# Patient Record
Sex: FEMALE | Race: WHITE | NOT HISPANIC OR LATINO | Employment: OTHER | URBAN - METROPOLITAN AREA
[De-identification: names, ages, dates, MRNs, and addresses within clinical notes are randomized per-mention and may not be internally consistent; named-entity substitution may affect disease eponyms.]

---

## 2017-02-16 ENCOUNTER — COMMUNICATION - HEALTHEAST (OUTPATIENT)
Dept: VASCULAR SURGERY | Facility: CLINIC | Age: 62
End: 2017-02-16

## 2017-02-16 ENCOUNTER — AMBULATORY - HEALTHEAST (OUTPATIENT)
Dept: VASCULAR SURGERY | Facility: CLINIC | Age: 62
End: 2017-02-16

## 2017-02-16 DIAGNOSIS — I83.813 VARICOSE VEINS OF BOTH LOWER EXTREMITIES WITH PAIN: ICD-10-CM

## 2017-02-27 ENCOUNTER — OFFICE VISIT - HEALTHEAST (OUTPATIENT)
Dept: VASCULAR SURGERY | Facility: CLINIC | Age: 62
End: 2017-02-27

## 2017-02-27 DIAGNOSIS — I83.813 VARICOSE VEINS OF BOTH LOWER EXTREMITIES WITH PAIN: ICD-10-CM

## 2017-02-27 DIAGNOSIS — M79.604 LEG PAIN, BILATERAL: ICD-10-CM

## 2017-02-27 DIAGNOSIS — M79.605 LEG PAIN, BILATERAL: ICD-10-CM

## 2017-02-28 ENCOUNTER — OFFICE VISIT - HEALTHEAST (OUTPATIENT)
Dept: VASCULAR SURGERY | Facility: CLINIC | Age: 62
End: 2017-02-28

## 2017-02-28 DIAGNOSIS — I87.2 VENOUS INSUFFICIENCY OF BOTH LOWER EXTREMITIES: ICD-10-CM

## 2017-02-28 DIAGNOSIS — I83.893 SYMPTOMATIC VARICOSE VEINS OF BOTH LOWER EXTREMITIES: ICD-10-CM

## 2017-03-14 ENCOUNTER — COMMUNICATION - HEALTHEAST (OUTPATIENT)
Dept: VASCULAR SURGERY | Facility: CLINIC | Age: 62
End: 2017-03-14

## 2017-03-14 ENCOUNTER — AMBULATORY - HEALTHEAST (OUTPATIENT)
Dept: VASCULAR SURGERY | Facility: CLINIC | Age: 62
End: 2017-03-14

## 2017-03-16 ENCOUNTER — COMMUNICATION - HEALTHEAST (OUTPATIENT)
Dept: VASCULAR SURGERY | Facility: CLINIC | Age: 62
End: 2017-03-16

## 2017-03-20 ENCOUNTER — HOSPITAL ENCOUNTER (OUTPATIENT)
Dept: MAMMOGRAPHY | Facility: CLINIC | Age: 62
Discharge: HOME | End: 2017-03-20
Attending: NURSE PRACTITIONER

## 2017-03-20 DIAGNOSIS — Z12.31 VISIT FOR SCREENING MAMMOGRAM: ICD-10-CM

## 2017-03-22 ENCOUNTER — RECORDS - HEALTHEAST (OUTPATIENT)
Dept: VASCULAR ULTRASOUND | Facility: CLINIC | Age: 62
End: 2017-03-22

## 2017-03-22 DIAGNOSIS — I83.893 VARICOSE VEINS OF BILATERAL LOWER EXTREMITIES WITH OTHER COMPLICATIONS: ICD-10-CM

## 2017-03-22 DIAGNOSIS — I87.2 VENOUS INSUFFICIENCY (CHRONIC) (PERIPHERAL): ICD-10-CM

## 2017-03-24 ENCOUNTER — RECORDS - HEALTHEAST (OUTPATIENT)
Dept: VASCULAR ULTRASOUND | Facility: CLINIC | Age: 62
End: 2017-03-24

## 2017-03-24 ENCOUNTER — RECORDS - HEALTHEAST (OUTPATIENT)
Dept: ADMINISTRATIVE | Facility: OTHER | Age: 62
End: 2017-03-24

## 2017-03-24 DIAGNOSIS — I87.2 VENOUS INSUFFICIENCY (CHRONIC) (PERIPHERAL): ICD-10-CM

## 2017-03-24 DIAGNOSIS — I83.893 VARICOSE VEINS OF BILATERAL LOWER EXTREMITIES WITH OTHER COMPLICATIONS: ICD-10-CM

## 2017-03-27 ENCOUNTER — AMBULATORY - HEALTHEAST (OUTPATIENT)
Dept: VASCULAR SURGERY | Facility: CLINIC | Age: 62
End: 2017-03-27

## 2017-04-11 ENCOUNTER — OFFICE VISIT - HEALTHEAST (OUTPATIENT)
Dept: VASCULAR SURGERY | Facility: CLINIC | Age: 62
End: 2017-04-11

## 2017-04-11 DIAGNOSIS — I83.893 SYMPTOMATIC VARICOSE VEINS OF BOTH LOWER EXTREMITIES: ICD-10-CM

## 2017-06-02 ENCOUNTER — TELEPHONE (OUTPATIENT)
Dept: OPHTHALMOLOGY | Facility: CLINIC | Age: 62
End: 2017-06-02

## 2017-06-02 ENCOUNTER — COMMUNICATION - HEALTHEAST (OUTPATIENT)
Dept: FAMILY MEDICINE | Facility: CLINIC | Age: 62
End: 2017-06-02

## 2017-06-02 NOTE — TELEPHONE ENCOUNTER
Pt last seen at eye clinic years ago  Pt states h/o retina holes  Has had right eye floaters for couple years  Tuesday evening while cooking started having visual disturbance  Peripheral vision 'scrambled; and center clear  Lasted about 5 minutes or so until normalized-- has not reoccurred  No new floaters and no consistant flashing-- no flashing at night, no flashing with eye movement  Pt's vision back to baseline   Reviewed s/s of ocular migraine and s/s of vitreous changes  Scheduled evaluation with Dr. Pierre/Alisa next Tuesday for evaluation  Asked pt to call if has any reoccurrances of visual disturbances, new floaters, new flashing  Reviewed weekend on call protocols  Pt verbally demonstrated understanding and seemed satisfied with plan  William Higginbotham RN 12:44 PM 06/02/17

## 2017-06-02 NOTE — TELEPHONE ENCOUNTER
----- Message from William Higginbotham RN sent at 6/2/2017 11:26 AM CDT -----  Regarding: FW: PT SEEING FLOATERS AND VISION IS SPOTTY  Contact: [phone]   Left message with direct number at [phone]  William Higginbotham RN 11:26 AM 06/02/17    ----- Message -----     From: Kendy Manzano     Sent: 6/2/2017  10:38 AM       To: Eye Triage-[facility]  Subject: PT SEEING FLOATERS AND VISION IS SPOTTY          Pt is seeing floaters and vision is like pieces of a puzzle and getting worse. Per Pt, this started on Tuesday of this week and wanted to speak with a nurse.     Please give Pt a call back at [phone].    Thank you,    Kendy  Call Center    Please DO NOT send message and or reply back to sender. Call center Representatives DO NOT respond to Messages.

## 2017-06-05 ENCOUNTER — AMBULATORY - HEALTHEAST (OUTPATIENT)
Dept: FAMILY MEDICINE | Facility: CLINIC | Age: 62
End: 2017-06-05

## 2017-06-05 DIAGNOSIS — H54.7 DECREASED VISUAL ACUITY: ICD-10-CM

## 2017-06-06 ENCOUNTER — OFFICE VISIT (OUTPATIENT)
Dept: OPHTHALMOLOGY | Facility: CLINIC | Age: 62
End: 2017-06-06
Attending: OPHTHALMOLOGY
Payer: COMMERCIAL

## 2017-06-06 ENCOUNTER — AMBULATORY - HEALTHEAST (OUTPATIENT)
Dept: NURSING | Facility: CLINIC | Age: 62
End: 2017-06-06

## 2017-06-06 DIAGNOSIS — H35.413 LATTICE DEGENERATION OF PERIPHERAL RETINA, BILATERAL: ICD-10-CM

## 2017-06-06 DIAGNOSIS — M81.0 SENILE OSTEOPOROSIS: ICD-10-CM

## 2017-06-06 DIAGNOSIS — G43.109 MIGRAINE WITH TYPICAL AURA: Primary | ICD-10-CM

## 2017-06-06 PROCEDURE — 99212 OFFICE O/P EST SF 10 MIN: CPT | Mod: ZF

## 2017-06-06 ASSESSMENT — VISUAL ACUITY
OS_CC: 20/20
OD_CC+: -1
OD_CC: 20/20
METHOD: SNELLEN - LINEAR

## 2017-06-06 ASSESSMENT — REFRACTION_WEARINGRX
OS_CYLINDER: +1.25
OD_ADD: +2.50
OD_CYLINDER: +0.50
OD_AXIS: 012
OS_AXIS: 024
OS_SPHERE: +2.00
OS_ADD: +2.50
OD_SPHERE: +4.75

## 2017-06-06 ASSESSMENT — CUP TO DISC RATIO
OD_RATIO: 0.5
OS_RATIO: 0.5

## 2017-06-06 ASSESSMENT — SLIT LAMP EXAM - LIDS
COMMENTS: NORMAL
COMMENTS: NORMAL

## 2017-06-06 ASSESSMENT — CONF VISUAL FIELD
OD_NORMAL: 1
OS_NORMAL: 1

## 2017-06-06 NOTE — NURSING NOTE
Chief Complaints and History of Present Illnesses   Patient presents with     Consult For     floaters,retina holes X2yrs  BE     HPI    Affected eye(s):  Both   Symptoms:     Floaters (Comment: BE for years no new floaters )   No flashes   No glare   No halos   Photophobia (Comment: at times)      Frequency:  Constant       Do you have eye pain now?:  No      Comments:  Floaters and retinal hole X years  1 week ago had distorted vision a scramble arch, started to get headache not sure how long lasted took meds and heat pad fell asleep.  Tanesha Bearden COA 9:06 AM June 6, 2017

## 2017-06-06 NOTE — PROGRESS NOTES
CC: Subjective Visual Disturbance    HPI: Soco Ferguson is a 61 year old female who presents for comprehensive eye exam. She says 1 week ago she had an episode in which her vision seemed as if it were a jigsaw puzzle. She also noticed a dark area which seemed to expand. There were some occasional flashes. Then she began to have a headache in the back of her head. The symptoms began over the course of minutes. She took some ibuprofen and then went to bed. She since has not had any symptoms.    POHx:  Lattice Degeneration  [facility]    Assessment & Plan   Soco Ferguson is a 61 year old female with the following diagnoses:     1. Migraine with Aura   - the patient describes a typical migraine aura   - she has a history of migraine headaches but not necessarily aura, discussed as people age their migraines can have more visual disturbance than headache   - discussed warning symptoms to seek further evaluation     2. Lattice Degeneration, Both Eyes   - stable    3. Refractive Error   - defers today    Return for yearly comprehensive eye exam    Discussed with Dr. Cuellar,    Julio Pierre MD PGY-3  Ophthalmology    Attending Physician Attestation:  Complete documentation of historical and exam elements from today's encounter can be found in the full encounter summary report (not reduplicated in this progress note).  I personally obtained the chief complaint(s) and history of present illness.  I confirmed and edited as necessary the review of systems, past medical/surgical history, family history, social history, and examination findings as documented by others; and I examined the patient myself.  I personally reviewed the relevant tests, images, and reports as documented above.  I formulated and edited as necessary the assessment and plan and discussed the findings and management plan with the patient and family. . - Mitchel Cuellar MD

## 2017-06-06 NOTE — MR AVS SNAPSHOT
After Visit Summary   2017    Soco Ferguson    MRN: [id number]           Patient Information     Date Of Birth          1955        Visit Information        Provider Department      2017 8:30 AM Julio Pierre MD [facility]        Today's Diagnoses     Migraine with typical aura    -  1       Follow-ups after your visit        Follow-up notes from your care team     Return in about 1 year (around 2018) for sooner if new symptoms.      Who to contact     Please call your clinic at [phone] to:    Ask questions about your health    Make or cancel appointments    Discuss your medicines    Learn about your test results    Speak to your doctor   If you have compliments or concerns about an experience at your clinic, or if you wish to file a complaint, please contact [facility] Patient Relations at [phone] or email us at [email or web address]         Additional Information About Your Visit        MyChart Information     Digitwhizt is an electronic gateway that provides easy, online access to your medical records. With [employer], you can request a clinic appointment, read your test results, renew a prescription or communicate with your care team.     To sign up for Digitwhizt visit the website at [email or web address]   You will be asked to enter the access code listed below, as well as some personal information. Please follow the directions to create your username and password.     Your access code is: [email or web address]  Expires: 9/3/2017  6:31 AM     Your access code will  in 90 days. If you need help or a new code, please contact your [facility] or call [phone] for assistance.        Care EveryWhere ID     This is your Care EveryWhere ID. This could be used by other organizations to access your [place] medical records  [id number]         Blood Pressure from Last 3 Encounters:   No data found for  BP    Weight from Last 3 Encounters:   No data found for Wt              Today, you had the following     No orders found for display       Primary Care Provider Office Phone # Fax #    Lachelle Odom [phone]       [place] [address] [facility] [address]  [facility] [address]        Thank you!     Thank you for choosing EYE CLINIC  for your care. Our goal is always to provide you with excellent care. Hearing back from our patients is one way we can continue to improve our services. Please take a few minutes to complete the written survey that you may receive in the mail after your visit with us. Thank you!             Your Updated Medication List - Protect others around you: Learn how to safely use, store and throw away your medicines at www.disposemymeds.org.          This list is accurate as of: 6/6/17 10:20 AM.  Always use your most recent med list.                   Brand Name Dispense Instructions for use    [employer] CALCIUM CITRATE +D3 MINI 200-250 MG-UNIT Tabs   Generic drug:  Calcium Citrate-Vitamin D      Take 1 tablet by mouth       D 2000 2000 UNITS tablet   Generic drug:  cholecalciferol      Take 1 tablet by mouth       denosumab 60 MG/ML Soln injection    PROLIA     Inject 60 mg Subcutaneous       docusate 50 MG/5ML liquid    COLACE

## 2017-06-08 ENCOUNTER — COMMUNICATION - HEALTHEAST (OUTPATIENT)
Dept: INTERNAL MEDICINE | Facility: CLINIC | Age: 62
End: 2017-06-08

## 2017-06-08 DIAGNOSIS — M81.0 OSTEOPOROSIS: ICD-10-CM

## 2017-06-22 ENCOUNTER — COMMUNICATION - HEALTHEAST (OUTPATIENT)
Dept: VASCULAR SURGERY | Facility: CLINIC | Age: 62
End: 2017-06-22

## 2017-06-27 ENCOUNTER — COMMUNICATION - HEALTHEAST (OUTPATIENT)
Dept: FAMILY MEDICINE | Facility: CLINIC | Age: 62
End: 2017-06-27

## 2017-06-29 ENCOUNTER — OFFICE VISIT - HEALTHEAST (OUTPATIENT)
Dept: FAMILY MEDICINE | Facility: CLINIC | Age: 62
End: 2017-06-29

## 2017-06-29 ENCOUNTER — HOSPITAL ENCOUNTER (OUTPATIENT)
Dept: ULTRASOUND IMAGING | Facility: CLINIC | Age: 62
Discharge: HOME | End: 2017-06-29
Attending: NURSE PRACTITIONER

## 2017-06-29 DIAGNOSIS — N85.00 ENDOMETRIAL HYPERPLASIA, UNSPECIFIED: ICD-10-CM

## 2017-06-29 DIAGNOSIS — M81.0 SENILE OSTEOPOROSIS: ICD-10-CM

## 2017-06-29 DIAGNOSIS — E21.3 HYPERPARATHYROIDISM, UNSPECIFIED (H): ICD-10-CM

## 2017-06-29 DIAGNOSIS — E66.9 OBESITY: ICD-10-CM

## 2017-06-29 DIAGNOSIS — K51.919 ULCERATIVE COLITIS WITH COMPLICATION, UNSPECIFIED LOCATION (H): ICD-10-CM

## 2017-06-29 DIAGNOSIS — J30.9 ALLERGIC RHINITIS, UNSPECIFIED ALLERGIC RHINITIS TRIGGER, UNSPECIFIED RHINITIS SEASONALITY: ICD-10-CM

## 2017-06-29 DIAGNOSIS — K21.9 GASTROESOPHAGEAL REFLUX DISEASE, ESOPHAGITIS PRESENCE NOT SPECIFIED: ICD-10-CM

## 2017-06-29 DIAGNOSIS — Z00.00 ROUTINE GENERAL MEDICAL EXAMINATION AT A HEALTH CARE FACILITY: ICD-10-CM

## 2017-06-29 LAB
CHOLEST SERPL-MCNC: 247 MG/DL
FASTING STATUS PATIENT QL REPORTED: YES
HDLC SERPL-MCNC: 51 MG/DL
LDLC SERPL CALC-MCNC: 156 MG/DL
TRIGL SERPL-MCNC: 201 MG/DL

## 2017-06-29 ASSESSMENT — MIFFLIN-ST. JEOR: SCORE: 1258.04

## 2017-07-05 ENCOUNTER — AMBULATORY - HEALTHEAST (OUTPATIENT)
Dept: VASCULAR SURGERY | Facility: CLINIC | Age: 62
End: 2017-07-05

## 2017-07-05 ENCOUNTER — COMMUNICATION - HEALTHEAST (OUTPATIENT)
Dept: VASCULAR SURGERY | Facility: CLINIC | Age: 62
End: 2017-07-05

## 2017-07-05 DIAGNOSIS — I83.813 VARICOSE VEINS OF BOTH LOWER EXTREMITIES WITH PAIN: ICD-10-CM

## 2017-07-05 LAB
HPV INTERPRETATION - HISTORICAL: NORMAL
HPV INTERPRETER - HISTORICAL: NORMAL

## 2017-07-13 ENCOUNTER — COMMUNICATION - HEALTHEAST (OUTPATIENT)
Dept: FAMILY MEDICINE | Facility: CLINIC | Age: 62
End: 2017-07-13

## 2017-07-25 ENCOUNTER — COMMUNICATION - HEALTHEAST (OUTPATIENT)
Dept: FAMILY MEDICINE | Facility: CLINIC | Age: 62
End: 2017-07-25

## 2017-07-26 ENCOUNTER — COMMUNICATION - HEALTHEAST (OUTPATIENT)
Dept: SCHEDULING | Facility: CLINIC | Age: 62
End: 2017-07-26

## 2017-08-23 ENCOUNTER — OFFICE VISIT - HEALTHEAST (OUTPATIENT)
Dept: VASCULAR SURGERY | Facility: CLINIC | Age: 62
End: 2017-08-23

## 2017-08-23 DIAGNOSIS — I83.893 SYMPTOMATIC VARICOSE VEINS OF BOTH LOWER EXTREMITIES: ICD-10-CM

## 2017-08-23 DIAGNOSIS — I87.2 VENOUS INSUFFICIENCY OF BOTH LOWER EXTREMITIES: ICD-10-CM

## 2017-08-25 ENCOUNTER — COMMUNICATION - HEALTHEAST (OUTPATIENT)
Dept: FAMILY MEDICINE | Facility: CLINIC | Age: 62
End: 2017-08-25

## 2017-08-31 ENCOUNTER — OFFICE VISIT - HEALTHEAST (OUTPATIENT)
Dept: VASCULAR SURGERY | Facility: CLINIC | Age: 62
End: 2017-08-31

## 2017-08-31 DIAGNOSIS — M79.604 LEG PAIN, BILATERAL: ICD-10-CM

## 2017-08-31 DIAGNOSIS — I83.813 VARICOSE VEINS OF BOTH LOWER EXTREMITIES WITH PAIN: ICD-10-CM

## 2017-08-31 DIAGNOSIS — S86.892A SHIN SPLINT, LEFT, INITIAL ENCOUNTER: ICD-10-CM

## 2017-08-31 DIAGNOSIS — Q66.70 HIGH ARCHES, CONGENITAL: ICD-10-CM

## 2017-08-31 DIAGNOSIS — M79.605 LEG PAIN, BILATERAL: ICD-10-CM

## 2017-08-31 ASSESSMENT — MIFFLIN-ST. JEOR: SCORE: 1244.89

## 2017-09-01 ENCOUNTER — COMMUNICATION - HEALTHEAST (OUTPATIENT)
Dept: VASCULAR SURGERY | Facility: CLINIC | Age: 62
End: 2017-09-01

## 2017-09-20 ENCOUNTER — COMMUNICATION - HEALTHEAST (OUTPATIENT)
Dept: VASCULAR SURGERY | Facility: CLINIC | Age: 62
End: 2017-09-20

## 2017-10-04 ENCOUNTER — RECORDS - HEALTHEAST (OUTPATIENT)
Dept: ADMINISTRATIVE | Facility: OTHER | Age: 62
End: 2017-10-04

## 2017-10-04 ENCOUNTER — RECORDS - HEALTHEAST (OUTPATIENT)
Dept: LAB | Facility: HOSPITAL | Age: 62
End: 2017-10-04

## 2017-10-05 LAB — ANA SER QL: 0.2 U

## 2017-10-10 LAB
SS-A/RO AUTOANTIBODIES - HISTORICAL: 2 EU
SS-B/LA AUTOANTIBODIES - HISTORICAL: 0 EU

## 2017-11-01 ENCOUNTER — COMMUNICATION - HEALTHEAST (OUTPATIENT)
Dept: FAMILY MEDICINE | Facility: CLINIC | Age: 62
End: 2017-11-01

## 2017-11-06 ENCOUNTER — AMBULATORY - HEALTHEAST (OUTPATIENT)
Dept: VASCULAR SURGERY | Facility: CLINIC | Age: 62
End: 2017-11-06

## 2017-11-06 ENCOUNTER — AMBULATORY - HEALTHEAST (OUTPATIENT)
Dept: FAMILY MEDICINE | Facility: CLINIC | Age: 62
End: 2017-11-06

## 2017-11-06 ENCOUNTER — COMMUNICATION - HEALTHEAST (OUTPATIENT)
Dept: VASCULAR SURGERY | Facility: CLINIC | Age: 62
End: 2017-11-06

## 2017-11-06 DIAGNOSIS — J31.0 CHRONIC RHINITIS, UNSPECIFIED TYPE: ICD-10-CM

## 2017-11-06 DIAGNOSIS — I83.813 VARICOSE VEINS OF BOTH LOWER EXTREMITIES WITH PAIN: ICD-10-CM

## 2017-11-06 DIAGNOSIS — M79.605 LEFT LEG PAIN: ICD-10-CM

## 2017-11-13 ENCOUNTER — RECORDS - HEALTHEAST (OUTPATIENT)
Dept: ADMINISTRATIVE | Facility: OTHER | Age: 62
End: 2017-11-13

## 2017-11-13 ENCOUNTER — OFFICE VISIT - HEALTHEAST (OUTPATIENT)
Dept: VASCULAR SURGERY | Facility: CLINIC | Age: 62
End: 2017-11-13

## 2017-11-13 DIAGNOSIS — Q66.70 HIGH ARCHES, CONGENITAL: ICD-10-CM

## 2017-11-13 DIAGNOSIS — M79.605 LEFT LEG PAIN: ICD-10-CM

## 2017-11-13 DIAGNOSIS — I83.813 VARICOSE VEINS OF BOTH LOWER EXTREMITIES WITH PAIN: ICD-10-CM

## 2017-11-13 DIAGNOSIS — S86.892A SHIN SPLINT, LEFT, INITIAL ENCOUNTER: ICD-10-CM

## 2017-11-13 ASSESSMENT — MIFFLIN-ST. JEOR: SCORE: 1244.89

## 2017-12-06 ENCOUNTER — RECORDS - HEALTHEAST (OUTPATIENT)
Dept: BONE DENSITY | Facility: CLINIC | Age: 62
End: 2017-12-06

## 2017-12-06 ENCOUNTER — RECORDS - HEALTHEAST (OUTPATIENT)
Dept: ADMINISTRATIVE | Facility: OTHER | Age: 62
End: 2017-12-06

## 2017-12-06 DIAGNOSIS — M81.0 AGE-RELATED OSTEOPOROSIS WITHOUT CURRENT PATHOLOGICAL FRACTURE: ICD-10-CM

## 2018-01-10 ENCOUNTER — OFFICE VISIT - HEALTHEAST (OUTPATIENT)
Dept: INTERNAL MEDICINE | Facility: CLINIC | Age: 63
End: 2018-01-10

## 2018-01-10 DIAGNOSIS — M81.0 SENILE OSTEOPOROSIS: ICD-10-CM

## 2018-02-01 ENCOUNTER — OFFICE VISIT - HEALTHEAST (OUTPATIENT)
Dept: OTOLARYNGOLOGY | Facility: CLINIC | Age: 63
End: 2018-02-01

## 2018-02-01 DIAGNOSIS — G50.1 ATYPICAL FACE PAIN: ICD-10-CM

## 2018-02-01 DIAGNOSIS — J37.0 CHRONIC LARYNGITIS: ICD-10-CM

## 2018-02-01 DIAGNOSIS — K21.9 LARYNGOPHARYNGEAL REFLUX (LPR): ICD-10-CM

## 2018-02-01 DIAGNOSIS — J30.1 CHRONIC SEASONAL ALLERGIC RHINITIS DUE TO POLLEN: ICD-10-CM

## 2018-02-14 ENCOUNTER — RECORDS - HEALTHEAST (OUTPATIENT)
Dept: ADMINISTRATIVE | Facility: OTHER | Age: 63
End: 2018-02-14

## 2018-02-15 ENCOUNTER — OFFICE VISIT - HEALTHEAST (OUTPATIENT)
Dept: VASCULAR SURGERY | Facility: CLINIC | Age: 63
End: 2018-02-15

## 2018-02-15 DIAGNOSIS — I83.813 VARICOSE VEINS OF BOTH LOWER EXTREMITIES WITH PAIN: ICD-10-CM

## 2018-02-15 DIAGNOSIS — Q66.70 HIGH ARCHES, CONGENITAL: ICD-10-CM

## 2018-02-15 DIAGNOSIS — M79.605 LEFT LEG PAIN: ICD-10-CM

## 2018-02-15 ASSESSMENT — MIFFLIN-ST. JEOR: SCORE: 1204.06

## 2018-03-27 ENCOUNTER — HOSPITAL ENCOUNTER (OUTPATIENT)
Dept: MAMMOGRAPHY | Facility: CLINIC | Age: 63
Discharge: HOME | End: 2018-03-27
Attending: NURSE PRACTITIONER

## 2018-03-27 DIAGNOSIS — Z12.31 VISIT FOR SCREENING MAMMOGRAM: ICD-10-CM

## 2018-06-19 ENCOUNTER — AMBULATORY - HEALTHEAST (OUTPATIENT)
Dept: INTERNAL MEDICINE | Facility: CLINIC | Age: 63
End: 2018-06-19

## 2018-06-19 DIAGNOSIS — M81.0 SENILE OSTEOPOROSIS: ICD-10-CM

## 2018-07-13 ENCOUNTER — AMBULATORY - HEALTHEAST (OUTPATIENT)
Dept: NURSING | Facility: CLINIC | Age: 63
End: 2018-07-13

## 2018-10-09 ENCOUNTER — COMMUNICATION - HEALTHEAST (OUTPATIENT)
Dept: VASCULAR SURGERY | Facility: CLINIC | Age: 63
End: 2018-10-09

## 2018-10-09 DIAGNOSIS — Q66.70 HIGH ARCHES, CONGENITAL: ICD-10-CM

## 2018-10-09 DIAGNOSIS — I83.813 VARICOSE VEINS OF BOTH LOWER EXTREMITIES WITH PAIN: ICD-10-CM

## 2018-10-09 DIAGNOSIS — G25.81 RESTLESS LEGS SYNDROME (RLS): ICD-10-CM

## 2018-10-09 DIAGNOSIS — M79.605 LEFT LEG PAIN: ICD-10-CM

## 2018-10-11 ENCOUNTER — RECORDS - HEALTHEAST (OUTPATIENT)
Dept: ADMINISTRATIVE | Facility: OTHER | Age: 63
End: 2018-10-11

## 2018-11-25 ENCOUNTER — COMMUNICATION - HEALTHEAST (OUTPATIENT)
Dept: FAMILY MEDICINE | Facility: CLINIC | Age: 63
End: 2018-11-25

## 2019-01-31 ENCOUNTER — AMBULATORY - HEALTHEAST (OUTPATIENT)
Dept: NURSING | Facility: CLINIC | Age: 64
End: 2019-01-31

## 2019-01-31 ENCOUNTER — OFFICE VISIT - HEALTHEAST (OUTPATIENT)
Dept: FAMILY MEDICINE | Facility: CLINIC | Age: 64
End: 2019-01-31

## 2019-01-31 DIAGNOSIS — J02.9 PHARYNGITIS, UNSPECIFIED ETIOLOGY: ICD-10-CM

## 2019-01-31 LAB — DEPRECATED S PYO AG THROAT QL EIA: NORMAL

## 2019-02-01 LAB — GROUP A STREP BY PCR: NORMAL

## 2019-02-14 ENCOUNTER — RECORDS - HEALTHEAST (OUTPATIENT)
Dept: ADMINISTRATIVE | Facility: OTHER | Age: 64
End: 2019-02-14

## 2019-02-14 ENCOUNTER — OFFICE VISIT - HEALTHEAST (OUTPATIENT)
Dept: VASCULAR SURGERY | Facility: CLINIC | Age: 64
End: 2019-02-14

## 2019-02-14 DIAGNOSIS — Q66.70 HIGH ARCHES, CONGENITAL: ICD-10-CM

## 2019-02-14 DIAGNOSIS — I83.813 VARICOSE VEINS OF BOTH LOWER EXTREMITIES WITH PAIN: ICD-10-CM

## 2019-02-14 DIAGNOSIS — I87.2 VENOUS INSUFFICIENCY OF BOTH LOWER EXTREMITIES: ICD-10-CM

## 2019-02-14 ASSESSMENT — MIFFLIN-ST. JEOR: SCORE: 1249.42

## 2019-02-15 ENCOUNTER — COMMUNICATION - HEALTHEAST (OUTPATIENT)
Dept: OTHER | Facility: CLINIC | Age: 64
End: 2019-02-15

## 2019-02-20 ENCOUNTER — COMMUNICATION - HEALTHEAST (OUTPATIENT)
Dept: OTHER | Facility: CLINIC | Age: 64
End: 2019-02-20

## 2019-02-21 ENCOUNTER — AMBULATORY - HEALTHEAST (OUTPATIENT)
Dept: VASCULAR SURGERY | Facility: CLINIC | Age: 64
End: 2019-02-21

## 2019-02-21 DIAGNOSIS — M79.604 BILATERAL LEG PAIN: ICD-10-CM

## 2019-02-21 DIAGNOSIS — I83.813 VARICOSE VEINS OF BOTH LOWER EXTREMITIES WITH PAIN: ICD-10-CM

## 2019-02-21 DIAGNOSIS — M79.605 BILATERAL LEG PAIN: ICD-10-CM

## 2019-02-21 DIAGNOSIS — Q66.70 HIGH ARCH: ICD-10-CM

## 2019-04-26 ENCOUNTER — HOSPITAL ENCOUNTER (OUTPATIENT)
Dept: MAMMOGRAPHY | Facility: CLINIC | Age: 64
Discharge: HOME | End: 2019-04-26
Attending: NURSE PRACTITIONER

## 2019-04-26 DIAGNOSIS — Z12.31 VISIT FOR SCREENING MAMMOGRAM: ICD-10-CM

## 2019-05-03 ENCOUNTER — COMMUNICATION - HEALTHEAST (OUTPATIENT)
Dept: INTERNAL MEDICINE | Facility: CLINIC | Age: 64
End: 2019-05-03

## 2019-05-03 DIAGNOSIS — M81.0 SENILE OSTEOPOROSIS: ICD-10-CM

## 2019-05-30 PROBLEM — K51.90 ULCERATIVE COLITIS (H): Status: ACTIVE | Noted: 2019-05-30

## 2019-05-30 PROBLEM — G25.81 RESTLESS LEGS SYNDROME: Status: ACTIVE | Noted: 2019-05-30

## 2019-05-30 PROBLEM — N85.00 ENDOMETRIAL HYPERPLASIA: Status: ACTIVE | Noted: 2019-05-30

## 2019-05-30 PROBLEM — D25.9 UTERINE LEIOMYOMA: Status: ACTIVE | Noted: 2019-05-30

## 2019-05-30 PROBLEM — M81.0 SENILE OSTEOPOROSIS: Status: ACTIVE | Noted: 2019-05-30

## 2019-05-30 PROBLEM — E21.3 HYPERPARATHYROIDISM (H): Status: ACTIVE | Noted: 2019-05-30

## 2019-05-30 PROBLEM — B40.9 BLASTOMYCOSIS: Status: ACTIVE | Noted: 2019-05-30

## 2019-05-30 NOTE — PROGRESS NOTES
HPI:  Patient presents for an annual eye exam - she is interested in new glasses      Pertinent Medical History:    Blastomycosis    Hyperparathyroidism    Ulcerative colitis    Leiomyoma of uterus    Ocular History:    Migraine with Aura    Lattice Degeneration both eyes    Eye Medications:    None    Assessment and Plan:  1.   Narrow Angle, both eyes.     Gonioscopy shows angles open to PTM both eyes - can see scleral spur with depression.     IOP is not elevated today.     Risk factors include: hyperopia and cataract    LPI consult with Daniel Olson/Alisa/Susanne.     Signs and symptoms of angle closure discussed - patient is to be seen immediately if experiencing symptoms.     2.   Migraine with Aura    Stable as before.     3.   Lattice Degeneration, both eyes.     Re-assess at a later juncture. Not able to dilate secondary to narrow angle.     4.   Hyperopia, both eyes.     Hold off on prescription for now.       Medical History:  No past medical history on file.    Medications:  Current Outpatient Medications   Medication Sig Dispense Refill     Calcium Citrate-Vitamin D (CVS CALCIUM CITRATE +D3 MINI) 200-250 MG-UNIT TABS Take 1 tablet by mouth       cholecalciferol (D 2000) 2000 UNITS tablet Take 1 tablet by mouth       denosumab (PROLIA) 60 MG/ML SOLN injection Inject 60 mg Subcutaneous       docusate (COLACE) 50 MG/5ML liquid      Complete documentation of historical and exam elements from today's encounter can be found in the full encounter summary report (not reduplicated in this progress note). I personally obtained the chief complaint(s) and history of present illness.  I confirmed and edited as necessary the review of systems, past medical/surgical history, family history, social history, and examination findings as documented by others; and I examined the patient myself. I personally reviewed the relevant tests, images, and reports as documented above. I formulated and edited as necessary the  assessment and plan and discussed the findings and management plan with the patient and family. - Janine Ivy OD

## 2019-05-31 ENCOUNTER — OFFICE VISIT (OUTPATIENT)
Dept: OPHTHALMOLOGY | Facility: CLINIC | Age: 64
End: 2019-05-31
Attending: OPHTHALMOLOGY
Payer: COMMERCIAL

## 2019-05-31 DIAGNOSIS — H25.813 COMBINED FORMS OF AGE-RELATED CATARACT OF BOTH EYES: ICD-10-CM

## 2019-05-31 DIAGNOSIS — H40.033 ANATOMICAL NARROW ANGLE BORDERLINE GLAUCOMA OF BOTH EYES: Primary | ICD-10-CM

## 2019-05-31 DIAGNOSIS — H52.03 HYPERMETROPIA OF BOTH EYES: ICD-10-CM

## 2019-05-31 PROBLEM — M26.639 ARTICULAR DISC DISORDER OF TEMPOROMANDIBULAR JOINT: Status: ACTIVE | Noted: 2018-02-14

## 2019-05-31 PROBLEM — M79.18 MYOFASCIAL PAIN: Status: ACTIVE | Noted: 2018-02-14

## 2019-05-31 PROCEDURE — 92020 GONIOSCOPY: CPT | Mod: ZF | Performed by: OPTOMETRIST

## 2019-05-31 PROCEDURE — 92015 DETERMINE REFRACTIVE STATE: CPT | Mod: ZF

## 2019-05-31 PROCEDURE — G0463 HOSPITAL OUTPT CLINIC VISIT: HCPCS | Mod: ZF

## 2019-05-31 ASSESSMENT — GONIOSCOPY
OD_TEMPORAL: PTM
OD_NASAL: PTM
OS_NASAL: PTM
OS_SUPERIOR: PTM
OD_SUPERIOR: PTM
OD_INFERIOR: PTM
OS_INFERIOR: PTM
OS_TEMPORAL: PTM

## 2019-05-31 ASSESSMENT — CUP TO DISC RATIO
OD_RATIO: 0.5
OS_RATIO: 0.5

## 2019-05-31 ASSESSMENT — TONOMETRY
OD_IOP_MMHG: 15
IOP_METHOD: APPLANATION
OS_IOP_MMHG: 17

## 2019-05-31 ASSESSMENT — REFRACTION_WEARINGRX
OD_AXIS: 012
OS_AXIS: 024
OS_ADD: +2.50
OS_SPHERE: +2.00
OD_CYLINDER: +0.50
OS_CYLINDER: +1.25
OD_ADD: +2.50
OD_SPHERE: +4.75

## 2019-05-31 ASSESSMENT — REFRACTION_MANIFEST
OD_CYLINDER: +0.75
OD_SPHERE: +4.25
OD_AXIS: 015
OS_CYLINDER: +1.00
OS_AXIS: 015
OD_ADD: +2.50
OS_SPHERE: +1.75
OS_ADD: +2.50

## 2019-05-31 ASSESSMENT — CONF VISUAL FIELD
OD_NORMAL: 1
METHOD: COUNTING FINGERS
OS_NORMAL: 1

## 2019-05-31 ASSESSMENT — VISUAL ACUITY
METHOD: SNELLEN - LINEAR
CORRECTION_TYPE: GLASSES
OD_CC: 20/25
OD_CC+: -1
OS_CC: 20/20

## 2019-05-31 ASSESSMENT — SLIT LAMP EXAM - LIDS
COMMENTS: NORMAL
COMMENTS: NORMAL

## 2019-05-31 NOTE — NURSING NOTE
Chief Complaints and History of Present Illnesses   Patient presents with     Yearly Exam     Chief Complaint(s) and History of Present Illness(es)     Yearly Exam     Laterality: both eyes    Onset: gradual    Onset: years ago    Quality: States va is the same since last visit      Frequency: constantly    Associated symptoms: Negative for dryness, redness, tearing, floaters and flashes    Pain scale: 0/10              Comments     Sera FRENCH 8:04 AM May 31, 2019

## 2019-06-10 ENCOUNTER — OFFICE VISIT (OUTPATIENT)
Dept: OPHTHALMOLOGY | Facility: CLINIC | Age: 64
End: 2019-06-10
Attending: OPTOMETRIST
Payer: COMMERCIAL

## 2019-06-10 DIAGNOSIS — H40.033 ANATOMICAL NARROW ANGLE BORDERLINE GLAUCOMA OF BOTH EYES: Primary | ICD-10-CM

## 2019-06-10 PROCEDURE — G0463 HOSPITAL OUTPT CLINIC VISIT: HCPCS | Mod: ZF

## 2019-06-10 PROCEDURE — 92020 GONIOSCOPY: CPT | Mod: ZF | Performed by: OPHTHALMOLOGY

## 2019-06-10 PROCEDURE — 25000125 ZZHC RX 250: Mod: ZF | Performed by: OPHTHALMOLOGY

## 2019-06-10 PROCEDURE — 66761 REVISION OF IRIS: CPT | Mod: RT,ZF | Performed by: OPHTHALMOLOGY

## 2019-06-10 RX ORDER — PREDNISOLONE ACETATE 10 MG/ML
1 SUSPENSION/ DROPS OPHTHALMIC 4 TIMES DAILY
Status: DISCONTINUED | OUTPATIENT
Start: 2019-06-10 | End: 2024-07-12

## 2019-06-10 RX ADMIN — PREDNISOLONE ACETATE 1 DROP: 10 SUSPENSION/ DROPS OPHTHALMIC at 10:01

## 2019-06-10 RX ADMIN — APRACLONIDINE HYDROCHLORIDE 1 DROP: 10 SOLUTION/ DROPS OPHTHALMIC at 09:11

## 2019-06-10 ASSESSMENT — VISUAL ACUITY
METHOD: SNELLEN - LINEAR
CORRECTION_TYPE: GLASSES
OS_CC: 20/20
OD_CC: 20/25

## 2019-06-10 ASSESSMENT — TONOMETRY
OS_IOP_MMHG: 17
IOP_METHOD: APPLANATION
OD_IOP_MMHG: 16

## 2019-06-10 ASSESSMENT — CUP TO DISC RATIO
OS_RATIO: 0.5
OD_RATIO: 0.5

## 2019-06-10 ASSESSMENT — REFRACTION_WEARINGRX
OD_AXIS: 012
OD_ADD: +2.50
OD_CYLINDER: +0.50
OS_ADD: +2.50
OS_AXIS: 024
OD_SPHERE: +4.75
OS_SPHERE: +2.00
OS_CYLINDER: +1.25

## 2019-06-10 ASSESSMENT — GONIOSCOPY
OS_SUPERIOR: B25B
OD_INFERIOR: B25B
OS_NASAL: B25B
OS_TEMPORAL: A25B
OD_NASAL: B25B
OD_TEMPORAL: A25B
OD_SUPERIOR: B25B
OS_INFERIOR: B25B

## 2019-06-10 ASSESSMENT — CONF VISUAL FIELD
OD_NORMAL: 1
OS_NORMAL: 1

## 2019-06-10 ASSESSMENT — PACHYMETRY
OS_CT(UM): 543
OD_CT(UM): 539

## 2019-06-10 ASSESSMENT — SLIT LAMP EXAM - LIDS
COMMENTS: NORMAL
COMMENTS: NORMAL

## 2019-06-10 NOTE — PROGRESS NOTES
HPI:  Soco Ferguson is a 63 year old female referred by Dr. Ivy for anatomical narrow angle of both eyes.     Pertinent Medical History:    Blastomycosis    Hyperparathyroidism    Ulcerative colitis    Leiomyoma of uterus    Ocular History:    Migraine with Aura    Lattice Degeneration both eyes    Assessment & Plan      (H40.033) Anatomical narrow angle borderline glaucoma of both eyes  (primary encounter diagnosis)  Comment: Appears borderline occludable in both eyes today in setting of high hyperopia and mild cataracts both eyes. Normal IOPs today and with Dr. Ivy.   Plan: Discussed r/b/a of LPI right eye, and she would like to proceed. Please see Imaging/Proc tab for procedure details. Return in 2-3 weeks for LPI left eye.     Return in about 3 weeks (around 7/1/2019) for LPI left eye. or sooner as needed.      Teaching statement:  Complete documentation of historical and exam elements from today's encounter can be found in the full encounter summary report (not reduplicated in this progress note). I personally obtained the chief complaint(s) and history of present illness.  I confirmed and edited as necessary the review of systems, past medical/surgical history, family history, social history, and examination findings as documented by others; and I examined the patient myself. I personally reviewed the relevant tests, images, and reports as documented above.     I formulated and edited as necessary the assessment and plan and discussed the findings and management plan with the patient and family.    Tabby Skinner MD  Comprehensive Ophthalmology & Ocular Pathology  Department of Ophthalmology and Visual Neurosciences  [email or web address]  Pager [phone]

## 2019-06-10 NOTE — NURSING NOTE
"Chief Complaints and History of Present Illnesses   Patient presents with     Consult For     Chief Complaint(s) and History of Present Illness(es)     Consult For     Laterality: both eyes              Comments     Patient here for an evaluation due to Anatomical narrow angle borderline glaucoma of both eyes. Her vision has been stable over the last 2 weeks. Denies eye pain each eye. Denies flashes each eye, has floaters \"sometimes\".   Deisy Cantor COA 7:45 AM Ayaka 10, 2019                   "

## 2019-06-24 ENCOUNTER — OFFICE VISIT (OUTPATIENT)
Dept: OPHTHALMOLOGY | Facility: CLINIC | Age: 64
End: 2019-06-24
Attending: OPHTHALMOLOGY
Payer: COMMERCIAL

## 2019-06-24 DIAGNOSIS — H40.033 ANATOMICAL NARROW ANGLE BORDERLINE GLAUCOMA OF BOTH EYES: Primary | ICD-10-CM

## 2019-06-24 PROCEDURE — G0463 HOSPITAL OUTPT CLINIC VISIT: HCPCS | Mod: ZF

## 2019-06-24 PROCEDURE — 66761 REVISION OF IRIS: CPT | Mod: LT,ZF | Performed by: OPHTHALMOLOGY

## 2019-06-24 RX ORDER — PREDNISOLONE ACETATE 10 MG/ML
1 SUSPENSION/ DROPS OPHTHALMIC 4 TIMES DAILY
Status: DISPENSED | OUTPATIENT
Start: 2019-06-24 | End: 2019-06-28

## 2019-06-24 ASSESSMENT — REFRACTION_WEARINGRX
OS_ADD: +2.50
OD_ADD: +2.50
OS_AXIS: 024
OD_CYLINDER: +0.50
OS_CYLINDER: +1.25
OS_SPHERE: +2.00
OD_SPHERE: +4.75
OD_AXIS: 012

## 2019-06-24 ASSESSMENT — CUP TO DISC RATIO
OS_RATIO: 0.5
OD_RATIO: 0.5

## 2019-06-24 ASSESSMENT — TONOMETRY
IOP_METHOD: APPLANATION
OS_IOP_MMHG: 20
OD_IOP_MMHG: 20

## 2019-06-24 ASSESSMENT — CONF VISUAL FIELD
OD_NORMAL: 1
OS_NORMAL: 1

## 2019-06-24 ASSESSMENT — SLIT LAMP EXAM - LIDS
COMMENTS: NORMAL
COMMENTS: NORMAL

## 2019-06-24 ASSESSMENT — VISUAL ACUITY
OS_CC: 20/20
OD_CC: 20/25
CORRECTION_TYPE: GLASSES
METHOD: SNELLEN - LINEAR

## 2019-06-24 NOTE — PROGRESS NOTES
HPI:  Soco Ferguson is a 63 year old female referred by Dr. Ivy for anatomical narrow angle of both eyes. She has done well since laser right eye without issues. No pain, redness, discharge. No flashes/floaters.     Pertinent Medical History:    Blastomycosis    Hyperparathyroidism    Ulcerative colitis    Leiomyoma of uterus    Ocular History:    Migraine with Aura    Lattice Degeneration both eyes    Assessment & Plan      (H40.033) Anatomical narrow angle borderline glaucoma of both eyes  (primary encounter diagnosis)  Comment: Appears borderline occludable in both eyes at last visit. Good appearance of LPI right eye today. In setting of high hyperopia and mild cataracts both eyes. Normal IOPs today and with Dr. Ivy.   Plan: Discussed r/b/a of LPI left eye, and she would like to proceed. Please see Imaging/Proc tab for procedure details.     Return in about 2 months (around 8/24/2019) for applanation IOP, dilation. or sooner as needed.      Teaching statement:  Complete documentation of historical and exam elements from today's encounter can be found in the full encounter summary report (not reduplicated in this progress note). I personally obtained the chief complaint(s) and history of present illness.  I confirmed and edited as necessary the review of systems, past medical/surgical history, family history, social history, and examination findings as documented by others; and I examined the patient myself. I personally reviewed the relevant tests, images, and reports as documented above.     I formulated and edited as necessary the assessment and plan and discussed the findings and management plan with the patient and family.    Tabby Skinner MD  Comprehensive Ophthalmology & Ocular Pathology  Department of Ophthalmology and Visual Neurosciences  [email or web address]  Pager [phone]

## 2019-06-24 NOTE — NURSING NOTE
Chief Complaints and History of Present Illnesses   Patient presents with     Follow Up     Chief Complaint(s) and History of Present Illness(es)     Follow Up               Comments     Anatomical narrow angle borderline glaucoma of both eyes-LPI LE today    Pt. States that she is doing well.  No change in VA BE.  No pain BE.  Lisette Holley COT 7:29 AM June 24, 2019

## 2019-08-02 ENCOUNTER — RECORDS - HEALTHEAST (OUTPATIENT)
Dept: BONE DENSITY | Facility: CLINIC | Age: 64
End: 2019-08-02

## 2019-08-02 ENCOUNTER — OFFICE VISIT - HEALTHEAST (OUTPATIENT)
Dept: INTERNAL MEDICINE | Facility: CLINIC | Age: 64
End: 2019-08-02

## 2019-08-02 ENCOUNTER — COMMUNICATION - HEALTHEAST (OUTPATIENT)
Dept: INTERNAL MEDICINE | Facility: CLINIC | Age: 64
End: 2019-08-02

## 2019-08-02 ENCOUNTER — RECORDS - HEALTHEAST (OUTPATIENT)
Dept: ADMINISTRATIVE | Facility: OTHER | Age: 64
End: 2019-08-02

## 2019-08-02 DIAGNOSIS — M81.0 AGE-RELATED OSTEOPOROSIS WITHOUT CURRENT PATHOLOGICAL FRACTURE: ICD-10-CM

## 2019-08-02 DIAGNOSIS — M81.0 SENILE OSTEOPOROSIS: ICD-10-CM

## 2019-08-02 LAB
ALBUMIN SERPL-MCNC: 4 G/DL (ref 3.5–5)
ALP SERPL-CCNC: 92 U/L (ref 45–120)
ALT SERPL W P-5'-P-CCNC: 17 U/L (ref 0–45)
ANION GAP SERPL CALCULATED.3IONS-SCNC: 10 MMOL/L (ref 5–18)
AST SERPL W P-5'-P-CCNC: 19 U/L (ref 0–40)
BILIRUB SERPL-MCNC: 0.4 MG/DL (ref 0–1)
BUN SERPL-MCNC: 26 MG/DL (ref 8–22)
CALCIUM SERPL-MCNC: 9.8 MG/DL (ref 8.5–10.5)
CALCIUM SERPL-MCNC: 9.9 MG/DL (ref 8.5–10.5)
CHLORIDE BLD-SCNC: 103 MMOL/L (ref 98–107)
CO2 SERPL-SCNC: 26 MMOL/L (ref 22–31)
CREAT SERPL-MCNC: 0.81 MG/DL (ref 0.6–1.1)
CREAT SERPL-MCNC: 0.82 MG/DL (ref 0.6–1.1)
ERYTHROCYTE [DISTWIDTH] IN BLOOD BY AUTOMATED COUNT: 12.8 % (ref 11–14.5)
GFR SERPL CREATININE-BSD FRML MDRD: >60 ML/MIN/1.73M2
GFR SERPL CREATININE-BSD FRML MDRD: >60 ML/MIN/1.73M2
GLUCOSE BLD-MCNC: 89 MG/DL (ref 70–125)
HCT VFR BLD AUTO: 42.9 % (ref 35–47)
HGB BLD-MCNC: 14.3 G/DL (ref 12–16)
MAGNESIUM SERPL-MCNC: 2.1 MG/DL (ref 1.8–2.6)
MCH RBC QN AUTO: 29.6 PG (ref 27–34)
MCHC RBC AUTO-ENTMCNC: 33.3 G/DL (ref 32–36)
MCV RBC AUTO: 89 FL (ref 80–100)
PHOSPHATE SERPL-MCNC: 3.8 MG/DL (ref 2.5–4.5)
PLATELET # BLD AUTO: 267 THOU/UL (ref 140–440)
PMV BLD AUTO: 7.6 FL (ref 7–10)
POTASSIUM BLD-SCNC: 4.3 MMOL/L (ref 3.5–5)
PROT SERPL-MCNC: 6.7 G/DL (ref 6–8)
PTH-INTACT SERPL-MCNC: 61 PG/ML (ref 10–86)
RBC # BLD AUTO: 4.81 MILL/UL (ref 3.8–5.4)
SODIUM SERPL-SCNC: 139 MMOL/L (ref 136–145)
TSH SERPL DL<=0.005 MIU/L-ACNC: 1.21 UIU/ML (ref 0.3–5)
WBC: 9 THOU/UL (ref 4–11)

## 2019-08-04 LAB — ALP BONE SERPL-MCNC: 14.9 UG/L

## 2019-08-05 ENCOUNTER — OFFICE VISIT - HEALTHEAST (OUTPATIENT)
Dept: FAMILY MEDICINE | Facility: CLINIC | Age: 64
End: 2019-08-05

## 2019-08-05 ENCOUNTER — HOSPITAL ENCOUNTER (OUTPATIENT)
Dept: ULTRASOUND IMAGING | Facility: CLINIC | Age: 64
Discharge: HOME | End: 2019-08-05

## 2019-08-05 ENCOUNTER — AMBULATORY - HEALTHEAST (OUTPATIENT)
Dept: LAB | Facility: CLINIC | Age: 64
End: 2019-08-05

## 2019-08-05 ENCOUNTER — OFFICE VISIT (OUTPATIENT)
Dept: OPHTHALMOLOGY | Facility: CLINIC | Age: 64
End: 2019-08-05
Attending: OPHTHALMOLOGY
Payer: COMMERCIAL

## 2019-08-05 DIAGNOSIS — H25.13 NUCLEAR SCLEROTIC CATARACT OF BOTH EYES: ICD-10-CM

## 2019-08-05 DIAGNOSIS — H52.203 HYPEROPIC ASTIGMATISM OF BOTH EYES: ICD-10-CM

## 2019-08-05 DIAGNOSIS — M79.622 PAIN OF LEFT UPPER ARM: ICD-10-CM

## 2019-08-05 DIAGNOSIS — M81.0 SENILE OSTEOPOROSIS: ICD-10-CM

## 2019-08-05 DIAGNOSIS — H40.033 ANATOMICAL NARROW ANGLE BORDERLINE GLAUCOMA OF BOTH EYES: Primary | ICD-10-CM

## 2019-08-05 DIAGNOSIS — H52.4 PRESBYOPIA: ICD-10-CM

## 2019-08-05 DIAGNOSIS — T14.8XXA BRUISING: ICD-10-CM

## 2019-08-05 LAB
25(OH)D3 SERPL-MCNC: 29.6 NG/ML (ref 30–80)
25(OH)D3 SERPL-MCNC: 29.6 NG/ML (ref 30–80)
BASOPHILS # BLD AUTO: 0 THOU/UL (ref 0–0.2)
BASOPHILS NFR BLD AUTO: 1 % (ref 0–2)
CALCIUM 24H UR-MRATE: 127 MG/24HR (ref 20–275)
CALCIUM UR-MCNC: 10.4 MG/DL
EOSINOPHIL # BLD AUTO: 0.5 THOU/UL (ref 0–0.4)
EOSINOPHIL NFR BLD AUTO: 7 % (ref 0–6)
ERYTHROCYTE [DISTWIDTH] IN BLOOD BY AUTOMATED COUNT: 12.9 % (ref 11–14.5)
GLIADIN IGA SER-ACNC: 1 U/ML
GLIADIN IGG SER-ACNC: 0.6 U/ML
HCT VFR BLD AUTO: 43.3 % (ref 35–47)
HGB BLD-MCNC: 14.5 G/DL (ref 12–16)
IGA SERPL-MCNC: 182 MG/DL (ref 65–400)
LYMPHOCYTES # BLD AUTO: 1.7 THOU/UL (ref 0.8–4.4)
LYMPHOCYTES NFR BLD AUTO: 26 % (ref 20–40)
MCH RBC QN AUTO: 29.8 PG (ref 27–34)
MCHC RBC AUTO-ENTMCNC: 33.4 G/DL (ref 32–36)
MCV RBC AUTO: 89 FL (ref 80–100)
MONOCYTES # BLD AUTO: 0.7 THOU/UL (ref 0–0.9)
MONOCYTES NFR BLD AUTO: 10 % (ref 2–10)
NEUTROPHILS # BLD AUTO: 3.7 THOU/UL (ref 2–7.7)
NEUTROPHILS NFR BLD AUTO: 56 % (ref 50–70)
PLATELET # BLD AUTO: 263 THOU/UL (ref 140–440)
PMV BLD AUTO: 7.3 FL (ref 7–10)
RBC # BLD AUTO: 4.85 MILL/UL (ref 3.8–5.4)
SPECIMEN VOL UR: 1225 ML
TTG IGA SER-ACNC: 0.4 U/ML
TTG IGG SER-ACNC: <0.6 U/ML
WBC: 6.6 THOU/UL (ref 4–11)

## 2019-08-05 PROCEDURE — G0463 HOSPITAL OUTPT CLINIC VISIT: HCPCS | Mod: ZF

## 2019-08-05 ASSESSMENT — REFRACTION_WEARINGRX
OS_AXIS: 024
OD_SPHERE: +4.75
OD_CYLINDER: +0.50
OD_AXIS: 012
OD_ADD: +2.50
OS_ADD: +2.50
OS_SPHERE: +2.00
OS_CYLINDER: +1.25

## 2019-08-05 ASSESSMENT — VISUAL ACUITY
METHOD: SNELLEN - LINEAR
CORRECTION_TYPE: GLASSES
OD_CC+: -2
OD_CC: 20/20
OS_CC: 20/20

## 2019-08-05 ASSESSMENT — CONF VISUAL FIELD
OD_NORMAL: 1
METHOD: COUNTING FINGERS
OS_NORMAL: 1

## 2019-08-05 ASSESSMENT — CUP TO DISC RATIO
OS_RATIO: 0.5
OD_RATIO: 0.5

## 2019-08-05 ASSESSMENT — TONOMETRY
OS_IOP_MMHG: 20
OD_IOP_MMHG: 18
IOP_METHOD: APPLANATION

## 2019-08-05 ASSESSMENT — SLIT LAMP EXAM - LIDS
COMMENTS: NORMAL
COMMENTS: NORMAL

## 2019-08-05 NOTE — NURSING NOTE
Chief Complaints and History of Present Illnesses   Patient presents with     Follow Up     Chief Complaint(s) and History of Present Illness(es)     Follow Up     Laterality: both eyes    Onset: gradual    Onset: months ago    Quality: States va is the same since last visit      Severity: mild    Frequency: constantly    Associated symptoms: Negative for dryness, redness and tearing    Treatments tried: no treatments    Pain scale: 0/10              Comments     TERESA Lentz COT 7:28 AM August 5, 2019

## 2019-08-05 NOTE — PROGRESS NOTES
HPI:  Soco Ferguson is a 64 year old female initially referred by Dr. Ivy for anatomical narrow angle of both eyes. She has done well since laser left eye without issues (now s/p LPI both eyes). Her vision is good with current glasses in both eyes at near and distance. No pain, redness, discharge. No flashes/floaters.     Pertinent Medical History:    Blastomycosis    Hyperparathyroidism    Ulcerative colitis    Leiomyoma of uterus    Ocular History:    Migraine with Aura    Lattice Degeneration both eyes    Assessment & Plan      (H40.033) Anatomical narrow angle borderline glaucoma of both eyes  (primary encounter diagnosis)  Comment: Borderline occludable on my initial exam. In setting of high hyperopia and mild cataracts both eyes. Now s/p LPI both eyes with good appearance. Normal IOPs with good neuroretinal rim.   Plan: Monitor    (H25.13) Nuclear sclerotic cataract of both eyes  Comment: Not visually significant  Plan: Observe    (H52.203) Hyperopic astigmatism of both eyes  (H52.4) Presbyopia  Comment: Good vision with current glasses  Plan: Ok to continue with current glasses    Return in about 1 year (around 8/5/2020). or sooner as needed.      Teaching statement:  Complete documentation of historical and exam elements from today's encounter can be found in the full encounter summary report (not reduplicated in this progress note). I personally obtained the chief complaint(s) and history of present illness.  I confirmed and edited as necessary the review of systems, past medical/surgical history, family history, social history, and examination findings as documented by others; and I examined the patient myself. I personally reviewed the relevant tests, images, and reports as documented above.     I formulated and edited as necessary the assessment and plan and discussed the findings and management plan with the patient and family.    Tabby Skinner MD  Comprehensive Ophthalmology & Ocular  Pathology  Department of Ophthalmology and Visual Neurosciences  [email or web address]  Pager [phone]

## 2019-08-06 LAB
ALBUMIN PERCENT: 61.8 % (ref 51–67)
ALBUMIN SERPL ELPH-MCNC: 4.1 G/DL (ref 3.2–4.7)
ALPHA 1 PERCENT: 3.1 % (ref 2–4)
ALPHA 2 PERCENT: 11.6 % (ref 5–13)
ALPHA1 GLOB SERPL ELPH-MCNC: 0.2 G/DL (ref 0.1–0.3)
ALPHA2 GLOB SERPL ELPH-MCNC: 0.8 G/DL (ref 0.4–0.9)
B-GLOBULIN SERPL ELPH-MCNC: 1 G/DL (ref 0.7–1.2)
BETA PERCENT: 14.2 % (ref 10–17)
GAMMA GLOB SERPL ELPH-MCNC: 0.6 G/DL (ref 0.6–1.4)
GAMMA GLOBULIN PERCENT: 9.3 % (ref 9–20)
PATH ICD:: NORMAL
PATH ICD:: NORMAL
PROT PATTERN SERPL ELPH-IMP: NORMAL
PROT PATTERN SERPL ELPH-IMP: NORMAL
PROT SERPL-MCNC: 6.7 G/DL (ref 6–8)
REVIEWING PATHOLOGIST: NORMAL
REVIEWING PATHOLOGIST: NORMAL
TOTAL PROTEIN RANDOM URINE MG/DL: <7 MG/DL
TRYPTASE SERPL-MCNC: 4.7 UG/L

## 2019-08-07 ENCOUNTER — COMMUNICATION - HEALTHEAST (OUTPATIENT)
Dept: INTERNAL MEDICINE | Facility: CLINIC | Age: 64
End: 2019-08-07

## 2019-08-08 ENCOUNTER — AMBULATORY - HEALTHEAST (OUTPATIENT)
Dept: VASCULAR SURGERY | Facility: CLINIC | Age: 64
End: 2019-08-08

## 2019-08-08 ENCOUNTER — COMMUNICATION - HEALTHEAST (OUTPATIENT)
Dept: VASCULAR SURGERY | Facility: CLINIC | Age: 64
End: 2019-08-08

## 2019-08-08 DIAGNOSIS — I87.2 VENOUS INSUFFICIENCY OF BOTH LOWER EXTREMITIES: ICD-10-CM

## 2019-08-08 DIAGNOSIS — I83.813 VARICOSE VEINS OF BOTH LOWER EXTREMITIES WITH PAIN: ICD-10-CM

## 2019-08-08 DIAGNOSIS — I83.893 SYMPTOMATIC VARICOSE VEINS OF BOTH LOWER EXTREMITIES: ICD-10-CM

## 2019-08-15 ENCOUNTER — RECORDS - HEALTHEAST (OUTPATIENT)
Dept: ADMINISTRATIVE | Facility: OTHER | Age: 64
End: 2019-08-15

## 2019-08-30 ENCOUNTER — OFFICE VISIT - HEALTHEAST (OUTPATIENT)
Dept: INTERNAL MEDICINE | Facility: CLINIC | Age: 64
End: 2019-08-30

## 2019-08-30 DIAGNOSIS — M81.0 SENILE OSTEOPOROSIS: ICD-10-CM

## 2019-09-06 ENCOUNTER — COMMUNICATION - HEALTHEAST (OUTPATIENT)
Dept: ADMINISTRATIVE | Facility: CLINIC | Age: 64
End: 2019-09-06

## 2019-09-27 ENCOUNTER — COMMUNICATION - HEALTHEAST (OUTPATIENT)
Dept: ADMINISTRATIVE | Facility: CLINIC | Age: 64
End: 2019-09-27

## 2019-10-25 ENCOUNTER — AMBULATORY - HEALTHEAST (OUTPATIENT)
Dept: LAB | Facility: CLINIC | Age: 64
End: 2019-10-25

## 2019-10-25 DIAGNOSIS — M81.0 SENILE OSTEOPOROSIS: ICD-10-CM

## 2019-10-25 LAB — CALCIUM SERPL-MCNC: 9.4 MG/DL (ref 8.5–10.5)

## 2019-11-20 ENCOUNTER — COMMUNICATION - HEALTHEAST (OUTPATIENT)
Dept: ADMINISTRATIVE | Facility: CLINIC | Age: 64
End: 2019-11-20

## 2020-01-27 ENCOUNTER — COMMUNICATION - HEALTHEAST (OUTPATIENT)
Dept: ADMINISTRATIVE | Facility: CLINIC | Age: 65
End: 2020-01-27

## 2020-01-27 DIAGNOSIS — M81.0 SENILE OSTEOPOROSIS: ICD-10-CM

## 2020-02-13 ENCOUNTER — OFFICE VISIT - HEALTHEAST (OUTPATIENT)
Dept: VASCULAR SURGERY | Facility: CLINIC | Age: 65
End: 2020-02-13

## 2020-02-13 DIAGNOSIS — M81.0 OSTEOPOROSIS, UNSPECIFIED OSTEOPOROSIS TYPE, UNSPECIFIED PATHOLOGICAL FRACTURE PRESENCE: ICD-10-CM

## 2020-02-13 DIAGNOSIS — I83.813 VARICOSE VEINS OF BOTH LOWER EXTREMITIES WITH PAIN: ICD-10-CM

## 2020-02-13 DIAGNOSIS — Q66.70 HIGH ARCH: ICD-10-CM

## 2020-02-13 DIAGNOSIS — M79.89 LEG SWELLING: ICD-10-CM

## 2020-02-13 DIAGNOSIS — I87.2 VENOUS INSUFFICIENCY OF BOTH LOWER EXTREMITIES: ICD-10-CM

## 2020-02-13 ASSESSMENT — MIFFLIN-ST. JEOR: SCORE: 1248.06

## 2020-02-18 ENCOUNTER — COMMUNICATION - HEALTHEAST (OUTPATIENT)
Dept: OTHER | Facility: CLINIC | Age: 65
End: 2020-02-18

## 2020-02-25 ENCOUNTER — COMMUNICATION - HEALTHEAST (OUTPATIENT)
Dept: OTHER | Facility: CLINIC | Age: 65
End: 2020-02-25

## 2020-03-03 ENCOUNTER — COMMUNICATION - HEALTHEAST (OUTPATIENT)
Dept: OTHER | Facility: CLINIC | Age: 65
End: 2020-03-03

## 2020-04-09 ENCOUNTER — COMMUNICATION - HEALTHEAST (OUTPATIENT)
Dept: ADMINISTRATIVE | Facility: CLINIC | Age: 65
End: 2020-04-09

## 2020-04-09 DIAGNOSIS — M19.90 ARTHRITIS: ICD-10-CM

## 2020-05-22 ENCOUNTER — HOSPITAL ENCOUNTER (OUTPATIENT)
Dept: MAMMOGRAPHY | Facility: CLINIC | Age: 65
Discharge: HOME | End: 2020-05-22
Attending: INTERNAL MEDICINE

## 2020-05-22 DIAGNOSIS — Z12.31 VISIT FOR SCREENING MAMMOGRAM: ICD-10-CM

## 2020-07-27 ENCOUNTER — OFFICE VISIT (OUTPATIENT)
Dept: OPHTHALMOLOGY | Facility: CLINIC | Age: 65
End: 2020-07-27
Attending: OPHTHALMOLOGY
Payer: COMMERCIAL

## 2020-07-27 DIAGNOSIS — H40.033 ANATOMICAL NARROW ANGLE BORDERLINE GLAUCOMA OF BOTH EYES: Primary | ICD-10-CM

## 2020-07-27 DIAGNOSIS — H52.4 PRESBYOPIA: ICD-10-CM

## 2020-07-27 DIAGNOSIS — H52.203 HYPEROPIC ASTIGMATISM OF BOTH EYES: ICD-10-CM

## 2020-07-27 DIAGNOSIS — H25.13 NUCLEAR SCLEROTIC CATARACT OF BOTH EYES: ICD-10-CM

## 2020-07-27 PROCEDURE — G0463 HOSPITAL OUTPT CLINIC VISIT: HCPCS | Mod: ZF

## 2020-07-27 PROCEDURE — 92015 DETERMINE REFRACTIVE STATE: CPT | Mod: ZF

## 2020-07-27 RX ORDER — TERIPARATIDE 250 UG/ML
1 INJECTION, SOLUTION SUBCUTANEOUS DAILY
COMMUNITY
Start: 2020-03-17 | End: 2021-08-10 | Stop reason: ALTCHOICE

## 2020-07-27 ASSESSMENT — REFRACTION_WEARINGRX
OD_SPHERE: +4.75
OS_ADD: +2.50
OS_SPHERE: +2.00
OS_CYLINDER: +1.25
OD_ADD: +2.50
OS_AXIS: 024
OD_AXIS: 012
OD_CYLINDER: +0.50

## 2020-07-27 ASSESSMENT — VISUAL ACUITY
CORRECTION_TYPE: GLASSES
OS_CC: J1
METHOD: SNELLEN - LINEAR
OD_CC: 20/25-2
OS_CC: 20/20
OD_CC: J1
OD_CC+: +1
METHOD_MR: DEFERRED BY PT

## 2020-07-27 ASSESSMENT — REFRACTION_MANIFEST
OS_AXIS: 015
OD_CYLINDER: +0.75
OD_SPHERE: +4.50
OS_ADD: +2.50
OS_SPHERE: +1.75
OS_CYLINDER: +1.25
OD_AXIS: 014
OD_ADD: +2.50

## 2020-07-27 ASSESSMENT — CONF VISUAL FIELD
METHOD: COUNTING FINGERS
OD_NORMAL: 1
OS_NORMAL: 1

## 2020-07-27 ASSESSMENT — TONOMETRY
OS_IOP_MMHG: 18
IOP_METHOD: ICARE
OD_IOP_MMHG: 17

## 2020-07-27 ASSESSMENT — SLIT LAMP EXAM - LIDS
COMMENTS: NORMAL
COMMENTS: NORMAL

## 2020-07-27 ASSESSMENT — CUP TO DISC RATIO
OS_RATIO: 0.5
OD_RATIO: 0.5

## 2020-07-27 NOTE — PROGRESS NOTES
HPI:  Soco Ferguson is a 64 year old female initially referred by Dr. Ivy for anatomical narrow angle of both eyes, and is s/p LPI both eyes in June 2019. Her vision is good with current glasses in both eyes at near and distance. No pain, redness, discharge. She has longstanding rare floaters in both eyes that are unchanged. No associated flashes.      Pertinent Medical History:    Blastomycosis    Hyperparathyroidism    Ulcerative colitis    Leiomyoma of uterus    Ocular History:    Migraine with Aura    Lattice Degeneration both eyes    Assessment & Plan      (H40.033) Anatomical narrow angle borderline glaucoma of both eyes  (primary encounter diagnosis)  Comment: Borderline occludable on my initial exam. In setting of high hyperopia and mild cataracts both eyes. Now s/p LPI both eyes with good appearance. Normal IOPs at 17/18 today with good neuroretinal rim.   Plan: Monitor    (H25.13) Nuclear sclerotic cataract of both eyes  Comment: Not visually significant  Plan: Observe    (H52.203) Hyperopic astigmatism of both eyes  (H52.4) Presbyopia  Comment: Good vision with refraction, minimal change  Plan: Given updated glasses Rx.     Return in about 1 year (around 7/27/2021). or sooner as needed.      Teaching statement:  Complete documentation of historical and exam elements from today's encounter can be found in the full encounter summary report (not reduplicated in this progress note). I personally obtained the chief complaint(s) and history of present illness.  I confirmed and edited as necessary the review of systems, past medical/surgical history, family history, social history, and examination findings as documented by others; and I examined the patient myself. I personally reviewed the relevant tests, images, and reports as documented above.     I formulated and edited as necessary the assessment and plan and discussed the findings and management plan with the patient and family.    Tabby Skinner,  MD  Comprehensive Ophthalmology & Ocular Pathology  Department of Ophthalmology and Visual Neurosciences  [email or web address]  Pager [phone]

## 2020-07-27 NOTE — NURSING NOTE
Chief Complaints and History of Present Illnesses   Patient presents with     Annual Eye Exam     Chief Complaint(s) and History of Present Illness(es)     Annual Eye Exam     Laterality: both eyes    Course: stable    Associated symptoms: Negative for eye pain, floaters, flashes, glare and haloes    Treatments tried: no treatments    Pain scale: 0/10              Comments     Annual Comprehensive Eye Exam    Vision stable, no additional comments/concerns.   Floaters OU are rare; longstanding and unchanging.     Lupe Singleton COT 8:56 AM July 27, 2020

## 2020-07-29 ENCOUNTER — AMBULATORY - HEALTHEAST (OUTPATIENT)
Dept: OTHER | Facility: CLINIC | Age: 65
End: 2020-07-29

## 2020-07-29 ENCOUNTER — COMMUNICATION - HEALTHEAST (OUTPATIENT)
Dept: INTERNAL MEDICINE | Facility: CLINIC | Age: 65
End: 2020-07-29

## 2020-07-29 DIAGNOSIS — M81.0 SENILE OSTEOPOROSIS: ICD-10-CM

## 2020-08-02 ENCOUNTER — COMMUNICATION - HEALTHEAST (OUTPATIENT)
Dept: INTERNAL MEDICINE | Facility: CLINIC | Age: 65
End: 2020-08-02

## 2020-08-02 DIAGNOSIS — M81.0 SENILE OSTEOPOROSIS: ICD-10-CM

## 2020-08-08 ENCOUNTER — COMMUNICATION - HEALTHEAST (OUTPATIENT)
Dept: SCHEDULING | Facility: CLINIC | Age: 65
End: 2020-08-08

## 2020-08-08 ENCOUNTER — AMBULATORY - HEALTHEAST (OUTPATIENT)
Dept: FAMILY MEDICINE | Facility: CLINIC | Age: 65
End: 2020-08-08

## 2020-08-08 ENCOUNTER — NURSE TRIAGE (OUTPATIENT)
Dept: NURSING | Facility: CLINIC | Age: 65
End: 2020-08-08

## 2020-08-08 DIAGNOSIS — Z20.822 SUSPECTED COVID-19 VIRUS INFECTION: ICD-10-CM

## 2020-08-08 NOTE — TELEPHONE ENCOUNTER
Soco reports that she started having the following symptoms on Friday:  - generally not feeling good  - fatigue  - Body aches  - throat pain  - abdominal pain and diarrhea, diarrhea has resolved today    No fever, no SOB, no chest pain.    Person at work tested positive for COVID but she was not in close contact with this person. Had domestic travel by plane, and was at airport on Monday.    She is looking to get tested for COVID, prefers phone visit than [facility].    Patient is seen regularly at [facility], PCP Dr. Blanton    COVID 19 Nurse Triage Plan/Patient Instructions    Please be aware that novel coronavirus (COVID-19) may be circulating in the community. If you develop symptoms such as fever, cough, or SOB or if you have concerns about the presence of another infection including coronavirus (COVID-19), please contact your health care provider or visit [email or web address].     Disposition/Instructions    Virtual Visit with provider recommended. Reference Visit Selection Guide. (within 24 hours)    Thank you for taking steps to prevent the spread of this virus.  o Limit your contact with others.  o Wear a simple mask to cover your cough.  o Wash your hands well and often.    Resources    [facility]: About COVID-19: [email or web address]    CDC: What to Do If You're Sick: www.cdc.gov/coronavirus/2019-ncov/about/steps-when-sick.html    CDC: Ending Home Isolation: www.cdc.gov/coronavirus/2019-ncov/hcp/disposition-in-home-patients.html     CDC: Caring for Someone: www.cdc.gov/coronavirus/2019-ncov/if-you-are-sick/care-for-someone.html     [facility]: Interim Guidance for Hospital Discharge to Home: www.health.[facility].[place]/diseases/coronavirus/hcp/hospdischarge.pdf    [facility] clinical trials (COVID-19 research studies): [email or web address].[email or web address]-trials     Below are the COVID-19 hotlines at the [place] Department of Health ([facility]). Interpreters are available.   o For abeo  questions: Call 552-798-5660 or 1-169.314.1309 (7 a.m. to 7 p.m.)  o For questions about schools and childcare: Call 426-168-9582 or 1-283.512.8681 (7 a.m. to 7 p.m.)         Lakshmi Kwon RN/[place] Nurse Advisor      Reason for Disposition    HIGH RISK patient (e.g., age > 64 years, diabetes, heart or lung disease, weak immune system)    Additional Information    Negative: SEVERE difficulty breathing (e.g., struggling for each breath, speaks in single words)    Negative: Difficult to awaken or acting confused (e.g., disoriented, slurred speech)    Negative: Bluish (or gray) lips or face now    Negative: Shock suspected (e.g., cold/pale/clammy skin, too weak to stand, low BP, rapid pulse)    Negative: Sounds like a life-threatening emergency to the triager    Negative: SEVERE or constant chest pain or pressure (Exception: mild central chest pain, present only when coughing)    Negative: MODERATE difficulty breathing (e.g., speaks in phrases, SOB even at rest, pulse 100-120)    Negative: Patient sounds very sick or weak to the triager    Negative: MILD difficulty breathing (e.g., minimal/no SOB at rest, SOB with walking, pulse <100)    Negative: Chest pain or pressure    Negative: Fever > 103 F (39.4 C)    Negative: [1] Fever > 101 F (38.3 C) AND [2] age > 60    Negative: [1] Fever > 100.0 F (37.8 C) AND [2] bedridden (e.g., nursing home patient, CVA, chronic illness, recovering from surgery)    Protocols used: CORONAVIRUS (COVID-19) DIAGNOSED OR [id number]-R-HF 5.16.20

## 2020-08-09 ENCOUNTER — COMMUNICATION - HEALTHEAST (OUTPATIENT)
Dept: SCHEDULING | Facility: CLINIC | Age: 65
End: 2020-08-09

## 2020-08-11 ENCOUNTER — COMMUNICATION - HEALTHEAST (OUTPATIENT)
Dept: SCHEDULING | Facility: CLINIC | Age: 65
End: 2020-08-11

## 2020-08-11 ENCOUNTER — COMMUNICATION - HEALTHEAST (OUTPATIENT)
Dept: FAMILY MEDICINE | Facility: CLINIC | Age: 65
End: 2020-08-11

## 2020-08-19 ENCOUNTER — AMBULATORY - HEALTHEAST (OUTPATIENT)
Dept: OTHER | Facility: CLINIC | Age: 65
End: 2020-08-19

## 2020-09-29 ENCOUNTER — RECORDS - HEALTHEAST (OUTPATIENT)
Dept: ADMINISTRATIVE | Facility: OTHER | Age: 65
End: 2020-09-29

## 2020-09-29 ENCOUNTER — OFFICE VISIT - HEALTHEAST (OUTPATIENT)
Dept: INTERNAL MEDICINE | Facility: CLINIC | Age: 65
End: 2020-09-29

## 2020-09-29 ENCOUNTER — RECORDS - HEALTHEAST (OUTPATIENT)
Dept: BONE DENSITY | Facility: CLINIC | Age: 65
End: 2020-09-29

## 2020-09-29 DIAGNOSIS — Z23 NEED FOR TD VACCINE: ICD-10-CM

## 2020-09-29 DIAGNOSIS — Z23 NEED FOR IMMUNIZATION AGAINST INFLUENZA: ICD-10-CM

## 2020-09-29 DIAGNOSIS — M81.0 SENILE OSTEOPOROSIS: ICD-10-CM

## 2020-09-29 DIAGNOSIS — N89.8 VAGINAL ITCHING: ICD-10-CM

## 2020-09-29 DIAGNOSIS — M81.0 AGE-RELATED OSTEOPOROSIS WITHOUT CURRENT PATHOLOGICAL FRACTURE: ICD-10-CM

## 2020-09-29 LAB
CLUE CELLS: NORMAL
TRICHOMONAS, WET PREP: NORMAL
YEAST, WET PREP: NORMAL

## 2020-09-30 ENCOUNTER — COMMUNICATION - HEALTHEAST (OUTPATIENT)
Dept: INTERNAL MEDICINE | Facility: CLINIC | Age: 65
End: 2020-09-30

## 2020-09-30 DIAGNOSIS — M81.0 SENILE OSTEOPOROSIS: ICD-10-CM

## 2020-09-30 LAB
BKR LAB AP ABNORMAL BLEEDING: NO
BKR LAB AP BIRTH CONTROL/HORMONES: NORMAL
BKR LAB AP CERVICAL APPEARANCE: NORMAL
BKR LAB AP GYN ADEQUACY: NORMAL
BKR LAB AP GYN INTERPRETATION: NORMAL
BKR LAB AP HPV REFLEX: NORMAL
BKR LAB AP LMP: NORMAL
BKR LAB AP PATIENT STATUS: NORMAL
BKR LAB AP PREVIOUS ABNORMAL: NO
BKR LAB AP PREVIOUS NORMAL: 2017
HIGH RISK?: NO
PATH REPORT.COMMENTS IMP SPEC: NORMAL
RESULT FLAG (HE HISTORICAL CONVERSION): NORMAL

## 2020-12-01 ENCOUNTER — OFFICE VISIT (OUTPATIENT)
Dept: OPHTHALMOLOGY | Facility: CLINIC | Age: 65
End: 2020-12-01
Attending: OPHTHALMOLOGY
Payer: COMMERCIAL

## 2020-12-01 DIAGNOSIS — H53.123 TRANSIENT VISUAL LOSS OF BOTH EYES: Primary | ICD-10-CM

## 2020-12-01 PROCEDURE — 99214 OFFICE O/P EST MOD 30 MIN: CPT | Performed by: OPHTHALMOLOGY

## 2020-12-01 PROCEDURE — G0463 HOSPITAL OUTPT CLINIC VISIT: HCPCS

## 2020-12-01 ASSESSMENT — SLIT LAMP EXAM - LIDS
COMMENTS: NORMAL
COMMENTS: NORMAL

## 2020-12-01 ASSESSMENT — VISUAL ACUITY
OD_CC: 20/20
METHOD: SNELLEN - LINEAR
CORRECTION_TYPE: GLASSES
OD_CC+: -2
OS_CC+: -2
OS_CC: 20/20

## 2020-12-01 ASSESSMENT — CUP TO DISC RATIO
OD_RATIO: 0.5
OS_RATIO: 0.5

## 2020-12-01 ASSESSMENT — TONOMETRY
IOP_METHOD: TONOPEN
OS_IOP_MMHG: 18
OD_IOP_MMHG: 14

## 2020-12-01 ASSESSMENT — REFRACTION_WEARINGRX
OS_AXIS: 024
OS_CYLINDER: +1.25
OD_AXIS: 012
OS_SPHERE: +2.00
OD_ADD: +2.50
OS_ADD: +2.50
OD_SPHERE: +4.75
OD_CYLINDER: +0.50

## 2020-12-01 ASSESSMENT — EXTERNAL EXAM - RIGHT EYE: OD_EXAM: NORMAL

## 2020-12-01 ASSESSMENT — CONF VISUAL FIELD
OD_NORMAL: 1
OS_NORMAL: 1
METHOD: COUNTING FINGERS

## 2020-12-01 ASSESSMENT — EXTERNAL EXAM - LEFT EYE: OS_EXAM: NORMAL

## 2020-12-01 NOTE — PROGRESS NOTES
HPI:  Soco Ferguson is a 65 year old female with a history of narrow angles s/p LPI both eyes here for evaluation of blurred vision in both eyes that occurred last week. It involved her entire peripheral vision, but there was a clear spot centrally where she could see her daughter's face. It lasted about 30 minutes. No headache, but she does have a history of migraines. There is associated irritating eye pain, 3/10 in severity, along with eye dryness. Both eyes feel mildly achy today. She is using artificial tears. The blurry vision has not returned. No redness or discharge. No flashes/floaters.      Pertinent Medical History:    Blastomycosis    Hyperparathyroidism    Ulcerative colitis    Leiomyoma of uterus    Ocular History:    Migraine with Aura    Lattice Degeneration both eyes    Assessment & Plan    (H53.123) Transient visual loss of both eyes  (primary encounter diagnosis)  Comment: Episode of bilateral vision blurring last 30 minutes with central clearing. Stable exam today without new abnormality. Given history of migraines, sounds most consistent with ocular migraine.  Plan: Discussed that should the vision changes recur or if she has any new episodes to call for repeat exam.    (H40.033) Anatomical narrow angle borderline glaucoma of both eyes   Comment: Borderline occludable on my initial exam. In setting of high hyperopia and mild cataracts both eyes. Now s/p LPI both eyes with good appearance. Normal IOPs at 14/18 today with good neuroretinal rim.   Plan: Monitor    (H25.13) Nuclear sclerotic cataract of both eyes  Comment: Not visually significant  Plan: Observe    (H52.203) Hyperopic astigmatism of both eyes  (H52.4) Presbyopia  Comment: Good vision with current glasses  Plan: Observe    Return in about 1 year (around 12/1/2021). or sooner as needed.      Teaching statement:  Complete documentation of historical and exam elements from today's encounter can be found in the full encounter summary  report (not reduplicated in this progress note). I personally obtained the chief complaint(s) and history of present illness.  I confirmed and edited as necessary the review of systems, past medical/surgical history, family history, social history, and examination findings as documented by others; and I examined the patient myself. I personally reviewed the relevant tests, images, and reports as documented above.     I formulated and edited as necessary the assessment and plan and discussed the findings and management plan with the patient and family.    Tabby Skinner MD  Comprehensive Ophthalmology & Ocular Pathology  Department of Ophthalmology and Visual Neurosciences  [email or web address]  Pager [phone]

## 2020-12-01 NOTE — NURSING NOTE
Chief Complaints and History of Present Illnesses   Patient presents with     Blurred Vision Evaluation     Chief Complaint(s) and History of Present Illness(es)     Blurred Vision Evaluation     Laterality: both eyes    Quality: blurred vision    Associated symptoms: eye pain and dryness.  Negative for flashes, floaters and discharge    Treatments tried: artificial tears    Pain scale: 3/10              Comments     Pt notes on Thursday she had an episode of blurry vision BE that lasted 30 minutes.  Has not had any blurry vision since.  Complains of BE feeling slightly achy today.  Denies any flashes, floaters, discharge, and tearing.  Ocular meds: AT's BID BE    Taylor Goldberg OT 8:25 AM December 1, 2020

## 2020-12-09 ENCOUNTER — COMMUNICATION - HEALTHEAST (OUTPATIENT)
Dept: INTERNAL MEDICINE | Facility: CLINIC | Age: 65
End: 2020-12-09

## 2020-12-09 ENCOUNTER — COMMUNICATION - HEALTHEAST (OUTPATIENT)
Dept: SCHEDULING | Facility: CLINIC | Age: 65
End: 2020-12-09

## 2020-12-11 ENCOUNTER — OFFICE VISIT - HEALTHEAST (OUTPATIENT)
Dept: CARDIOLOGY | Facility: CLINIC | Age: 65
End: 2020-12-11

## 2020-12-11 DIAGNOSIS — R06.09 DYSPNEA ON EXERTION: ICD-10-CM

## 2020-12-11 DIAGNOSIS — I44.7 LEFT BUNDLE BRANCH BLOCK (LBBB): ICD-10-CM

## 2020-12-11 DIAGNOSIS — R07.9 CHEST PAIN, UNSPECIFIED TYPE: ICD-10-CM

## 2020-12-11 DIAGNOSIS — E78.2 MIXED HYPERLIPIDEMIA: ICD-10-CM

## 2020-12-11 ASSESSMENT — MIFFLIN-ST. JEOR: SCORE: 1240.35

## 2020-12-30 ENCOUNTER — OFFICE VISIT - HEALTHEAST (OUTPATIENT)
Dept: INTERNAL MEDICINE | Facility: CLINIC | Age: 65
End: 2020-12-30

## 2020-12-30 DIAGNOSIS — Z00.00 HEALTHCARE MAINTENANCE: ICD-10-CM

## 2020-12-30 DIAGNOSIS — Z92.29 PERSONAL HISTORY OF OTHER DRUG THERAPY: ICD-10-CM

## 2020-12-30 DIAGNOSIS — R07.9 CHEST PAIN, UNSPECIFIED TYPE: ICD-10-CM

## 2020-12-30 DIAGNOSIS — K21.00 GASTROESOPHAGEAL REFLUX DISEASE WITH ESOPHAGITIS WITHOUT HEMORRHAGE: ICD-10-CM

## 2020-12-30 DIAGNOSIS — M81.0 SENILE OSTEOPOROSIS: ICD-10-CM

## 2020-12-30 DIAGNOSIS — K51.919 ULCERATIVE COLITIS WITH COMPLICATION, UNSPECIFIED LOCATION (H): ICD-10-CM

## 2020-12-30 LAB
ALBUMIN SERPL-MCNC: 4.1 G/DL (ref 3.5–5)
ALBUMIN UR-MCNC: NEGATIVE MG/DL
ALP SERPL-CCNC: 152 U/L (ref 45–120)
ALT SERPL W P-5'-P-CCNC: 26 U/L (ref 0–45)
ANION GAP SERPL CALCULATED.3IONS-SCNC: 10 MMOL/L (ref 5–18)
APPEARANCE UR: CLEAR
AST SERPL W P-5'-P-CCNC: 22 U/L (ref 0–40)
BACTERIA #/AREA URNS HPF: ABNORMAL HPF
BILIRUB SERPL-MCNC: 0.4 MG/DL (ref 0–1)
BILIRUB UR QL STRIP: NEGATIVE
BUN SERPL-MCNC: 17 MG/DL (ref 8–22)
CALCIUM SERPL-MCNC: 10.1 MG/DL (ref 8.5–10.5)
CHLORIDE BLD-SCNC: 103 MMOL/L (ref 98–107)
CHOLEST SERPL-MCNC: 265 MG/DL
CO2 SERPL-SCNC: 30 MMOL/L (ref 22–31)
COLOR UR AUTO: YELLOW
CREAT SERPL-MCNC: 0.87 MG/DL (ref 0.6–1.1)
ERYTHROCYTE [DISTWIDTH] IN BLOOD BY AUTOMATED COUNT: 12 % (ref 11–14.5)
FASTING STATUS PATIENT QL REPORTED: YES
GFR SERPL CREATININE-BSD FRML MDRD: >60 ML/MIN/1.73M2
GLUCOSE BLD-MCNC: 96 MG/DL (ref 70–125)
GLUCOSE UR STRIP-MCNC: NEGATIVE MG/DL
HCT VFR BLD AUTO: 47.2 % (ref 35–47)
HDLC SERPL-MCNC: 52 MG/DL
HGB BLD-MCNC: 15.5 G/DL (ref 12–16)
HGB UR QL STRIP: ABNORMAL
HYALINE CASTS #/AREA URNS LPF: ABNORMAL LPF
KETONES UR STRIP-MCNC: NEGATIVE MG/DL
LDLC SERPL CALC-MCNC: 150 MG/DL
LEUKOCYTE ESTERASE UR QL STRIP: ABNORMAL
MCH RBC QN AUTO: 29.7 PG (ref 27–34)
MCHC RBC AUTO-ENTMCNC: 32.8 G/DL (ref 32–36)
MCV RBC AUTO: 91 FL (ref 80–100)
MUCOUS THREADS #/AREA URNS LPF: ABNORMAL LPF
NITRATE UR QL: NEGATIVE
PH UR STRIP: 5.5 [PH] (ref 5–8)
PLATELET # BLD AUTO: 253 THOU/UL (ref 140–440)
PMV BLD AUTO: 7.6 FL (ref 7–10)
POTASSIUM BLD-SCNC: 5.5 MMOL/L (ref 3.5–5)
PROT SERPL-MCNC: 7.3 G/DL (ref 6–8)
RBC # BLD AUTO: 5.21 MILL/UL (ref 3.8–5.4)
RBC #/AREA URNS AUTO: ABNORMAL HPF
SODIUM SERPL-SCNC: 143 MMOL/L (ref 136–145)
SP GR UR STRIP: 1.02 (ref 1–1.03)
SQUAMOUS #/AREA URNS AUTO: ABNORMAL LPF
TRIGL SERPL-MCNC: 314 MG/DL
UROBILINOGEN UR STRIP-ACNC: ABNORMAL
WBC #/AREA URNS AUTO: ABNORMAL HPF
WBC: 8.3 THOU/UL (ref 4–11)

## 2020-12-30 ASSESSMENT — MIFFLIN-ST. JEOR: SCORE: 1237.05

## 2020-12-31 LAB
25(OH)D3 SERPL-MCNC: 33.2 NG/ML (ref 30–80)
25(OH)D3 SERPL-MCNC: 33.2 NG/ML (ref 30–80)
BACTERIA SPEC CULT: ABNORMAL

## 2021-01-02 ENCOUNTER — AMBULATORY - HEALTHEAST (OUTPATIENT)
Dept: INTERNAL MEDICINE | Facility: CLINIC | Age: 66
End: 2021-01-02

## 2021-01-02 DIAGNOSIS — E78.2 MIXED HYPERLIPIDEMIA: ICD-10-CM

## 2021-01-04 ENCOUNTER — COMMUNICATION - HEALTHEAST (OUTPATIENT)
Dept: VASCULAR SURGERY | Facility: CLINIC | Age: 66
End: 2021-01-04

## 2021-01-07 ENCOUNTER — HOSPITAL ENCOUNTER (OUTPATIENT)
Dept: CT IMAGING | Facility: CLINIC | Age: 66
Discharge: HOME | End: 2021-01-07
Attending: INTERNAL MEDICINE

## 2021-01-07 ENCOUNTER — HOSPITAL ENCOUNTER (OUTPATIENT)
Dept: CARDIOLOGY | Facility: HOSPITAL | Age: 66
Discharge: HOME | End: 2021-01-07
Attending: INTERNAL MEDICINE

## 2021-01-07 DIAGNOSIS — R07.9 CHEST PAIN, UNSPECIFIED TYPE: ICD-10-CM

## 2021-01-07 DIAGNOSIS — I44.7 LEFT BUNDLE BRANCH BLOCK (LBBB): ICD-10-CM

## 2021-01-07 DIAGNOSIS — E78.2 MIXED HYPERLIPIDEMIA: ICD-10-CM

## 2021-01-07 DIAGNOSIS — R06.09 DYSPNEA ON EXERTION: ICD-10-CM

## 2021-01-07 LAB
AORTIC ROOT: 2.8 CM
AORTIC VALVE MEAN VELOCITY: 96.8 CM/S
AV DIMENSIONLESS INDEX VTI: 0.9
AV MEAN GRADIENT: 4 MMHG
AV PEAK GRADIENT: 10.5 MMHG
AV VALVE AREA: 3.4 CM2
AV VELOCITY RATIO: 0.9
BSA FOR ECHO PROCEDURE: 1.79 M2
BSA FOR ECHO PROCEDURE: 1.79 M2
CV BLOOD PRESSURE: ABNORMAL MMHG
CV ECHO HEIGHT: 61.3 IN
CV ECHO WEIGHT: 164 LBS
DOP CALC AO PEAK VEL: 162 CM/S
DOP CALC AO VTI: 31.3 CM
DOP CALC LVOT AREA: 3.8 CM2
DOP CALC LVOT DIAMETER: 2.2 CM
DOP CALC LVOT PEAK VEL: 140 CM/S
DOP CALC LVOT STROKE VOLUME: 105.2 CM3
DOP CALCLVOT PEAK VEL VTI: 27.7 CM
EJECTION FRACTION: 63 % (ref 55–75)
FRACTIONAL SHORTENING: 29.8 % (ref 28–44)
INTERVENTRICULAR SEPTUM IN END DIASTOLE: 1.2 CM (ref 0.6–0.9)
IVS/PW RATIO: 1.7
LA AREA 1: 18.6 CM2
LA AREA 2: 14.6 CM2
LEFT ATRIUM LENGTH: 5.02 CM
LEFT ATRIUM SIZE: 3.2 CM
LEFT ATRIUM VOLUME INDEX: 25.7 ML/M2
LEFT ATRIUM VOLUME: 46 ML
LEFT VENTRICLE CARDIAC INDEX: 4.4 L/MIN/M2
LEFT VENTRICLE CARDIAC OUTPUT: 7.8 L/MIN
LEFT VENTRICLE DIASTOLIC VOLUME INDEX: 53.6 CM3/M2 (ref 29–61)
LEFT VENTRICLE DIASTOLIC VOLUME: 96 CM3 (ref 46–106)
LEFT VENTRICLE HEART RATE: 72 BPM
LEFT VENTRICLE HEART RATE: 74 BPM
LEFT VENTRICLE MASS INDEX: 85.7 G/M2
LEFT VENTRICLE SYSTOLIC VOLUME INDEX: 20.1 CM3/M2 (ref 8–24)
LEFT VENTRICLE SYSTOLIC VOLUME: 36 CM3 (ref 14–42)
LEFT VENTRICULAR INTERNAL DIMENSION IN DIASTOLE: 4.7 CM (ref 3.8–5.2)
LEFT VENTRICULAR INTERNAL DIMENSION IN SYSTOLE: 3.3 CM (ref 2.2–3.5)
LEFT VENTRICULAR MASS: 153.4 G
LEFT VENTRICULAR OUTFLOW TRACT MEAN GRADIENT: 4 MMHG
LEFT VENTRICULAR OUTFLOW TRACT MEAN VELOCITY: 89.6 CM/S
LEFT VENTRICULAR OUTFLOW TRACT PEAK GRADIENT: 8 MMHG
LEFT VENTRICULAR POSTERIOR WALL IN END DIASTOLE: 0.7 CM (ref 0.6–0.9)
LV STROKE VOLUME INDEX: 58.8 ML/M2
MITRAL VALVE E/A RATIO: 1.2
MV AVERAGE E/E' RATIO: 8.2 CM/S
MV DECELERATION TIME: 222 MS
MV E'TISSUE VEL-LAT: 11.3 CM/S
MV E'TISSUE VEL-MED: 9.75 CM/S
MV LATERAL E/E' RATIO: 7.6
MV MEDIAL E/E' RATIO: 8.9
MV PEAK A VELOCITY: 72.1 CM/S
MV PEAK E VELOCITY: 86.4 CM/S
NUC REST DIASTOLIC VOLUME INDEX: 2624 LBS
NUC REST SYSTOLIC VOLUME INDEX: 61.25 IN
TRICUSPID REGURGITATION PEAK PRESSURE GRADIENT: 17.5 MMHG
TRICUSPID VALVE ANULAR PLANE SYSTOLIC EXCURSION: 2.8 CM
TRICUSPID VALVE PEAK REGURGITANT VELOCITY: 209 CM/S

## 2021-01-07 ASSESSMENT — MIFFLIN-ST. JEOR
SCORE: 1235.24
SCORE: 1235.24

## 2021-01-19 ENCOUNTER — COMMUNICATION - HEALTHEAST (OUTPATIENT)
Dept: OTHER | Facility: CLINIC | Age: 66
End: 2021-01-19

## 2021-01-21 ENCOUNTER — COMMUNICATION - HEALTHEAST (OUTPATIENT)
Dept: INTERNAL MEDICINE | Facility: CLINIC | Age: 66
End: 2021-01-21

## 2021-01-21 ENCOUNTER — OFFICE VISIT - HEALTHEAST (OUTPATIENT)
Dept: INTERNAL MEDICINE | Facility: CLINIC | Age: 66
End: 2021-01-21

## 2021-01-21 DIAGNOSIS — R07.89 ATYPICAL CHEST PAIN: ICD-10-CM

## 2021-01-21 DIAGNOSIS — E87.5 HYPERKALEMIA: ICD-10-CM

## 2021-01-21 DIAGNOSIS — K51.919 ULCERATIVE COLITIS WITH COMPLICATION, UNSPECIFIED LOCATION (H): ICD-10-CM

## 2021-01-21 DIAGNOSIS — M81.0 SENILE OSTEOPOROSIS: ICD-10-CM

## 2021-01-21 DIAGNOSIS — E78.2 MIXED HYPERLIPIDEMIA: ICD-10-CM

## 2021-01-21 DIAGNOSIS — R03.0 ELEVATED BLOOD PRESSURE READING WITHOUT DIAGNOSIS OF HYPERTENSION: ICD-10-CM

## 2021-01-22 ENCOUNTER — COMMUNICATION - HEALTHEAST (OUTPATIENT)
Dept: INTERNAL MEDICINE | Facility: CLINIC | Age: 66
End: 2021-01-22

## 2021-01-29 ENCOUNTER — AMBULATORY - HEALTHEAST (OUTPATIENT)
Dept: LAB | Facility: CLINIC | Age: 66
End: 2021-01-29

## 2021-01-29 DIAGNOSIS — E87.5 HYPERKALEMIA: ICD-10-CM

## 2021-01-29 DIAGNOSIS — E78.2 MIXED HYPERLIPIDEMIA: ICD-10-CM

## 2021-01-29 LAB
ALT SERPL W P-5'-P-CCNC: 17 U/L (ref 0–45)
AST SERPL W P-5'-P-CCNC: 16 U/L (ref 0–40)
CHOLEST SERPL-MCNC: 141 MG/DL
FASTING STATUS PATIENT QL REPORTED: YES
HDLC SERPL-MCNC: 54 MG/DL
LDLC SERPL CALC-MCNC: 76 MG/DL
POTASSIUM BLD-SCNC: 4.4 MMOL/L (ref 3.5–5)
TRIGL SERPL-MCNC: 57 MG/DL

## 2021-03-08 ENCOUNTER — OFFICE VISIT - HEALTHEAST (OUTPATIENT)
Dept: VASCULAR SURGERY | Facility: CLINIC | Age: 66
End: 2021-03-08

## 2021-03-08 DIAGNOSIS — M79.89 LEG SWELLING: ICD-10-CM

## 2021-03-08 DIAGNOSIS — I87.2 VENOUS INSUFFICIENCY OF BOTH LOWER EXTREMITIES: ICD-10-CM

## 2021-03-08 DIAGNOSIS — Q66.70 HIGH ARCH: ICD-10-CM

## 2021-03-08 DIAGNOSIS — I83.813 VARICOSE VEINS OF BOTH LOWER EXTREMITIES WITH PAIN: ICD-10-CM

## 2021-03-08 DIAGNOSIS — M81.0 OSTEOPOROSIS, UNSPECIFIED OSTEOPOROSIS TYPE, UNSPECIFIED PATHOLOGICAL FRACTURE PRESENCE: ICD-10-CM

## 2021-05-10 ENCOUNTER — COMMUNICATION - HEALTHEAST (OUTPATIENT)
Dept: ADMINISTRATIVE | Facility: CLINIC | Age: 66
End: 2021-05-10

## 2021-05-20 ENCOUNTER — RECORDS - HEALTHEAST (OUTPATIENT)
Dept: INTERNAL MEDICINE | Facility: CLINIC | Age: 66
End: 2021-05-20

## 2021-05-20 DIAGNOSIS — Z12.31 VISIT FOR SCREENING MAMMOGRAM: ICD-10-CM

## 2021-05-25 ENCOUNTER — RECORDS - HEALTHEAST (OUTPATIENT)
Dept: ADMINISTRATIVE | Facility: CLINIC | Age: 66
End: 2021-05-25

## 2021-05-26 ENCOUNTER — RECORDS - HEALTHEAST (OUTPATIENT)
Dept: ADMINISTRATIVE | Facility: CLINIC | Age: 66
End: 2021-05-26

## 2021-05-27 ENCOUNTER — COMMUNICATION - HEALTHEAST (OUTPATIENT)
Dept: ADMINISTRATIVE | Facility: CLINIC | Age: 66
End: 2021-05-27

## 2021-05-28 ENCOUNTER — RECORDS - HEALTHEAST (OUTPATIENT)
Dept: ADMINISTRATIVE | Facility: CLINIC | Age: 66
End: 2021-05-28

## 2021-05-28 NOTE — TELEPHONE ENCOUNTER
She is getting Prolia for 8-9 yars. It would be strange that Prolia is giving her pain now. She can see me in July when due for the next dose.

## 2021-05-28 NOTE — TELEPHONE ENCOUNTER
Dr. Blanton patient     She is complaining of aching in arms, hands and feet. She is wondering if it's due to the Prolia injection? Should she continue on the injection?     Please advise.     Ambreen @ [phone]

## 2021-05-28 NOTE — TELEPHONE ENCOUNTER
I switched pt's nurse visit to seeing Dr. BUCIO - she is requesting the a dxa order be placed.. Pool Mcduffie CMA  2:01 PM  5/3/2019

## 2021-05-29 ENCOUNTER — RECORDS - HEALTHEAST (OUTPATIENT)
Dept: ADMINISTRATIVE | Facility: CLINIC | Age: 66
End: 2021-05-29

## 2021-05-30 ENCOUNTER — RECORDS - HEALTHEAST (OUTPATIENT)
Dept: ADMINISTRATIVE | Facility: CLINIC | Age: 66
End: 2021-05-30

## 2021-05-31 VITALS — WEIGHT: 169.9 LBS | HEIGHT: 61 IN | BODY MASS INDEX: 32.08 KG/M2

## 2021-05-31 VITALS — BODY MASS INDEX: 31.53 KG/M2 | HEIGHT: 61 IN | WEIGHT: 167 LBS

## 2021-05-31 VITALS — BODY MASS INDEX: 30.99 KG/M2 | WEIGHT: 164 LBS

## 2021-05-31 VITALS — WEIGHT: 167 LBS | BODY MASS INDEX: 31.53 KG/M2 | HEIGHT: 61 IN

## 2021-05-31 NOTE — TELEPHONE ENCOUNTER
Faxed new compression order to [facility]. Called patient but no answer and no voicemail box available.

## 2021-05-31 NOTE — TELEPHONE ENCOUNTER
Called [facility]  [phone] and per rep Forteo is covered by insurance plan, no PA required. Patient can get this medication with a prior authorization.

## 2021-05-31 NOTE — PROGRESS NOTES
"Assessment/Plan:        1. Osteoporosis Senile  teriparatide (FORTEO) 20 mcg/dose - 600 mcg/2.4 mL injection    pen needle, diabetic (SURE-FINE PEN NEEDLES) 31 gauge x 3/16\" Ndle    Calcium       Return in about 1 year (around 8/30/2020) for Recheck, osteoporosis.    Patient Instructions   Let me know when Forteo started.  Stop taking calcium pills when you start Forteo. Get calcium from diet.    We have to check calcium in your blood 4 weeks after Forteo started. Lab order is in, just call and schedule lab only appt 4 weeks 4 weeks after Forteo started.    We should repeat DXA scan in 1 year after Forteo started.  We will not do Prolia for 15 months while on Forteo.  We will restart Prolia when done with Forteo.      Chief Complaint   Patient presents with     Follow-up     Recheck-Blood work       /62   Pulse 72   Wt 168 lb 8 oz (76.4 kg)   LMP 03/20/2006   SpO2 98%   BMI 31.84 kg/m      Patient is here today for follow up of osteoporosis labs and starting treatment with Forteo.  Since she has significant decline in the bone density, on Prolia treatment every 6 months for the last 4 years, I did proceed with the workup for secondary causes of the bone density loss.  She was treated with Forteo for 9 months in 2012, stopped because was not covered. She was on oral BSP for 4 years prior Forteo.     I did check if we can get Forteo covered and use this anabolic agent for 15 months.  Lab results from the last visit reviewed and unremarkable. She will just increase vit D intake. I will  recheck  calcium level 4 weeks after Forteo started.  follow up with me in 1 year with repeated DXA scan.    15 minutes spent with the patient and more then 50 % of the time in counseling.  This note has been dictated using voice recognition software. Any grammatical or context distortions are unintentional and inherent to the software      Patient Active Problem List   Diagnosis     Blastomycosis     Nephrolithiasis     " Osteoporosis Senile     Restless Legs Syndrome     Chronic Constipation     Ulcerative Colitis     Leiomyoma Of The Uterus     Endometrial Hyperplasia     Left leg pain     Venous insufficiency of both lower extremities     Varicose veins of both lower extremities with pain     High arches, congenital       Current Outpatient Medications on File Prior to Visit   Medication Sig Dispense Refill     b complex vitamins tablet daily.       calcium citrate-vitamin D3 200 mg calcium -250 unit Tab Take 1 tablet by mouth at bedtime.        cholecalciferol, vitamin D3, (VITAMIN D3) 2,000 unit Tab Take 1 tablet by mouth at bedtime.        denosumab 60 mg/mL Syrg Inject 60 mg under the skin every 6 (six) months.       docusate sodium (COLACE) 50 mg/5 mL oral liquid Take by mouth.       ibuprofen (ADVIL,MOTRIN) 200 MG tablet Take 600 mg by mouth 2 (two) times a day.       mesalamine (ROWASA) 4 gram/60 mL enema        omeprazole (PRILOSEC) 20 MG capsule Take 20 mg by mouth daily as needed.       methylcellulose (CITRUCEL ORAL) 20 mg daily.       traMADol (ULTRAM) 50 mg tablet Take 1 tablet (50 mg total) by mouth every 6 (six) hours as needed for pain. 10 tablet 0     No current facility-administered medications on file prior to visit.

## 2021-05-31 NOTE — PROGRESS NOTES
1. Osteoporosis Senile  Electrophoresis, Protein, Random Urine Ccade    Parathyroid Hormone Intact with Minerals    Celiac(Gluten)Antibody Panel    Bone Specific Alkaline Phosphatase    Calcium, 24 Hour Urine    Electrophoresis, Protein, Serum, Cascade    Tryptase    Vitamin D, Total (25-Hydroxy)    HM2(CBC w/o Differential)    Thyroid Stimulating Hormone (TSH)    Comprehensive Metabolic Panel    Magnesium     Patient was educated on safety of Prolia utilizing Patient Counseling Chart for Healthcare Providers, as outlined by the Prolia REMS progam.     Return in about 4 weeks (around 8/30/2019) for Recheck, osteoporosis.    Patient Instructions   Prolia 14th today.  Prolia 15th in 6 months with my nurse. I will see you in 1 year.    DXA in 1 year .   Phone number to schedule [phone].    Daily calcium need is 2023-9020 mg a day from the diet and supplements.  Calcium citrate is easier to digest.  Vitamin D 2000 IU daily recommended.      Chief Complaint   Patient presents with     Osteoporosis Follow Up     Osteo F/U       /75   Pulse 67   Wt 167 lb 11.2 oz (76.1 kg)   LMP 03/20/2006   SpO2 100%   BMI 31.69 kg/m        Did you experience any problems with previous Prolia injection? no  Any medication change in the last 6 months? no  Did you take prednisone or other immunosupressant drugs in the last 6 months   (chemo, transplant, rheum, dermatology conditions)? no  Did you have any serious infection in the last 6 months?no  Any recent hospitalizations?no  Do you plan any dental work in the next 2-3 months?no  How much calcium do you take daily from the diet and supplements?1200 mg  How much vit D do you take daily? 2000 IU  Last DXA? Done today, L1-L2 T-score -3.0 and significant decline of 12.8% compared to 2017. Reviewed and discussed      Patient is here today for the 14th Prolia injection. Patient tolerated previous injections well.   We discussed calcium and vit D daily needs today.   Next  Prolia injection will be in 6 months.     Since she has significant decline in the bone density, on Prolia treatment every 6 months for the last 4 years, I will proceed with the workup for secondary causes of the bone density loss.  She was treated with Forteo for 9 months in 2012, stopped because was not covered. She was on oral BSP for 4 years prior Forteo.    I will check if we can get Forteo covered and use this anabolic agent for 15 months ( reaching 2 years).  She will be the candidate for trial with Masha, when this medication is available.    25 minutes spent with the patient and more then 50 % of the time in counseling.  This note has been dictated using voice recognition software. Any grammatical or context distortions are unintentional and inherent to the software      Patient Active Problem List   Diagnosis     Blastomycosis     Nephrolithiasis     Osteoporosis Senile     Restless Legs Syndrome     Chronic Constipation     Ulcerative Colitis     Leiomyoma Of The Uterus     Endometrial Hyperplasia     Left leg pain     Venous insufficiency of both lower extremities     Varicose veins of both lower extremities with pain     High arches, congenital       Current Outpatient Medications on File Prior to Visit   Medication Sig Dispense Refill     b complex vitamins tablet daily.       calcium citrate-vitamin D3 200 mg calcium -250 unit Tab Take 1 tablet by mouth at bedtime.        cholecalciferol, vitamin D3, (VITAMIN D3) 2,000 unit Tab Take 1 tablet by mouth at bedtime.        denosumab 60 mg/mL Syrg Inject 60 mg under the skin every 6 (six) months.       docusate sodium (COLACE) 50 mg/5 mL oral liquid Take by mouth.       ibuprofen (ADVIL,MOTRIN) 200 MG tablet Take 600 mg by mouth 2 (two) times a day.       omeprazole (PRILOSEC) 20 MG capsule Take 20 mg by mouth daily as needed.       mesalamine (ROWASA) 4 gram/60 mL enema        methylcellulose (CITRUCEL ORAL) 20 mg daily.       Current  Facility-Administered Medications on File Prior to Visit   Medication Dose Route Frequency Provider Last Rate Last Dose     [COMPLETED] denosumab 60 mg (PROLIA 60 mg/ml)  60 mg Subcutaneous Q6 Months Jorge Blanton MD   60 mg at 08/02/19 8471

## 2021-05-31 NOTE — TELEPHONE ENCOUNTER
Patient Returning Call  Reason for call:  Patient calling back   Information relayed to patient:  Below message  Patient has additional questions:  Yes  If YES, what are your questions/concerns:  Patient asked if there was a later appointment on 8/30. Patient will call back to try to re-schedule.  Okay to leave a detailed message?: Yes [phone]

## 2021-05-31 NOTE — TELEPHONE ENCOUNTER
Please start PA process for Forteo or Tymlos.   Dg. Osteoporosis  Decline in the bone density on Prolia treatment  Thanks.  Dr SANCHEZ

## 2021-05-31 NOTE — TELEPHONE ENCOUNTER
Patient Returning Call  Reason for call:  Returning a brianna from the clinic.  Information relayed to patient:      No open message found on chart.  Did see labs results on patient, notes on result stated this was available to see on my chart, patient has  informed that she is not up and running on my chart at this time, so if there is further information for the patient to please call phone number listed      Patient has additional questions:  Yes  If YES, what are your questions/concerns:  Further instructions?  Okay to leave a detailed message?: Yes

## 2021-05-31 NOTE — PATIENT INSTRUCTIONS - HE
Let me know when Forteo started.  Stop taking calcium pills when you start Forteo. Get calcium from diet.    We have to check calcium in your blood 4 weeks after Forteo started. Lab order is in, just call and schedule lab only appt 4 weeks 4 weeks after Forteo started.    We should repeat DXA scan in 1 year after Forteo started.  We will not do Prolia for 15 months while on Forteo.  We will restart Prolia when done with Forteo.

## 2021-05-31 NOTE — PATIENT INSTRUCTIONS - HE
Prolia 14th today.  Prolia 15th in 6 months with my nurse. I will see you in 1 year.    DXA in 1 year .   Phone number to schedule [phone].    Daily calcium need is 7443-9572 mg a day from the diet and supplements.  Calcium citrate is easier to digest.  Vitamin D 2000 IU daily recommended.

## 2021-05-31 NOTE — TELEPHONE ENCOUNTER
Attempted to call but was not able to leave voicemail. Will try back at a later time.  [employer] message sent.

## 2021-05-31 NOTE — PROGRESS NOTES
ASSESSMENT:   1. Bruising  HM1(CBC and Differential)    HM1 (CBC with Diff)    traMADol (ULTRAM) 50 mg tablet   2. Pain of left upper arm  US Venous Arm Right    traMADol (ULTRAM) 50 mg tablet       PLAN:  Normal CBC and ultrasound is negative for DVT.  Results were communicated to patient over the phone as she was at the hospital for imaging.  Likely resolving hematoma.  Patient asked to use warm packs, patient reiterates to me that she has been in so much pain that she cannot sleep.  Prescribed a small number of tramadol for pain management.  Encouraged to follow-up with her primary care provider if no improvement over the course of the next several days.    I discussed red flag symptoms, return precautions to clinic/ER and follow up care with patient/parent.  Expected clinical course, symptomatic cares advised. Questions answered. Patient/parent amenable with plan.    Patient Instructions:  There are no Patient Instructions on file for this visit.    SUBJECTIVE:   Soco Ferguson is a 64 y.o. female who presents today with pain, swelling and bruising to the right arm.  Patient notes that she donated plasma on July 26 and staff members had difficulty accessing her vein.  Notes that she had 3 venipunctures in total, and during 1 think she felt a significant pain just above the antecubital fossa.  The following day, she developed an area of bruising to the medial aspect of the antecubital fossa, and in the several days following she developed bruising extending from the mid bicep area to the mid forearm.  Patient notes that the bruising has improved, and does present me with photos documenting the bruising and it does appear to be improved.  She presents today because she is having increasing pain to the area and was unable to sleep last night secondary to the pain.  She denies any paresthesias, she denies fevers.  She denies any weakness of the upper extremity.  She has no personal or family history of bleeding  or clotting disorders.      ROS:  Comprehensive 12 pt ROS completed, positives noted in HPI, otherwise negative.      Past Medical History:  Patient Active Problem List   Diagnosis     Blastomycosis     Nephrolithiasis     Osteoporosis Senile     Restless Legs Syndrome     Chronic Constipation     Ulcerative Colitis     Leiomyoma Of The Uterus     Endometrial Hyperplasia     Left leg pain     Venous insufficiency of both lower extremities     Varicose veins of both lower extremities with pain     High arches, congenital       Surgical History:  Past Surgical History:   Procedure Laterality Date     KIDNEY STONE SURGERY  1999    removal     TUBAL LIGATION  1987     venous closure Bilateral      WISDOM TOOTH EXTRACTION  1980           Family History:  Family History   Problem Relation Age of Onset     Breast cancer Other      Emphysema Mother      Varicose Veins Mother      Edema Mother      Lung cancer Father      Breast cancer Niece         Early 20's     Asthma Grandchild        Reviewed; Non-contributory    Social History     Tobacco Use   Smoking Status Former Smoker     Packs/day: 0.25     Types: Cigarettes   Smokeless Tobacco Never Used   Tobacco Comment    in HS and 20's socail only, never pack a day smoker       Current Medications:  Current Outpatient Medications on File Prior to Visit   Medication Sig Dispense Refill     b complex vitamins tablet daily.       calcium citrate-vitamin D3 200 mg calcium -250 unit Tab Take 1 tablet by mouth at bedtime.        cholecalciferol, vitamin D3, (VITAMIN D3) 2,000 unit Tab Take 1 tablet by mouth at bedtime.        denosumab 60 mg/mL Syrg Inject 60 mg under the skin every 6 (six) months.       docusate sodium (COLACE) 50 mg/5 mL oral liquid Take by mouth.       ibuprofen (ADVIL,MOTRIN) 200 MG tablet Take 600 mg by mouth 2 (two) times a day.       mesalamine (ROWASA) 4 gram/60 mL enema        methylcellulose (CITRUCEL ORAL) 20 mg daily.       omeprazole (PRILOSEC) 20 MG  capsule Take 20 mg by mouth daily as needed.       No current facility-administered medications on file prior to visit.        Allergies:   Allergies   Allergen Reactions     Comtrex Dizziness     Acet/dextromethorphan/phenylephrine     Ragweed Pollen        OBJECTIVE:   Vitals:    08/05/19 0926   BP: 133/82   Patient Site: Left Arm   Patient Position: Sitting   Cuff Size: Adult Regular   Pulse: 68   Resp: 16   Temp: 97.9  F (36.6  C)   TempSrc: Oral   SpO2: 97%   Weight: 168 lb 4 oz (76.3 kg)     Physical exam reveals a pleasant 64 y.o. female.   General: Appears healthy, alert and cooperative. Non-toxic appearance.  Pulmonary/Chest: Chest is clear, no wheezing, rhonchi or rales. Symmetric air entry throughout both lung fields. Symmetrical chest wall movement.  Heart: regular rate and rhythm, no murmur, rub or gallop  Right arm: There is ecchymosis extending from the medial aspect of the bicep area to the medial aspect of the volar surface of the forearm.  There is swelling when compared to the opposite arm.  2+ bilateral radial pulses.  CMS is intact.  Neuro: Alert, oriented. Non-focal.  Skin: pink, warm, dry, and without lesions on limited skin exam. No rashes.  Psychiatric: Normal mood and affect.  Normal judgement and thought content. Normal behavior.       RADIOLOGY    Us Venous Arm Right    Result Date: 8/5/2019  EXAM: US VENOUS ARM RIGHT LOCATION: [facility] DATE/TIME: 8/5/2019 1:36 PM INDICATION: Pain, swelling to RUE COMPARISON: None. TECHNIQUE: Duplex exam performed utilizing 2D gray-scale imaging, Doppler interrogation with color-flow and spectral waveform analysis. Exam performed without and with compression when possible. FINDINGS: Ultrasound includes evaluation of the internal jugular veins, innominate veins, subclavian veins, axillary veins, and brachial veins. The superficial cephalic and basilic veins were also evaluated where seen. RIGHT ARM VEINS: Negative for DVT.     CONCLUSION: 1.  The  right arm veins are negative for DVT.      LABORATORY STUDIES    Recent Results (from the past 24 hour(s))   HM1 (CBC with Diff)   Result Value Ref Range    WBC 6.6 4.0 - 11.0 thou/uL    RBC 4.85 3.80 - 5.40 mill/uL    Hemoglobin 14.5 12.0 - 16.0 g/dL    Hematocrit 43.3 35.0 - 47.0 %    MCV 89 80 - 100 fL    MCH 29.8 27.0 - 34.0 pg    MCHC 33.4 32.0 - 36.0 g/dL    RDW 12.9 11.0 - 14.5 %    Platelets 263 140 - 440 thou/uL    MPV 7.3 7.0 - 10.0 fL    Neutrophils % 56 50 - 70 %    Lymphocytes % 26 20 - 40 %    Monocytes % 10 2 - 10 %    Eosinophils % 7 (H) 0 - 6 %    Basophils % 1 0 - 2 %    Neutrophils Absolute 3.7 2.0 - 7.7 thou/uL    Lymphocytes Absolute 1.7 0.8 - 4.4 thou/uL    Monocytes Absolute 0.7 0.0 - 0.9 thou/uL    Eosinophils Absolute 0.5 (H) 0.0 - 0.4 thou/uL    Basophils Absolute 0.0 0.0 - 0.2 thou/uL           Danielle Godinez, CNP

## 2021-05-31 NOTE — TELEPHONE ENCOUNTER
There was a telephone encounter from 8/2/19. Attempted to call but was not able to leave voicemail. Will try back at a later time.    If patient returns call, please give her message below:  Please inform the pt that Forteo is covered. Dr. Blanton will talk about this more when she sees her in a few weeks. Also, all of her labs looked good. Dr. Blanton would like her to add 1000 U of Vitamin D3 OTC daily.  Viola Lopes CMA ............... 9:34 AM, 08/07/19

## 2021-05-31 NOTE — TELEPHONE ENCOUNTER
Attempted to call but was not able to leave voicemail. Will try back at a later time.  Viola Lopes CMA ............... 10:03 AM, 08/14/19

## 2021-05-31 NOTE — TELEPHONE ENCOUNTER
Please inform the pt that Forteo is covered. We will talk about this more when I see her in a few weeks.

## 2021-05-31 NOTE — TELEPHONE ENCOUNTER
Attempted to call pt but no answer, no voicemail. Please try again  KEKE Mcduffie  3:33 PM  8/6/2019

## 2021-06-01 ENCOUNTER — OFFICE VISIT - HEALTHEAST (OUTPATIENT)
Dept: INTERNAL MEDICINE | Facility: CLINIC | Age: 66
End: 2021-06-01

## 2021-06-01 VITALS — BODY MASS INDEX: 29.83 KG/M2 | HEIGHT: 61 IN | WEIGHT: 158 LBS

## 2021-06-01 DIAGNOSIS — N89.8 VAGINAL ITCHING: ICD-10-CM

## 2021-06-01 DIAGNOSIS — N95.2 ATROPHIC VAGINITIS: ICD-10-CM

## 2021-06-01 NOTE — TELEPHONE ENCOUNTER
Left detailed message for patient with this information. Instructed to call back if further questions.  Viola Lopes CMA ............... 11:48 AM, 09/09/19

## 2021-06-01 NOTE — TELEPHONE ENCOUNTER
Please call the pt :    Stop taking calcium pills when you start Forteo. Get calcium from diet.     We have to check calcium in your blood 4 weeks after Forteo started. Lab order is in, just call and schedule lab only appt 4 weeks 4 weeks after Forteo started.    She should see me in a year

## 2021-06-01 NOTE — TELEPHONE ENCOUNTER
Left voicemail for patient to return call to clinic. When patient returns call, please give them below message.    Viola Lopes CMA ............... 5:07 PM, 09/27/19

## 2021-06-01 NOTE — TELEPHONE ENCOUNTER
Harvinder Team    Forteo copay card people need to know what type of DX pt has, and what strength is the medication.    Please call her back @ [phone], ok to lv msg.

## 2021-06-01 NOTE — TELEPHONE ENCOUNTER
Pt returned call. She called the number on the back of the patient support forteo and they were able to help her. And get her started.    At this time nothing else is needed.

## 2021-06-01 NOTE — TELEPHONE ENCOUNTER
She has called several places, one place has no more money, the other 7 places say they wont help her due to her DX.    So she is wondering what her other options are in regards of medications or assistance.    She can be reached @ [phone], ok to lv msg.

## 2021-06-02 ENCOUNTER — RECORDS - HEALTHEAST (OUTPATIENT)
Dept: ADMINISTRATIVE | Facility: CLINIC | Age: 66
End: 2021-06-02

## 2021-06-02 VITALS — WEIGHT: 160 LBS | BODY MASS INDEX: 30.23 KG/M2

## 2021-06-02 VITALS — BODY MASS INDEX: 31.72 KG/M2 | WEIGHT: 168 LBS | HEIGHT: 61 IN

## 2021-06-03 VITALS — WEIGHT: 168.5 LBS | BODY MASS INDEX: 31.84 KG/M2

## 2021-06-03 VITALS — BODY MASS INDEX: 31.69 KG/M2 | WEIGHT: 167.7 LBS

## 2021-06-03 VITALS — BODY MASS INDEX: 31.79 KG/M2 | WEIGHT: 168.25 LBS

## 2021-06-03 NOTE — TELEPHONE ENCOUNTER
Harvinder Team    since taking foreteo, heart rate has increased and pain in chest, mostly on the left, upper side, and some on the back.     she can be reached @ [phone], ok to [facility] at 1030am.

## 2021-06-04 VITALS
HEART RATE: 64 BPM | WEIGHT: 167.7 LBS | TEMPERATURE: 98.7 F | HEIGHT: 61 IN | DIASTOLIC BLOOD PRESSURE: 66 MMHG | BODY MASS INDEX: 31.66 KG/M2 | RESPIRATION RATE: 16 BRPM | SYSTOLIC BLOOD PRESSURE: 124 MMHG

## 2021-06-05 ENCOUNTER — RECORDS - HEALTHEAST (OUTPATIENT)
Dept: FAMILY MEDICINE | Facility: CLINIC | Age: 66
End: 2021-06-05

## 2021-06-05 VITALS
DIASTOLIC BLOOD PRESSURE: 74 MMHG | HEIGHT: 61 IN | WEIGHT: 166 LBS | HEART RATE: 72 BPM | BODY MASS INDEX: 31.34 KG/M2 | SYSTOLIC BLOOD PRESSURE: 126 MMHG | RESPIRATION RATE: 16 BRPM

## 2021-06-05 VITALS
HEIGHT: 61 IN | SYSTOLIC BLOOD PRESSURE: 123 MMHG | HEART RATE: 75 BPM | DIASTOLIC BLOOD PRESSURE: 79 MMHG | WEIGHT: 164.4 LBS | BODY MASS INDEX: 31.04 KG/M2 | OXYGEN SATURATION: 99 %

## 2021-06-05 VITALS
HEART RATE: 80 BPM | TEMPERATURE: 98.9 F | BODY MASS INDEX: 30.55 KG/M2 | DIASTOLIC BLOOD PRESSURE: 70 MMHG | WEIGHT: 163 LBS | RESPIRATION RATE: 20 BRPM | SYSTOLIC BLOOD PRESSURE: 110 MMHG

## 2021-06-05 VITALS — HEIGHT: 61 IN | WEIGHT: 164 LBS | BODY MASS INDEX: 30.96 KG/M2

## 2021-06-05 VITALS
BODY MASS INDEX: 31.29 KG/M2 | SYSTOLIC BLOOD PRESSURE: 124 MMHG | DIASTOLIC BLOOD PRESSURE: 70 MMHG | HEART RATE: 80 BPM | WEIGHT: 165.6 LBS

## 2021-06-05 VITALS — BODY MASS INDEX: 30.96 KG/M2 | WEIGHT: 164 LBS | HEIGHT: 61 IN

## 2021-06-05 VITALS
HEART RATE: 68 BPM | WEIGHT: 171 LBS | OXYGEN SATURATION: 98 % | DIASTOLIC BLOOD PRESSURE: 83 MMHG | BODY MASS INDEX: 32.05 KG/M2 | SYSTOLIC BLOOD PRESSURE: 149 MMHG

## 2021-06-05 DIAGNOSIS — R10.9 ABDOMINAL PAIN: ICD-10-CM

## 2021-06-05 NOTE — TELEPHONE ENCOUNTER
Left message informing pt that she is not due for a DEXA scan until 8/2020, and in regards to Forteo, I do not have the exact start date. She was told to recheck labs 4 weeks after starting Forteo which would put her start date around 9/27/2019

## 2021-06-05 NOTE — TELEPHONE ENCOUNTER
Patient believes she needs a bone Dxa, and is wondering if Dr Blanton could place an order for it.    She is also wondering if you can tell her the date when she started the Forteo, as she cant remember exactly and would like to keep it uniform as far as getting the injection.     [id number]

## 2021-06-05 NOTE — TELEPHONE ENCOUNTER
It looks like Forteo was started on 9/25/19.   Please remind her to Stop taking calcium pills when you start Forteo. Get calcium from diet.     We should repeat DXA scan in 1 year after Forteo started.  We will not do Prolia for 15 months while on Forteo.  We will restart Prolia when done with Forteo.

## 2021-06-06 NOTE — PROGRESS NOTES
Date of Service:  20    Date last seen by Dr. Tavares:  19    PCP: Jorge Blanton MD    Impression:  1. Bilateral leg swelling -under good control  2. Bilateral leg venous insufficiency and hypertension  3. Very high arches  4. Osteoporosis     Plan:  1. Questions were answered.  2. Insoles working well.  To continue.  3. Knows importance of exercise and compression with regular updating.  New compression written for.  4. Discussed importance of and how to exercise on a regular basis.  Modifications reviewed with history of osteoporosis.  5. Patient will follow up in 1 year or when needed.     Time spent with patient 15 minutes with greater than 50% time in consultation, education and coordination of care.     ---------------------------------------------------------------------------------------------------------------------    Chief Complaint: Bilateral leg swelling     History of Present Illness:     Soco Ferguson returns to the [facility] for bilateral leg swelling secondary to venous hypertension with insufficiency confirmed with ultrasound s/p right leg venous closure (2017) with improvement.   She was to continue to wear her compression, exercise and wear her insoles.  At follow-up today she reports she has been stable. She is on medication Forteo for osteoporosis.  She has been feeling good.  She wears her compression daily and also insoles.  She needs new compression.  She is trying to increase her exercise.  There is no new numbness, tingling or weakness.  She denies any new rashes, masses, shortness of breath or unexplained weight loss.  She has had no irregular bleeding.  She has had no fevers and no ulcers.        Past Medical History:   Diagnosis Date     Abdominal Pain     Created by Conversion      Arthralgia     Created by Conversion      Blastomycosis     Created by Conversion      Chronic Constipation     Created by Conversion       "Endometrial Hyperplasia     Created by Conversion      Hyperparathyroidism     Created by Conversion      Leiomyoma Of The Uterus     Created by Conversion      Myalgia And Myositis     Created by Conversion      Nausea     Created by Conversion      Nephrolithiasis     Created by Conversion      Osteoporosis     Created by Conversion      Osteoporosis Senile     Created by Conversion      Pelvic Pain     Created by Conversion      Restless Legs Syndrome     Created by Conversion      Sinusitis     Created by Conversion      Ulcerative Colitis     Created by Conversion      Urine Tests Nonspecific Abnormal Findings     Created by Conversion      Varicose Veins     Created by Conversion      Vitamin D Deficiency     Created by Conversion        Past Surgical History:   Procedure Laterality Date     KIDNEY STONE SURGERY  1999    removal     TUBAL LIGATION  1987     venous closure Bilateral      WISDOM TOOTH EXTRACTION  1980         Current Outpatient Medications:      b complex vitamins tablet, daily., Disp: , Rfl:      ibuprofen (ADVIL,MOTRIN) 200 MG tablet, Take 600 mg by mouth 2 (two) times a day., Disp: , Rfl:      omeprazole (PRILOSEC) 20 MG capsule, Take 20 mg by mouth daily as needed., Disp: , Rfl:      teriparatide (FORTEO) 20 mcg/dose - 600 mcg/2.4 mL injection, Inject 0.08 mL (20 mcg total) under the skin daily., Disp: 2.4 mL, Rfl: 14     pen needle, diabetic (SURE-FINE PEN NEEDLES) 31 gauge x 3/16\" Ndle, Use daily with Forteo, Disp: 100 each, Rfl: 3    Allergies   Allergen Reactions     Comtrex Dizziness     Acet/dextromethorphan/phenylephrine     Ragweed Pollen        Social History     Socioeconomic History     Marital status:      Spouse name: Not on file     Number of children: Not on file     Years of education: Not on file     Highest education level: Not on file   Occupational History     Not on file   Social Needs     Financial resource strain: Not on file     Food insecurity:     Worry: Not " on file     Inability: Not on file     Transportation needs:     Medical: Not on file     Non-medical: Not on file   Tobacco Use     Smoking status: Former Smoker     Packs/day: 0.25     Types: Cigarettes     Smokeless tobacco: Never Used     Tobacco comment: in HS and 20's socail only, never pack a day smoker   Substance and Sexual Activity     Alcohol use: Yes     Alcohol/week: 1.7 standard drinks     Types: 2 Standard drinks or equivalent per week     Drug use: No     Sexual activity: Never     Comment:     Lifestyle     Physical activity:     Days per week: Not on file     Minutes per session: Not on file     Stress: Not on file   Relationships     Social connections:     Talks on phone: Not on file     Gets together: Not on file     Attends Mandaeism service: Not on file     Active member of club or organization: Not on file     Attends meetings of clubs or organizations: Not on file     Relationship status: Not on file     Intimate partner violence:     Fear of current or ex partner: Not on file     Emotionally abused: Not on file     Physically abused: Not on file     Forced sexual activity: Not on file   Other Topics Concern     Not on file   Social History Narrative    .  2 kids.  [employer] .       Family History   Problem Relation Age of Onset     Breast cancer Other      Emphysema Mother      Varicose Veins Mother      Edema Mother      Lung cancer Father      Breast cancer Niece         Early 20's     Asthma Grandchild        Review of Systems:  Soco Ferguson no new numbess, tingling or weakness, redness or rashes, fevers, new masses, abdominal bloating or discomfort, unexplained weight loss, increased pain, new ulcers, shortness of breath and chest pain  Full 12 point review of systems was completed.    Imaging:    I personally reviewed the following imaging results today and those on care everywhere, if indicated    EXAM: US VENOUS ARM RIGHT  LOCATION:  [facility]  DATE/TIME: 8/5/2019 1:36 PM     INDICATION: Pain, swelling to RUE  COMPARISON: None.  TECHNIQUE: Duplex exam performed utilizing 2D gray-scale imaging, Doppler interrogation with color-flow and spectral waveform analysis. Exam performed without and with compression when possible.     FINDINGS: Ultrasound includes evaluation of the internal jugular veins, innominate veins, subclavian veins, axillary veins, and brachial veins. The superficial cephalic and basilic veins were also evaluated where seen.      RIGHT ARM VEINS: Negative for DVT.     IMPRESSION:   CONCLUSION:  1.  The right arm veins are negative for DVT.    12/19/2016     EXAM: BILATERAL LOWER EXTREMITY DEEP AND SUPERFICIAL VENOUS DUPLEX ULTRASOUND WITH PHYSIOLOGIC TESTING     INDICATION: Bilateral leg swelling and pain, varicose veins. Assess for incompetent veins.     TECHNIQUE: Supine and upright ultrasound of the deep and superficial veins with Valsalva and compression augmentation maneuvers. Duplex imaging is performed utilizing gray-scale, two-dimensional images, color-flow imaging, Doppler waveform analysis, and   spectral Doppler imaging.      INCOMPETENCY CRITERIA: Deep vein reflux reported when greater than 1,000 ms flow reversal. Superficial vein reflux reported when greater than 500 ms flow reversal.  vein reflux reported as greater than 350 ms flow reversal.     DEEP VEIN FINDINGS:   RIGHT LEG: The common femoral, profunda femoral, femoral, popliteal, and visualized calf veins are patent and compressible. The right common femoral vein, profunda femoral vein, and proximal superficial femoral vein are all incompetent.     LEFT LEG: The common femoral, profunda femoral, femoral, popliteal, and visualized calf veins are patent and compressible. The left common femoral vein is incompetent.     RIGHT SUPERFICIAL VEIN FINDINGS:  GREAT SAPHENOUS VEIN: Incompetent from the saphenofemoral junction to the distal calf.      SMALL SAPHENOUS VEIN: Competent from the saphenopopliteal junction to the mid calf.     No incompetent perforating veins or abnormal accessory veins identified.     LEFT SUPERFICIAL VEIN FINDINGS:  GREAT SAPHENOUS VEIN: Incompetent at the saphenofemoral junction. Competent from the mid thigh to the mid calf.     SMALL SAPHENOUS VEIN: Competent from the saphenopopliteal junction to the mid calf.     No incompetent perforating veins or abnormal accessory veins identified.     IMPRESSION:   CONCLUSION:   1. No deep venous thrombosis of either lower extremity.  2. RIGHT LEG: The right superficial venous system is patent, the greater saphenous vein is incompetent throughout its course.  3. LEFT LEG: The left superficial venous system is patent, the greater saphenous vein is incompetent at the saphenofemoral junction.    Labs:    I personally reviewed the following lab results today and those on care everywhere, if indicated    Lab Results   Component Value Date    SEDRATE 7 09/04/2013         Lab Results   Component Value Date    CRP 0.3 09/04/2013           Lab Results   Component Value Date    CREATININE 0.81 08/02/2019    CREATININE 0.82 08/02/2019      Lab Results   Component Value Date    HGBA1C 5.6 06/27/2014           Lab Results   Component Value Date    BUN 26 (H) 08/02/2019              Lab Results   Component Value Date    ALBUMIN 4.0 08/02/2019       Vitamin D, Total (25-Hydroxy)   Date Value Ref Range Status   08/02/2019 29.6 (L) 30.0 - 80.0 ng/mL Final      Ref Range & Units11/30/16 10:28 AM  TSH0.30 - 5.00 uIU/mL  0.96  [facility]  Ref Range & Units11/30/16 10:28 AM  Magnesium1.8 - 2.6 mg/dL  2.3  [facility]      Physical Exam:  Vitals:    02/13/20 1528   BP: 124/66   Pulse: 64   Resp: 16   Temp: 98.7  F (37.1  C)     BMI 31.69  Weight 167 pounds    Circumferential measures:    Vasc Edema 8/31/2017 11/13/2017 2/15/2018 2/14/2019 2/13/2020   Right just above MTP 20.6 21.0 19.3 20.5 20.2    Right Ankle 22.2 21.5 21.3 21.8 22.2   Right Widest Calf 38.0 37.0 36.6 36.7 37.6   Right Thigh Up 10cm - - 44.7 - 47   Left - just above MTP 20.6 20.6 20 20 20.7   Left Ankle 21.9 21.9 21.1 22 22.2   Left Widest Calf 37.1 34.5 36.5 36. 37.2   Left Thigh Up 10cm - - 45 - 46     Measures stable.    General:  64 y.o. female in no apparent distress.      Psych: Alert and oriented x 3.  Cooperative. Affect normal.    HEENT: Atraumatic, normocephalic.     Musculoskeletal:  Normal range of motion in knees and ankles bilaterally without active joint synovitis, erythema, swelling or joint laxity.      Neurological:  Sensation is intact to pin prick and light touch in both legs.  Strength testing is normal in hip flexion, knee flexion, knee extension, ankle dorsiflexion and great toe extension bilaterally.      Vascular: Dorsalis pedis and posterior tibialis pulses are strong and equal bilaterally. There are significant telangietasias, medial ankle venous flares, venous varicosities  and spider veins .     Integumentary: Skin of the legs is uniformly warm and dry and with negative Stemmer's Sign.      Haylee Tavares MD, FABWMS, FACCWS, FAAPMR  Medical Director Wound Care and Lymphedema  [facility]  [phone]

## 2021-06-06 NOTE — PATIENT INSTRUCTIONS - HE
"For Compression Stockings:  [facility] (Please call to make an appointment)    [facility]  [address], [address]  [place], [place].  Phone: [phone]    [facility]  [address]  [place] [address] [phone]          Swelling in the legs can be caused by many reasons. No matter what the reason, treatment usually includes some type of compression. You should wear your compression socks as much as you can. Your compression should be put on first thing in the morning. Take the compression off at night. It is especially important to wear them with long periods of sitting/standing, long car rides or if you will be flying. Going without compression for even brief periods of time can be damaging to your legs and your health.  Compression socks should get replaced usually every 4-6 months. They do not need to be worn at night while in bed. If we have seen you in the last year, we can refill your sock prescription, otherwise your primary care provider is able to refill them for you. Call us with any problems or questions.   Compression Velcro may need to be replaced every 9-12 months.  Often the liners and socks need to be replaced every three or four months.  The neoprene velcro may last up to 2 years.  If the velcro becomes worn a tailor may be able to repair it for you inexpensively.    If you do a lot of standing, it is good to do calf raises to help keep the blood pumping. If you sit a lot at work, it is good to get up periodically to walk around. Elevation of the foot of your bed 4-6\" helps the blood return back to where it is needed.   Please call us if you have any questions [phone]    Thank you for choosing [facility].    "

## 2021-06-07 NOTE — TELEPHONE ENCOUNTER
Dr Blanton      States she is experiencing throbbing in hands and arms. She spoke with her vascular provider and they recommended for her to follow up with PCP.    Wondering if should test for arthiritis or get a referral.    she can be reached @ [phone]

## 2021-06-09 NOTE — PROGRESS NOTES
Patient here for her 3 month compression f/u. Patient is a candidate for her right gsv. pics taken, needs prior auth.

## 2021-06-09 NOTE — PROGRESS NOTES
Follow Up: Varicose Veins/ Venous Insufficiency    Soco Ferguson is a 61 y.o.  female here for followup. she has worn stockings now for 3 months. I saw her previously in December 2016.  Continued progression of disease and symptoms and issues; reviewed ultrasound results. Patient has ongoing symptoms with pain and swelling needing intervention with pain meds secondary to interfering with daily activities and work.    Allergies:Comtrex and Aspirin (tartrazine only)    Past Medical History:   Diagnosis Date     Abdominal Pain     Created by Conversion      Arthralgia     Created by Conversion      Blastomycosis     Created by Conversion      Chronic Constipation     Created by Conversion      Endometrial Hyperplasia     Created by Conversion      Hyperparathyroidism     Created by Conversion      Leiomyoma Of The Uterus     Created by Conversion      Myalgia And Myositis     Created by Conversion      Nausea     Created by Conversion      Nephrolithiasis     Created by Conversion      Osteoporosis     Created by Conversion      Osteoporosis Senile     Created by Conversion      Pelvic Pain     Created by Conversion      Restless Legs Syndrome     Created by Conversion      Sinusitis     Created by Conversion      Ulcerative Colitis     Created by Conversion      Urine Tests Nonspecific Abnormal Findings     Created by Conversion      Varicose Veins     Created by Conversion      Vitamin D Deficiency     Created by Conversion        Past Surgical History:   Procedure Laterality Date     KIDNEY STONE SURGERY  1999    removal     TUBAL LIGATION  1987     WISDOM TOOTH EXTRACTION  1980       CURRENT MEDS:  Current Outpatient Prescriptions on File Prior to Visit   Medication Sig Dispense Refill     calcium citrate-vitamin D3 200 mg calcium -250 unit Tab Take 1 tablet by mouth daily.       cholecalciferol, vitamin D3, (VITAMIN D3) 2,000 unit Tab Take 1 tablet by mouth daily.       DOCUSATE CALCIUM (STOOL SOFTENER ORAL)  Take by mouth daily.       No current facility-administered medications on file prior to visit.        Family History   Problem Relation Age of Onset     Emphysema Mother      Varicose Veins Mother      Edema Mother      Lung cancer Father      Breast cancer Niece      Early 20's     Asthma Grandchild         reports that she has quit smoking. Her smoking use included Cigarettes. She smoked 0.25 packs per day. She does not have any smokeless tobacco history on file. She reports that she drinks about 1.0 oz of alcohol per week  She reports that she does not use illicit drugs.    Review of Systems:  Negative except leg pain, swelling, varicose veins, inhibits daily activities and chores, Otherwise twelve system of review is negative.      OBJECTIVE:  There were no vitals filed for this visit.  There is no height or weight on file to calculate BMI.    EXAM:  GENERAL: This is a well-developed 61 y.o. female who appears her stated age  HEAD: normocephalic  HEENT: Pupils equal and reactive bilaterally  CARDIAC: RRR without murmur  CHEST/LUNG:  Clear to auscultation  ABDOMEN: Soft, nontender, nondistended, no masses    NEUROLOGIC: Focally intact, nonfocal  VASCULAR: Pulses intact, symmetrical upper and lower extremities.    US Venous Insufficiency Legs Bilateral (Order [id number])   Imaging   Date: 12/19/2016 Department: [facility] Ultrasound [place] Released By: Levy Stewart CMA Authorizing: Haylee Tavares MD   Study Result   [facility] OUTPATIENT  12/19/2016     EXAM: BILATERAL LOWER EXTREMITY DEEP AND SUPERFICIAL VENOUS DUPLEX ULTRASOUND WITH PHYSIOLOGIC TESTING     INDICATION: Bilateral leg swelling and pain, varicose veins. Assess for incompetent veins.     TECHNIQUE: Supine and upright ultrasound of the deep and superficial veins with Valsalva and compression augmentation maneuvers. Duplex imaging is performed utilizing gray-scale, two-dimensional images, color-flow imaging,  Doppler waveform analysis, and   spectral Doppler imaging.      INCOMPETENCY CRITERIA: Deep vein reflux reported when greater than 1,000 ms flow reversal. Superficial vein reflux reported when greater than 500 ms flow reversal.  vein reflux reported as greater than 350 ms flow reversal.     DEEP VEIN FINDINGS:   RIGHT LEG: The common femoral, profunda femoral, femoral, popliteal, and visualized calf veins are patent and compressible. The right common femoral vein, profunda femoral vein, and proximal superficial femoral vein are all incompetent.     LEFT LEG: The common femoral, profunda femoral, femoral, popliteal, and visualized calf veins are patent and compressible. The left common femoral vein is incompetent.     RIGHT SUPERFICIAL VEIN FINDINGS:  GREAT SAPHENOUS VEIN: Incompetent from the saphenofemoral junction to the distal calf.     SMALL SAPHENOUS VEIN: Competent from the saphenopopliteal junction to the mid calf.     No incompetent perforating veins or abnormal accessory veins identified.     LEFT SUPERFICIAL VEIN FINDINGS:  GREAT SAPHENOUS VEIN: Incompetent at the saphenofemoral junction. Competent from the mid thigh to the mid calf.     SMALL SAPHENOUS VEIN: Competent from the saphenopopliteal junction to the mid calf.     No incompetent perforating veins or abnormal accessory veins identified.     IMPRESSION:   CONCLUSION:   1. No deep venous thrombosis of either lower extremity.  2. RIGHT LEG: The right superficial venous system is patent, the greater saphenous vein is incompetent throughout its course.  3. LEFT LEG: The left superficial venous system is patent, the greater saphenous vein is incompetent at the saphenofemoral junction.         She has  incompetency and insuffiencey of the rignt  greater  saphenous vein.  Right measures 11mm  at the junction. Deep systems are intact. No DVTs. Great candidate for endovenous  closure. We spent counseling time more than 50% of visit: 20 minutes  today and discussed the procedure. The risks of anesthesia, infection, bleeding, clotting, DVTs, numbess at the insertion site dermatome and  the process and procedure were discussed. Answered all questions today. Will submit this to [employer] insurance if needed for pre approval.Will set this up when approved. Discussed need to have a  and procedure woul take around 30 minutes with total time 2-3 hours. Also understands a small screening ultrasound 2-3 days out to rule out clot formation at the closed vessel.    DIAGNOSIS: Venous insufficiency of the rignt  greater} saphenous vein .     PROCEDURE: Endovenous closure of the right greater saphenous vein    Shayan King MD  [facility] Surgery Dept.

## 2021-06-09 NOTE — PROGRESS NOTES
Date of Service: 2017     Date last seen by Dr. Tavares:  2017    PCP: Joslyn Ness CNP    Impression:  1. Bilateral leg swelling and pain  2. Bilateral leg venous insufficiency and hypertension  3. Cramps in legs, L>R     Plan:  1. Questions were answered.  2. Venous insufficiency study of legs showed venous insufficiency.  To see Dr. King this week.    3. Discussed importance of exercise and compression.  4. Compression stockings ordered  5. TSH and Mg normal.  Labs reviewed.    6. Patient will follow up in 6 months or when needed.      Time spent with patient 15 minutes with greater than 50% time in consultation, education and coordination of care.     ---------------------------------------------------------------------------------------------------------------------    Chief Complaint: Bilateral leg swelling and  leg swelling     History of Present Illness:     Soco Ferguson returns to the [facility], as a new consult, with bilateral leg swelling and generalized arms and leg swelling secondary to venous hypertension with insufficiency.  Venous insufficiency ultrasound showed venous insufficiency bilaterally.  She is scheduled to see Dr. King tomorrow.  Her compression daily now.  She says this definitely has helped.  The pain is less.  Her thyroid and magnesium tests were normal.  The cramping is less.  She still has the pain in the legs.  There is no new numbness, tingling or weakness.  She has had no fevers.  There is no increase in pain.      Past Medical History:   Diagnosis Date     Abdominal Pain     Created by Conversion      Arthralgia     Created by Conversion      Blastomycosis     Created by Conversion      Chronic Constipation     Created by Conversion      Endometrial Hyperplasia     Created by Conversion      Hyperparathyroidism     Created by Conversion      Leiomyoma Of The Uterus     Created by Conversion      Myalgia And Myositis     Created by  Conversion      Nausea     Created by Conversion      Nephrolithiasis     Created by Conversion      Osteoporosis     Created by Conversion      Osteoporosis Senile     Created by Conversion      Pelvic Pain     Created by Conversion      Restless Legs Syndrome     Created by Conversion      Sinusitis     Created by Conversion      Ulcerative Colitis     Created by Conversion      Urine Tests Nonspecific Abnormal Findings     Created by Conversion      Varicose Veins     Created by Conversion      Vitamin D Deficiency     Created by Conversion        Past Surgical History:   Procedure Laterality Date     KIDNEY STONE SURGERY  1999    removal     TUBAL LIGATION  1987     WISDOM TOOTH EXTRACTION  1980         Current Outpatient Prescriptions:      calcium citrate-vitamin D3 200 mg calcium -250 unit Tab, Take 1 tablet by mouth daily., Disp: , Rfl:      cholecalciferol, vitamin D3, (VITAMIN D3) 2,000 unit Tab, Take 1 tablet by mouth daily., Disp: , Rfl:      DOCUSATE CALCIUM (STOOL SOFTENER ORAL), Take by mouth daily., Disp: , Rfl:     Allergies   Allergen Reactions     Comtrex Dizziness     Aspirin (Tartrazine Only) Nausea Only       Social History     Social History     Marital status:      Spouse name: N/A     Number of children: N/A     Years of education: N/A     Occupational History     Not on file.     Social History Main Topics     Smoking status: Former Smoker     Packs/day: 0.25     Types: Cigarettes     Smokeless tobacco: Not on file      Comment: in HS and 20's socail only, never pack a day smoker     Alcohol use 1.0 oz/week     2 Standard drinks or equivalent per week     Drug use: No     Sexual activity: No      Comment:       Other Topics Concern     Not on file     Social History Narrative    .  2 kids.  [employer] .       Family History   Problem Relation Age of Onset     Emphysema Mother      Varicose Veins Mother      Edema Mother      Lung cancer Father       Breast cancer Niece      Early 20's     Asthma Grandchild        Review of Systems:  Soco Ferguson no new numbess, tingling or weakness, redness or rashes, fevers, new masses, abdominal bloating or discomfort, unexplained weight loss, increased pain, new ulcers, shortness of breath and chest pain  Full 12 point review of systems was completed.    Imagin2016     EXAM: BILATERAL LOWER EXTREMITY DEEP AND SUPERFICIAL VENOUS DUPLEX ULTRASOUND WITH PHYSIOLOGIC TESTING     INDICATION: Bilateral leg swelling and pain, varicose veins. Assess for incompetent veins.     TECHNIQUE: Supine and upright ultrasound of the deep and superficial veins with Valsalva and compression augmentation maneuvers. Duplex imaging is performed utilizing gray-scale, two-dimensional images, color-flow imaging, Doppler waveform analysis, and   spectral Doppler imaging.      INCOMPETENCY CRITERIA: Deep vein reflux reported when greater than 1,000 ms flow reversal. Superficial vein reflux reported when greater than 500 ms flow reversal.  vein reflux reported as greater than 350 ms flow reversal.     DEEP VEIN FINDINGS:   RIGHT LEG: The common femoral, profunda femoral, femoral, popliteal, and visualized calf veins are patent and compressible. The right common femoral vein, profunda femoral vein, and proximal superficial femoral vein are all incompetent.     LEFT LEG: The common femoral, profunda femoral, femoral, popliteal, and visualized calf veins are patent and compressible. The left common femoral vein is incompetent.     RIGHT SUPERFICIAL VEIN FINDINGS:  GREAT SAPHENOUS VEIN: Incompetent from the saphenofemoral junction to the distal calf.     SMALL SAPHENOUS VEIN: Competent from the saphenopopliteal junction to the mid calf.     No incompetent perforating veins or abnormal accessory veins identified.     LEFT SUPERFICIAL VEIN FINDINGS:  GREAT SAPHENOUS VEIN: Incompetent at the saphenofemoral junction. Competent  from the mid thigh to the mid calf.     SMALL SAPHENOUS VEIN: Competent from the saphenopopliteal junction to the mid calf.     No incompetent perforating veins or abnormal accessory veins identified.     IMPRESSION:   CONCLUSION:   1. No deep venous thrombosis of either lower extremity.  2. RIGHT LEG: The right superficial venous system is patent, the greater saphenous vein is incompetent throughout its course.  3. LEFT LEG: The left superficial venous system is patent, the greater saphenous vein is incompetent at the saphenofemoral junction.      Labs:    Lab Results   Component Value Date    SEDRATE 7 09/04/2013         Lab Results   Component Value Date    CRP 0.3 09/04/2013           Lab Results   Component Value Date    CREATININE 0.82 06/22/2015      Lab Results   Component Value Date    HGBA1C 5.6 06/27/2014           Lab Results   Component Value Date    BUN 21 06/22/2015              Lab Results   Component Value Date    ALBUMIN 3.8 06/10/2014       VITAMIN D, TOTAL (25-HYDROXY)   Date Value Ref Range Status   06/22/2015 47.4 30.0 - 80.0 ng/mL Final      Ref Range & Units11/30/16 10:28 AM  TSH0.30 - 5.00 uIU/mL  0.96  Resulting AgencyS  Ref Range & Units11/30/16 10:28 AM  Magnesium1.8 - 2.6 mg/dL  2.3  Resulting AgencyS        Physical Exam:  Vitals:    02/27/17 1519   BP: 110/68   Pulse: 80   Resp: 16   Temp: 97.9  F (36.6  C)    There is no height or weight on file to calculate BMI.    Circumferential measures:    Vasc Edema 11/30/2016 2/27/2017   Right just above MTP 19.7 19.9   Right Ankle 22 22   Right Widest Calf 37.5 39   Right Thigh Up 10cm 44 47   Left - just above MTP 21.5 20.6   Left Ankle 21.3 21.5   Left Widest Calf 36.7 37.2   Left Thigh Up 10cm 42.3 47.4       General:  61 y.o. female in no apparent distress.  Alert and oriented x 3.  Cooperative. Affect normal.    Musculoskeletal:  Normal range of motion in  knees and ankles bilaterally throughout .  There is no active joint synovitis,  erythema, swelling or joint laxity.      Neurological:  Sensation is intact to pin prick and light touch in both legs.  Strength testing is normal in hip flexion, knee flexion, knee extension, ankle dorsiflexion and great toe extension bilaterally.      Vascular: Dorsalis pedis and posterior tibialis pulses are strong and equal bilaterally. There are significant telangietasias, medial ankle venous flares, venous varicosities  and spider veins . There is normal capillary refill.     Integumentary: Skin of the legs is uniformly warm and dry and with negative Stemmer's Sign.      Haylee Tavares MD, ABWMS, FACCWS, [facility]  Medical Director Wound Care and Lymphedema  [facility]  [phone]

## 2021-06-09 NOTE — PROGRESS NOTES
VNUS Radiofrequency Ablation    Pre-Procedure:  Admit  [x]Consent for Radio Frequency Ablation of the   [x]Right Saphenous Vein and/or branches  []Left Saphenous Vein and/or branches  Vitals  [x]Vital signs per routine    Nursing Orders  [x]Vein Mapping of  [x]R extremity  []L extremity  [x]Sterile Prep to extremity  [x]Obtain Tumescent Solution 500mL (NS 1000mL, 1% Lidocaine w Epi 56mL, 8.4% Sodium Bicarbonate 5.6mL)    Medications  []Diazepam (Valium) 5mg PO, May Repeat x 1 for anxiety, relaxtion.    Post-Procedure:  Admission  [x]Post Procedure care - call physician for status update  []Admit to inpatient - Please document reason for admission in chart    Interventions require inpatient services    High risk of deterioration due to comorbidities    High risk of deterioration due to nature of admitting diagnosis  Location:    Vitals  [x]Vital signs per routine    Nursing Orders  [x]Thigh High Compression Stocking 20-30mm or 30-40mm to  [x]R extremity  []L extremity  [x]Check insertion site for bleeding or hematoma.  If bleeding or hematoma occurs, apply pressure and notify MD    Discharge  [x]Discharge home if no complications arise.  [x]Give post radiofrequency ablation discharge instructions to patient.  [x]Follow up venous ultrasound 72-96 hours after procedure.  [x]Follow up appointment with MD at 2 weeks.  [x]Contact MD for further questions

## 2021-06-10 NOTE — PROGRESS NOTES
Post Procedure Note Endovenous Closure    S: Soco Ferguson is a 61 y.o. female S/P Right  leg endovenous closure ofRight lower ext. Two weeks out from procedure. Doing well, wore female  thigh high socks. Minimal discomfort. Some superficial phlibitis. Which is resolving.     O:   Vitals:    04/11/17 1139   BP: 100/60   Patient Site: Right Arm   Patient Position: Sitting   Cuff Size: Adult Large   Pulse: 72   Resp: 16   Temp: 97.8  F (36.6  C)   TempSrc: Oral       General: no apparent distress  Legs look good no signs of infection, incisions healing nicely.    US Venous Post Ablation Leg Right (Order [id number])   Imaging   Date: 3/24/2017 Department: [facility] Ultrasound [place] Released By: Ciera Meza Authorizing: Shayan King MD   Study Result   LIMITED RIGHT LOWER EXTREMITY VENOUS DUPLEX ULTRASOUND  3/24/2017 10:40 AM     INDICATIONS: Symptomatic varicose veins, leg pain, and swelling. Followup closure of right greater saphenous vein.  TECHNIQUE: Venous duplex ultrasound with gray-scale imaging, graded compression, and color-flow, Doppler, and spectral analysis.     FINDINGS: The right greater saphenous vein is thrombosed and incompressible, from the proximal thigh to distal thigh. The right common femoral vein is patent and fully compressible, with normal waveforms by duplex ultrasound.     CONCLUSIONS:   1. Closure of right greater saphenous vein.  2. No evidence for propagation of thrombus into the deep venous system.         A/P: S/p endovenous closure. For insuffiencey of veins     May switch to knee high support socks   Resume all activities   RTC 4 months   Call for any questions or concerns     Shayan King MD   [facility]

## 2021-06-10 NOTE — TELEPHONE ENCOUNTER
RN cannot approve Refill Request    RN can NOT refill this medication Protocol failed and NO refill given. Last office visit: 8/30/2019 Jorge Blanton MD Last Physical: Visit date not found Last MTM visit: Visit date not found Last visit same specialty: 8/30/2019 Jorge Blanton MD.  Next visit within 3 mo: Visit date not found  Next physical within 3 mo: Visit date not found      Renita Morales, [facility] Triage/Med Refill 8/2/2020    Requested Prescriptions   Pending Prescriptions Disp Refills     FORTEO 20 mcg/dose - 600 mcg/2.4 mL injection [Pharmacy Med Name: FORTEO PEN 2.4ML 20MCG/DOSE] 2.4 mL 12     Sig: INJECT 0.08 ML OR 20 MCG UNDER THE SKIN DAILY. EXPIRES 28 DAYS FROM INITIAL USE.       There is no refill protocol information for this order

## 2021-06-10 NOTE — TELEPHONE ENCOUNTER
Tested for Covid yesterday - is feeling much better - is wondering if this is typical for Covid? Advised patient that her Covid test is still in process. Patient states that she will call back tomorrow to set up Mychart - advised patient we could transfer her to the schedulers for assistance.    Erica Valladares RN, Triage Nurse Advisor    Reason for Disposition    COVID-19 Disease, questions about    Additional Information    Negative: SEVERE difficulty breathing (e.g., struggling for each breath, speaks in single words)    Negative: Difficult to awaken or acting confused (e.g., disoriented, slurred speech)    Negative: Bluish (or gray) lips or face now    Negative: Shock suspected (e.g., cold/pale/clammy skin, too weak to stand, low BP, rapid pulse)    Negative: Sounds like a life-threatening emergency to the triager    Negative: [1] COVID-19 exposure AND [2] no symptoms    Negative: COVID-19 and Breastfeeding, questions about    Negative: [1] Adult with possible COVID-19 symptoms AND [2] triager concerned about severity of symptoms or other causes    Negative: SEVERE or constant chest pain or pressure (Exception: mild central chest pain, present only when coughing)    Negative: MODERATE difficulty breathing (e.g., speaks in phrases, SOB even at rest, pulse 100-120)    Negative: Patient sounds very sick or weak to the triager    Negative: MILD difficulty breathing (e.g., minimal/no SOB at rest, SOB with walking, pulse <100)    Negative: Chest pain or pressure    Negative: Fever > 103 F (39.4 C)    Negative: [1] Fever > 101 F (38.3 C) AND [2] age > 60    Negative: [1] Fever > 100.0 F (37.8 C) AND [2] bedridden (e.g., nursing home patient, CVA, chronic illness, recovering from surgery)    Negative: HIGH RISK patient (e.g., age > 64 years, diabetes, heart or lung disease, weak immune system)    Negative: Fever present > 3 days (72 hours)    Negative: [1] Fever returns after gone for over 24 hours AND [2] symptoms worse or  not improved    Negative: [1] Continuous (nonstop) coughing interferes with work or school AND [2] no improvement using cough treatment per protocol    Negative: [1] COVID-19 infection suspected by caller or triager AND [2] mild symptoms (cough, fever, or others) AND [3] no complications or SOB    Negative: Cough present > 3 weeks    Negative: [1] COVID-19 diagnosed by positive lab test AND [2] mild symptoms (e.g., cough, fever, others) AND [3] no complications or SOB    Negative: [1] COVID-19 diagnosed by HCP (doctor, NP or PA) AND [2] mild symptoms (e.g., cough, fever, others) AND [3] no complications or SOB    Negative: COVID-19 Home Isolation, questions about    Negative: COVID-19 Testing, questions about    Negative: COVID-19 Prevention and Healthy Living, questions about    Protocols used: CORONAVIRUS (COVID-19) DIAGNOSED OR [id number] 5.16.20

## 2021-06-10 NOTE — TELEPHONE ENCOUNTER
Coronavirus (COVID-19) Notification  Writer in error sent positive result letter.  Patient informed per phone to disregard.  Writer sent another letter that confirms the negative and destroy the letter that  Is in error.    Your result for COVID-19 is Negative  Letter sent that will serve as a formal notice for your employer. Patient is asking for a letter per MyChart. Writer sent that.    Writer informed the patient of the following:   How can I protect others?If you have symptoms, stay home and away from others (self-isolate) until:You ve had no fever--and no medicine that reduces fever--for 3 full days (72 hours). And      Your other symptoms have gotten better. For example, your cough or breathing has improved. And   At least 10 days have passed since your symptoms started.    Kiana Castorena LPN

## 2021-06-10 NOTE — TELEPHONE ENCOUNTER
Soco reports that she started having the following symptoms on Friday:  - generally not feeling good  - fatigue  - Body aches  - throat pain  - abdominal pain and diarrhea, diarrhea has resolved today     No fever, no SOB, no chest pain.     Person at work tested positive for COVID but she was not in close contact with this person. Had domestic travel by plane, and was at airport on Monday.     She is looking to get tested for COVID, prefers phone visit than [facility].     Patient is seen regularly at [facility], PCP Dr. Blanton.    Transferred to  scheduling for  virtual visit.     COVID 19 Nurse Triage Plan/Patient Instructions     Please be aware that novel coronavirus (COVID-19) may be circulating in the community. If you develop symptoms such as fever, cough, or SOB or if you have concerns about the presence of another infection including coronavirus (COVID-19), please contact your health care provider or visit [email or web address].      Disposition/Instructions     Virtual Visit with provider recommended. Reference Visit Selection Guide. (within 24 hours)     Thank you for taking steps to prevent the spread of this virus.    Limit your contact with others.    Wear a simple mask to cover your cough.    Wash your hands well and often.     Resources    [facility]: About COVID-19: [email or web address]    CDC: What to Do If You're Sick: www.cdc.gov/coronavirus/2019-ncov/about/steps-when-sick.html    CDC: Ending Home Isolation: www.cdc.gov/coronavirus/2019-ncov/hcp/disposition-in-home-patients.html     CDC: Caring for Someone: www.cdc.gov/coronavirus/2019-ncov/if-you-are-sick/care-for-someone.html     [facility]: Interim Guidance for Hospital Discharge to Home: www.health.[facility].mn.us/diseases/coronavirus/hcp/hospdischarge.pdf    [facility] clinical trials (COVID-19 research studies): clinicalaffairs.[facility]/umn-clinical-trials     Below are the COVID-19 hotlines at the [facility]  [facility] ([facility]). Interpreters are available.     For health questions: Call [phone] or 1-426.898.2145 (7 a.m. to 7 p.m.)    For questions about schools and childcare: Call 253-745-5800 or 1-446.169.6478 (7 a.m. to 7 p.m.)            Lakshmi Kwon RN/[place] Nurse Advisor    Reason for Disposition    [1] COVID-19 infection suspected by caller or triager AND [2] mild symptoms (cough, fever, or others) AND [3] no complications or SOB    Additional Information    Negative: SEVERE difficulty breathing (e.g., struggling for each breath, speaks in single words)    Negative: Difficult to awaken or acting confused (e.g., disoriented, slurred speech)    Negative: Bluish (or gray) lips or face now    Negative: Shock suspected (e.g., cold/pale/clammy skin, too weak to stand, low BP, rapid pulse)    Negative: Sounds like a life-threatening emergency to the triager    Negative: SEVERE or constant chest pain or pressure (Exception: mild central chest pain, present only when coughing)    Negative: MODERATE difficulty breathing (e.g., speaks in phrases, SOB even at rest, pulse 100-120)    Negative: Patient sounds very sick or weak to the triager    Negative: MILD difficulty breathing (e.g., minimal/no SOB at rest, SOB with walking, pulse <100)    Negative: Chest pain or pressure    Negative: Fever > 103 F (39.4 C)    Negative: [1] Fever > 101 F (38.3 C) AND [2] age > 60    Negative: [1] Fever > 100.0 F (37.8 C) AND [2] bedridden (e.g., nursing home patient, CVA, chronic illness, recovering from surgery)    Negative: HIGH RISK patient (e.g., age > 64 years, diabetes, heart or lung disease, weak immune system)    Negative: Fever present > 3 days (72 hours)    Negative: [1] Fever returns after gone for over 24 hours AND [2] symptoms worse or not improved    Negative: [1] Continuous (nonstop) coughing interferes with work or school AND [2] no improvement using cough treatment per protocol    Protocols used: CORONAVIRUS  (COVID-19) DIAGNOSED OR [id number] 5.16.20

## 2021-06-10 NOTE — PROGRESS NOTES
"S: Patient was seen in [place] to be measured for knee high stockings 20-30 mmHg. RX on-file is current from Dr. Tavares.    O: Goal is to evaluate and measure patient for a compression garment that will help control her Varicose Veins, Venous Insufficiency, and Leg Swelling. Ambreen brought in her old stockings from [employer] which did not have a tag on them so I could not tell the brand.    A: Ambreen complained that these stockings she has been getting from [employer] bind hard into her ankle so she would like to try something different. I told her maybe a different material that has more cotton fabric would help so I showed her the [employer] and [place] [employer]. She loved both these and would like one of each pair in navy. She says she had been wearing open toe because closed toe always was too tight on her toes. I told her we could do a \"men's closed toe\" option where it is longer in the foot. She is happy to try this so I placed an order for one of each pair in a size Medium, Short per her measurements taken today, with the men's closed toe option.    P: She confirmed her address so I can ship them out to her once in.     "

## 2021-06-10 NOTE — PROGRESS NOTES
S: I have received Soco's [employer]is knee high stockings. RX on-file is current.    A: No assessment was made. I will be shipping the garments to her home address, along with proper compliance paperwork to be signed, dated, and returned.    P: She is to call with any further needs.  Goal is to maintain a home program.

## 2021-06-10 NOTE — TELEPHONE ENCOUNTER
RN cannot approve Refill Request    RN can NOT refill this medication med is not covered by policy/route to provider     . Last office visit: 8/30/2019 Jorge Blanton MD Last Physical: Visit date not found Last MTM visit: Visit date not found Last visit same specialty: 8/30/2019 Jorge Blanton MD.  Next visit within 3 mo: Visit date not found  Next physical within 3 mo: Visit date not found      Dalila Awan, [facility] Triage/Med Refill 7/30/2020    Requested Prescriptions   Pending Prescriptions Disp Refills     FORTEO 20 mcg/dose - 600 mcg/2.4 mL injection [Pharmacy Med Name: FORTEO PEN 2.4ML 20MCG/DOSE] 2.4 mL 12     Sig: INJECT 0.08 ML OR 20 MCG UNDER THE SKIN DAILY. EXPIRES 28 DAYS FROM INITIAL USE.       There is no refill protocol information for this order

## 2021-06-11 NOTE — PROGRESS NOTES
"Assessment/Plan:        1. Need for immunization against influenza  Influenza, Recombinant, Inj, Quadrivalent, PF, 18+YRS   2. Osteoporosis Senile  pen needle, diabetic (SURE-FINE PEN NEEDLES) 31 gauge x 3/16\" Ndle    teriparatide (FORTEO) 20 mcg/dose - 600 mcg/2.4 mL injection    pen needle, diabetic (SURE-FINE PEN NEEDLES) 31 gauge x 3/16\" Ndle   3. Need for Td vaccine  Td, Preservative Free (green label)   4. Vaginal itching  Wet Prep, Vaginal    Gynecologic Cytology (PAP Smear)    estradioL (ESTRACE) 0.01 % (0.1 mg/gram) vaginal cream    miconazole (MICOTIN) 2 % vaginal cream       Return in about 6 months (around 3/29/2021) for Annual physical.    Patient Instructions   Continue Forteo daily.  DXA in 1 year.  We will switch to Prolia in 9/2021.    It itching not better in 2-3 weeks, let me know.        Chief Complaint   Patient presents with     Medication Management       /70 (Patient Site: Right Arm, Patient Position: Sitting, Cuff Size: Adult Large)   Pulse 80   Wt 165 lb 9.6 oz (75.1 kg)   LMP 03/20/2006   BMI 31.29 kg/m      Patient is here today for follow up of osteoporosis treatment with Forteo, started a year ago. No reported side effects. Patient will continue with the same supplements.Patient will continue Forteo daily and follow up with me in 1 year with repeated DXA scan. We will switch to Prolia at that time.  DXA was done today and showed improvement of 17% in her spine. We reviewed it together.    She is complaining about vaginal itching for 2 months with no bleeding or discharge. She tried different soaps, detergent change. Itching is mostly on labia and vaginal opening.    General Appearance: Alert, cooperative, no distress, appears stated age.  Head: Normocephalic, without obvious abnormality, atraumatic  Abdomen: Soft, non-tender, bowel sounds active all four quadrants,  no masses, no organomegaly.  Pelvic:Normal external genitalia, speculum exam done, PAP done, cervix normal, " "bimanual exam showed no adnexal tenderness.    Wet prep and PAP done.  I sent Rx for Monistat and Estrace.    25 minutes spent with the patient and more then 50 % of the time in counseling.  This note has been dictated using voice recognition software. Any grammatical or context distortions are unintentional and inherent to the software      Patient Active Problem List   Diagnosis     Blastomycosis     Nephrolithiasis     Osteoporosis Senile     Osteoporosis, unspecified osteoporosis type, unspecified pathological fracture presence     Restless Legs Syndrome     Chronic Constipation     Ulcerative Colitis     Leiomyoma Of The Uterus     Endometrial Hyperplasia     Left leg pain     Venous insufficiency of both lower extremities     Varicose veins of both lower extremities with pain     High arches, congenital     High arch     Leg swelling       Current Outpatient Medications on File Prior to Visit   Medication Sig Dispense Refill     b complex vitamins tablet daily.       ibuprofen (ADVIL,MOTRIN) 200 MG tablet Take 600 mg by mouth 2 (two) times a day.       omeprazole (PRILOSEC) 20 MG capsule Take 20 mg by mouth daily as needed.       [DISCONTINUED] FORTEO 20 mcg/dose - 600 mcg/2.4 mL injection INJECT 0.08 ML OR 20 MCG UNDER THE SKIN DAILY. EXPIRES 28 DAYS FROM INITIAL USE. 2.4 mL 12     [DISCONTINUED] pen needle, diabetic (SURE-FINE PEN NEEDLES) 31 gauge x 3/16\" Ndle Use daily with Forteo 100 each 3     No current facility-administered medications on file prior to visit.      "

## 2021-06-11 NOTE — TELEPHONE ENCOUNTER
Medication Question or Clarification  Who is calling: [facility]  What medication are you calling about (include dose and sig)?:   teriparatide (FORTEO) 20 mcg/dose - 600 mcg/2.4 mL injection  2.4 mL  12  9/29/2020      Sig: INJECT 0.08 ML OR 20 MCG UNDER THE SKIN DAILY. EXPIRES 28 DAYS FROM INITIAL USE.        Who prescribed the medication?: Jorge Blanton MD  What is your question/concern?: Pharmacy stated due to patient's insurance, the above listed prescription will need to be submitted to [facility]. Pharmacy was not able to transfer the prescription.  Requested Pharmacy: [facility]  Okay to leave a detailed message?: No

## 2021-06-11 NOTE — PROGRESS NOTES
Chief Complaint   Patient presents with     Prolia #10     1. Did you experience any problems with previous Prolia injection? NO  2. Do you feel sick today?(fever, RS, GI,  issues)? NO  3. Any medication changes in the last 6 months? NO  4. Did you take prednisone or other immunosuppressant drugs in the last 6 months?(chemo, transplant, rheu, dermatology conditions)? NO  5. Did you have any serious infection in the last 6 months? (pancreatitis) NO  6. Any recent hospitalizations/ surgeries (especially gastric bypass, thyroid, parathyroid)? NO   7. Do you plan any dental work?(especially implants and extractions) in the next 2-3 months? NO  8. Did you have any fractures in the last year? NO    Remind pt to schedule for next 6 months OV F/U with provider and Dexa scan.   Per Kendy KAMARA Ok to give Prolia for this time visit. Any questions/ concerns please contact to Kendy KAMARA Thank you       Aster Chow, [facility] 6/6/2017 1:12 PM

## 2021-06-11 NOTE — PATIENT INSTRUCTIONS - HE
Continue Forteo daily.  DXA in 1 year.  We will switch to Prolia in 9/2021.    It itching not better in 2-3 weeks, let me know.

## 2021-06-11 NOTE — PROGRESS NOTES
Assessment and Plan:    1. Routine general medical examination at a health care facility  Discussed consuming a healthy diet and exercising.  Discussed importance of routine sunscreen.  Discussed adequate calcium and vitamin D intake.  She receives Prolia injections.  She is up-to-date on vaccinations, mammogram, and colonoscopy.  - Gynecologic Cytology (PAP Smear)  - Lipid Cascade    2. Hyperparathyroidism  We will recheck parathyroid labs and notify patient.  - Basic Metabolic Panel  - Vitamin D, Total (25-Hydroxy)  - Parathyroid Hormone Intact    3. Osteoporosis Senile  She continues to receive Prolia injections.    4. Obesity  The following high BMI interventions were performed this visit: exercise promotion: strength training, exercise promotion: stretching and nutrition therapy    5. Ulcerative colitis with complication, unspecified location  She continues to see gastroenterology.      6. Gastroesophageal reflux disease, esophagitis presence not specified  Due to her chronic throat irritation, I encouraged her to take her omeprazole every day to rule out esophageal reflux as being the cause.  - omeprazole (PRILOSEC) 20 MG capsule; Take 1 capsule (20 mg total) by mouth daily.  Dispense: 90 capsule; Refill: 3    7. Allergic rhinitis, unspecified allergic rhinitis trigger, unspecified rhinitis seasonality  I suspect patient's postnasal drainage may be related to allergies.  Will refer to allergist for further evaluation and treatment options.  She is content with the plan.  - Ambulatory referral to Allergy    8. Endometrial Hyperplasia  I do not see any gynecology notes with information regarding follow-up.  Due to history of endometrial hyperplasia, will obtain pelvic ultrasound for further evaluation.    Subjective:     Soco is a 61 y.o. female presenting to the clinic for a female physical.     LMP: age 50, no bleeding since   Hx of abnormal pap smear: yes, in her 40's; colposcopy normal since   Last pap  smear: 1/25/13 normal, negative HPV   Perform self-breast exams: yes   Vaginal discharge or irritation: none   Sexually active: single ()   Contraception: none   Concerns for STDs: none   Previous pregnancies:two pregnancies, vaginal deliveries     Patient sees gastroenterology for ulcerative colitis.  She occasionally uses the omeprazole for acid reflux and when her ulcerative colitis flares.  She has a history of hyperparathyroidism. She had labs checked in 2015 with normal PTH, calcium, and vitamin D. She recently had venous closure performed by Dr. King which has assisted with her leg cramps.  She has a history of endometrial hyperplasia in 2014 with a pelvic ultrasound showing the endometrial stripe was thickened.  She had a biopsy which was normal.  She is unsure what follow-up is necessary.  She has not had any vaginal bleeding since.    She also complains of chronic postnasal drainage and nonproductive cough.  She has had intermittent sore throats for the past 2-1/2 years.  She feels as though she is constantly congested.  She has tried Mucinex with no relief.  She was taking out Allegra-D but does not want to take that long-term.  She has a history of allergy testing multiple years ago which was positive for ragweed.  She does experience acid reflux symptoms but takes omeprazole intermittently.    Review of systems:  I performed a 10 point review of systems.  All pertinent positives and negatives are noted in the HPI. All others are negative.     Allergies   Allergen Reactions     Comtrex Dizziness     Aspirin (Tartrazine Only) Nausea Only       Current Outpatient Prescriptions on File Prior to Visit   Medication Sig Dispense Refill     calcium citrate-vitamin D3 200 mg calcium -250 unit Tab Take 1 tablet by mouth daily.       cholecalciferol, vitamin D3, (VITAMIN D3) 2,000 unit Tab Take 1 tablet by mouth daily.       denosumab 60 mg/mL Syrg Inject 60 mg under the skin every 6 (six) months.        DOCUSATE CALCIUM (STOOL SOFTENER ORAL) Take by mouth daily.       naproxen sodium (ALEVE) 220 MG tablet Take 220 mg by mouth every 12 (twelve) hours as needed for pain.       No current facility-administered medications on file prior to visit.        Social History     Social History     Marital status:      Spouse name: N/A     Number of children: N/A     Years of education: N/A     Occupational History     Not on file.     Social History Main Topics     Smoking status: Former Smoker     Packs/day: 0.25     Types: Cigarettes     Smokeless tobacco: Not on file      Comment: in HS and 20's socail only, never pack a day smoker     Alcohol use 1.0 oz/week     2 Standard drinks or equivalent per week     Drug use: No     Sexual activity: No      Comment:       Other Topics Concern     Not on file     Social History Narrative    .  2 kids.  [employer] .       Past Medical History:   Diagnosis Date     Abdominal Pain     Created by Conversion      Arthralgia     Created by Conversion      Blastomycosis     Created by Conversion      Chronic Constipation     Created by Conversion      Endometrial Hyperplasia     Created by Conversion      Hyperparathyroidism     Created by Conversion      Leiomyoma Of The Uterus     Created by Conversion      Myalgia And Myositis     Created by Conversion      Nausea     Created by Conversion      Nephrolithiasis     Created by Conversion      Osteoporosis     Created by Conversion      Osteoporosis Senile     Created by Conversion      Pelvic Pain     Created by Conversion      Restless Legs Syndrome     Created by Conversion      Sinusitis     Created by Conversion      Ulcerative Colitis     Created by Conversion      Urine Tests Nonspecific Abnormal Findings     Created by Conversion      Varicose Veins     Created by Conversion      Vitamin D Deficiency     Created by Conversion        Family History   Problem Relation Age of Onset      Emphysema Mother      Varicose Veins Mother      Edema Mother      Lung cancer Father      Breast cancer Niece      Early 20's     Asthma Grandchild        Past Surgical History:   Procedure Laterality Date     KIDNEY STONE SURGERY  1999    removal     TUBAL LIGATION  1987     WISDOM TOOTH EXTRACTION  1980       Objective:     Vitals:    06/29/17 0830   BP: 120/62   Pulse: 72   SpO2: 96%       Patient is alert, no obvious distress.   Skin: Warm, dry.  No rashes or lesions. Skin turgor rapid return.   HEENT:  Eyes normal.  Ears normal.  Nose patent, mucosa pink.  Oropharynx mucosa pink, no lesions or tonsil enlargement.   Neck:  Supple, without lymphadenopathy, bruits, JVD. Thyroid normal texture and size.    Lungs:  Clear to auscultation.  No wheezing, rales noted.  Respirations even and unlabored.   Heart:  Regular rate and rhythm.  No murmurs.   Breasts:  Normal.  No surrounding adenopathy.   Abdomen: Soft, nontender.  No organomegaly.  Bowel sounds normoactive.  No guarding or masses noted.   :  External genitalia normal.  Normal vaginal mucosa.  Cervix no lesions or cervical motion tenderness. Uterus normal size, no masses.  Adnexa no masses palpated bilaterally.   Musculoskeletal:  Full ROM of extremities.  Muscle strength equal +5/5.   Neurological:  Cranial nerves 2-12 intact.

## 2021-06-12 NOTE — PROGRESS NOTES
Date of Service:  17    Date last seen by Dr. Tavares:  2017    PCP: Joslyn Ness CNP    Impression:  1. Bilateral leg swelling and pain  2. Bilateral leg venous insufficiency and hypertension  3. Left leg pain,  Probable shin splints  4.  Very high arches     Plan:  1. Questions were answered.  2. Insoles.    3. Knows importance of exercise and compression. She regularly updates her compression.  4. Patient will follow up in 8 weeks to check insoles and see if left shin pain has resolved.     Time spent with patient 15 minutes with greater than 50% time in consultation, education and coordination of care.     ---------------------------------------------------------------------------------------------------------------------    Chief Complaint: Bilateral leg swelling and  leg swelling     History of Present Illness:     Soco Ferguson returns to the [facility] with bilateral leg swelling and generalized arms and leg swelling secondary to venous hypertension with insufficiency.  Venous insufficiency ultrasound showed venous insufficiency bilaterally.  She is scheduled to see Dr. King tomorrow.  She wear her compression daily and updates them regularly.  She had venous closure of the right leg due to continuing pain despite conservative treatment and compliance with the program.  This was done 3.22/17 and she says the discomfort has improved .  There is no new numbness, tingling or weakness.  She has had no fevers.  About 2-3 months ago she started to develop sharp pain in the left shin  She does not have any LBP with it.  She cannot associate it with any specific activity.  She has has repeated strep throat and ear infections.  She is scheduled to see ENT soon.        Past Medical History:   Diagnosis Date     Abdominal Pain     Created by Conversion      Arthralgia     Created by Conversion      Blastomycosis     Created by Conversion      Chronic Constipation     Created by  Conversion      Endometrial Hyperplasia     Created by Conversion      Hyperparathyroidism     Created by Conversion      Leiomyoma Of The Uterus     Created by Conversion      Myalgia And Myositis     Created by Conversion      Nausea     Created by Conversion      Nephrolithiasis     Created by Conversion      Osteoporosis     Created by Conversion      Osteoporosis Senile     Created by Conversion      Pelvic Pain     Created by Conversion      Restless Legs Syndrome     Created by Conversion      Sinusitis     Created by Conversion      Ulcerative Colitis     Created by Conversion      Urine Tests Nonspecific Abnormal Findings     Created by Conversion      Varicose Veins     Created by Conversion      Vitamin D Deficiency     Created by Conversion        Past Surgical History:   Procedure Laterality Date     KIDNEY STONE SURGERY  1999    removal     TUBAL LIGATION  1987     venous closure Bilateral      WISDOM TOOTH EXTRACTION  1980         Current Outpatient Prescriptions:      calcium citrate-vitamin D3 200 mg calcium -250 unit Tab, Take 1 tablet by mouth at bedtime. , Disp: , Rfl:      cholecalciferol, vitamin D3, (VITAMIN D3) 2,000 unit Tab, Take 1 tablet by mouth at bedtime. , Disp: , Rfl:      denosumab 60 mg/mL Syrg, Inject 60 mg under the skin every 6 (six) months., Disp: , Rfl:      omeprazole (PRILOSEC) 20 MG capsule, Take 20 mg by mouth daily as needed., Disp: , Rfl:     Allergies   Allergen Reactions     Comtrex Dizziness     Acet/dextromethorphan/phenylephrine       Social History     Social History     Marital status:      Spouse name: N/A     Number of children: N/A     Years of education: N/A     Occupational History     Not on file.     Social History Main Topics     Smoking status: Former Smoker     Packs/day: 0.25     Types: Cigarettes     Smokeless tobacco: Never Used      Comment: in HS and 20's socail only, never pack a day smoker     Alcohol use 1.0 oz/week     2 Standard drinks  or equivalent per week     Drug use: No     Sexual activity: No      Comment:       Other Topics Concern     Not on file     Social History Narrative    .  2 kids.  [employer] .       Family History   Problem Relation Age of Onset     Emphysema Mother      Varicose Veins Mother      Edema Mother      Lung cancer Father      Breast cancer Niece      Early 20's     Asthma Grandchild        Review of Systems:  Soco Ferguson no new numbess, tingling or weakness, redness or rashes, fevers, new masses, abdominal bloating or discomfort, unexplained weight loss, increased pain, new ulcers, shortness of breath and chest pain  Full 12 point review of systems was completed.    Imagin2016     EXAM: BILATERAL LOWER EXTREMITY DEEP AND SUPERFICIAL VENOUS DUPLEX ULTRASOUND WITH PHYSIOLOGIC TESTING     INDICATION: Bilateral leg swelling and pain, varicose veins. Assess for incompetent veins.     TECHNIQUE: Supine and upright ultrasound of the deep and superficial veins with Valsalva and compression augmentation maneuvers. Duplex imaging is performed utilizing gray-scale, two-dimensional images, color-flow imaging, Doppler waveform analysis, and   spectral Doppler imaging.      INCOMPETENCY CRITERIA: Deep vein reflux reported when greater than 1,000 ms flow reversal. Superficial vein reflux reported when greater than 500 ms flow reversal.  vein reflux reported as greater than 350 ms flow reversal.     DEEP VEIN FINDINGS:   RIGHT LEG: The common femoral, profunda femoral, femoral, popliteal, and visualized calf veins are patent and compressible. The right common femoral vein, profunda femoral vein, and proximal superficial femoral vein are all incompetent.     LEFT LEG: The common femoral, profunda femoral, femoral, popliteal, and visualized calf veins are patent and compressible. The left common femoral vein is incompetent.     RIGHT SUPERFICIAL VEIN FINDINGS:  GREAT  SAPHENOUS VEIN: Incompetent from the saphenofemoral junction to the distal calf.     SMALL SAPHENOUS VEIN: Competent from the saphenopopliteal junction to the mid calf.     No incompetent perforating veins or abnormal accessory veins identified.     LEFT SUPERFICIAL VEIN FINDINGS:  GREAT SAPHENOUS VEIN: Incompetent at the saphenofemoral junction. Competent from the mid thigh to the mid calf.     SMALL SAPHENOUS VEIN: Competent from the saphenopopliteal junction to the mid calf.     No incompetent perforating veins or abnormal accessory veins identified.     IMPRESSION:   CONCLUSION:   1. No deep venous thrombosis of either lower extremity.  2. RIGHT LEG: The right superficial venous system is patent, the greater saphenous vein is incompetent throughout its course.  3. LEFT LEG: The left superficial venous system is patent, the greater saphenous vein is incompetent at the saphenofemoral junction.      Labs:    Lab Results   Component Value Date    SEDRATE 7 09/04/2013         Lab Results   Component Value Date    CRP 0.3 09/04/2013           Lab Results   Component Value Date    CREATININE 0.84 07/26/2017      Lab Results   Component Value Date    HGBA1C 5.6 06/27/2014           Lab Results   Component Value Date    BUN 21 07/26/2017              Lab Results   Component Value Date    ALBUMIN 3.8 06/10/2014       Vitamin D, Total (25-Hydroxy)   Date Value Ref Range Status   06/29/2017 44.1 30.0 - 80.0 ng/mL Final      Ref Range & Units11/30/16 10:28 AM  TSH0.30 - 5.00 uIU/mL  0.96  [facility]ing [facility]  Ref Range & Units11/30/16 10:28 AM  Magnesium1.8 - 2.6 mg/dL  2.3  Resulting [facility]        Physical Exam:  Vitals:    08/31/17 1526   BP: 110/58   Pulse: 76   Resp: 16   Temp: 98  F (36.7  C)    Body mass index is 31.55 kg/(m^2).    Circumferential measures:    Vasc Edema 11/30/2016 2/27/2017 8/31/2017   Right just above MTP 19.7 19.9 20.6   Right Ankle 22 22 22.2   Right Widest Calf 37.5 39 38.0   Right Thigh Up  10cm 44 47 -   Left - just above MTP 21.5 20.6 20.6   Left Ankle 21.3 21.5 21.9   Left Widest Calf 36.7 37.2 37.1   Left Thigh Up 10cm 42.3 47.4 -     Measures stable.    General:  62 y.o. female in no apparent distress.  Alert and oriented x 3.  Cooperative. Affect normal.    Musculoskeletal:  Normal range of motion in hips,  knees and ankles bilaterally throughout .  There is no active joint synovitis, erythema, swelling or joint laxity.  Straight leg raise is negative bilaterally without increased pain with ankle dorsiflexion.  Slight pain along palpation of the left anterior mid tibia.  With gait noted to have extremely high arches.    Abdominal:  Normal bowel sounds.  No masses, tenderness, guarding or rigidity.  No inguinal lymphadenopathy or fullness palpated.      Neurological:  Sensation is intact to pin prick and light touch in both legs.  Strength testing is normal in hip flexion, knee flexion, knee extension, ankle dorsiflexion and great toe extension bilaterally.      Vascular: Dorsalis pedis and posterior tibialis pulses are strong and equal bilaterally. There are significant telangietasias, medial ankle venous flares, venous varicosities  and spider veins . There is normal capillary refill.     Integumentary: Skin of the legs is uniformly warm and dry and with negative Stemmer's Sign.      Haylee Tavares MD, ABWMS, FACCWS, [facility]  Medical Director Wound Care and Lymphedema  [facility]  [phone]

## 2021-06-12 NOTE — PROGRESS NOTES
Patient here for her 4 month post right gsv rfa. Patient reports pain in her left leg that started about 3 months that has not gone away.

## 2021-06-12 NOTE — PROGRESS NOTES
HPI: Pt is here for follow up of a endovenous closure of right leg.  This leg is doing fine with no problems or issues decrease in size of her veins she is quite happy the results.  She is a left leg pain this is posterior calf region, knee region here to see because this is a venous issue.  Does not hurt all the time hurts at the end of the day and is relieved and helped with this compression sock use She is doing well. Her appetite is good, and bowel function regular.  No fevers or chills. Ambulating without problems.       LMP 03/20/2006      EXAM: This is a  62 y.o. WOMAN in no distress  GENERAL: Appears well  CHEST clear  CVS S1S2 NSR  ABDOMEN: Soft, non-tender.   EXT: warm, moves without difficulty, no residual varicose vein just a few spider veins in both legs.  No significant swelling noted at this time point bilaterally.      Assessment/Plan:   1. Symptomatic varicose veins of both lower extremities  Appears that the endovenous closure is done well for her she is happy results from that standpoint tried to reassure her about left leg pain I would try continue wearing socks this could be some may be some lymphedema issues which she has had known problem for the past to wear socks exercise work on weight control and and she has an appointment follow-up and will see Dr. Tavares in the future    2. Venous insufficiency of both lower extremities    Compression socks    No Follow-up on file.     Shayan King MD  [facility] Department of Surgery

## 2021-06-13 NOTE — TELEPHONE ENCOUNTER
See triage note from same date (12/9/20). Pt had returned call and was triaged by Ana Heaton RN.  Patient was advised to go to ED during call. Note from ED at  by ED triage, patient has arrived at ED at 1148am.    Guerline Carbone RN 12/09/20 12:03 PM  [facility] Nurse Advisor

## 2021-06-13 NOTE — TELEPHONE ENCOUNTER
Patient calling .    Pain between breasts on left side , sharp pain stays all the time , and both sides of breast on outer side of breasts , pain is there , on the outside of breast.  No breathing problems.  No left arm pain or shoulder pain.    No light headedness or dizziness .  Patient reports chest pain has been going on for quite some time , but seems to be getting worse, especially when lying on her left side in bed.    Advised ER visit now..    Ana Heaton RN  [facility] Triage/refill nurse          Additional Information    Chest pain lasting longer than 5 minutes    Intermittent chest pain and pain has been increasing in severity or frequency    Negative: SEVERE chest pain    Negative: Pain also present in shoulder(s) or arm(s) or jaw    Negative: Difficulty breathing    Negative: Cocaine use within last 3 days    Negative: History of prior 'blood clot' in leg or lungs (i.e., deep vein thrombosis, pulmonary embolism)    Negative: Recent illness requiring prolonged bed rest (i.e., immobilization)    Negative: Hip or leg fracture in past 2 months (e.g, or had cast on leg or ankle)    Negative: Major surgery in the past month    Negative: Recent long-distance travel with prolonged time in car, bus, plane, or train (i.e., within past 2 weeks; 6 or more hours duration)    Negative: Heart beating irregularly or very rapidly    Protocols used: CHEST PAIN-A-OH

## 2021-06-13 NOTE — TELEPHONE ENCOUNTER
RN attempted to reach patient to triage symptoms.     RN left message for patient to return call. RN advised that RN would attempt to call patient again in the next 2-3 minutes and gave number for patient to call back to speak to triage.     RN attempted to reach patient a second time. Second message left asking patient to call back to triage.     Guerline Carbone RN 12/09/20 10:35 AM  [facility] Nurse Advisor

## 2021-06-13 NOTE — TELEPHONE ENCOUNTER
I do not see a triage call.  I did try to contact patient to check on her.  No answer, received voicemail but did not leave message.  Marisol JENKINS, CMA

## 2021-06-13 NOTE — TELEPHONE ENCOUNTER
Symptom  Describe your symptoms: chest pain  Any pain: yes  New/Ongoing: New  How long have you been having symptoms: Transferred to triage  Have you been seen for this:  No  Appointment offered?: Patient was transferred to triage  Triage offered?: Yes, caller was transferred.  Home remedies tried: Unknown  Requested [facility]acy: [facility]  Okay to leave a detailed message? Yes

## 2021-06-14 NOTE — TELEPHONE ENCOUNTER
Ambreen called on 1/18/2021 and left a VM with the  asking about a bill she received for a third pair of stockings she never got. I called her back and left a VM explaining that what she is seeing is likely the updated bill for her 8/20/20 DOS since I had to re-enter a new sales order due to her returning one pair of her stockings. I said it looks like she should just have one pair of stockings and because of this, a new sales order with one pair was entered in and the original order with two pairs was voided through insurance. I told her that is the best I can explain it and only what I can see from my end and if she has any further questions she should direct them to the billing department and I provided that number for her to call.

## 2021-06-14 NOTE — PATIENT INSTRUCTIONS - HE
Stop Forteo.  We will restart Prolia when approved.  When restart Prolia, you should increase daily calcium intake.  My nurse will call you when Prolia approved and set you up to see me again.      You should follow up with [facility] for the h/o ulcerative colitis/ proctitis and schedule colonoscopy.    Check with your insurance if you can get Shingrix vaccine, 2 doses, one now and the second one in 2-6 months.

## 2021-06-14 NOTE — TELEPHONE ENCOUNTER
Please call the pt. I sent her message a while ago about labs from physical through My Chart. Please help to access My Chart

## 2021-06-14 NOTE — TELEPHONE ENCOUNTER
"LMTCB - lab results below     Urine looks like \"dirty\" sample that picked up some cells and bacteria from the skin. If you do not have any symptoms of pain and burning with urination, blood in the urine - no need for antibiotics.     Stable results. You can take same vit D supplements. Potassium can be higher if the blood sample was hemolyzed ( lab error). We will recheck it on the next visit.      Cholesterol is high and I would suggest starting the treatment with cholesterol medication atorvastatin 40 mg daily. I sent new RX. Triglycerides can get better with low fat diet, weight loss and taking omega 3 fish oil capsules OTC.    "

## 2021-06-14 NOTE — TELEPHONE ENCOUNTER
Lmtcb. Please inform the patient of the lab results in the note documented below when she returns this call.

## 2021-06-14 NOTE — PATIENT INSTRUCTIONS - HE
Shingrix 1st dose today.    Prolia 15th today.  Prolia 16th in 6 months with me.    DXA in 9/2021 .   Phone number to schedule [phone].    Daily calcium need is 6394-5799 mg a day from the diet and supplements.  Calcium citrate is easier to digest.  Vitamin D 4000 international units during winter time and 2000 IU daily spring/summer recommended.

## 2021-06-14 NOTE — PROGRESS NOTES
Assessment:     Healthy female exam.   All preventive exams are up-to-date.  Fasting labs will be done today.  She will f/u with Cardiologist after CT angiogram and ECHO.  She is due to see [facility] and schedule colonoscopy.  She will stop Forteo and when Prolia approved, will see her again to discuss the plan for next year. At this point, she completed FDA approved Forteo use of 2 years in the lifetime. She will need to restart calcium supplements when back on Prolia.    The following high BMI interventions were performed this visit: encouragement to exercise and weight monitoring  This note has been dictated using voice recognition software. Any grammatical or context distortions are unintentional and inherent to the software    1. Healthcare maintenance  Lipid Profile   2. Osteoporosis Senile  denosumab 60 mg (PROLIA 60 mg/ml)    DXA Bone Density Scan   3. Personal history of other drug therapy  denosumab 60 mg (PROLIA 60 mg/ml)   4. Ulcerative colitis with complication, unspecified location (H)  Urinalysis-UC if Indicated    Vitamin D, Total (25-Hydroxy)    Lipid Profile    HM2(CBC w/o Differential)    Comprehensive Metabolic Panel    Ambulatory referral for Colonoscopy    Ambulatory referral to Gastroenterology   5. Chest pain, unspecified type  Lipid Profile   6. Gastroesophageal reflux disease with esophagitis without hemorrhage  Ambulatory referral to Gastroenterology         Patient Instructions   Stop Forteo.  We will restart Prolia when approved.  When restart Prolia, you should increase daily calcium intake.  My nurse will call you when Prolia approved and set you up to see me again.      You should follow up with [facility] for the h/o ulcerative colitis/ proctitis and schedule colonoscopy.    Check with your insurance if you can get Shingrix vaccine, 2 doses, one now and the second one in 2-6 months.      Plan:          Return in about 6 months (around 6/30/2021) for Recheck, Prolia injection.    Subjective:        Chief Complaint   Patient presents with     Annual Exam     Mammo 5-22-20 DXA 9-29-20 Pap 9-29-20 Colon 12-30-11       Soco Ferguson is a 65 y.o. female who presents for an annual exam.   Chronic medical problems reviewed:  1. H/o blastomycosis pneumonia more than 10 years ago. No shortness of breath, cough or chronic respiratory issues. Last CT chest and lung biopsy was done in 2010. Lung biopsy showed caseating granuloma in the left lower lobe. Patient was told that will stay as a scar on her lungs.  2. Ulcerative colitis, diagnosed on 2006. Last colonoscopy in 2011 showed proctitis. No recent flare ups. LAst visit with [facility] was in 2014. She also has GERD and taking Pepcid daily.  3. She presented in the ER few weeks ago with acute chest pain and saw cardiologist on 12/11/20. She was diagnosed with new LBBB and is scheduled now for CT angiogram and ECHO. The pain resolved.  4. Osteoporosis - on Forteo since 9/2019, with the previous history of Forteo use with Dr Bruce in [id number] for about 9-10 months.     Healthy Habits:   Regular Exercise: Yes  Sunscreen Use: Yes  Healthy Diet: Yes  Dental Visits Regularly: Yes  Seat Belt: Yes  Immunization status: up to date and documented.    Health Maintenance   Topic Date Due     HIV SCREENING  07/28/1971     ADVANCE CARE PLANNING  07/28/1974     ZOSTER VACCINES (1 of 2) 07/28/2006     PREVENTIVE CARE VISIT  06/29/2018     MAMMOGRAM  05/22/2022     LIPID  06/29/2022     PAP SMEAR  09/29/2023     HPV TEST  09/29/2023     COLORECTAL CANCER SCREENING  12/30/2025     TD 18+ HE  09/29/2030     HEPATITIS C SCREENING  Completed     INFLUENZA VACCINE RULE BASED  Completed     TDAP ADULT ONE TIME DOSE  Completed     Pneumococcal Vaccine: Pediatrics (0 to 5 Years) and At-Risk Patients (6 to 64 Years)  Aged Out     HEPATITIS B VACCINES  Aged Out       Social History     Socioeconomic History     Marital status:      Spouse name: Not on file     Number of  children: Not on file     Years of education: Not on file     Highest education level: Not on file   Occupational History     Not on file   Social Needs     Financial resource strain: Not on file     Food insecurity     Worry: Not on file     Inability: Not on file     Transportation needs     Medical: Not on file     Non-medical: Not on file   Tobacco Use     Smoking status: Never Smoker     Smokeless tobacco: Never Used     Tobacco comment: in HS and 20's socail only, never pack a day smoker   Substance and Sexual Activity     Alcohol use: Yes     Alcohol/week: 1.7 standard drinks     Types: 2 Standard drinks or equivalent per week     Drug use: No     Sexual activity: Never     Comment:     Lifestyle     Physical activity     Days per week: Not on file     Minutes per session: Not on file     Stress: Not on file   Relationships     Social connections     Talks on phone: Not on file     Gets together: Not on file     Attends Jewish service: Not on file     Active member of club or organization: Not on file     Attends meetings of clubs or organizations: Not on file     Relationship status: Not on file     Intimate partner violence     Fear of current or ex partner: Not on file     Emotionally abused: Not on file     Physically abused: Not on file     Forced sexual activity: Not on file   Other Topics Concern     Not on file   Social History Narrative    .  2 kids.  [employer] .       Family History   Problem Relation Age of Onset     Breast cancer Other      Emphysema Mother      Varicose Veins Mother      Edema Mother      Lung cancer Father      Breast cancer Niece         Early 20's     Asthma Grandchild        Patient Active Problem List   Diagnosis     Blastomycosis     Nephrolithiasis     Osteoporosis Senile     Restless Legs Syndrome     Ulcerative Colitis     Leiomyoma Of The Uterus     Endometrial Hyperplasia     Varicose veins of both lower extremities with pain  "      Current Outpatient Medications on File Prior to Visit   Medication Sig Dispense Refill     b complex vitamins tablet daily.       cholecalciferol, vitamin D3, 50 mcg (2,000 unit) Tab Take 2 tablets by mouth daily.       estradioL (ESTRACE) 0.01 % (0.1 mg/gram) vaginal cream Insert 1 g into the vagina daily. 42.5 g 1     famotidine (PEPCID) 20 MG tablet Take 20 mg by mouth daily.       levocetirizine (XYZAL) 5 MG tablet Take 5 mg by mouth as needed for allergies.       peg 400-propylene glycol (SYSTANE) 0.4-0.3 % Drop Administer 1 drop to both eyes 2 (two) times a day.       pen needle, diabetic (SURE-FINE PEN NEEDLES) 31 gauge x 3/16\" Ndle Use daily with Forteo 100 each 3     teriparatide (FORTEO) 20 mcg/dose - 600 mcg/2.4 mL injection INJECT 0.08 ML OR 20 MCG UNDER THE SKIN DAILY. EXPIRES 28 DAYS FROM INITIAL USE. 2.4 mL 12     triamcinolone (NASACORT) 55 mcg nasal inhaler Apply 1 spray into each nostril daily.       [DISCONTINUED] ibuprofen (ADVIL,MOTRIN) 200 MG tablet Take 600 mg by mouth 2 (two) times a day.       [DISCONTINUED] omeprazole (PRILOSEC) 20 MG capsule Take 20 mg by mouth daily as needed.       [DISCONTINUED] pen needle, diabetic (SURE-FINE PEN NEEDLES) 31 gauge x 3/16\" Ndle Use daily with Forteo 100 each 3     No current facility-administered medications on file prior to visit.        Review of Systems  A 12 point comprehensive review of systems was negative except as noted in HPI.         Objective:      /79   Pulse 75   Ht 5' 1.25\" (1.556 m)   Wt 164 lb 6.4 oz (74.6 kg)   LMP 03/20/2006   SpO2 99%   BMI 30.81 kg/m      Body mass index is 30.81 kg/m .        Physical Exam:  General Appearance: Alert, cooperative, no distress, appears stated age.  Head: Normocephalic, without obvious abnormality, atraumatic  Eyes: PERRL, conjunctiva/corneas clear, EOM's intact  Ears: Normal TM's and external ear canals, both ears  Nose: Nares normal, septum midline,mucosa normal, no " drainage  Throat: Lips, mucosa, and tongue normal; teeth and gums normal  Neck: Supple, symmetrical, trachea midline, no adenopathy;  thyroid: not enlarged, symmetric, no tenderness/mass/nodules; no carotid bruit or JVD  Back: Symmetric, no curvature, ROM normal, no CVA tenderness.  Lungs: Clear to auscultation bilaterally, respirations unlabored.  Breasts: No breast masses, tenderness, asymmetry, or nipple discharge.  Heart: Regular rate and rhythm, S1 and S2 normal, no murmur, rub, or gallop.  Abdomen: Soft, non-tender, bowel sounds active all four quadrants,  no masses, no organomegaly.  Pelvic:Not done, seeing GYN.  Extremities: Extremities normal, atraumatic, no cyanosis or edema.  Skin: Skin color, texture, turgor normal, no rashes or lesions.  Lymph nodes: Cervical, supraclavicular, and axillary nodes normal.  Neurologic: No focal neurological findings.

## 2021-06-14 NOTE — PROGRESS NOTES
Assessment/Plan:        1. Ulcerative colitis with complication, unspecified location (H)     2. Osteoporosis Senile     3. Atypical chest pain     4. Elevated blood pressure reading without diagnosis of hypertension     5. Mixed hyperlipidemia  Lipid Profile    AST (SGOT)    ALT (SGPT)       Return in about 6 months (around 7/21/2021) for Prolia injection.    Patient Instructions   Shingrix 1st dose today.    Prolia 15th today.  Prolia 16th in 6 months with me.    DXA in 9/2021 .   Phone number to schedule [phone].    Daily calcium need is 1599-3298 mg a day from the diet and supplements.  Calcium citrate is easier to digest.  Vitamin D 4000 international units during winter time and 2000 IU daily spring/summer recommended.      Chief Complaint   Patient presents with     Osteoporosis Follow Up     Osteo F/U-Shingles Injection     Few different issues discussed today:  1. Osteoporosis -  She was treated with Prolia 4281-4927, Forteo 6466-2592 and 9/2019-12/2020. She stopped Forteo last month ssince completed 2 years of treatment. We are restarting Prolia today. We spent a lot of time reviewing her supplements. While she was on Forteo, I was instructing her not to take calcium pills. Now, since we are restarting Prolia, she will have to start calcium supplements since her dietary intake of calcium rich food is poor. We reviewed her labs from 12/30 and I recommended to increase vit D to 4000 international unit(s) daily ( currently taking 2000). Next Prolia dose should be in 6 months. DXA should be done 9/2021.  2. Ulcerative colitis with last colonoscopy in 2011. She will contact [facility] to schedule f/u visit and colonoscopy this year.  3. Cardiac evaluation after presented in the ER on 12/9/20 with acute chest pain. CT angigram and ECHO normal, both results reviewed today.  4. She wants to get first Shingrix dose today.  5. BP elevated in the office today, usually not that high. She will monitor at home. Goal BP <  "130/80.  6. Mixed hyperlipidemia - labs 12/30 showed Tgl 314 and non . She started atorvastatin that I sent in December. Low fat diet and omega 3 fish oil recommended. F/u labs - FLP, AST, ALT in 1 week.  7. Potassium was 5.5 on 12/30, but not on any medications that can possibly increase potassium. She denies any cardiac symptoms. It will be rechecked with lipids.    /83   Pulse 68   Wt 171 lb (77.6 kg)   LMP 03/20/2006   SpO2 98%   BMI 32.05 kg/m    30 minutes ( 11 in the room and 20 in the chart) spent with the patient.    This note has been dictated using voice recognition software. Any grammatical or context distortions are unintentional and inherent to the software      Patient Active Problem List   Diagnosis     Blastomycosis     Nephrolithiasis     Osteoporosis Senile     Restless Legs Syndrome     Ulcerative Colitis     Leiomyoma Of The Uterus     Endometrial Hyperplasia     Varicose veins of both lower extremities with pain     Mixed hyperlipidemia       Current Outpatient Medications on File Prior to Visit   Medication Sig Dispense Refill     atorvastatin (LIPITOR) 40 MG tablet Take 1 tablet (40 mg total) by mouth daily. 90 tablet 3     b complex vitamins tablet daily.       cholecalciferol, vitamin D3, 50 mcg (2,000 unit) Tab Take 2 tablets by mouth daily.       estradioL (ESTRACE) 0.01 % (0.1 mg/gram) vaginal cream Insert 1 g into the vagina daily. 42.5 g 1     famotidine (PEPCID) 20 MG tablet Take 20 mg by mouth daily.       levocetirizine (XYZAL) 5 MG tablet Take 5 mg by mouth as needed for allergies.       peg 400-propylene glycol (SYSTANE) 0.4-0.3 % Drop Administer 1 drop to both eyes 2 (two) times a day.       triamcinolone (NASACORT) 55 mcg nasal inhaler Apply 1 spray into each nostril daily.       pen needle, diabetic (SURE-FINE PEN NEEDLES) 31 gauge x 3/16\" Ndle Use daily with Forteo 100 each 3     [DISCONTINUED] teriparatide (FORTEO) 20 mcg/dose - 600 mcg/2.4 mL injection " INJECT 0.08 ML OR 20 MCG UNDER THE SKIN DAILY. EXPIRES 28 DAYS FROM INITIAL USE. 2.4 mL 12     Current Facility-Administered Medications on File Prior to Visit   Medication Dose Route Frequency Provider Last Rate Last Admin     denosumab 60 mg (PROLIA 60 mg/ml)  60 mg Subcutaneous Q6 Months Jorge Blanton MD   60 mg at 01/21/21 [id number]

## 2021-06-14 NOTE — TELEPHONE ENCOUNTER
I spoke to [place] and she will be making that lab only appointment to come in next week, She was also inquiring about the lab's from her physical?

## 2021-06-14 NOTE — TELEPHONE ENCOUNTER
Call the pt. Need a confirmation that she started prescribed atorvastatin for high cholesterol in labs from 12/30/20. If she started this med, should schedule lab only appt and come fasting to the lab next week. Orders are in.

## 2021-06-14 NOTE — PROGRESS NOTES
Date of Service:  17    Date last seen by Dr. Tavares:  2017    PCP: Joslyn Ness CNP    Impression:  1. Bilateral leg swelling and pain  2. Bilateral leg venous insufficiency and hypertension  3. Left leg pain,  Probable shin splints.  Improved, but still bothersome.     4. Very high arches     Plan:  1. Questions were answered.  2. Insoles.    3. Knows importance of exercise and compression. She regularly updates her compression.  I will write for therapy to work with the left leg pain.  4. Patient will follow up in 3 months to see if left shin pain has resolved.     Time spent with patient 15 minutes with greater than 50% time in consultation, education and coordination of care.     ---------------------------------------------------------------------------------------------------------------------    Chief Complaint: Bilateral leg swelling and  leg swelling     History of Present Illness:     Soco Ferguson returns to the [facility] with bilateral leg swelling secondary to venous hypertension with insufficiency.  Venous insufficiency ultrasound showed venous insufficiency bilaterally. She had right leg venous closure and the leg is feeling much better.  She wears her compression daily and updates them regularly. There is no new numbness, tingling or weakness.  She has had no fevers. She developed left shin pain which was felt to be due to left shin splint.  Insoles were ordered.  Her insurance did not cover custom insoles so she has over the counter insoles which help but do not resolve the pain and it is still bothersome.  She continues to have ear problems.      Past Medical History:   Diagnosis Date     Abdominal Pain     Created by Conversion      Arthralgia     Created by Conversion      Blastomycosis     Created by Conversion      Chronic Constipation     Created by Conversion      Endometrial Hyperplasia     Created by Conversion      Hyperparathyroidism     Created by  Conversion      Leiomyoma Of The Uterus     Created by Conversion      Myalgia And Myositis     Created by Conversion      Nausea     Created by Conversion      Nephrolithiasis     Created by Conversion      Osteoporosis     Created by Conversion      Osteoporosis Senile     Created by Conversion      Pelvic Pain     Created by Conversion      Restless Legs Syndrome     Created by Conversion      Sinusitis     Created by Conversion      Ulcerative Colitis     Created by Conversion      Urine Tests Nonspecific Abnormal Findings     Created by Conversion      Varicose Veins     Created by Conversion      Vitamin D Deficiency     Created by Conversion        Past Surgical History:   Procedure Laterality Date     KIDNEY STONE SURGERY  1999    removal     TUBAL LIGATION  1987     venous closure Bilateral      WISDOM TOOTH EXTRACTION  1980         Current Outpatient Prescriptions:      calcium citrate-vitamin D3 200 mg calcium -250 unit Tab, Take 1 tablet by mouth at bedtime. , Disp: , Rfl:      cholecalciferol, vitamin D3, (VITAMIN D3) 2,000 unit Tab, Take 1 tablet by mouth at bedtime. , Disp: , Rfl:      denosumab 60 mg/mL Syrg, Inject 60 mg under the skin every 6 (six) months., Disp: , Rfl:      ibuprofen (ADVIL,MOTRIN) 200 MG tablet, Take 600 mg by mouth 2 (two) times a day., Disp: , Rfl:      omeprazole (PRILOSEC) 20 MG capsule, Take 20 mg by mouth daily as needed., Disp: , Rfl:     Allergies   Allergen Reactions     Comtrex Dizziness     Acet/dextromethorphan/phenylephrine       Social History     Social History     Marital status:      Spouse name: N/A     Number of children: N/A     Years of education: N/A     Occupational History     Not on file.     Social History Main Topics     Smoking status: Former Smoker     Packs/day: 0.25     Types: Cigarettes     Smokeless tobacco: Never Used      Comment: in HS and 20's socail only, never pack a day smoker     Alcohol use 1.0 oz/week     2 Standard drinks or  equivalent per week     Drug use: No     Sexual activity: No      Comment:       Other Topics Concern     Not on file     Social History Narrative    .  2 kids.  [employer] .       Family History   Problem Relation Age of Onset     Emphysema Mother      Varicose Veins Mother      Edema Mother      Lung cancer Father      Breast cancer Niece      Early 20's     Asthma Grandchild        Review of Systems:  Soco Ferguson no new numbess, tingling or weakness, redness or rashes, fevers, new masses, abdominal bloating or discomfort, unexplained weight loss, increased pain, new ulcers, shortness of breath and chest pain  Full 12 point review of systems was completed.    Imagin2016     EXAM: BILATERAL LOWER EXTREMITY DEEP AND SUPERFICIAL VENOUS DUPLEX ULTRASOUND WITH PHYSIOLOGIC TESTING     INDICATION: Bilateral leg swelling and pain, varicose veins. Assess for incompetent veins.     TECHNIQUE: Supine and upright ultrasound of the deep and superficial veins with Valsalva and compression augmentation maneuvers. Duplex imaging is performed utilizing gray-scale, two-dimensional images, color-flow imaging, Doppler waveform analysis, and   spectral Doppler imaging.      INCOMPETENCY CRITERIA: Deep vein reflux reported when greater than 1,000 ms flow reversal. Superficial vein reflux reported when greater than 500 ms flow reversal.  vein reflux reported as greater than 350 ms flow reversal.     DEEP VEIN FINDINGS:   RIGHT LEG: The common femoral, profunda femoral, femoral, popliteal, and visualized calf veins are patent and compressible. The right common femoral vein, profunda femoral vein, and proximal superficial femoral vein are all incompetent.     LEFT LEG: The common femoral, profunda femoral, femoral, popliteal, and visualized calf veins are patent and compressible. The left common femoral vein is incompetent.     RIGHT SUPERFICIAL VEIN FINDINGS:  GREAT  SAPHENOUS VEIN: Incompetent from the saphenofemoral junction to the distal calf.     SMALL SAPHENOUS VEIN: Competent from the saphenopopliteal junction to the mid calf.     No incompetent perforating veins or abnormal accessory veins identified.     LEFT SUPERFICIAL VEIN FINDINGS:  GREAT SAPHENOUS VEIN: Incompetent at the saphenofemoral junction. Competent from the mid thigh to the mid calf.     SMALL SAPHENOUS VEIN: Competent from the saphenopopliteal junction to the mid calf.     No incompetent perforating veins or abnormal accessory veins identified.     IMPRESSION:   CONCLUSION:   1. No deep venous thrombosis of either lower extremity.  2. RIGHT LEG: The right superficial venous system is patent, the greater saphenous vein is incompetent throughout its course.  3. LEFT LEG: The left superficial venous system is patent, the greater saphenous vein is incompetent at the saphenofemoral junction.      Labs:    Lab Results   Component Value Date    SEDRATE 7 09/04/2013         Lab Results   Component Value Date    CRP 0.3 09/04/2013           Lab Results   Component Value Date    CREATININE 0.84 07/26/2017      Lab Results   Component Value Date    HGBA1C 5.6 06/27/2014           Lab Results   Component Value Date    BUN 21 07/26/2017              Lab Results   Component Value Date    ALBUMIN 3.8 06/10/2014       Vitamin D, Total (25-Hydroxy)   Date Value Ref Range Status   06/29/2017 44.1 30.0 - 80.0 ng/mL Final      Ref Range & Units11/30/16 10:28 AM  TSH0.30 - 5.00 uIU/mL  0.96  [facility]  Ref Range & Units11/30/16 10:28 AM  Magnesium1.8 - 2.6 mg/dL  2.3  [facility]        Physical Exam:  Vitals:    11/13/17 1500   BP: 110/68   Pulse: 79   Resp: 16   Temp: 98  F (36.7  C)    Body mass index is 31.55 kg/(m^2).    Circumferential measures:    Vasc Edema 11/30/2016 2/27/2017 8/31/2017 11/13/2017   Right just above MTP 19.7 19.9 20.6 21.0   Right Ankle 22 22 22.2 21.5   Right Widest Calf 37.5 39 38.0  37.0   Right Thigh Up 10cm 44 47 - -   Left - just above MTP 21.5 20.6 20.6 20.6   Left Ankle 21.3 21.5 21.9 21.9   Left Widest Calf 36.7 37.2 37.1 34.5   Left Thigh Up 10cm 42.3 47.4 - -     Measures stable.    General:  62 y.o. female in no apparent distress.  Alert and oriented x 3.  Cooperative. Affect normal.    Musculoskeletal:  Normal range of motion in knees and ankles bilaterally throughout.  There is no active joint synovitis, erythema, swelling or joint laxity. Slight pain along palpation of the left anterior mid tibia.  With gait noted to have extremely high arches.      Neurological:  Sensation is intact to pin prick and light touch in both legs.  Strength testing is normal in hip flexion, knee flexion, knee extension, ankle dorsiflexion and great toe extension bilaterally.      Vascular: Dorsalis pedis and posterior tibialis pulses are strong and equal bilaterally. There are significant telangietasias, medial ankle venous flares, venous varicosities  and spider veins .     Integumentary: Skin of the legs is uniformly warm and dry and with negative Stemmer's Sign.      Haylee Tavares MD, ABWMS, FACCWS, [facility] [place]  Medical Director Wound Care and Lymphedema  [facility]  [phone]

## 2021-06-15 NOTE — PROGRESS NOTES
1. Osteoporosis Senile         Return in about 6 months (around 7/10/2018) for Recheck.    Patient Instructions   Prolia 11th today.  Prolia 12th, 13th, 14th every 6 months with my nurse. I will see you in 2 years.  DXA in 12/2019 .   Phone number to schedule [phone].  Please avoid any extensive dental work as implants and teeth extractions for the next 1-2 months.  Daily calcium need is 0078-0791 mg a day from the diet and supplements.  Calcium citrate is easier to digest.  Vitamin D 2000 IU daily recommended.      Chief Complaint   Patient presents with     Osteoporosis Follow Up       /70  Pulse 72  Wt 164 lb (74.4 kg)  LMP 03/20/2006  BMI 30.99 kg/m2      Did you experience any problems with previous Prolia injection? no  Any medication change in the last 6 months? no  Did you take prednisone or other immunosupressant drugs in the last 6 months   (chemo, transplant, rheum, dermatology conditions)? no  Did you have any serious infection in the last 6 months?no  Any recent hospitalizations?no  Do you plan any dental work in the next 2-3 months?no  How much calcium do you take daily from the diet and supplements?1200 mg  How much vit D do you take daily? 2000 IU  Last DXA?12/2017      Patient is here today for the 11th Prolia injection. Patient tolerated previous injections well.   We discussed calcium and vit D daily needs today.   Next Prolia injection will be in 6 months.     15 minutes spent with the patient and more then 50 % of the time in counseling.  This note has been dictated using voice recognition software. Any grammatical or context distortions are unintentional and inherent to the software      Patient Active Problem List   Diagnosis     Blastomycosis     Nephrolithiasis     Osteoporosis Senile     Restless Legs Syndrome     Chronic Constipation     Ulcerative Colitis     Hyperparathyroidism     Leiomyoma Of The Uterus     Endometrial Hyperplasia     Left leg pain     Varicose veins  of both lower extremities with pain     Shin splint, left, initial encounter     High arches, congenital       Current Outpatient Prescriptions on File Prior to Visit   Medication Sig Dispense Refill     calcium citrate-vitamin D3 200 mg calcium -250 unit Tab Take 1 tablet by mouth at bedtime.        cholecalciferol, vitamin D3, (VITAMIN D3) 2,000 unit Tab Take 1 tablet by mouth at bedtime.        denosumab 60 mg/mL Syrg Inject 60 mg under the skin every 6 (six) months.       ibuprofen (ADVIL,MOTRIN) 200 MG tablet Take 600 mg by mouth 2 (two) times a day.       omeprazole (PRILOSEC) 20 MG capsule Take 20 mg by mouth daily as needed.       No current facility-administered medications on file prior to visit.

## 2021-06-15 NOTE — PATIENT INSTRUCTIONS - HE
"    For compression stockings:  [facility] (Please call to make an appointment)    [facility]  [address], [address]  [place], [place].  Phone: [phone]        Swelling in the legs can be caused by many reasons. No matter what the reason, treatment usually includes some type of compression. You should wear your compression socks as much as you can. Your compression should be put on first thing in the morning. Take the compression off at night. It is especially important to wear them with long periods of sitting/standing, long car rides or if you will be flying. Going without compression for even brief periods of time can be damaging to your legs and your health.  Compression socks should get replaced usually every 4-6 months. They do not need to be worn at night while in bed. If we have seen you in the last year, we can refill your sock prescription, otherwise your primary care provider is able to refill them for you. Call us with any problems or questions.   Compression Velcro may need to be replaced every 9-12 months.  Often the liners and socks need to be replaced every three or four months.  The neoprene velcro may last up to 2 years.  If the velcro becomes worn a tailor may be able to repair it for you inexpensively.    If you do a lot of standing, it is good to do calf raises to help keep the blood pumping. If you sit a lot at work, it is good to get up periodically to walk around. Elevation of the foot of your bed 4-6\" helps the blood return back to where it is needed.   Please call us if you have any questions [phone]    Thank you for choosing [facility].      "

## 2021-06-15 NOTE — PROGRESS NOTES
HPI: This patient is a 63yo F who presents for evaluation of several issues.. There has been a globus sensation for quite some time, accompanied with occasional hoarseness and occasional dry cough. She has nasal congestion and rhinorrhea; has been diagnosed with a ragweed allergy and takes Allegra-D. Also reports bilateral ear pressure and popping. She does have clicking and popping of the jaw, but does not know if she is clenching or grinding her teeth. Denies fevers, otalgia, weight loss, odynophagia, dysphagia, hemoptysis, and shortness of breath. Does not complain of sour taste, heartburn, or burping. Is taking reflux medication.    Past medical history, surgical history, social history, family history, medications, and allergies have been reviewed with the patient and are documented above.    Review of Systems: a 10-system review was performed. Pertinent positives are noted in the HPI and on a separate scanned document in the chart.    PHYSICAL EXAMINATION:  GEN: no acute distress, normocephalic  EYES: extraocular movements are intact, pupils are equal and round. Sclera clear.   EARS: auricles are normally formed. The external auditory canals are clear with minimal to no cerumen. Tympanic membranes are intact bilaterally with no signs of infection, effusion, retractions, or perforations.  NOSE: anterior nares are patent. There are no masses or lesions. The septum is non-obstructing. Turbinates mildly boggy  OC/OP: clear, dentition is in good repair. The tongue and palate are fully mobile and symmetric. The floor of mouth, base of tongue, and tonsils are soft and symmetric. Cobblestoning of the posterior pharyngeal wall.  HP/L (scope): nasopharynx, base of tongue, vallecula, epiglottis, and pyriform sinuses are clear. The bilateral vocal folds are mobile and without lesion. There is interarytenoid edema and erythema.  NECK: soft and supple. No lymphadenopathy or masses. Airway is midline. Bilateral TMJ crepitus  and pain  NEURO: CN II-XII are intact bilaterally. alert and oriented x 3. No nystagmus. Gait is normal.  PULM: breathing comfortably on room air, normal chest expansion with respiration    MEDICAL DECISION-MAKING: This patient is a 61yo F with chronic laryngitis/globus sensation from acid reflux, known allergic rhinitis, and TMD. Discussed diet and lifestyle changes, including weight loss, in addition to risks and benefits of H2 blocker vs PPI therapy she is on. Discussed nasal steroid sprays for her allergic rhinitis. Also referred to [facility] for management of this.

## 2021-06-16 NOTE — PROGRESS NOTES
Date of Service:  02/15/18    Date last seen by Dr. Tavares:  17    PCP: Joslyn Ness CNP    Impression:  1. Bilateral leg swelling -under good control  2. Bilateral leg venous insufficiency and hypertension  3. Left leg pain-resolved    4. Very high arches     Plan:  1. Questions were answered.  2. Insoles.    3. Knows importance of exercise and compression. She regularly updates her compression. New compression written for to address some of the problems she has been having.  4. Patient will follow up in 1 year or when needed.     Time spent with patient 25 minutes with greater than 50% time in consultation, education and coordination of care.     ---------------------------------------------------------------------------------------------------------------------    Chief Complaint: Bilateral leg swelling and  leg swelling     History of Present Illness:     Soco Ferguson returns to the [facility] with bilateral leg swelling secondary to venous hypertension with insufficiency confirmed with ultrasound.   She had right leg venous closure and the leg improved.  She wears her compression daily and updates them regularly. There is no new numbness, tingling or weakness.  She has had no fevers. She developed left shin pain which was felt to be due to left shin splint and therapy helped along with over the counter insoles.  The only problem she is having is with reflux and tight jaw.  She will be getting therapy for the latter. She has tightness in her socks around her MTP joints with the open toed sock.  The closed toe caused too much compression on the toes.      Past Medical History:   Diagnosis Date     Abdominal Pain     Created by Conversion      Arthralgia     Created by Conversion      Blastomycosis     Created by Conversion      Chronic Constipation     Created by Conversion      Endometrial Hyperplasia     Created by Conversion      Hyperparathyroidism     Created by Conversion       Leiomyoma Of The Uterus     Created by Conversion      Myalgia And Myositis     Created by Conversion      Nausea     Created by Conversion      Nephrolithiasis     Created by Conversion      Osteoporosis     Created by Conversion      Osteoporosis Senile     Created by Conversion      Pelvic Pain     Created by Conversion      Restless Legs Syndrome     Created by Conversion      Sinusitis     Created by Conversion      Ulcerative Colitis     Created by Conversion      Urine Tests Nonspecific Abnormal Findings     Created by Conversion      Varicose Veins     Created by Conversion      Vitamin D Deficiency     Created by Conversion        Past Surgical History:   Procedure Laterality Date     KIDNEY STONE SURGERY  1999    removal     TUBAL LIGATION  1987     venous closure Bilateral      WISDOM TOOTH EXTRACTION  1980         Current Outpatient Prescriptions:      calcium citrate-vitamin D3 200 mg calcium -250 unit Tab, Take 1 tablet by mouth at bedtime. , Disp: , Rfl:      cholecalciferol, vitamin D3, (VITAMIN D3) 2,000 unit Tab, Take 1 tablet by mouth at bedtime. , Disp: , Rfl:      denosumab 60 mg/mL Syrg, Inject 60 mg under the skin every 6 (six) months., Disp: , Rfl:      ibuprofen (ADVIL,MOTRIN) 200 MG tablet, Take 600 mg by mouth 2 (two) times a day., Disp: , Rfl:      omeprazole (PRILOSEC) 20 MG capsule, Take 20 mg by mouth daily as needed., Disp: , Rfl:     Allergies   Allergen Reactions     Comtrex Dizziness     Acet/dextromethorphan/phenylephrine       Social History     Social History     Marital status:      Spouse name: N/A     Number of children: N/A     Years of education: N/A     Occupational History     Not on file.     Social History Main Topics     Smoking status: Former Smoker     Packs/day: 0.25     Types: Cigarettes     Smokeless tobacco: Never Used      Comment: in HS and 20's socail only, never pack a day smoker     Alcohol use 1.0 oz/week     2 Standard drinks or equivalent per  week     Drug use: No     Sexual activity: No      Comment:       Other Topics Concern     Not on file     Social History Narrative    .  2 kids.  [employer] .       Family History   Problem Relation Age of Onset     Emphysema Mother      Varicose Veins Mother      Edema Mother      Lung cancer Father      Breast cancer Niece      Early 20's     Asthma Grandchild        Review of Systems:  Soco Ferguson no new numbess, tingling or weakness, redness or rashes, fevers, new masses, abdominal bloating or discomfort, unexplained weight loss, increased pain, new ulcers, shortness of breath and chest pain  Full 12 point review of systems was completed.    [email or web address]     EXAM: BILATERAL LOWER EXTREMITY DEEP AND SUPERFICIAL VENOUS DUPLEX ULTRASOUND WITH PHYSIOLOGIC TESTING     INDICATION: Bilateral leg swelling and pain, varicose veins. Assess for incompetent veins.     TECHNIQUE: Supine and upright ultrasound of the deep and superficial veins with Valsalva and compression augmentation maneuvers. Duplex imaging is performed utilizing gray-scale, two-dimensional images, color-flow imaging, Doppler waveform analysis, and   spectral Doppler imaging.      INCOMPETENCY CRITERIA: Deep vein reflux reported when greater than 1,000 ms flow reversal. Superficial vein reflux reported when greater than 500 ms flow reversal.  vein reflux reported as greater than 350 ms flow reversal.     DEEP VEIN FINDINGS:   RIGHT LEG: The common femoral, profunda femoral, femoral, popliteal, and visualized calf veins are patent and compressible. The right common femoral vein, profunda femoral vein, and proximal superficial femoral vein are all incompetent.     LEFT LEG: The common femoral, profunda femoral, femoral, popliteal, and visualized calf veins are patent and compressible. The left common femoral vein is incompetent.     RIGHT SUPERFICIAL VEIN FINDINGS:  GREAT SAPHENOUS VEIN:  Incompetent from the saphenofemoral junction to the distal calf.     SMALL SAPHENOUS VEIN: Competent from the saphenopopliteal junction to the mid calf.     No incompetent perforating veins or abnormal accessory veins identified.     LEFT SUPERFICIAL VEIN FINDINGS:  GREAT SAPHENOUS VEIN: Incompetent at the saphenofemoral junction. Competent from the mid thigh to the mid calf.     SMALL SAPHENOUS VEIN: Competent from the saphenopopliteal junction to the mid calf.     No incompetent perforating veins or abnormal accessory veins identified.     IMPRESSION:   CONCLUSION:   1. No deep venous thrombosis of either lower extremity.  2. RIGHT LEG: The right superficial venous system is patent, the greater saphenous vein is incompetent throughout its course.  3. LEFT LEG: The left superficial venous system is patent, the greater saphenous vein is incompetent at the saphenofemoral junction.      Labs:    Lab Results   Component Value Date    SEDRATE 7 09/04/2013         Lab Results   Component Value Date    CRP 0.3 09/04/2013           Lab Results   Component Value Date    CREATININE 0.84 07/26/2017      Lab Results   Component Value Date    HGBA1C 5.6 06/27/2014           Lab Results   Component Value Date    BUN 21 07/26/2017              Lab Results   Component Value Date    ALBUMIN 3.8 06/10/2014       Vitamin D, Total (25-Hydroxy)   Date Value Ref Range Status   06/29/2017 44.1 30.0 - 80.0 ng/mL Final      Ref Range & Units11/30/16 10:28 AM  TSH0.30 - 5.00 uIU/mL  0.96  [facility]  Ref Range & Units11/30/16 10:28 AM  Magnesium1.8 - 2.6 mg/dL  2.3  [facility]        Physical Exam:  Vitals:    02/15/18 1305   BP: 100/74   Pulse: 76   Resp: 16   Temp: 98.4  F (36.9  C)    Body mass index is 29.85 kg/(m^2).    Circumferential measures:    Vasc Edema 11/30/2016 2/27/2017 8/31/2017 11/13/2017 2/15/2018   Right just above MTP 19.7 19.9 20.6 21.0 19.3   Right Ankle 22 22 22.2 21.5 21.3   Right Widest Calf 37.5  39 38.0 37.0 36.6   Right Thigh Up 10cm 44 47 - - 44.7   Left - just above MTP 21.5 20.6 20.6 20.6 20   Left Ankle 21.3 21.5 21.9 21.9 21.1   Left Widest Calf 36.7 37.2 37.1 34.5 36.5   Left Thigh Up 10cm 42.3 47.4 - - 45     Measures stable.    General:  62 y.o. female in no apparent distress.  Alert and oriented x 3.  Cooperative. Affect normal.    Abdominal:  Normal bowel sounds.  No masses, tenderness, guarding or rigidity.  No inguinal lymphadenopathy or fullness palpated.      Musculoskeletal:  Normal range of motion in knees and ankles bilaterally throughout.  There is no active joint synovitis, erythema, swelling or joint laxity. Extremely high arches.      Neurological:  Sensation is intact to pin prick and light touch in both legs.  Strength testing is normal in hip flexion, knee flexion, knee extension, ankle dorsiflexion and great toe extension bilaterally.      Vascular: Dorsalis pedis and posterior tibialis pulses are strong and equal bilaterally. There are significant telangietasias, medial ankle venous flares, venous varicosities  and spider veins .     Integumentary: Skin of the legs is uniformly warm and dry and with negative Stemmer's Sign.      Haylee Tavares MD, ABWMS, FACCWS, [facility]  Medical Director Wound Care and Lymphedema  [facility]  [phone]

## 2021-06-17 NOTE — TELEPHONE ENCOUNTER
Reason for Call:  Medication or medication refill:    Do you use a [facility]?  Name of the pharmacy and phone number for the current request: [facility]  [phone]    Name of the medication requested: estradioL (ESTRACE) 0.01 % (0.1 mg/gram) vaginal cream    Patient requesting this in PILL form if it is available        Can we leave a detailed message on this number? Yes    Phone number patient can be reached at: Home number on file [phone] (home)    Best Time: any    Call taken on 5/10/2021 at 4:44 PM by Laura L Goldberg

## 2021-06-17 NOTE — PATIENT INSTRUCTIONS - HE
Patient Instructions by Glendy Yang CNP at 1/31/2019 10:10 AM     Author: Glendy Yang CNP Service: -- Author Type: Nurse Practitioner    Filed: 1/31/2019 10:35 AM Encounter Date: 1/31/2019 Status: Addendum    : Glendy Yang CNP (Nurse Practitioner)    Related Notes: Original Note by Glendy Yang CNP (Nurse Practitioner) filed at 1/31/2019 10:35 AM         Patient Education     Self-Care for Sore Throats    Sore throats happen for many reasons, such as colds, allergies, and infections caused by viruses or bacteria. In any case, your throat becomes red and sore. Your goal for self-care is to reduce your discomfort while giving your throat a chance to heal.  Moisten and soothe your throat  Tips include the following:    Try a sip of water first thing after waking up.    Keep your throat moist by drinking 6 or more glasses of clear liquids every day.    Run a cool-air humidifier in your room overnight.    Avoid cigarette smoke.     Suck on throat lozenges, cough drops, hard candy, ice chips, or frozen fruit-juice bars. Use the sugar-free versions if your diet or medical condition requires them.  Gargle to ease irritation  Gargling every hour or 2 can ease irritation. Try gargling with 1 of these solutions:    1/4 teaspoon of salt in 1/2 cup of warm water    An over-the-counter anesthetic gargle  Use medicine for more relief  Over-the-counter medicine can reduce sore throat symptoms. Ask your pharmacist if you have questions about which medicine to use:    Ease pain with anesthetic sprays. Aspirin or an aspirin substitute also helps. Remember, never give aspirin to anyone 18 or younger, or if you are already taking blood thinners.     For sore throats caused by allergies, try antihistamines to block the allergic reaction.    Remember: unless a sore throat is caused by a bacterial infection, antibiotics wont help you.  Prevent future sore throats  Prevention tips include  the following:    Stop smoking or reduce contact with secondhand smoke. Smoke irritates the tender throat lining.    Limit contact with pets and with allergy-causing substances, such as pollen and mold.    When youre around someone with a sore throat or cold, wash your hands often to keep viruses or bacteria from spreading.    Dont strain your vocal cords.  Call your healthcare provider  Contact your healthcare provider if you have:    A temperature over 101 F (38.3 C)    White spots on the throat    Great difficulty swallowing    Trouble breathing    A skin rash    Recent exposure to someone else with strep bacteria    Severe hoarseness and swollen glands in the neck or jaw   Date Last Reviewed: 8/1/2016 2000-2017 [employer]. [address] [place] [address], [place] [address]. All rights reserved. This information is not intended as a substitute for professional medical care. Always follow your healthcare professional's instructions.           Patient Education     Pharyngitis (Sore Throat), Report Pending    Pharyngitis (sore throat) is often due to a virus. It can also be caused by streptococcus (strep), bacteria. This is often called strep throat. Both viral and strep infections can cause throat pain that is worse when swallowing, aching all over, headache, and fever. Both types of infections are contagious. They may be spread by coughing, kissing, or touching others after touching your mouth or nose.  A test has been done to find out if you or your child have strep throat. Call this facility or your healthcare provider if you were not given your test results. If the test is positive for strep infection, you will need to take antibiotic medicines. A prescription can be called into your pharmacy at that time. If the test is negative, you probably have a viral pharyngitis. This does not need to be treated with antibiotics. Until you receive the results of the strep test, you should stay home from work. If  your child is being tested, he or she should stay home from school.  Home care    Rest at home. Drink plenty of fluids so you won't get dehydrated.    If the test is positive for strep, you or your child should not go to work or school for the first 2 days of taking the antibiotics. After this time, you or your child will not be contagious. You or your child can then return to work or school when feeling better.     Use the antibiotic medicine for the full 10 days. Do not stop the medicine even if you or your child feel better. This is very important to make sure the infection is fully treated. It is also important to prevent medicine-resistant germs from growing. If you or your child were given an antibiotic shot, no more antibiotics are needed.    Use throat lozenges or numbing throat sprays to help reduce pain. Gargling with warm salt water will also help reduce throat pain. Dissolve 1/2 teaspoon of salt in 1 glass of warm water. Children can sip on juice or a popsicle. Children 5 years and older can also suck on a lollipop or hard candy.    Don't eat salty or spicy foods or give them to your child. These can irritate the throat.  Other medicine for a child: You can give your child acetaminophen for fever, fussiness, or discomfort. In babies over 6 months of age, you may use ibuprofen instead of acetaminophen. If your child has chronic liver or kidney disease or ever had a stomach ulcer or GI bleeding, talk with your jonelle healthcare provider before giving these medicines. Aspirin should never be used by any child under 18 years of age who has a fever. It may cause severe liver damage.  Other medicine for an adult: You may use acetaminophen or ibuprofen to control pain or fever, unless another medicine was prescribed for this. If you have chronic liver or kidney disease or ever had a stomach ulcer or GI bleeding, talk with your healthcare provider before using these medicines.  Follow-up care  Follow up with  your healthcare provider or our staff if you or your child don't get better over the next week.  When to seek medical advice  Call your healthcare provider right away if any of these occur:    Fever as directed by your healthcare provider. For children, seek care if:  ? Your child is of any age and has repeated fevers above 104 F (40 C).  ? Your child is younger than 2 years of age and has a fever of 100.4 F (38 C) for more than 1 day.  ? Your child is 2 years old or older and has a fever of 100.4 F (38 C) for more than 3 days.    New or worsening ear pain, sinus pain, or headache    Painful lumps in the back of neck    Stiff neck    Lymph nodes are getting larger    ?Cant swallow liquids, a lot of drooling, or cant open mouth wide due to throat pain    Signs of dehydration, such as very dark urine or no urine, sunken eyes, dizziness    Trouble breathing or noisy breathing    Muffled voice    New rash    Other symptoms getting worse  Prevention  Here are steps you can take to help prevent an infection:    Keep good hand washing habits.    Dont have close contact with people who have sore throats, colds, or other upper respiratory infections.    Dont smoke, and stay away from secondhand smoke.    Stay up to date with of your vaccines.  Date Last Reviewed: 11/1/2017 2000-2017 [employer]. [address] [place] [address], [place] [address]. All rights reserved. This information is not intended as a substitute for professional medical care. Always follow your healthcare professional's instructions.

## 2021-06-19 NOTE — PROGRESS NOTES
1. Did you experience new onset of achiness or rashes that lasted for over a month? no  2. Do you feel sick today - fever > 101F, or new deep cough? no  3. Did you have gastric bypass or parathyroid surgery in the last 6 months? no  4. Do you plan any dental implants or extractions in the next 2-3 months? no  5. Have you started any new medications in the last 6 months that you were told could affect your immune system? These may have been prescribed by Oncologist, Transplant Center, Rheumatology or Dermatology. no

## 2021-06-20 NOTE — LETTER
Letter by Severson, Tammie F, LPN at      Author: Severson, Tammie F, LPN Service: -- Author Type: --    Filed:  Encounter Date: 8/11/2020 Status: (Other)       8/11/2020      Soco Ferguson  [address]  [place] [address]          2019-nCOV   Date Value Ref Range Status   08/08/2020 Not Detected  Final     Comment:     Collection of multiple specimens from the same patient may be necessary to  detect the virus. The possibility of a false negative should be considered if  the patient's recent exposure or clinical presentation suggests 2019 nCOV  infection and diagnostic tests for other causes of illness are negative. Repeat  testing may be considered in this setting.  Viral RNA was extracted via a validated method and subsequently underwent  single step reverse transcriptase-real time polymerase chain reaction using  primers to the CDC specified N1,N2 gene targets of CoV2 and human RNP as an  internal control.  A negative result does not rule out the presence of real-time PCR inhibitors in  the specimen or COVID-19 RNA in concentrations below the limit of detection of  the assay. The possibility of a false negative should be considered if the  patients recent exposure or clinical presentation suggests COVID-19. Additional  testing or repeat testing requires consultation with the laboratory.  Nasopharyngeal specimen is the preferred choice for swab-based SARS CoV2  testing. When collection of a nasopharyngeal swab is not possible the following  are acceptable alternatives:  an oropharyngeal (OP) specimen collected by a healthcare professional, or a  nasal mid-turbinate (NMT) swab collected by a healthcare professional or by  onsite self-collection (using a flocked tapered swab), or an anterior nares  specimen collected by a healthcare professional or by onsite self-collection  (using a round foam swab). (Centers for Disease Control)  Testing performed by [facility], Room 1-210,  [address] [place] [id number]. This test was developed and its  performance characteristics determined by the [facility]. It has not been cleared or approved by the FDA.  The laboratory is regulated under the Clinical Laboratory Improvement  Amendments of 1988 (CLIA-88) as qualified to perform high-complexity testing.  This test is used for clinical purposes. It should not be regarded as  investigational or for research.    Performed and/or entered by:  [address] [address]        No results found for: [id number]    This letter provides a written record that you were tested for COVID-19.    Your result was positive. This means you have COVID-19 (coronavirus).    How can I protect others?If you have symptoms, stay home and away from others (self-isolate) until:Youve had no fever--and no medicine that reduces fever--for 3 full days (72 hours). And?     Your other symptoms have gotten better. For example, your cough or breathing has improved. And?  At least 10 days have passed since your symptoms started.    If you dont have symptoms: Stay home and away from others (self-isolate) until at least 10 days have passed since your first positive COVID-19 test.    During this time:    Stay in your own room, including for meals. Use your own bathroom if you can.    Stay away from others in your home. No hugging, kissing or shaking hands. No visitors.     Dont go to work, school or anywhere else.     Clean high touch surfaces often (doorknobs, counters, handles, etc.). Use a household cleaning spray or wipes. Youll find a full list on the EPA website at www.epa.gov/pesticide-registration/list-n-disinfectants-use-against-sars-cov-2.     Cover your mouth and nose with a mask, tissue or washcloth to avoid spreading germs.    Wash your hands and face often with soap and water.    Caregivers in these groups are at risk for severe  illness due to COVID-19:  o People 65 years and older  o People who live in a nursing home or long-term care facility  o People with chronic disease (lung, heart, cancer, diabetes, kidney, liver, immunologic)  o People who have a weakened immune system, including those who:    Are in cancer treatment    Take medicine that weakens the immune system, such as corticosteroids    Had a bone marrow or organ transplant    Have an immune deficiency    Have poorly controlled HIV or AIDS    Are obese (body mass index of 40 or higher)    Smoke regularly    Caregivers should wear gloves while washing dishes, handling laundry and cleaning bedrooms and bathrooms.    Wash and dry laundry with special caution. Dont shake dirty laundry, and use the warmest water setting you can.    If you have a weakened immune system, ask your doctor about other actions you should take.    For more tips, go to www.cdc.gov/coronavirus/2019-ncov/downloads/10Things.pdf.    You should not go back to work until you meet the guidelines above for ending your home isolation. You should meet these along with any other guidelines that your employer has.    Employers: This document serves as formal notice of your employees medical guidelines for going back to work. They must meet the above guidelines before going back to work in person.    How can I take care of myself?    1. Get lots of rest. Drink extra fluids (unless a doctor has told you not to).    2. Take Tylenol (acetaminophen) for fever or pain. If you have liver or kidney problems, ask your family doctor if its okay to take Tylenol.     Take either:     650 mg (two 325 mg pills) every 4 to 6 hours, or?    1,000 mg (two 500 mg pills) every 8 hours as needed.     Note: Dont take more than 3,000 mg in one day. Acetaminophen is found in many medicines (both prescribed and over-the-counter medicines). Read all labels to be sure you dont take too much.    For children, check the Tylenol bottle for the  right dose (based on their age or weight).    3. If you have other health problems (like cancer, heart failure, an organ transplant or severe kidney disease): Call your specialty clinic if you dont feel better in the next 2 days.    4. Know when to call 911: Emergency warning signs include:    Trouble breathing or shortness of breath    Pain or pressure in the chest that doesnt go away    Feeling confused like you havent felt before, or not being able to wake up    Bluish-colored lips or face    5. Sign up for [employer]. We know its scary to hear that you have COVID-19. We want to track your symptoms to make sure youre okay over the next 2 weeks. Please look for an email from [employer]--this is a free, online program that well use to keep in touch. To sign up, follow the link in the email. Learn more at [email or web address].    Where can I get more information?    [facility]: [email or web address]    Coronavirus Basics: www.health.[email or web address].mn./diseases/coronavirus/basics.html    What to Do If Youre Sick: www.cdc.gov/coronavirus/2019-ncov/about/steps-when-sick.html    Ending Home Isolation: www.cdc.gov/coronavirus/2019-ncov/hcp/disposition-in-home-patients.html     Caring for Someone with COVID-19: www.cdc.gov/coronavirus/2019-ncov/if-you-are-sick/care-for-someone.html     [facility] clinical trials (COVID-19 research studies): clinicalaffairs.[facility]/n-clinical-trials

## 2021-06-20 NOTE — LETTER
Letter by Kiana Castorena LPN at      Author: Kiana Castorena LPN Service: -- Author Type: --    Filed:  Encounter Date: 8/11/2020 Status: (Other)       8/11/2020      Soco Ferguson  [address]  [place] [address]          2019-nCOV   Date Value Ref Range Status   08/08/2020 Not Detected  Final     Comment:     Collection of multiple specimens from the same patient may be necessary to  detect the virus. The possibility of a false negative should be considered if  the patient's recent exposure or clinical presentation suggests 2019 nCOV  infection and diagnostic tests for other causes of illness are negative. Repeat  testing may be considered in this setting.  Viral RNA was extracted via a validated method and subsequently underwent  single step reverse transcriptase-real time polymerase chain reaction using  primers to the CDC specified N1,N2 gene targets of CoV2 and human RNP as an  internal control.  A negative result does not rule out the presence of real-time PCR inhibitors in  the specimen or COVID-19 RNA in concentrations below the limit of detection of  the assay. The possibility of a false negative should be considered if the  patients recent exposure or clinical presentation suggests COVID-19. Additional  testing or repeat testing requires consultation with the laboratory.  Nasopharyngeal specimen is the preferred choice for swab-based SARS CoV2  testing. When collection of a nasopharyngeal swab is not possible the following  are acceptable alternatives:  an oropharyngeal (OP) specimen collected by a healthcare professional, or a  nasal mid-turbinate (NMT) swab collected by a healthcare professional or by  onsite self-collection (using a flocked tapered swab), or an anterior nares  specimen collected by a healthcare professional or by onsite self-collection  (using a round foam swab). (Centers for Disease Control)  Testing performed by [facility], Room 1[address]210,  [address] [place] [id number]. This test was developed and its  performance characteristics determined by the [facility]. It has not been cleared or approved by the FDA.  The laboratory is regulated under the Clinical Laboratory Improvement  Amendments of 1988 (CLIA-88) as qualified to perform high-complexity testing.  This test is used for clinical purposes. It should not be regarded as  investigational or for research.    Performed and/or entered by:  [address] [address]        No results found for: [id number]    This letter provides a written record that you were tested for COVID-19.    Your result was negative. This means that we didnt find the virus that causes COVID-19 in your sample. A test may show negative when you do actually have the virus. This can happen when the virus is in the early stages of infection, before you feel illness symptoms.    If you have symptoms   Stay home and away from others (self-isolate) until you meet ALL of the guidelines below:    Youve had no fever--and no medicine that reduces fever--for 3 full days (72 hours). And ?    Your other symptoms have gotten better. For example, your cough or breathing has improved. And?    At least 10 days have passed since your symptoms started.    During this time:    Stay home. Dont go to work, school or anywhere else.     Stay in your own room, including for meals. Use your own bathroom if you can.    Stay away from others in your home. No hugging, kissing or shaking hands. No visitors.    Clean high touch surfaces often (doorknobs, counters, handles, etc.). Use a household cleaning spray or wipes. You can find a full list on the EPA website at www.epa.gov/pesticide-registration/list-n-disinfectants-use-against-sars-cov-2.    Cover your mouth and nose with a mask, tissue or washcloth to avoid spreading germs.    Wash your hands and face often  with soap and water.    Going back to work  Check with your employer for any guidelines to follow for going back to work.    Employers: This document serves as formal notice that your employee tested negative for COVID-19, as of the testing date shown above.

## 2021-06-23 NOTE — PROGRESS NOTES
Assessment:     1. Throat pain  Rapid Strep A Screen-Throat    Group A Strep, RNA Direct Detection, Throat          Plan:     Differential diagnosis include but not limited to upper respiratory infection, strep pharyngitis, or pharyngitis.  Discussed with the patient on exam I did not find any indication for bacterial infection.  Rapid strep done, negative.  Discussed the results with the patient.  Advised patient to use salt and water gargles for the sore throat, increase fluid intake, may use ibuprofen or Tylenol for pain, fever, or discomfort.  Monitor for worsening symptoms.  May follow-up with PCP if symptoms does not resolve within the next 3-5 days.  Patient verbalized understanding the plan of care.      Subjective:       63 y.o. female presents for evaluation of possible strep infection.  Patient reports that she is here because she went to have her Prolia injection, since she was complaining of a sore throat, she was advised to come and be evaluated for possible strep.  She reports that her sore throat started today.  She has not had any fever within the last 24 hours, denies any body aches, no runny nose, no cough, no nausea, vomiting, or diarrhea.  She has not taken any medications for her symptoms either.      The following portions of the patient's history were reviewed and updated as appropriate: allergies, current medications, past family history, past medical history, past social history, past surgical history and problem list.    Review of Systems  A 12 point comprehensive review of systems was negative except as noted.      Objective:      /70   Pulse 73   Temp 98.3  F (36.8  C)   Resp 16   Wt 160 lb (72.6 kg)   LMP 03/20/2006   SpO2 97%   BMI 30.23 kg/m    General appearance: alert, appears stated age, cooperative and moderate distress  Head: Normocephalic, without obvious abnormality, atraumatic, sinuses nontender to percussion  Eyes: conjunctivae/corneas clear. PERRL, EOM's intact.  Fundi benign.  Ears: abnormal TM right ear - bulging and air-fluid level and abnormal TM left ear - bulging and air-fluid level  Nose: Nares normal. Septum midline. Mucosa normal. No drainage or sinus tenderness.  Throat: lips, mucosa, and tongue normal; teeth and gums normal  Lungs: clear to auscultation bilaterally  Heart: regular rate and rhythm, S1, S2 normal, no murmur, click, rub or gallop  Extremities: extremities normal, atraumatic, no cyanosis or edema  Pulses: 2+ and symmetric  Skin: Skin color, texture, turgor normal. No rashes or lesions  Lymph nodes: Cervical, supraclavicular, and axillary nodes normal.  Neurologic: Grossly normal     This note has been dictated using voice recognition software. Any grammatical or context distortions are unintentional and inherent to the software

## 2021-06-23 NOTE — PROGRESS NOTES
The following steps were completed to comply with the REMS program for Prolia:  1. Ordering provider has previously reviewed information in the Medication Guide and Patient Counseling Chart, including the serious risks of Prolia  and the symptoms of each risk and have been advised to seek prompt medical attention if they have signs or symptoms of any of the serious risks.  2. Provided each patient a copy of the Medication Guide and Patient Brochure.  See MAR for administration details.   Indication: Prolia  (denosumab) is a prescription medicine used to treat osteoporosis in patients who:   Are at high risk for fracture, meaning patients who have had a fracture related to osteoporosis, or who have multiple risk factors for fracture; Cannot use another osteoporosis medicine or other osteoporosis medicines did not work well.   The timeline for early/late injections would be 4 weeks early and any time after the 6 month laura. If a patient receives their injection late, then the subsequent injection would be 6 months from the date that they actually received the injection    Have the screening questions been asked prior to this administration? Yes           1. Did you experience new onset of achiness or rashes that lasted for over a month? No    2. Do you feel sick today - fever > 101F, or new deep cough? Yes-       After talking to Dr Figueroa- he recommended that pt go to [facility] to get checked out to make sure she has nothing infectious. If they clear her then she can get Prolia. Pt agreed with this plan.     Pt was cleared by [facility] to get Prolia today.           3. Did you have gastric bypass or parathyroid surgery in the last 6 months? No   4. Do you plan any dental implants or extractions in the next 2-3 months? No   5. Have you started any new medications in the last 6 months that you were told could affect your immune system? These may have been prescribed by Oncologist, Transplant Center, Rheumatology or Dermatology. No

## 2021-06-24 NOTE — PROGRESS NOTES
Patient called requesting compression script be sent to [facility]. Unable to go to [facility] on Wednesdays.

## 2021-06-24 NOTE — PROGRESS NOTES
Date of Service:  19    Date last seen by Dr. Tavares:  02/15/18    PCP: Joslyn Ness, [place]    Impression:  1. Bilateral leg swelling -under good control  2. Bilateral leg venous insufficiency and hypertension  3. Very high arches     Plan:  1. Questions were answered.  2. Insoles-new ones written for.  3. Knows importance of exercise and compression. She regularly updates her compression. New compression written for.  4. Patient will follow up in 1 year or when needed.     Time spent with patient 15 minutes with greater than 50% time in consultation, education and coordination of care.     ---------------------------------------------------------------------------------------------------------------------    Chief Complaint: Bilateral leg swelling     History of Present Illness:     Soco Ferguson returns to the [facility] bilateral leg swelling secondary to venous hypertension with insufficiency confirmed with ultrasound.   She had right leg venous closure and the leg improved.  She wears her compression daily and updates them regularly.  She has very high arches and had insoles made.  These of been working very well for her.  She comes in today saying things are under good control.  Her swelling is been under good control.  The insoles are starting to wear out and she wonders if she could get new ones.  She has been switching her job to a position that is working much better for her.  She has been healthy this year.  There is no new numbness, tingling or weakness.  She denies any new rashes, masses, shortness of breath or unexplained weight loss.  She has had no irregular bleeding.  She has had no fevers      Past Medical History:   Diagnosis Date     Abdominal Pain     Created by Conversion      Arthralgia     Created by Conversion      Blastomycosis     Created by Conversion      Chronic Constipation     Created by Conversion      Endometrial Hyperplasia      Created by Conversion      Hyperparathyroidism     Created by Conversion      Leiomyoma Of The Uterus     Created by Conversion      Myalgia And Myositis     Created by Conversion      Nausea     Created by Conversion      Nephrolithiasis     Created by Conversion      Osteoporosis     Created by Conversion      Osteoporosis Senile     Created by Conversion      Pelvic Pain     Created by Conversion      Restless Legs Syndrome     Created by Conversion      Sinusitis     Created by Conversion      Ulcerative Colitis     Created by Conversion      Urine Tests Nonspecific Abnormal Findings     Created by Conversion      Varicose Veins     Created by Conversion      Vitamin D Deficiency     Created by Conversion        Past Surgical History:   Procedure Laterality Date     KIDNEY STONE SURGERY  1999    removal     TUBAL LIGATION  1987     venous closure Bilateral      WISDOM TOOTH EXTRACTION  1980         Current Outpatient Medications:      b complex vitamins tablet, daily., Disp: , Rfl:      calcium citrate-vitamin D3 200 mg calcium -250 unit Tab, Take 1 tablet by mouth at bedtime. , Disp: , Rfl:      cholecalciferol, vitamin D3, (VITAMIN D3) 2,000 unit Tab, Take 1 tablet by mouth at bedtime. , Disp: , Rfl:      denosumab 60 mg/mL Syrg, Inject 60 mg under the skin every 6 (six) months., Disp: , Rfl:      methylcellulose (CITRUCEL ORAL), 20 mg daily., Disp: , Rfl:      omeprazole (PRILOSEC) 20 MG capsule, Take 20 mg by mouth daily as needed., Disp: , Rfl:      docusate sodium (COLACE) 50 mg/5 mL oral liquid, Take by mouth., Disp: , Rfl:      ibuprofen (ADVIL,MOTRIN) 200 MG tablet, Take 600 mg by mouth 2 (two) times a day., Disp: , Rfl:      mesalamine (ROWASA) 4 gram/60 mL enema, , Disp: , Rfl:     Current Facility-Administered Medications:      denosumab 60 mg (PROLIA 60 mg/ml), 60 mg, Subcutaneous, Q6 Months, Jorge Blanton MD, 60 mg at 01/31/19 1041    Allergies   Allergen Reactions     Comtrex Dizziness      Acet/dextromethorphan/phenylephrine     Ragweed Pollen        Social History     Socioeconomic History     Marital status:      Spouse name: Not on file     Number of children: Not on file     Years of education: Not on file     Highest education level: Not on file   Social Needs     Financial resource strain: Not on file     Food insecurity - worry: Not on file     Food insecurity - inability: Not on file     Transportation needs - medical: Not on file     Transportation needs - non-medical: Not on file   Occupational History     Not on file   Tobacco Use     Smoking status: Former Smoker     Packs/day: 0.25     Types: Cigarettes     Smokeless tobacco: Never Used     Tobacco comment: in HS and 20's socail only, never pack a day smoker   Substance and Sexual Activity     Alcohol use: Yes     Alcohol/week: 1.0 oz     Types: 2 Standard drinks or equivalent per week     Drug use: No     Sexual activity: No     Comment:     Other Topics Concern     Not on file   Social History Narrative    .  2 kids.  [employer] .       Family History   Problem Relation Age of Onset     Breast cancer Other      Emphysema Mother      Varicose Veins Mother      Edema Mother      Lung cancer Father      Breast cancer Niece         Early 20's     Asthma Grandchild        Review of Systems:  Soco Ferguson no new numbess, tingling or weakness, redness or rashes, fevers, new masses, abdominal bloating or discomfort, unexplained weight loss, increased pain, new ulcers, shortness of breath and chest pain  Full 12 point review of systems was completed.    Imaging:    I personally reviewed the following imaging today and those on care everywhere, if indicated    12/19/2016     EXAM: BILATERAL LOWER EXTREMITY DEEP AND SUPERFICIAL VENOUS DUPLEX ULTRASOUND WITH PHYSIOLOGIC TESTING     INDICATION: Bilateral leg swelling and pain, varicose veins. Assess for incompetent veins.     TECHNIQUE: Supine and  upright ultrasound of the deep and superficial veins with Valsalva and compression augmentation maneuvers. Duplex imaging is performed utilizing gray-scale, two-dimensional images, color-flow imaging, Doppler waveform analysis, and   spectral Doppler imaging.      INCOMPETENCY CRITERIA: Deep vein reflux reported when greater than 1,000 ms flow reversal. Superficial vein reflux reported when greater than 500 ms flow reversal.  vein reflux reported as greater than 350 ms flow reversal.     DEEP VEIN FINDINGS:   RIGHT LEG: The common femoral, profunda femoral, femoral, popliteal, and visualized calf veins are patent and compressible. The right common femoral vein, profunda femoral vein, and proximal superficial femoral vein are all incompetent.     LEFT LEG: The common femoral, profunda femoral, femoral, popliteal, and visualized calf veins are patent and compressible. The left common femoral vein is incompetent.     RIGHT SUPERFICIAL VEIN FINDINGS:  GREAT SAPHENOUS VEIN: Incompetent from the saphenofemoral junction to the distal calf.     SMALL SAPHENOUS VEIN: Competent from the saphenopopliteal junction to the mid calf.     No incompetent perforating veins or abnormal accessory veins identified.     LEFT SUPERFICIAL VEIN FINDINGS:  GREAT SAPHENOUS VEIN: Incompetent at the saphenofemoral junction. Competent from the mid thigh to the mid calf.     SMALL SAPHENOUS VEIN: Competent from the saphenopopliteal junction to the mid calf.     No incompetent perforating veins or abnormal accessory veins identified.     IMPRESSION:   CONCLUSION:   1. No deep venous thrombosis of either lower extremity.  2. RIGHT LEG: The right superficial venous system is patent, the greater saphenous vein is incompetent throughout its course.  3. LEFT LEG: The left superficial venous system is patent, the greater saphenous vein is incompetent at the saphenofemoral junction.    Nothing new to review.    Labs:    I personally reviewed  the following labs today and those on care everywhere, if indicated    Lab Results   Component Value Date    SEDRATE 7 09/04/2013         Lab Results   Component Value Date    CRP 0.3 09/04/2013           Lab Results   Component Value Date    CREATININE 0.84 07/26/2017      Lab Results   Component Value Date    HGBA1C 5.6 06/27/2014           Lab Results   Component Value Date    BUN 21 07/26/2017              Lab Results   Component Value Date    ALBUMIN 3.8 06/10/2014       Vitamin D, Total (25-Hydroxy)   Date Value Ref Range Status   06/29/2017 44.1 30.0 - 80.0 ng/mL Final      Ref Range & Units11/30/16 10:28 AM  TSH0.30 - 5.00 uIU/mL  0.96  [facility]  Ref Range & Units11/30/16 10:28 AM  Magnesium1.8 - 2.6 mg/dL  2.3  [facility]      Nothing new to review    Physical Exam:  Vitals:    02/14/19 1525   BP: 152/80   Pulse: 68   Resp: 20   Temp: 98.4  F (36.9  C)     BMI 31.74    Circumferential measures:    Vasc Edema 2/27/2017 8/31/2017 11/13/2017 2/15/2018 2/14/2019   Right just above MTP 19.9 20.6 21.0 19.3 20.5   Right Ankle 22 22.2 21.5 21.3 21.8   Right Widest Calf 39 38.0 37.0 36.6 36.7   Right Thigh Up 10cm 47 - - 44.7 -   Left - just above MTP 20.6 20.6 20.6 20 20   Left Ankle 21.5 21.9 21.9 21.1 22   Left Widest Calf 37.2 37.1 34.5 36.5 36.   Left Thigh Up 10cm 47.4 - - 45 -     Measures stable.    General:  63 y.o. female in no apparent distress.      Psych: Alert and oriented x 3.  Cooperative. Affect normal.    HEENT: Atraumatic, normocephalic.     Musculoskeletal:  Normal range of motion in knees and ankles bilaterally.  There is no active joint synovitis, erythema, swelling or joint laxity. Extremely high arches.      Neurological:  Sensation is intact to pin prick and light touch in both legs.  Strength testing is normal in hip flexion, knee flexion, knee extension, ankle dorsiflexion and great toe extension bilaterally.      Vascular: Dorsalis pedis and posterior tibialis pulses  are strong and equal bilaterally. There are significant telangietasias, medial ankle venous flares, venous varicosities  and spider veins .     Integumentary: Skin of the legs is uniformly warm and dry and with negative Stemmer's Sign.      Haylee Tavares MD, ABWMS, FACCWS, [facility]  Medical Director Wound Care and Lymphedema  [facility]  [phone]

## 2021-06-24 NOTE — PATIENT INSTRUCTIONS - HE
"Swelling in the legs can be caused by many reasons. No matter what the reason, treatment usually includes some type of compression. You should wear your compression socks as much as you can. Your compression should be put on first thing in the morning. Take the compression off at night. It is especially important to wear them with long periods of sitting/standing, long car rides or if you will be flying. Going without compression for even brief periods of time can be damaging to your legs and your health.  Compression socks should get replaced usually every 4-6 months. They do not need to be worn at night while in bed. If we have seen you in the last year, we can refill your sock prescription, otherwise your primary care provider is able to refill them for you. Call us with any problems or questions.   If you do a lot of standing, it is good to do calf raises to help keep the blood pumping. If you sit a lot at work, it is good to get up periodically to walk around. Elevation of the foot of your bed 4-6\" helps the blood return back to where it is needed.   Please call us if you have any questions [phone]    Thank you for choosing [facility].    Referral to:  [facility] (Please call to make an appointment)    [facility]  [address],   3rd floor,[address]  [place], [place].  Phone: [phone]    [facility]  [address]  [place] [address] [phone]          "

## 2021-06-25 NOTE — TELEPHONE ENCOUNTER
Please advise patient's concern.    The only medication that I see that was renewed recently was estradiol 0.01% vaginal cream

## 2021-06-25 NOTE — TELEPHONE ENCOUNTER
..Reason for Call:  Other       Detailed comments: Vaginal itching and PCP placed her on medicine and now she is experiencing itching all over her body. Unsure, if this could be from her work which she works around a lot of starch or if this is a reaction to the medication.     Phone Number Patient can be reached at: Home number on file [phone] (home)    Best Time: 11:30am or later     Can we leave a detailed message on this number?: Yes    Call taken on 5/27/2021 at 8:30 AM by Christine Nguyen

## 2021-06-25 NOTE — PROGRESS NOTES
Soco Ferguson is a 65 y.o. female who is being evaluated via a billable telephone visit.      What phone number would you like to be contacted at? [phone]  How would you like to obtain your AVS? AVS Preference: Zeinab.    Assessment & Plan     Vaginal itching  Mostly vulva and labia, respond well to taking benadryl prn. She will try Vagisil and hydrocortisone OTC prn.    Atrophic vaginitis  She tried estrogen cream and it was very hard to do the applications. She would like to try Vagifem.  - estradioL (VAGIFEM) 10 mcg vaginal tablet  Dispense: 14 tablet; Refill: 3    Jorge Blanton MD  [facility]          Phone call duration: 11 minutes

## 2021-06-30 NOTE — PROGRESS NOTES
Progress Notes by Haylee Tavares MD at 3/8/2021  8:45 AM     Author: Haylee Tavares MD Service: -- Author Type: Physician    Filed: 3/8/2021 10:15 AM Encounter Date: 3/8/2021 Status: Signed    : Haylee Tavares MD (Physician)                     Date of Service:  21    Date last seen by Dr. Tavares:  20    PCP: Jorge Blanton MD    Impression:  1. Bilateral leg swelling - under good control  2. Bilateral leg venous insufficiency and hypertension  3. Very high arches  4. Osteoporosis     Plan:  1. Questions were answered.  2. Insoles working well.  To continue.  3. Discussed importance of and how to exercise on a regular basis.  Modifications reviewed with recommendations on using the internet and cable for additional options.  4. New compression written for.  5. Patient will follow up in 1 year or when needed.       On day of encounter time spent in chart review and with patient in consultation, exam, education and coordination of care:  25 minutes    ---------------------------------------------------------------------------------------------------------------------    Chief Complaint: Bilateral leg swelling     History of Present Illness:     Soco Ferguson returns to the [facility] for bilateral leg swelling secondary to venous hypertension with insufficiency confirmed with ultrasound s/p right leg venous closure (2017) with improvement.   She was to continue to wear her compression, exercise and wear her insoles.  At follow-up today she reports she has been stable.  She wears her compression daily and also insoles.  She needs new compression.  She is trying to increase her exercise.  She has been working on her diet decreasing her intake of fatty foods and has been slowly losing weight.  There is no new numbness, tingling or weakness.  She denies any new rashes, masses, shortness of breath or unexplained weight loss.  She has  had no irregular bleeding.  She has had no fevers and no ulcers.  Osteoporosis is stable.      Past Medical History:   Diagnosis Date   ? Abdominal Pain     Created by Conversion    ? Arthralgia     Created by Conversion    ? Blastomycosis     Created by Conversion    ? Chronic Constipation     Created by Conversion    ? Endometrial Hyperplasia     Created by Conversion    ? Hyperparathyroidism     Created by Conversion    ? Leiomyoma Of The Uterus     Created by Conversion    ? Myalgia And Myositis     Created by Conversion    ? Nausea     Created by Conversion    ? Nephrolithiasis     Created by Conversion    ? Osteoporosis     Created by Conversion    ? Osteoporosis Senile     Created by Conversion    ? Pelvic Pain     Created by Conversion    ? Restless Legs Syndrome     Created by Conversion    ? Sinusitis     Created by Conversion    ? Ulcerative Colitis     Created by Conversion    ? Urine Tests Nonspecific Abnormal Findings     Created by Conversion    ? Varicose Veins     Created by Conversion    ? Vitamin D Deficiency     Created by Conversion        Past Surgical History:   Procedure Laterality Date   ? KIDNEY STONE SURGERY  1999    removal   ? TUBAL LIGATION  1987   ? venous closure Bilateral    ? WISDOM TOOTH EXTRACTION  1980         Current Outpatient Medications:   ?  atorvastatin (LIPITOR) 40 MG tablet, Take 1 tablet (40 mg total) by mouth daily., Disp: 90 tablet, Rfl: 3  ?  b complex vitamins tablet, daily., Disp: , Rfl:   ?  cholecalciferol, vitamin D3, 50 mcg (2,000 unit) Tab, Take 2 tablets by mouth daily., Disp: , Rfl:   ?  famotidine (PEPCID) 20 MG tablet, Take 20 mg by mouth daily., Disp: , Rfl:   ?  peg 400-propylene glycol (SYSTANE) 0.4-0.3 % Drop, Administer 1 drop to both eyes 2 (two) times a day., Disp: , Rfl:   ?  triamcinolone (NASACORT) 55 mcg nasal inhaler, Apply 1 spray into each nostril daily., Disp: , Rfl:   ?  estradioL (ESTRACE) 0.01 % (0.1 mg/gram) vaginal cream, Insert 1 g into  "the vagina daily., Disp: 42.5 g, Rfl: 1  ?  levocetirizine (XYZAL) 5 MG tablet, Take 5 mg by mouth as needed for allergies., Disp: , Rfl:   ?  pen needle, diabetic (SURE-FINE PEN NEEDLES) 31 gauge x 3/16\" Ndle, Use daily with Forteo, Disp: 100 each, Rfl: 3    Current Facility-Administered Medications:   ?  denosumab 60 mg (PROLIA 60 mg/ml), 60 mg, Subcutaneous, Q6 Months, Jorge Blanton MD, 60 mg at 01/21/21 1802    Allergies   Allergen Reactions   ? Comtrex Dizziness     Acet/dextromethorphan/phenylephrine   ? Ragweed Pollen        Social History     Socioeconomic History   ? Marital status:      Spouse name: Not on file   ? Number of children: Not on file   ? Years of education: Not on file   ? Highest education level: Not on file   Occupational History   ? Not on file   Social Needs   ? Financial resource strain: Not on file   ? Food insecurity     Worry: Not on file     Inability: Not on file   ? Transportation needs     Medical: Not on file     Non-medical: Not on file   Tobacco Use   ? Smoking status: Never Smoker   ? Smokeless tobacco: Never Used   ? Tobacco comment: in HS and 20's socail only, never pack a day smoker   Substance and Sexual Activity   ? Alcohol use: Yes     Alcohol/week: 1.7 standard drinks     Types: 2 Standard drinks or equivalent per week   ? Drug use: No   ? Sexual activity: Never     Comment:     Lifestyle   ? Physical activity     Days per week: Not on file     Minutes per session: Not on file   ? Stress: Not on file   Relationships   ? Social connections     Talks on phone: Not on file     Gets together: Not on file     Attends Jehovah's witness service: Not on file     Active member of club or organization: Not on file     Attends meetings of clubs or organizations: Not on file     Relationship status: Not on file   ? Intimate partner violence     Fear of current or ex partner: Not on file     Emotionally abused: Not on file     Physically abused: Not on file     Forced " sexual activity: Not on file   Other Topics Concern   ? Not on file   Social History Narrative    .  2 kids.  [employer] .       Family History   Problem Relation Age of Onset   ? Breast cancer Other    ? Emphysema Mother    ? Varicose Veins Mother    ? Edema Mother    ? Lung cancer Father    ? Breast cancer Niece         Early 20's   ? Asthma Grandchild        Review of Systems:  See HPI    Imaging:    I personally reviewed the following imaging results today and those on care everywhere, if indicated    1/7/21  CTA Coronary  Interpretation Summary      Mild coronary atherosclerosis with no obstructive plaque identified.     EXAM: OVERREAD: DETAILED [place] [facility] EXTRACARDIAC OVERREAD OF CARDIAC CT   LOCATION: [facility]  DATE/TIME: 1/7/2021 10:14 AM     INDICATION: Chest pain.  TECHNIQUE: Dose reduction techniques were used.  CONTRAST: Iohexol (Omni) 100 mL  COMPARISON: 06/09/2014 CT abdomen pelvis lung bases.     FINDINGS:    LIMITED CHEST: No significant change of clustered and partially calcified left lung base nodules, presumed incidental.  LIMITED MEDIASTINUM: Negative.  LIMITED UPPER ABDOMEN: Borderline hiatus hernia.     IMPRESSION:   1.  No significant incidental extracardiac findings.  2.  Please refer to cardiologist's dictation for the cardiac CT report.      EXAM: US VENOUS ARM RIGHT  LOCATION: [facility]  DATE/TIME: 8/5/2019 1:36 PM     INDICATION: Pain, swelling to RUE  COMPARISON: None.  TECHNIQUE: Duplex exam performed utilizing 2D gray-scale imaging, Doppler interrogation with color-flow and spectral waveform analysis. Exam performed without and with compression when possible.     FINDINGS: Ultrasound includes evaluation of the internal jugular veins, innominate veins, subclavian veins, axillary veins, and brachial veins. The superficial cephalic and basilic veins were also evaluated where seen.      RIGHT ARM VEINS: Negative for  DVT.     IMPRESSION:   CONCLUSION:  1.  The right arm veins are negative for DVT.    12/19/2016     EXAM: BILATERAL LOWER EXTREMITY DEEP AND SUPERFICIAL VENOUS DUPLEX ULTRASOUND WITH PHYSIOLOGIC TESTING     INDICATION: Bilateral leg swelling and pain, varicose veins. Assess for incompetent veins.     TECHNIQUE: Supine and upright ultrasound of the deep and superficial veins with Valsalva and compression augmentation maneuvers. Duplex imaging is performed utilizing gray-scale, two-dimensional images, color-flow imaging, Doppler waveform analysis, and   spectral Doppler imaging.      INCOMPETENCY CRITERIA: Deep vein reflux reported when greater than 1,000 ms flow reversal. Superficial vein reflux reported when greater than 500 ms flow reversal.  vein reflux reported as greater than 350 ms flow reversal.     DEEP VEIN FINDINGS:   RIGHT LEG: The common femoral, profunda femoral, femoral, popliteal, and visualized calf veins are patent and compressible. The right common femoral vein, profunda femoral vein, and proximal superficial femoral vein are all incompetent.     LEFT LEG: The common femoral, profunda femoral, femoral, popliteal, and visualized calf veins are patent and compressible. The left common femoral vein is incompetent.     RIGHT SUPERFICIAL VEIN FINDINGS:  GREAT SAPHENOUS VEIN: Incompetent from the saphenofemoral junction to the distal calf.     SMALL SAPHENOUS VEIN: Competent from the saphenopopliteal junction to the mid calf.     No incompetent perforating veins or abnormal accessory veins identified.     LEFT SUPERFICIAL VEIN FINDINGS:  GREAT SAPHENOUS VEIN: Incompetent at the saphenofemoral junction. Competent from the mid thigh to the mid calf.     SMALL SAPHENOUS VEIN: Competent from the saphenopopliteal junction to the mid calf.     No incompetent perforating veins or abnormal accessory veins identified.     IMPRESSION:   CONCLUSION:   1. No deep venous thrombosis of either lower  extremity.  2. RIGHT LEG: The right superficial venous system is patent, the greater saphenous vein is incompetent throughout its course.  3. LEFT LEG: The left superficial venous system is patent, the greater saphenous vein is incompetent at the saphenofemoral junction.    Labs:    I personally reviewed the following lab results today and those on care everywhere, if indicated    Lab Results   Component Value Date    SEDRATE 7 09/04/2013         Lab Results   Component Value Date    CRP 0.3 09/04/2013           Lab Results   Component Value Date    CREATININE 0.87 12/30/2020      Lab Results   Component Value Date    HGBA1C 5.6 06/27/2014           Lab Results   Component Value Date    BUN 17 12/30/2020              Lab Results   Component Value Date    ALBUMIN 4.1 12/30/2020       Vitamin D, Total (25-Hydroxy)   Date Value Ref Range Status   12/30/2020 33.2 30.0 - 80.0 ng/mL Final      Ref Range & Units11/30/16 10:28 AM  TSH0.30 - 5.00 uIU/mL  0.96  [facility]  Ref Range & Units11/30/16 10:28 AM  Magnesium1.8 - 2.6 mg/dL  2.3  [facility]      Physical Exam:  Vitals:    03/08/21 0850   BP: 110/70   Pulse: 80   Resp: 20   Temp: 98.9  F (37.2  C)     BMI 30.55  Weight 163 (-3) pounds    Circumferential measures:    Vasc Edema 11/13/2017 2/15/2018 2/14/2019 2/13/2020 3/8/2021   Right just above MTP 21.0 19.3 20.5 20.2 20.5   Right Ankle 21.5 21.3 21.8 22.2 23   Right Widest Calf 37.0 36.6 36.7 37.6 37   Right Thigh Up 10cm - 44.7 - 47 -   Left - just above MTP 20.6 20 20 20.7 21.6   Left Ankle 21.9 21.1 22 22.2 22.5   Left Widest Calf 34.5 36.5 36. 37.2 37   Left Thigh Up 10cm - 45 - 46 -     Measures stable.    General:  65 y.o. female in no apparent distress.      Psych: Alert and oriented x 3.  Cooperative. Affect normal.    HEENT: Atraumatic, normocephalic.     Musculoskeletal:  Normal range of motion in knees and ankles bilaterally without active joint synovitis, erythema, swelling or joint  laxity.      Neurological:  Sensation is intact to pin prick and light touch in both legs.  Strength testing is normal in hip flexion, knee flexion, knee extension, ankle dorsiflexion and great toe extension bilaterally.      Vascular: Dorsalis pedis and posterior tibialis pulses are strong and equal bilaterally. There are significant telangietasias, medial ankle venous flares, venous varicosities  and spider veins .     Integumentary: Skin of the legs is uniformly warm and dry and with negative Stemmer's Sign.  Stable.    Haylee Tavares MD, Founding Diplomate ABWMS, FACCWS, FAAPMR  Medical Director Wound Care and Lymphedema  [facility]  [phone]

## 2021-06-30 NOTE — PROGRESS NOTES
Progress Notes by Santo Esquivel DO at 12/11/2020  9:10 AM     Author: Santo Esquivel DO Service: -- Author Type: Physician    Filed: 12/11/2020 10:05 AM Encounter Date: 12/11/2020 Status: Signed    : Santo Esquivel DO (Physician)         Thank you, Dr. Randall, for asking the [facility] team to see Ms. Soco Ferguson to evaluate chest pain, exertional dyspnea and left bundle branch block.      Assessment/Recommendations   Assessment:    1. Chest pain concerning for cardiac cause.   2. Exertional dysena  2. Hyperlipdiemia  3. ASCVD: 5.7% (borderline risk)   4. Left Bundle Branch Block.     Plan:  1. Recommend coronary CT angiogram recommend imaging modality with CT angiogram given patient has a left bundle branch block which will make stress echo or nuclear imaging high probability of a false positive test or indeterminate test.  Patient has recurrent chest pain episodes which are concerning for cardiac etiology.  Has a history of hyperlipidemia.  Also has exertional dyspnea symptoms which are concerning for cardiac source.  2.  Discussed statin therapy given her cholesterol.  Awaiting CT scan findings.  If she does have abnormal CT scan findings would recommend starting atorvastatin.  3.  Complete echocardiogram to assess left bundle branch block and rule out dyssynchrony causing left ventricular systolic dysfunction and cause of her dyspnea.       History of Present Illness/Subjective    Ms. Soco Ferguson is a 65 y.o. female with hyperlipidemia who presented to the emergency department at [facility] on 12/9/2020 with chest pain symptoms associated with dyspnea.  Twelve-lead EKG demonstrated sinus rhythm with left bundle branch block.  Troponin level is negative.  Patient states that her chest pain woke her from sleep.  Was worse when she is lying on her side.  She also has noticed dyspnea on exertion for quite some time.  She has had a couple episodes when  she had noted palpitations after taking, her medications.  She also had an episode of severe lightheadedness with standing.       Does have elevated lipids.  Currently her risk is in the borderline and would be to be recommendation for statin therapy.  Holding on starting statin therapy and aspirin today.    No prior cardiac surgeries.  No family history of premature coronary artery disease.  Both her mother and father had irregular heart beats.       Physical Examination Review of Systems   Vitals:    12/11/20 0910   BP: 126/74   Pulse: 72   Resp: 16     Body mass index is 31.37 kg/m .  Wt Readings from Last 3 Encounters:   12/11/20 166 lb (75.3 kg)   12/09/20 165 lb (74.8 kg)   09/29/20 165 lb 9.6 oz (75.1 kg)     [unfilled]  General Appearance:   no distress, mild obese body habitus   ENT/Mouth: membranes moist, no oral lesions or bleeding gums.      EYES:  no scleral icterus, normal conjunctivae   Neck: no carotid bruits or thyromegaly   Chest/Lungs:   lungs are clear to auscultation, no rales or wheezing, no sternal scar, equal chest wall expansion    Cardiovascular:   Regular. Normal first and second heart sounds with no murmurs, rubs, or gallops; the carotid, radial and posterior tibial pulses are intact, Jugular venous pressure normal, no edema bilaterally    Abdomen:  no organomegaly, masses, bruits, or tenderness; bowel sounds are present   Extremities: no cyanosis or clubbing   Skin: no xanthelasma, warm.    Neurologic: normal  bilateral, no tremors     Psychiatric: alert and oriented x3, calm     General: Weight Gain  Eyes: WNL  Ears/Nose/Throat: WNL  Lungs: Shortness of Breath  Heart: Chest Pain, Leg Swelling  Stomach: Constipation, Heartburn  Bladder: WNL  Muscle/Joints: WNL  Skin: WNL  Nervous System: WNL  Mental Health: WNL     Blood: WNL     Medical History  Surgical History Family History Social History   Past Medical History:   Diagnosis Date   ? Abdominal Pain     Created by Conversion    ?  Arthralgia     Created by Conversion    ? Blastomycosis     Created by Conversion    ? Chronic Constipation     Created by Conversion    ? Endometrial Hyperplasia     Created by Conversion    ? Hyperparathyroidism     Created by Conversion    ? Leiomyoma Of The Uterus     Created by Conversion    ? Myalgia And Myositis     Created by Conversion    ? Nausea     Created by Conversion    ? Nephrolithiasis     Created by Conversion    ? Osteoporosis     Created by Conversion    ? Osteoporosis Senile     Created by Conversion    ? Pelvic Pain     Created by Conversion    ? Restless Legs Syndrome     Created by Conversion    ? Sinusitis     Created by Conversion    ? Ulcerative Colitis     Created by Conversion    ? Urine Tests Nonspecific Abnormal Findings     Created by Conversion    ? Varicose Veins     Created by Conversion    ? Vitamin D Deficiency     Created by Conversion     Past Surgical History:   Procedure Laterality Date   ? KIDNEY STONE SURGERY  1999    removal   ? TUBAL LIGATION  1987   ? venous closure Bilateral    ? WISDOM TOOTH EXTRACTION  1980    Family History   Problem Relation Age of Onset   ? Breast cancer Other    ? Emphysema Mother    ? Varicose Veins Mother    ? Edema Mother    ? Lung cancer Father    ? Breast cancer Niece         Early 20's   ? Asthma Grandchild     Social History     Socioeconomic History   ? Marital status:      Spouse name: Not on file   ? Number of children: Not on file   ? Years of education: Not on file   ? Highest education level: Not on file   Occupational History   ? Not on file   Social Needs   ? Financial resource strain: Not on file   ? Food insecurity     Worry: Not on file     Inability: Not on file   ? Transportation needs     Medical: Not on file     Non-medical: Not on file   Tobacco Use   ? Smoking status: Never Smoker   ? Smokeless tobacco: Never Used   ? Tobacco comment: in HS and 20's socail only, never pack a day smoker   Substance and Sexual  "Activity   ? Alcohol use: Yes     Alcohol/week: 1.7 standard drinks     Types: 2 Standard drinks or equivalent per week   ? Drug use: No   ? Sexual activity: Never     Comment:     Lifestyle   ? Physical activity     Days per week: Not on file     Minutes per session: Not on file   ? Stress: Not on file   Relationships   ? Social connections     Talks on phone: Not on file     Gets together: Not on file     Attends Yarsanism service: Not on file     Active member of club or organization: Not on file     Attends meetings of clubs or organizations: Not on file     Relationship status: Not on file   ? Intimate partner violence     Fear of current or ex partner: Not on file     Emotionally abused: Not on file     Physically abused: Not on file     Forced sexual activity: Not on file   Other Topics Concern   ? Not on file   Social History Narrative    .  2 kids.  [employer] .          Medications  Allergies   Current Outpatient Medications   Medication Sig Dispense Refill   ? b complex vitamins tablet daily.     ? cholecalciferol, vitamin D3, 50 mcg (2,000 unit) Tab Take 2 tablets by mouth daily.     ? famotidine (PEPCID) 20 MG tablet Take 20 mg by mouth daily.     ? levocetirizine (XYZAL) 5 MG tablet Take 5 mg by mouth as needed for allergies.     ? peg 400-propylene glycol (SYSTANE) 0.4-0.3 % Drop Administer 1 drop to both eyes 2 (two) times a day.     ? pen needle, diabetic (SURE-FINE PEN NEEDLES) 31 gauge x 3/16\" Ndle Use daily with Forteo 100 each 3   ? teriparatide (FORTEO) 20 mcg/dose - 600 mcg/2.4 mL injection INJECT 0.08 ML OR 20 MCG UNDER THE SKIN DAILY. EXPIRES 28 DAYS FROM INITIAL USE. 2.4 mL 12   ? triamcinolone (NASACORT) 55 mcg nasal inhaler Apply 1 spray into each nostril daily.     ? estradioL (ESTRACE) 0.01 % (0.1 mg/gram) vaginal cream Insert 1 g into the vagina daily. 42.5 g 1   ? ibuprofen (ADVIL,MOTRIN) 200 MG tablet Take 600 mg by mouth 2 (two) times a day.   " "  ? omeprazole (PRILOSEC) 20 MG capsule Take 20 mg by mouth daily as needed.     ? pen needle, diabetic (SURE-FINE PEN NEEDLES) 31 gauge x 3/16\" Ndle Use daily with Forteo 100 each 3     No current facility-administered medications for this visit.     Allergies   Allergen Reactions   ? Comtrex Dizziness     Acet/dextromethorphan/phenylephrine   ? Ragweed Pollen          Lab Results    Chemistry/lipid CBC Cardiac Enzymes/BNP/TSH/INR   Lab Results   Component Value Date    CHOL 247 (H) 06/29/2017    HDL 51 06/29/2017    LDLCALC 156 (H) 06/29/2017    TRIG 201 (H) 06/29/2017    CREATININE 0.84 12/09/2020    BUN 24 (H) 12/09/2020    K 3.9 12/09/2020     12/09/2020     12/09/2020    CO2 24 12/09/2020    Lab Results   Component Value Date    WBC 7.7 12/09/2020    HGB 14.0 12/09/2020    HCT 42.9 12/09/2020    MCV 89 12/09/2020     12/09/2020    Lab Results   Component Value Date    CKTOTAL 77 09/04/2013    TROPONINI <0.01 12/09/2020    TSH 1.21 08/02/2019    INR 0.96 12/09/2020                                                "

## 2021-07-01 ENCOUNTER — TRANSCRIBE ORDERS (OUTPATIENT)
Dept: INTERNAL MEDICINE | Facility: CLINIC | Age: 66
End: 2021-07-01

## 2021-07-01 DIAGNOSIS — Z92.29 PERSONAL HISTORY OF OTHER DRUG THERAPY: ICD-10-CM

## 2021-07-01 DIAGNOSIS — M81.0 SENILE OSTEOPOROSIS: Primary | ICD-10-CM

## 2021-07-01 NOTE — PROGRESS NOTES
As part of the required manual data conversion process for integration, this encounter was created to document a CAM (Clinic Administered Medication) order. This information was copied from the [facility] patient's chart to the [facility] patient chart.     Julianna Urbina RPH  July 1, 2021

## 2021-07-03 NOTE — ADDENDUM NOTE
Addendum Note by Bloch, Lisa M, CMA at 1/10/2018  4:16 PM     Author: Bloch, Lisa M, CMA Service: -- Author Type: Certified Medical Assistant    Filed: 1/10/2018  4:16 PM Encounter Date: 1/10/2018 Status: Signed    : Bloch, Lisa M, CMA (Certified Medical Assistant)    Addended by: BLOCH, LISA M on: 1/10/2018 04:16 PM        Modules accepted: Orders

## 2021-07-03 NOTE — ADDENDUM NOTE
Addendum Note by Christy Blanton MD at 5/3/2019  2:14 PM     Author: Christy Blanton MD Service: -- Author Type: Physician    Filed: 5/3/2019  2:14 PM Encounter Date: 5/3/2019 Status: Signed    : Christy Blanton MD (Physician)    Addended by: CHRISTY BLANTON on: 5/3/2019 02:14 PM        Modules accepted: Orders

## 2021-07-03 NOTE — ADDENDUM NOTE
Addendum Note by Samina Cantor CMA at 5/3/2019  2:02 PM     Author: Samina Cantor CMA Service: -- Author Type: Certified Medical Assistant    Filed: 5/3/2019  2:02 PM Encounter Date: 5/3/2019 Status: Signed    : Samina Cantor CMA (Certified Medical Assistant)    Addended by: SAMINA CANTOR on: 5/3/2019 02:02 PM        Modules accepted: Orders

## 2021-07-19 NOTE — PROGRESS NOTES
Patient is on the [facility] CSS for 07/22/2021 for shingles vaccine, there is no orders in chart. Routed to PCP support pool for order review.

## 2021-07-21 ENCOUNTER — RECORDS - HEALTHEAST (OUTPATIENT)
Dept: ADMINISTRATIVE | Facility: CLINIC | Age: 66
End: 2021-07-21

## 2021-07-22 ENCOUNTER — ALLIED HEALTH/NURSE VISIT (OUTPATIENT)
Dept: FAMILY MEDICINE | Facility: CLINIC | Age: 66
End: 2021-07-22
Payer: COMMERCIAL

## 2021-07-22 ENCOUNTER — ANCILLARY PROCEDURE (OUTPATIENT)
Dept: BONE DENSITY | Facility: CLINIC | Age: 66
End: 2021-07-22
Attending: INTERNAL MEDICINE
Payer: COMMERCIAL

## 2021-07-22 DIAGNOSIS — M81.0 OSTEOPOROSIS: Primary | ICD-10-CM

## 2021-07-22 DIAGNOSIS — M81.0 SENILE OSTEOPOROSIS: ICD-10-CM

## 2021-07-22 PROCEDURE — 99207 PR NO CHARGE NURSE ONLY: CPT

## 2021-07-22 PROCEDURE — 90471 IMMUNIZATION ADMIN: CPT

## 2021-07-22 PROCEDURE — 90750 HZV VACC RECOMBINANT IM: CPT

## 2021-07-22 PROCEDURE — 77080 DXA BONE DENSITY AXIAL: CPT | Mod: TC | Performed by: RADIOLOGY

## 2021-07-22 PROCEDURE — 96372 THER/PROPH/DIAG INJ SC/IM: CPT | Performed by: PHARMACIST

## 2021-07-22 NOTE — PROGRESS NOTES
"Prolia Injection Phone Screen      Screening questions have been asked 2-3 days prior to administration visit for Prolia. If any questions are answered with \"Yes,\" this phone encounter were will routed to ordering provider for further evaluation.     1.  When was the last injection?  1/21/21    2.  Has insurance for this injection been verified?  Yes    3.  Did you experience any new onset achiness or rashes that lasted for over a month with your previous Prolia injection?   No    4.  Do you have a fever over 101?F or a new deep cough that is unusual for you today? No    5.  Have you started any new medications in the last 6 months that you were told could affect your immune system? These may have been prescribed by oncologist, transplant, rheumatology, or dermatology.   No    6.  In the last 6 months have you have gastric bypass or parathyroid surgery?   No    7.  Do you plan dental work requiring drilling into the bone such as implants/extractions or oral surgery in the next 2-3 months?   No    8. Do you have new insurance since the last injection?    9. Have you received the Covid-19 vaccine? Yes  If No - Proceed with Prolia injection  If Yes - Date of vaccination 4/26/2021, 4/5/2021. Will need to wait until 2 weeks after 2nd dose of Covid-19 vaccine before administering Prolia       Patient informed if symptoms discussed above present prior to their administration appointment, they are to notify clinic immediately.     Joan Saha            "

## 2021-07-27 ENCOUNTER — TRANSFERRED RECORDS (OUTPATIENT)
Dept: HEALTH INFORMATION MANAGEMENT | Facility: CLINIC | Age: 66
End: 2021-07-27

## 2021-08-10 ENCOUNTER — OFFICE VISIT (OUTPATIENT)
Dept: INTERNAL MEDICINE | Facility: CLINIC | Age: 66
End: 2021-08-10
Payer: COMMERCIAL

## 2021-08-10 VITALS
SYSTOLIC BLOOD PRESSURE: 114 MMHG | WEIGHT: 166.1 LBS | BODY MASS INDEX: 31.13 KG/M2 | DIASTOLIC BLOOD PRESSURE: 64 MMHG | HEART RATE: 84 BPM

## 2021-08-10 DIAGNOSIS — M81.0 SENILE OSTEOPOROSIS: Primary | ICD-10-CM

## 2021-08-10 PROCEDURE — 99213 OFFICE O/P EST LOW 20 MIN: CPT | Performed by: INTERNAL MEDICINE

## 2021-08-10 RX ORDER — ATORVASTATIN CALCIUM 40 MG/1
1 TABLET, FILM COATED ORAL DAILY
COMMUNITY
Start: 2021-08-07 | End: 2022-01-12

## 2021-08-10 RX ORDER — FAMOTIDINE 20 MG/1
1 TABLET, FILM COATED ORAL DAILY
COMMUNITY
End: 2022-07-28

## 2021-08-10 NOTE — PROGRESS NOTES
"  (M81.0) Senile osteoporosis  (primary encounter diagnosis)      Patient was educated on safety of Prolia utilizing Patient Counseling Chart for Healthcare Providers, as outlined by the Prolia REMS progam.     Return in about 6 months (around 2/10/2022) for KARSON Escobedo.    Patient Instructions   Prolia 17th in Jan 2022.  Prolia 18th in July 2022.    DXA in 2 years .   Phone number to schedule [phone].    Daily calcium need is 0154-5456 mg a day from the diet and supplements.  Calcium citrate is easier to digest.  Vitamin D 2000 IU daily recommended.          /64   Pulse 84   Wt 75.3 kg (166 lb 1.6 oz)   Breastfeeding No   BMI 31.13 kg/m        Did you experience any problems with previous Prolia injection? no  Any medication change in the last 6 months? no  Did you take prednisone or other immunosupressant drugs in the last 6 months   (chemo, transplant, rheum, dermatology conditions)? no  Did you have any serious infection in the last 6 months?no  Any recent hospitalizations?no  Do you plan any dental work in the next 2-3 months?no  How much calcium do you take daily from the diet and supplements?1200 mg  How much vit D do you take daily? 2000 IU  Last DXA? 7/22/2021, Reviewed and discussed      Patient is here today for the Prolia injection. She had Prolia on 7/22/21. Patient tolerated previous injections well.   We discussed calcium and vit D daily needs today.   DXA scan showed significant improvement after 15 m on Forteo and now on Prolia.   We discussed possibility of \"drug holiday\" in the future after switching to bisphosphonate for 1-2 years, but also increased fracture risk when Prolia stopped suddenly. Calcium, vit D supplements and weight bearing exercise discussed.      Next Prolia injection will be in 6 months.           This note has been dictated using voice recognition software. Any grammatical or context distortions are unintentional and inherent to the software      Patient Active " Problem List   Diagnosis     Blastomycosis     Endometrial hyperplasia     Hyperparathyroidism (H)     Pain of lower extremity     Restless legs syndrome     Senile osteoporosis     Ulcerative colitis (H)     Leiomyoma of uterus, unspecified     Venous insufficiency of both lower extremities     Articular disc disorder of temporomandibular joint     Myofascial pain       Current Outpatient Medications   Medication     atorvastatin (LIPITOR) 40 MG tablet     Calcium Citrate-Vitamin D ([employer] CALCIUM CITRATE +D3 MINI) 200-250 MG-UNIT TABS     cholecalciferol (D 2000) 2000 UNITS tablet     docusate (COLACE) 50 MG/5ML liquid     famotidine (PEPCID) 20 MG tablet     Current Facility-Administered Medications   Medication     apraclonidine (IOPIDINE) 1 % ophthalmic solution 1 drop     denosumab (PROLIA) injection 60 mg     prednisoLONE acetate (PRED FORTE) 1 % ophthalmic susp 1 drop

## 2021-08-10 NOTE — PATIENT INSTRUCTIONS
Prolia 17th in Jan 2022.  Prolia 18th in July 2022.    DXA in 2 years .   Phone number to schedule [phone].    Daily calcium need is 1352-9413 mg a day from the diet and supplements.  Calcium citrate is easier to digest.  Vitamin D 2000 IU daily recommended.

## 2021-08-16 ENCOUNTER — HOSPITAL ENCOUNTER (OUTPATIENT)
Dept: MAMMOGRAPHY | Facility: CLINIC | Age: 66
Discharge: HOME | End: 2021-08-16
Attending: INTERNAL MEDICINE | Admitting: INTERNAL MEDICINE
Payer: COMMERCIAL

## 2021-08-16 DIAGNOSIS — Z12.31 VISIT FOR SCREENING MAMMOGRAM: ICD-10-CM

## 2021-08-16 PROCEDURE — 77067 SCR MAMMO BI INCL CAD: CPT

## 2021-09-12 ENCOUNTER — HEALTH MAINTENANCE LETTER (OUTPATIENT)
Age: 66
End: 2021-09-12

## 2021-09-15 ENCOUNTER — TELEPHONE (OUTPATIENT)
Dept: VASCULAR SURGERY | Facility: CLINIC | Age: 66
End: 2021-09-15

## 2021-09-15 DIAGNOSIS — B99.9 INFECTION: Primary | ICD-10-CM

## 2021-09-15 RX ORDER — CEPHALEXIN 500 MG/1
500 CAPSULE ORAL 4 TIMES DAILY
Qty: 28 CAPSULE | Refills: 0 | Status: SHIPPED | OUTPATIENT
Start: 2021-09-15 | End: 2021-09-22

## 2021-09-15 NOTE — TELEPHONE ENCOUNTER
Spoke with patient, antibiotic ordered by Dr. Tavares. Patient to call back on Friday with update on how she is doing. Elevated leg, wear compression as able. If fever, shortness of breath or worsening symptoms, patient to go to ER or urgent care.

## 2021-09-15 NOTE — TELEPHONE ENCOUNTER
Patient is calling stating she would like an apt with Dr Tavares, her left leg is turning red.  She is not able to send a photo to us.  Writer updated her that Dr. Tavares doesn't have an opening till January, and a message will be sent to a RN.

## 2021-09-17 NOTE — TELEPHONE ENCOUNTER
Physical Therapy Evaluation      Visit Count: 1  Plan of Care Dates: Initial: 10/27/2017 Through: 12/8/2017    Insurance Information:   [employer] [id number]   ID#: [id number]  Group#: [id number]   Eff Date: 1/1/17 (calendar year policy)     HARD Visit Limit: 20 visits per calendar year             - Combined: No            - Used: 0  Pre auth/cert required: No     Ded: NONE  OOP: $1250 - $253.99 Met   Secondary Insurance? NONE       Next Referring Provider Visit: 11/6/17    Referred by: Rogerio Tolentino MD  Medical Diagnosis (from order): Z98.890 S/P left knee arthroscopy   Treatment Diagnosis: Knee Symptoms with Pain, Impaired Range of Motion, Impaired Motor Function/Muscle Performance, Impaired Gait/Locomotion Deficits and Impaired Balance  Insurance: 1. [employer]  2. N/A    Date of Surgery: 10/24/17; Surgery performed:   1.  Left knee arthroscopy, patellar chondroplasty.  2.  Partial lateral meniscectomy.  3.  Trochlear groove chondroplasty.  4.  Lateral tibial plateau chondroplasty.  5.  Medial femoral condyle chondroplasty.  6.  Lysis of inflamed intraarticular plica.;   Physician Guidelines: yes:   Treatments: AROM-PROM-AAROM, Strengthening, Stretching  Modalities: Per Therapist Assessment  Frequency: Per Therapist Plan of Care  Duration: Per Therapist Plan of Care  See op note for surgery details.    Diagnosis Precautions: none  Chart reviewed: Relevant co-morbidities: history of squamous cell carcinoma, inguinal hernia repair , allergies: morphine and related opiod analgesics, codeine, tests: MRI left knee and medications:  Aspirin, Norco, Motrin, Niacin, Multi-vitamin    SUBJECTIVE   Patient underwent  Left knee arthroscopy, patellar chondroplasty, Partial lateral meniscectomy, Trochlear groove chondroplasty, Lateral tibial plateau chondroplasty, Medial femoral condyle chondroplasty, Lysis of inflamed intraarticular plica on 10/24/17 with Dr. Tolentino. Initial injury potential  Patient states that she is feeling much better today. She has been elevating her legs as much as possible, and she is wearing a different pair of work shoes that are more like a tennis shoe with her inserts rather than steel toe boots.   Patient has not gone to  abx yet and is wondering if she still needs to; she states that she does not have any sign of infection.   Patient will be at work today until 4:30PM, OK to : [phone]   occurred with jumping on a trampoline in January.    States she has been doing well since surgery, has maintained non-weightbearing status since surgery and been compliant with exercises given to her. Denies pain for the most part, had a slight twinge on medial side of knee with movement earlier in the week but otherwise good. Has not been taking any medication for pain or inflammation regularly, just Motrin on occasion because her daughter tells her to.     States she is an active person, working out six days per week.    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 Daughter and fiance are helping her out currently.     Pain:  Intensity: Now: 0/10; Best: 0/10; Worst: 1/10 (in the last 2 weeks)  Location: Medial aspect of left knee  Quality/Description: twinge  Relieving/Alleviating factors: rest, ice, motrin every once and a while for the inflammation.  Pain Frequency: minimal to none  Radiating Pain: denies numbness/tingling/radiating pain in left lower extremity  Function:  Limitations and exacerbating factors (patient reported): difficulty with all activities/tasks with involved extremity, age appropriate activities, all weight bearing activities/tasks with involved extremity, ambulation with crutches NWB  Prior level (patient reported): independent with all activities of daily living and instrumental activites of daily living    Prior Treatment: no therapies in the past year for current condition. Hospitalization, home health services or skilled nursing facility in the last 30 days: No, per patient.    Home Environment/Social Support: Patient lives with significant other, in a 2 story home, needs to complete stairs for home entry and access to  area in basement.  Patient has consistent assist from family/friends.      Safety:  Do you feel safe at home, work and/or school? yes, per patient  Falls: balance history in last  year (< or equal to 2 falls/near falls and/or reasons) does not indicate further testing      Patient Goals/Concerns:  Get back to the gym    OBJECTIVE   Posture/Observation:  Ambulating with axillary crutches, non-weight bearing left lower extremity.    Crutches improperly fit, patient ambulating notably hunched forward with crutches pressed into axilla bilaterally.     Gait Analysis:  Swing to gait pattern with axillary crutches, non-weight bearing left lower extremity    Range of Motion (degrees)  [] All motions within functional/normal limits except those noted.   [] Only those motions that were assessed are noted.  [] Uninvolved extremity motion within functional/normal limits.    Norm Left Right   Date  Initial Initial   Knee Flexion 135  120  125 P 137  150 P   Knee Extension 0-5 0  0 P  0  0 P   standard testing positions unless otherwise noted; Key: ranges are reported in active range of motion unless noted as AA=active assistive or P=passive range of motion, * denotes pain   Comments: Tightness in superomedial knee at end range of passive flexion.     Strength (out of 5)  [] Gross muscle strength within functional/normal limits except as noted.  [x] Only muscle strength that was assessed are noted.  [] Uninvolved muscle strength within functional/normal limits.   Left Right   Date Initial Initial   Hip Flexion 4 4+   Hip Extension 4- 4   Hip Abduction 4 4   Hip Glut Medius     Hip Adduction      Hip Internal Rotation  4   Hip External Rotation  5   Knee Flexion  5   Knee Extension  5   Ankle Dorsiflexion 5 5   Ankle Plantar flexion     Ankle Inversion     Ankle Eversion     standard testing positions unless otherwise noted,* denotes pain  Comments:      Muscle Flexibility:  Gastrocnemius - Left: within normal limits, Right: within normal limits  Gluteals - Left: within normal limits, Right: within normal limits  Hamstring - Left: minimal tightness, Right: minimal tightness  Hip External Rotators - Left:  within normal limits, Right: within normal limits  Hip Internal Rotators - Left: within normal limits, Right: within normal limits  Iliopsoas - Left: within normal limits, Right: within normal limits  Iliotibial Band - Left: within normal limits, Right: within normal limits  Rectus Femoris - Left: within normal limits, Right: within normal limits       Palpation:  Mild swelling left knee with mild tenderness to palpation along medial joint line and incisions.    Joint Play Assessment:  Not assessed    Special Tests:  90/90 Hamstring Test: Positive bilaterally for hamstring tightness 152 degrees left, 159 degrees right    Functional Tests:  Deferred    Outcome Measures:   Lower Extremity Functional Scale: LEFS Calculated Total: 23   (0=extreme difficulty; 80=no difficulty)     Initial Treatment   Initial evaluation completed.    Therapeutic Exercise:   Quadriceps set, 1x15 with 5 sec holds, L  SLR, 1x15, L (slight quadriceps lag at initiation of lift)  Side lying hip abduction, 1x15 L  LAQ, 1x15 L  Prone hip extension with glute set, leg straight, 1x10 B  Patient education: evaluation findings, progression of weight bearing beginning next session, activity modification post-op to allow for healing/recovery      Initial Home Program:  Exercise: Date issued Date DC Comments   Quadriceps set, 1x15 with 5 sec holds, L  SLR, 1x15, L (slight quadriceps lag at initiation of lift)  Side lying hip abduction, 1x15 L  LAQ, 1x15 L 10/27/17                                   Plan for next session: Begin partial weight bearing, continue with quadriceps strengthening, hip strengthening, hamstring stretching.    ASSESSMENT   56 year old female presents to therapy with significant decline in prior level of function due to signs and symptoms consistent with status post left knee arthroscopic surgery on 10/24/17. Contributing limiting factors include the following: decreased knee mobility, increased knee irritation and inflammation,  decreased dynamic hip strength, decreased neuromuscular control of quadriceps, decreased walking tolerance, decreased ability to complete stair negotiation, decreased weight bearing tolerance, decreased lower extremity flexibility.    Outcome:    Benefit from skilled therapy: yes   Rehab potential is good due to positive factors age, pain level, activity tolerance, healthy lifestyle and negative factors not apparent at this time    Predicted patient presentation: stable and/or uncomplicated characteristics   Plan of care to increase strength/stability, increase range of motion, decrease pain, increase scar tissue mobility, improve balance/proprioception, improve quality of gait, improve activities of daily living and instrumental activites of daily living, improve body mechanics to address functional limitations listed above.    Goals:  To be obtained by end of this plan of care:  1. Patient independent with modified and progressed home exercise program.  2. Patient will decrease involved knee pain/symptoms to 0/10  to aid in community ambulation for activities of independent living and normalization of gait for independent living.   3. Patient will increase involved knee active range of motion to at least 135° to aid in normalization of gait for independent living, completing transfers including low and soft surfaces and squatting for lifting for household independent living.   4. Patient will increase involved knee strength to 5/5 to aid in stair ambulation, returning to community activities and age appropriate activities.  5. Lower Extremity Functional Scale: Patient will complete form to reflect an improved score from initial score of 23 to greater than or equal to 64 (0=extreme difficulty; 80=no difficulty) to indicate pt reported improvement in function/disability/impairment (minimal detectable change: 9 points).     PLAN   Frequency/Duration: 2 times per week for 6 weeks with tapering as the patient  progresses  Skilled training and instruction for the following interventions:  Activities of Daily Living/Self Care (70133)  Gait Training (66550)  Manual Therapy (64937)  Therapeutic Activity (68788)  Therapeutic Exercise (07035)  Electrical Stimulation (30327//76353)   Aquatic Therapy (06343)    The plan of care and goals were established with the patient who concurs.  Patient has been given attendance policy at time of initial evaluation.    Patient Education:  Who will be receiving education: patient  Are they ready to learn: yes  Preferred learning style: written, verbal, demonstration  Barriers to learning: no barriers apparent at this time   Result of initial outlined education: Verbalizes understanding    THERAPY DAILY BILLING   Primary Insurance: [employer]  Secondary Insurance: N/A    Evaluation Procedures:  Physical Therapy Evaluation: Low Complexity    Timed Procedures:  Therapeutic Exercise, 15 minutes    Untimed Procedures:  No untimed codes were used on this date of service    Total Treatment Time: 55 minutes

## 2021-09-17 NOTE — TELEPHONE ENCOUNTER
Called and left vm to patient that she does not need to take the antibiotics but advised her to  prescription in case symptoms return.

## 2021-12-20 ENCOUNTER — OFFICE VISIT (OUTPATIENT)
Dept: OPHTHALMOLOGY | Facility: CLINIC | Age: 66
End: 2021-12-20
Attending: OPHTHALMOLOGY
Payer: COMMERCIAL

## 2021-12-20 DIAGNOSIS — H40.033 ANATOMICAL NARROW ANGLE BORDERLINE GLAUCOMA OF BOTH EYES: Primary | ICD-10-CM

## 2021-12-20 DIAGNOSIS — H52.4 PRESBYOPIA: ICD-10-CM

## 2021-12-20 DIAGNOSIS — H52.203 HYPEROPIC ASTIGMATISM OF BOTH EYES: ICD-10-CM

## 2021-12-20 DIAGNOSIS — H25.813 COMBINED FORM OF AGE-RELATED CATARACT, BOTH EYES: ICD-10-CM

## 2021-12-20 PROCEDURE — 92015 DETERMINE REFRACTIVE STATE: CPT

## 2021-12-20 PROCEDURE — 92014 COMPRE OPH EXAM EST PT 1/>: CPT | Performed by: OPHTHALMOLOGY

## 2021-12-20 PROCEDURE — G0463 HOSPITAL OUTPT CLINIC VISIT: HCPCS

## 2021-12-20 ASSESSMENT — CONF VISUAL FIELD
OS_NORMAL: 1
OD_NORMAL: 1

## 2021-12-20 ASSESSMENT — REFRACTION_MANIFEST
OS_ADD: +2.50
OD_CYLINDER: +0.75
OS_CYLINDER: +1.00
OS_SPHERE: +2.25
OD_AXIS: 005
OS_AXIS: 025
OD_SPHERE: +4.50
OD_ADD: +2.50

## 2021-12-20 ASSESSMENT — REFRACTION_WEARINGRX
OD_SPHERE: +4.75
OD_AXIS: 012
OS_CYLINDER: +1.25
OS_ADD: +2.50
OD_CYLINDER: +0.50
OS_SPHERE: +2.00
OD_ADD: +2.50
OS_AXIS: 024

## 2021-12-20 ASSESSMENT — TONOMETRY
OD_IOP_MMHG: 18
IOP_METHOD: TONOPEN
OS_IOP_MMHG: 19

## 2021-12-20 ASSESSMENT — VISUAL ACUITY
OD_CC: 20/20
CORRECTION_TYPE: GLASSES
OS_CC: 20/20
OD_CC+: -2
METHOD: SNELLEN - LINEAR
OS_CC+: -1

## 2021-12-20 ASSESSMENT — SLIT LAMP EXAM - LIDS
COMMENTS: NORMAL
COMMENTS: NORMAL

## 2021-12-20 ASSESSMENT — CUP TO DISC RATIO: OD_RATIO: 0.5

## 2021-12-20 ASSESSMENT — EXTERNAL EXAM - LEFT EYE: OS_EXAM: NORMAL

## 2021-12-20 ASSESSMENT — EXTERNAL EXAM - RIGHT EYE: OD_EXAM: NORMAL

## 2021-12-20 NOTE — NURSING NOTE
Chief Complaints and History of Present Illnesses   Patient presents with     Annual Eye Exam     Chief Complaint(s) and History of Present Illness(es)     Annual Eye Exam     Laterality: both eyes    Course: stable    Associated symptoms: Negative for eye pain, headache, floaters and flashes              Comments     Pt here for an annual eye exam. She has hx of anatomically narrow angels each eye and is s/p LPI both eyes. Pt notes pressure left eye intermittent followed by blurred vision. Pt states vision is overall stable since last exam.  Pt using:  ATs PRN each eye   CLIFFORD MONTAÑO 7:56 AM December 20, 2021

## 2021-12-20 NOTE — PROGRESS NOTES
HPI     Annual Eye Exam     In both eyes.  Since onset it is stable.  Associated symptoms include Negative for eye pain, headache, floaters and flashes.              Comments     Pt here for an annual eye exam. She has hx of anatomically narrow angels each eye and is s/p LPI both eyes. Pt notes pressure left eye intermittent followed by blurred vision. Pt states vision is overall stable since last exam.  Pt using:  ATs PRN each eye   CLIFFORD MONTAÑO 7:56 AM December 20, 2021               Last edited by Emma Glover COA on 12/20/2021  7:56 AM. (History)        HPI:  Soco Ferguson is a 66 year old female with a history of narrow angles s/p LPI both eyes here for yearly follow-up. She has noted intermittent episodes of left eye blurred vision. The blurred vision occurs overnight. No headache. Otherwise, her vision has been good and stable in both eyes. She is using artificial tears as needed for eye dryness. No redness or discharge. No flashes/floaters.    Pertinent Medical History:    Blastomycosis    Hyperparathyroidism    Ulcerative colitis    Leiomyoma of uterus    Ocular History:    Migraine with Aura    Lattice Degeneration both eyes    Assessment & Plan      (H40.033) Anatomical narrow angle borderline glaucoma of both eyes   Comment: In setting of high hyperopia and mild cataracts both eyes. Now s/p LPI both eyes with good appearance. Normal IOPs at 18/19 today with good neuroretinal rim. Episodes of blurred vision when waking at night only - does not occur in the morning on waking or during the day, even in times of low light. Does not sound consistent to me with episodes of angle closure, but we did discuss that if episodes become more frequent or associated with increased pain/headache, she should be re-evaluated. Discussed that next step for treatment would likely be cataract removal. She is not interested in surgical intervention at this time unless absolutely necessary because she will  be on Medicare starting next year, and would prefer to wait until then.   Plan: Monitor for now. Lubricating drops prior to going to bed at night to see if this helps with night blurring. Return precautions as discussed. Recommend OCT next visit, prefers to wait until on Medicare.    (H25.13) Nuclear sclerotic cataract of both eyes  Comment: Not visually significant, but will monitor for contribution to phacomorphic angle closure as above.   Plan: Observe    (H52.203) Hyperopic astigmatism of both eyes  (H52.4) Presbyopia  Comment: Good vision with current glasses, minimal change  Plan: Given updated glasses Rx, optional to fill.     Return in about 1 year (around 12/20/2022) for applanation IOP, dilation, nerve OCT OU, or sooner as needed.       Teaching statement:  Complete documentation of historical and exam elements from today's encounter can be found in the full encounter summary report (not reduplicated in this progress note). I personally obtained the chief complaint(s) and history of present illness.  I confirmed and edited as necessary the review of systems, past medical/surgical history, family history, social history, and examination findings as documented by others; and I examined the patient myself. I personally reviewed the relevant tests, images, and reports as documented above.     I formulated and edited as necessary the assessment and plan and discussed the findings and management plan with the patient and family.    Tabby Skinner MD  Comprehensive Ophthalmology & Ocular Pathology  Department of Ophthalmology and Visual Neurosciences  [email or web address]  Pager [phone]

## 2022-01-12 ENCOUNTER — OFFICE VISIT (OUTPATIENT)
Dept: INTERNAL MEDICINE | Facility: CLINIC | Age: 67
End: 2022-01-12
Payer: COMMERCIAL

## 2022-01-12 VITALS
SYSTOLIC BLOOD PRESSURE: 130 MMHG | OXYGEN SATURATION: 98 % | HEART RATE: 78 BPM | HEIGHT: 62 IN | WEIGHT: 175 LBS | DIASTOLIC BLOOD PRESSURE: 76 MMHG | BODY MASS INDEX: 32.2 KG/M2

## 2022-01-12 DIAGNOSIS — K51.919 ULCERATIVE COLITIS WITH COMPLICATION, UNSPECIFIED LOCATION (H): ICD-10-CM

## 2022-01-12 DIAGNOSIS — M81.0 SENILE OSTEOPOROSIS: ICD-10-CM

## 2022-01-12 DIAGNOSIS — Z00.00 ANNUAL PHYSICAL EXAM: Primary | ICD-10-CM

## 2022-01-12 DIAGNOSIS — L98.9 SKIN LESION: ICD-10-CM

## 2022-01-12 DIAGNOSIS — E78.2 MIXED HYPERLIPIDEMIA: ICD-10-CM

## 2022-01-12 DIAGNOSIS — Z12.11 COLON CANCER SCREENING: ICD-10-CM

## 2022-01-12 DIAGNOSIS — E21.3 HYPERPARATHYROIDISM (H): ICD-10-CM

## 2022-01-12 LAB
ALBUMIN SERPL-MCNC: 4 G/DL (ref 3.5–5)
ALP SERPL-CCNC: 75 U/L (ref 45–120)
ALT SERPL W P-5'-P-CCNC: 21 U/L (ref 0–45)
ANION GAP SERPL CALCULATED.3IONS-SCNC: 13 MMOL/L (ref 5–18)
AST SERPL W P-5'-P-CCNC: 19 U/L (ref 0–40)
BILIRUB SERPL-MCNC: 0.5 MG/DL (ref 0–1)
BUN SERPL-MCNC: 26 MG/DL (ref 8–22)
CALCIUM SERPL-MCNC: 9.4 MG/DL (ref 8.5–10.5)
CHLORIDE BLD-SCNC: 106 MMOL/L (ref 98–107)
CHOLEST SERPL-MCNC: 203 MG/DL
CO2 SERPL-SCNC: 24 MMOL/L (ref 22–31)
CREAT SERPL-MCNC: 0.8 MG/DL (ref 0.6–1.1)
ERYTHROCYTE [DISTWIDTH] IN BLOOD BY AUTOMATED COUNT: 12.7 % (ref 10–15)
FASTING STATUS PATIENT QL REPORTED: ABNORMAL
GFR SERPL CREATININE-BSD FRML MDRD: 81 ML/MIN/1.73M2
GLUCOSE BLD-MCNC: 95 MG/DL (ref 70–125)
HCT VFR BLD AUTO: 42.1 % (ref 35–47)
HDLC SERPL-MCNC: 49 MG/DL
HGB BLD-MCNC: 13.7 G/DL (ref 11.7–15.7)
LDLC SERPL CALC-MCNC: 121 MG/DL
MCH RBC QN AUTO: 29.3 PG (ref 26.5–33)
MCHC RBC AUTO-ENTMCNC: 32.5 G/DL (ref 31.5–36.5)
MCV RBC AUTO: 90 FL (ref 78–100)
PLATELET # BLD AUTO: 234 10E3/UL (ref 150–450)
POTASSIUM BLD-SCNC: 4.1 MMOL/L (ref 3.5–5)
PROT SERPL-MCNC: 7 G/DL (ref 6–8)
PTH-INTACT SERPL-MCNC: 62 PG/ML (ref 10–86)
RBC # BLD AUTO: 4.68 10E6/UL (ref 3.8–5.2)
SODIUM SERPL-SCNC: 143 MMOL/L (ref 136–145)
TRIGL SERPL-MCNC: 163 MG/DL
WBC # BLD AUTO: 7.1 10E3/UL (ref 4–11)

## 2022-01-12 PROCEDURE — 96372 THER/PROPH/DIAG INJ SC/IM: CPT | Performed by: PHARMACIST

## 2022-01-12 PROCEDURE — 90670 PCV13 VACCINE IM: CPT | Performed by: INTERNAL MEDICINE

## 2022-01-12 PROCEDURE — 80053 COMPREHEN METABOLIC PANEL: CPT | Performed by: INTERNAL MEDICINE

## 2022-01-12 PROCEDURE — 99397 PER PM REEVAL EST PAT 65+ YR: CPT | Mod: 25 | Performed by: INTERNAL MEDICINE

## 2022-01-12 PROCEDURE — 82306 VITAMIN D 25 HYDROXY: CPT | Performed by: INTERNAL MEDICINE

## 2022-01-12 PROCEDURE — 85027 COMPLETE CBC AUTOMATED: CPT | Performed by: INTERNAL MEDICINE

## 2022-01-12 PROCEDURE — 90471 IMMUNIZATION ADMIN: CPT | Performed by: INTERNAL MEDICINE

## 2022-01-12 PROCEDURE — 36415 COLL VENOUS BLD VENIPUNCTURE: CPT | Performed by: INTERNAL MEDICINE

## 2022-01-12 PROCEDURE — 83970 ASSAY OF PARATHORMONE: CPT | Performed by: INTERNAL MEDICINE

## 2022-01-12 PROCEDURE — 80061 LIPID PANEL: CPT | Performed by: INTERNAL MEDICINE

## 2022-01-12 RX ORDER — FLUTICASONE PROPIONATE 50 MCG
1 SPRAY, SUSPENSION (ML) NASAL DAILY
COMMUNITY
End: 2022-07-28

## 2022-01-12 RX ORDER — ATORVASTATIN CALCIUM 40 MG/1
40 TABLET, FILM COATED ORAL DAILY
Qty: 90 TABLET | Refills: 3 | Status: SHIPPED | OUTPATIENT
Start: 2022-01-12 | End: 2023-02-20

## 2022-01-12 ASSESSMENT — MIFFLIN-ST. JEOR: SCORE: 1284.1

## 2022-01-12 ASSESSMENT — ACTIVITIES OF DAILY LIVING (ADL): CURRENT_FUNCTION: NO ASSISTANCE NEEDED

## 2022-01-12 NOTE — PATIENT INSTRUCTIONS
DXA in July 2022.  Mammogram -June 2022.  Colonoscopy due now. Please check with your gastroenterologist.  Atorvastatin at bedtime.      PCV 13 today. Labs today.    Prolia 17th today.  Prolia 18th in 6 months with me.    DXA in 7/2022 .   Phone number to schedule [phone].    Daily calcium need is 3308-3889 mg a day from the diet and supplements.  Calcium citrate is easier to digest.  Vitamin D 2000 IU daily recommended.      Patient Education   Personalized Prevention Plan  You are due for the preventive services outlined below.  Your care team is available to assist you in scheduling these services.  If you have already completed any of these items, please share that information with your care team to update in your medical record.  Health Maintenance Due   Topic Date Due     ANNUAL REVIEW OF HM ORDERS  Never done     Discuss Advance Care Planning  Never done     HIV Screening  Never done     FALL RISK ASSESSMENT  Never done     Pneumococcal Vaccine (1 of 1 - PPSV23) 07/28/2021     Colorectal Cancer Screening  12/30/2021       Exercise for a Healthier Heart  You may wonder how you can improve the health of your heart. If you re thinking about exercise, you re on the right track. You don t need to become an athlete. But you do need a certain amount of brisk exercise to help strengthen your heart. If you have been diagnosed with a heart condition, your healthcare provider may advise exercise to help stabilize your condition. To help make exercise a habit, choose safe, fun activities.      Exercise with a friend. When activity is fun, you're more likely to stick with it.   Before you start  Check with your healthcare provider before starting an exercise program. This is especially important if you have not been active for a while. It's also important if you have a long-term (chronic) health problem such as heart disease, diabetes, or obesity. Or if you are at high risk for having these problems.   Why  exercise?  Exercising regularly offers many healthy rewards. It can help you do all of the following:     Improve your blood cholesterol level to help prevent further heart trouble    Lower your blood pressure to help prevent a stroke or heart attack    Control diabetes, or reduce your risk of getting this disease    Improve your heart and lung function    Reach and stay at a healthy weight    Make your muscles stronger so you can stay active    Prevent falls and fractures by slowing the loss of bone mass (osteoporosis)    Manage stress better    Reduce your blood pressure    Improve your sense of self and your body image  Exercise tips      Ease into your routine. Set small goals. Then build on them. If you are not sure what your activity level should be, talk with your healthcare provider first before starting an exercise routine.    Exercise on most days. Aim for a total of 150 minutes (2 hours and 30 minutes) or more of moderate-intensity aerobic activity each week. Or 75 minutes (1 hour and 15 minutes) or more of vigorous-intensity aerobic activity each week. Or try for a combination of both. Moderate activity means that you breathe heavier and your heart rate increases but you can still talk. Think about doing 40 minutes of moderate exercise, 3 to 4 times a week. For best results, activity should last for about 40 minutes to lower blood pressure and cholesterol. It's OK to work up to the 40-minute period over time. Examples of moderate-intensity activity are walking 1 mile in 15 minutes. Or doing 30 to 45 minutes of yard work.    Step up your daily activity level.  Along with your exercise program, try being more active the whole day. Walk instead of drive. Or park further away so that you take more steps each day. Do more household tasks or yard work. You may not be able to meet the advised mount of physical activity. But doing some moderate- or vigorous-intensity aerobic activity can help reduce your risk  for heart disease. Your healthcare provider can help you figure out what is best for you.    Choose 1 or more activities you enjoy.  Walking is one of the easiest things you can do. You can also try swimming, riding a bike, dancing, or taking an exercise class.    When to call your healthcare provider  Call your healthcare provider if you have any of these:     Chest pain or feel dizzy or lightheaded    Burning, tightness, pressure, or heaviness in your chest, neck, shoulders, back, or arms    Abnormal shortness of breath    More joint or muscle pain    A very fast or irregular heartbeat (palpitations)  [employer] last reviewed this educational content on 7/1/2019 2000-2021 [employer]. All rights reserved. This information is not intended as a substitute for professional medical care. Always follow your healthcare professional's instructions.          Understanding USDA MyPlate  The USDA has guidelines to help you make healthy food choices. These are called MyPlate. MyPlate shows the food groups that make up healthy meals using the image of a place setting. Before you eat, think about the healthiest choices for what to put on your plate or in your cup or bowl. To learn more about building a healthy plate, visit www.choosemyplate.gov.    The food groups    Fruits. Any fruit or 100% fruit juice counts as part of the Fruit Group. Fruits may be fresh, canned, frozen, or dried, and may be whole, cut-up, or pureed. Make 1/2 of your plate fruits and vegetables.    Vegetables. Any vegetable or 100% vegetable juice counts as a member of the Vegetable Group. Vegetables may be fresh, frozen, canned, or dried. They can be served raw or cooked and may be whole, cut-up, or mashed. Make 1/2 of your plate fruits and vegetables.    Grains. All foods made from grains are part of the Grains Group. These include wheat, rice, oats, cornmeal, and barley. Grains are often used to make foods such as bread, pasta, oatmeal,  cereal, tortillas, and grits. Grains should be no more than 1/4 of your plate. At least half of your grains should be whole grains.    Protein. This group includes meat, poultry, seafood, beans and peas, eggs, processed soy products (such as tofu), nuts (including nut butters), and seeds. Make protein choices no more than 1/4 of your plate. Meat and poultry choices should be lean or low fat.    Dairy. The Dairy Group includes all fluid milk products and foods made from milk that contain calcium, such as yogurt and cheese. (Foods that have little calcium, such as cream, butter, and cream cheese, are not part of this group.) Most dairy choices should be low-fat or fat-free.    Oils. Oils aren't a food group, but they do contain essential nutrients. However it's important to watch your intake of oils. These are fats that are liquid at room temperature. They include canola, corn, olive, soybean, vegetable, and sunflower oil. Foods that are mainly oil include mayonnaise, certain salad dressings, and soft margarines. You likely already get your daily oil allowance from the foods you eat.  Things to limit  Eating healthy also means limiting these things in your diet:       Salt (sodium). Many processed foods have a lot of sodium. To keep sodium intake down, eat fresh vegetables, meats, poultry, and seafood when possible. Purchase low-sodium, reduced-sodium, or no-salt-added food products at the store. And don't add salt to your meals at home. Instead, season them with herbs and spices such as dill, oregano, cumin, and paprika. Or try adding flavor with lemon or lime zest and juice.    Saturated fat. Saturated fats are most often found in animal products such as beef, pork, and chicken. They are often solid at room temperature, such as butter. To reduce your saturated fat intake, choose leaner cuts of meat and poultry. And try healthier cooking methods such as grilling, broiling, roasting, or baking. For a simple lower-fat  coreyap, use plain nonfat yogurt instead of mayonnaise when making potato salad or macaroni salad.    Added sugars. These are sugars added to foods. They are in foods such as ice cream, candy, soda, fruit drinks, sports drinks, energy drinks, cookies, pastries, jams, and syrups. Cut down on added sugars by sharing sweet treats with a family member or friend. You can also choose fruit for dessert, and drink water or other unsweetened beverages.     [employer] last reviewed this educational content on 6/1/2020 2000-2021 [employer]. All rights reserved. This information is not intended as a substitute for professional medical care. Always follow your healthcare professional's instructions.

## 2022-01-12 NOTE — PROGRESS NOTES
"SUBJECTIVE:   Soco Ferguson is a 66 year old female who presents for Preventive Visit.      Patient has been advised of split billing requirements and indicates understanding: Yes   Are you in the first 12 months of your Medicare coverage?  Patient is still working, but mentioned that has Medicare A. I think she is on commercial insurance but not sure.    Healthy Habits:     In general, how would you rate your overall health?  Good    Frequency of exercise:  None    Do you usually eat at least 4 servings of fruit and vegetables a day, include whole grains    & fiber and avoid regularly eating high fat or \"junk\" foods?  No    Taking medications regularly:  Yes    Medication side effects:  Not applicable    Ability to successfully perform activities of daily living:  No assistance needed    Home Safety:  No safety concerns identified    Hearing Impairment:  No hearing concerns    In the past 6 months, have you been bothered by leaking of urine?  No    In general, how would you rate your overall mental or emotional health?  Excellent      PHQ-2 Total Score: 2    Additional concerns today:  No    Do you feel safe in your environment? Yes    Have you ever done Advance Care Planning? (For example, a Health Directive, POLST, or a discussion with a medical provider or your loved ones about your wishes): No, advance care planning information given to patient to review.  Advanced care planning was discussed at today's visit.       Fall risk  Fallen 2 or more times in the past year?: (P) No  Any fall with injury in the past year?: (P) No    Cognitive Screening   1) Repeat 3 items (Leader, Season, Table)    2) Clock draw: NORMAL  3) 3 item recall: Recalls 3 objects      Mini-CogTM Copyright JENNIFER Christina. Licensed by the author for use in [facility]; reprinted with permission (angel@.[facility]). All rights reserved.      Do you have sleep apnea, excessive snoring or daytime drowsiness?: no    Reviewed and updated as " needed this visit by clinical staff  Tobacco  Allergies  Meds             Reviewed and updated as needed this visit by Provider               Social History     Tobacco Use     Smoking status: Never Smoker     Smokeless tobacco: Never Used     Tobacco comment: in HS and 20's socail only, never pack a day smoker   Substance Use Topics     Alcohol use: Yes     Alcohol/week: 1.7 standard drinks     If you drink alcohol do you typically have >3 drinks per day or >7 drinks per week? No    Alcohol Use 1/12/2022   Prescreen: >3 drinks/day or >7 drinks/week? No               Current providers sharing in care for this patient include:   Patient Care Team:  Jorge Blanton MD as PCP - General  Janine Ivy Chi, OD as MD (Optometry)  Tabby Skinner MD as MD (Ophthalmology)  Tabby Skinner MD as Assigned Surgical Provider  Jorge Blanton MD as Assigned PCP  Haylee Tavares MD as Assigned Neuroscience Provider  Santo Esquivel DO as Assigned Heart and Vascular Provider    The following health maintenance items are reviewed in Epic and correct as of today:  Health Maintenance Due   Topic Date Due     ANNUAL REVIEW OF HM ORDERS  Never done     ADVANCE CARE PLANNING  Never done     HIV SCREENING  Never done     MEDICARE ANNUAL WELLNESS VISIT  Never done     FALL RISK ASSESSMENT  Never done     Pneumococcal Vaccine: 65+ Years (1 of 1 - PPSV23) 07/28/2021     COLORECTAL CANCER SCREENING  12/30/2021           FHS-7:   Breast CA Risk Assessment (FHS-7) 8/16/2021 1/12/2022   Did any of your first-degree relatives have breast or ovarian cancer? No Unknown   Did any of your relatives have bilateral breast cancer? No Unknown   Did any man in your family have breast cancer? No No   Did any woman in your family have breast and ovarian cancer? No No   Did any woman in your family have breast cancer before age 50 y? No No   Do you have 2 or more relatives with breast and/or ovarian cancer? No No   Do you have 2 or more  "relatives with breast and/or bowel cancer? No No         Pertinent mammograms are reviewed under the imaging tab.    Review of Systems  CONSTITUTIONAL: NEGATIVE for fever, chills, change in weight  INTEGUMENTARY/SKIN: NEGATIVE for worrisome rashes, moles or lesions  EYES: NEGATIVE for vision changes or irritation  ENT/MOUTH: NEGATIVE for ear, mouth and throat problems  RESP: NEGATIVE for significant cough or SOB  BREAST: NEGATIVE for masses, tenderness or discharge  CV: NEGATIVE for chest pain, palpitations or peripheral edema  GI: NEGATIVE for nausea, abdominal pain, heartburn, or change in bowel habits  : NEGATIVE for frequency, dysuria, or hematuria  MUSCULOSKELETAL: NEGATIVE for significant arthralgias or myalgia  NEURO: NEGATIVE for weakness, dizziness or paresthesias  ENDOCRINE: NEGATIVE for temperature intolerance, skin/hair changes  HEME: NEGATIVE for bleeding problems  PSYCHIATRIC: NEGATIVE for changes in mood or affect    OBJECTIVE:   /76 (BP Location: Right arm, Patient Position: Sitting)   Pulse 78   Ht 1.562 m (5' 1.5\")   Wt 79.4 kg (175 lb)   SpO2 98%   BMI 32.53 kg/m   Estimated body mass index is 32.53 kg/m  as calculated from the following:    Height as of this encounter: 1.562 m (5' 1.5\").    Weight as of this encounter: 79.4 kg (175 lb).  Physical Exam  General Appearance: Alert, cooperative, no distress, appears stated age.  Head: Normocephalic, without obvious abnormality, atraumatic  Eyes: PERRL, conjunctiva/corneas clear, EOM's intact  Ears: Normal TM's and external ear canals, both ears  Nose: Nares normal, septum midline,mucosa normal, no drainage  Throat: Lips, mucosa, and tongue normal; teeth and gums normal  Neck: Supple, symmetrical, trachea midline, no adenopathy;  thyroid: not enlarged, symmetric, no tenderness/mass/nodules; no carotid bruit or JVD  Back: Symmetric, no curvature, ROM normal, no CVA tenderness.  Lungs: Clear to auscultation bilaterally, respirations " "unlabored.  Breasts: No breast masses, tenderness, asymmetry, or nipple discharge.  Heart: Regular rate and rhythm, S1 and S2 normal, no murmur, rub, or gallop.  Abdomen: Soft, non-tender, bowel sounds active all four quadrants,  no masses, no organomegaly.  Extremities: Extremities normal, atraumatic, no cyanosis or edema.  Skin: Skin color, texture, turgor normal, no rashes. She has multiple lesions on her chest and arms and upper legs, possibly sun exposure related and seborrheic keratosis.  Lymph nodes: Cervical, supraclavicular, and axillary nodes normal.  Neurologic: No focal neurological findings.          ASSESSMENT / PLAN:   (Z00.00) Annual physical exam  (primary encounter diagnosis)      (M81.0) Senile osteoporosis  Comment: She was treated with Prolia 7837-1962, Forteo 6420-2362 and 9/2019-12/2020. She stopped Forteo 12/2020, ssince completed 2 years of treatment. On Prolia 1/2021, 7/2021.  Prolia 17 today.  After DXA scan in July this year, we will decide if we can switch to reclast from Prolia for 1-2 years, before \"drug holiday\".  .The following steps were completed to comply with the REMS program for Prolia:    Reviewed information in the Medication Guide and Patient Counseling Chart, including the serious risks of Prolia  and the symptoms of each risk.    Advised patient to seek prompt medical attention if they have signs or symptoms of any of the serious risks.    Provided each patient a copy of the Medication Guide and Patient Brochure  Plan: Vitamin D Deficiency, DX Hip/Pelvis/Spine            (K51.919) Ulcerative colitis with complication, unspecified location (H)  Comment: stable, asymptomatic,due for colonoscopy and GI follow-up.  Plan: Adult Gastro Ref - Procedure Only, CBC with         platelets, Comprehensive metabolic panel            (E21.3) Hyperparathyroidism (H)  Comment: PTH today.  Plan: Comprehensive metabolic panel            (E78.2) Mixed hyperlipidemia  Comment: she is forgetting " "Lipitor very often, needs refill.  Plan: Lipid panel reflex to direct LDL Fasting,         atorvastatin (LIPITOR) 40 MG tablet            (Z12.11) Colon cancer screening  Comment:   Plan: Adult Gastro Ref - Procedure Only            (L98.9) Skin lesion  Comment: see exam part  Plan: Adult Dermatology Referral              Patient has been advised of split billing requirements and indicates understanding: Yes  COUNSELING:  Reviewed preventive health counseling, as reflected in patient instructions       Regular exercise       Healthy diet/nutrition    Estimated body mass index is 32.53 kg/m  as calculated from the following:    Height as of this encounter: 1.562 m (5' 1.5\").    Weight as of this encounter: 79.4 kg (175 lb).        She reports that she has never smoked. She has never used smokeless tobacco.      Appropriate preventive services were discussed with this patient, including applicable screening as appropriate for cardiovascular disease, diabetes, osteopenia/osteoporosis, and glaucoma.  As appropriate for age/gender, discussed screening for colorectal cancer, prostate cancer, breast cancer, and cervical cancer. Checklist reviewing preventive services available has been given to the patient.    Reviewed patients plan of care and provided an AVS. The Basic Care Plan (routine screening as documented in Health Maintenance) for [place] meets the Care Plan requirement. This Care Plan has been established and reviewed with the Patient.    Counseling Resources:  ATP IV Guidelines  Pooled Cohorts Equation Calculator  Breast Cancer Risk Calculator  Breast Cancer: Medication to Reduce Risk  FRAX Risk Assessment  ICSI Preventive Guidelines  Dietary Guidelines for Americans, 2010  USDA's MyPlate  ASA Prophylaxis  Lung CA Screening    Jorge Blanton MD  [facility]    Identified Health Risks:  "

## 2022-01-13 PROBLEM — E21.3 HYPERPARATHYROIDISM (H): Status: RESOLVED | Noted: 2019-05-30 | Resolved: 2022-01-13

## 2022-01-13 LAB — DEPRECATED CALCIDIOL+CALCIFEROL SERPL-MC: 55 UG/L (ref 30–80)

## 2022-01-14 ENCOUNTER — TELEPHONE (OUTPATIENT)
Dept: INTERNAL MEDICINE | Facility: CLINIC | Age: 67
End: 2022-01-14
Payer: COMMERCIAL

## 2022-01-14 NOTE — TELEPHONE ENCOUNTER
----- Message from Jorge Blanton MD sent at 1/13/2022  9:34 PM CST -----  Call the pt. All lab results are good and she can continue same medications and supplements.

## 2022-03-15 ENCOUNTER — TRANSFERRED RECORDS (OUTPATIENT)
Dept: HEALTH INFORMATION MANAGEMENT | Facility: CLINIC | Age: 67
End: 2022-03-15
Payer: COMMERCIAL

## 2022-04-26 ENCOUNTER — TRANSFERRED RECORDS (OUTPATIENT)
Dept: HEALTH INFORMATION MANAGEMENT | Facility: CLINIC | Age: 67
End: 2022-04-26
Payer: COMMERCIAL

## 2022-06-10 ENCOUNTER — TRANSFERRED RECORDS (OUTPATIENT)
Dept: HEALTH INFORMATION MANAGEMENT | Facility: CLINIC | Age: 67
End: 2022-06-10
Payer: COMMERCIAL

## 2022-07-08 DIAGNOSIS — M81.0 SENILE OSTEOPOROSIS: ICD-10-CM

## 2022-07-08 DIAGNOSIS — Z92.29 PERSONAL HISTORY OF OTHER DRUG THERAPY: Primary | ICD-10-CM

## 2022-07-27 ENCOUNTER — TRANSFERRED RECORDS (OUTPATIENT)
Dept: HEALTH INFORMATION MANAGEMENT | Facility: CLINIC | Age: 67
End: 2022-07-27

## 2022-07-28 ENCOUNTER — ANCILLARY PROCEDURE (OUTPATIENT)
Dept: BONE DENSITY | Facility: CLINIC | Age: 67
End: 2022-07-28
Attending: INTERNAL MEDICINE
Payer: COMMERCIAL

## 2022-07-28 ENCOUNTER — OFFICE VISIT (OUTPATIENT)
Dept: INTERNAL MEDICINE | Facility: CLINIC | Age: 67
End: 2022-07-28

## 2022-07-28 VITALS
WEIGHT: 175.5 LBS | SYSTOLIC BLOOD PRESSURE: 139 MMHG | OXYGEN SATURATION: 97 % | DIASTOLIC BLOOD PRESSURE: 83 MMHG | HEART RATE: 58 BPM | BODY MASS INDEX: 32.62 KG/M2

## 2022-07-28 DIAGNOSIS — M81.0 SENILE OSTEOPOROSIS: ICD-10-CM

## 2022-07-28 DIAGNOSIS — K27.9 PEPTIC ULCER: ICD-10-CM

## 2022-07-28 DIAGNOSIS — K51.80 OTHER ULCERATIVE COLITIS WITHOUT COMPLICATION (H): ICD-10-CM

## 2022-07-28 DIAGNOSIS — M81.0 SENILE OSTEOPOROSIS: Primary | ICD-10-CM

## 2022-07-28 PROCEDURE — 96372 THER/PROPH/DIAG INJ SC/IM: CPT | Performed by: INTERNAL MEDICINE

## 2022-07-28 PROCEDURE — 99214 OFFICE O/P EST MOD 30 MIN: CPT | Mod: 25 | Performed by: INTERNAL MEDICINE

## 2022-07-28 PROCEDURE — 77080 DXA BONE DENSITY AXIAL: CPT | Mod: TC | Performed by: RADIOLOGY

## 2022-07-28 NOTE — PROGRESS NOTES
(M81.0) Senile osteoporosis  (primary encounter diagnosis)  Comment: She was treated with Prolia 5881-1918, Forteo 5764-3590 and 9/2019-12/2020. She stopped Forteo 12/2020, sunnce completed 2 years of treatment. On Prolia 1/2021, 7/2021.  Prolia 18 today.  DXA done today showed lowest T-score -1.9, FRAx moderate. We will stay on Prolia for few more years with the hope to get her T-score around -1.5 if possible.  Considering reported general safety on long treatment with Prolia, more than 10 years, I recommended continuing Prolia treatment. Risk of rebound fractures and decline in bone density is reported with Prolia discontinuation and even with switching to bisphosphonate.      (K27.9) Peptic ulcer  upper endoscopy which was completed Ay[facility] 10. This revealed LA grade a esophagitis a small hiatal hernia with mild pre-pyloric gastritis characterized by a few small erosions and erythema and normal appearing duodenum. Stomach biopsy revealed reactive gastropathy endoscopically erosive. Since last visit patient reports she continues to have some reflux symptoms several times per week. She is  taking omeprazole       (K51.80) Other ulcerative colitis without complication (H)  Past medical history is significant for ulcerative proctitis. She is only needing medications for this on an intermittent basis. She was initially diagnosed at age 22. Recent colonoscopy was largely unremarkable. Biopsy of the rectum did show some quiescent ulcerative proctitis. Patient denies any family history of gastrointestinal type cancers.    Patient was educated on safety of Prolia utilizing Patient Counseling Chart for Healthcare Providers, as outlined by the Prolia REMS progam.     Return in about 6 months (around 1/28/2023) for WTM and Prolia, Prolia, Routine preventive.    Patient Instructions   Prolia 18th today.  Prolia 19th in 6 months with Welcome to Medicare visit.    DXA in 2 year .   Phone number to schedule [phone].    Daily  calcium need is 5833-3685 mg a day from the diet and supplements.  Calcium citrate is easier to digest.  Vitamin D 2000 IU daily recommended.    Risk of rebound vertebral fractures is higher when Prolia suddenly stopped or dose was missed.           /83   Pulse 58   Wt 79.6 kg (175 lb 8 oz)   SpO2 97%   BMI 32.62 kg/m        Did you experience any problems with previous Prolia injection? no  Any medication change in the last 6 months? no  Did you take prednisone or other immunosupressant drugs in the last 6 months   (chemo, transplant, rheum, dermatology conditions)? no  Did you have any serious infection in the last 6 months?no  Any recent hospitalizations?no  Do you plan any dental work in the next 2-3 months?no  How much calcium do you take daily from the diet and supplements?1200 mg  How much vit D do you take daily? 2000 IU  Last DXA?done today,  Reviewed and discussed      Patient is here today for the 18th Prolia injection. Patient tolerated previous injections well.   We discussed calcium and vit D daily needs today.   I reviewed the lab results:  Component      Latest Ref Rng & Units 1/12/2022   Vitamin D Deficiency screening      30 - 80 ug/L 55     Component      Latest Ref Rng & Units 1/12/2022   Sodium      136 - 145 mmol/L 143   Potassium      3.5 - 5.0 mmol/L 4.1   Chloride      98 - 107 mmol/L 106   Carbon Dioxide      22 - 31 mmol/L 24   Anion Gap      5 - 18 mmol/L 13   Urea Nitrogen      8 - 22 mg/dL 26 (H)   Creatinine      0.60 - 1.10 mg/dL 0.80   Calcium      8.5 - 10.5 mg/dL 9.4   Glucose      70 - 125 mg/dL 95   Alkaline Phosphatase      45 - 120 U/L 75   AST      0 - 40 U/L 19   ALT      0 - 45 U/L 21   Protein Total      6.0 - 8.0 g/dL 7.0   Albumin      3.5 - 5.0 g/dL 4.0   Bilirubin Total      0.0 - 1.0 mg/dL 0.5   GFR Estimate      >60 mL/min/1.73m2 81         We discussed high risk of rebound vertebral fractures when Prolia suddenly stopped.    Next Prolia injection will be in  6 months.           This note has been dictated using voice recognition software. Any grammatical or context distortions are unintentional and inherent to the software      Patient Active Problem List   Diagnosis     Blastomycosis     Endometrial hyperplasia     Restless legs syndrome     Senile osteoporosis     Ulcerative colitis (H)     Leiomyoma of uterus, unspecified     Venous insufficiency of both lower extremities     Articular disc disorder of temporomandibular joint     Myofascial pain     Peptic ulcer       Current Outpatient Medications   Medication     atorvastatin (LIPITOR) 40 MG tablet     Calcium Citrate-Vitamin D 200-250 MG-UNIT TABS     cholecalciferol 50 MCG (2000 UT) tablet     omeprazole (PRILOSEC) 20 MG DR capsule     Current Facility-Administered Medications   Medication     apraclonidine (IOPIDINE) 1 % ophthalmic solution 1 drop     denosumab (PROLIA) injection 60 mg     prednisoLONE acetate (PRED FORTE) 1 % ophthalmic susp 1 drop

## 2022-07-28 NOTE — PATIENT INSTRUCTIONS
Prolia 18th today.  Prolia 19th in 6 months with Welcome to Medicare visit.    DXA in 2 year .   Phone number to schedule [phone].    Daily calcium need is 1153-8656 mg a day from the diet and supplements.  Calcium citrate is easier to digest.  Vitamin D 2000 IU daily recommended.    Risk of rebound vertebral fractures is higher when Prolia suddenly stopped or dose was missed.

## 2022-09-14 ENCOUNTER — HOSPITAL ENCOUNTER (OUTPATIENT)
Dept: MAMMOGRAPHY | Facility: CLINIC | Age: 67
Discharge: HOME | End: 2022-09-14
Attending: INTERNAL MEDICINE | Admitting: INTERNAL MEDICINE
Payer: COMMERCIAL

## 2022-09-14 DIAGNOSIS — Z12.31 SCREENING MAMMOGRAM FOR BREAST CANCER: ICD-10-CM

## 2022-09-14 PROCEDURE — 77067 SCR MAMMO BI INCL CAD: CPT

## 2022-10-13 ENCOUNTER — TRANSFERRED RECORDS (OUTPATIENT)
Dept: HEALTH INFORMATION MANAGEMENT | Facility: CLINIC | Age: 67
End: 2022-10-13

## 2023-01-03 ENCOUNTER — TRANSFERRED RECORDS (OUTPATIENT)
Dept: HEALTH INFORMATION MANAGEMENT | Facility: CLINIC | Age: 68
End: 2023-01-03
Payer: COMMERCIAL

## 2023-01-06 ENCOUNTER — OFFICE VISIT (OUTPATIENT)
Dept: OPHTHALMOLOGY | Facility: CLINIC | Age: 68
End: 2023-01-06
Attending: OPHTHALMOLOGY
Payer: COMMERCIAL

## 2023-01-06 DIAGNOSIS — H40.033 ANATOMICAL NARROW ANGLE BORDERLINE GLAUCOMA OF BOTH EYES: ICD-10-CM

## 2023-01-06 DIAGNOSIS — H25.813 COMBINED FORM OF AGE-RELATED CATARACT, BOTH EYES: Primary | ICD-10-CM

## 2023-01-06 PROCEDURE — 92133 CPTRZD OPH DX IMG PST SGM ON: CPT | Performed by: OPHTHALMOLOGY

## 2023-01-06 PROCEDURE — 92015 DETERMINE REFRACTIVE STATE: CPT

## 2023-01-06 PROCEDURE — G0463 HOSPITAL OUTPT CLINIC VISIT: HCPCS | Mod: 25

## 2023-01-06 PROCEDURE — 92025 CPTRIZED CORNEAL TOPOGRAPHY: CPT | Performed by: OPHTHALMOLOGY

## 2023-01-06 PROCEDURE — 92014 COMPRE OPH EXAM EST PT 1/>: CPT | Mod: GC | Performed by: OPHTHALMOLOGY

## 2023-01-06 PROCEDURE — 76519 ECHO EXAM OF EYE: CPT | Performed by: OPHTHALMOLOGY

## 2023-01-06 ASSESSMENT — REFRACTION_MANIFEST
OS_SPHERE: +1.75
OS_CYLINDER: +1.00
OD_AXIS: 001
OD_SPHERE: +4.50
OS_AXIS: 025
OD_ADD: +2.50
OS_ADD: +2.50
OD_CYLINDER: +1.25

## 2023-01-06 ASSESSMENT — VISUAL ACUITY
OD_CC: 20/25
OS_CC: 20/20
METHOD: SNELLEN - LINEAR
OD_CC+: -2
CORRECTION_TYPE: GLASSES

## 2023-01-06 ASSESSMENT — REFRACTION_WEARINGRX
OS_AXIS: 024
OD_ADD: +2.50
OS_ADD: +2.50
OS_CYLINDER: +1.25
OD_SPHERE: +4.75
OD_AXIS: 012
OD_CYLINDER: +0.50
OS_SPHERE: +2.00

## 2023-01-06 ASSESSMENT — CONF VISUAL FIELD
OS_SUPERIOR_TEMPORAL_RESTRICTION: 0
OD_NORMAL: 1
OS_INFERIOR_NASAL_RESTRICTION: 0
OD_SUPERIOR_TEMPORAL_RESTRICTION: 0
OD_INFERIOR_NASAL_RESTRICTION: 0
OS_SUPERIOR_NASAL_RESTRICTION: 0
OS_INFERIOR_TEMPORAL_RESTRICTION: 0
METHOD: COUNTING FINGERS
OD_INFERIOR_TEMPORAL_RESTRICTION: 0
OS_NORMAL: 1
OD_SUPERIOR_NASAL_RESTRICTION: 0

## 2023-01-06 ASSESSMENT — EXTERNAL EXAM - RIGHT EYE: OD_EXAM: NORMAL

## 2023-01-06 ASSESSMENT — TONOMETRY
OD_IOP_MMHG: 20
OS_IOP_MMHG: 18
IOP_METHOD: TONOPEN

## 2023-01-06 ASSESSMENT — SLIT LAMP EXAM - LIDS
COMMENTS: NORMAL
COMMENTS: NORMAL

## 2023-01-06 ASSESSMENT — CUP TO DISC RATIO
OS_RATIO: 0.6
OD_RATIO: 0.5

## 2023-01-06 ASSESSMENT — EXTERNAL EXAM - LEFT EYE: OS_EXAM: NORMAL

## 2023-01-06 NOTE — PROGRESS NOTES
Chief complaint     Chief Complaints and History of Present Illnesses   Patient presents with     Cataract       Referring Provider: Simon     HPI    Soco Ferguson 67 year old female who presents for annual examiantion. Last seen with Dr. 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 Reports That her vision is quite good. She notes some sensitivity to bright lights that has been chronic and longstanding. No blurring of vision and is overall happy with her vision. No flashes, floaters, curtains.       Past ocular history   Prior eye surgery/laser/Trauma: LPI OU  CTL wearer:no  Glasses : yes  Family Hx of eye disease:no    PMH     Past Medical History:   Diagnosis Date     Abdominal pain, unspecified site     Created by Conversion      Asymptomatic varicose veins     Created by Conversion      Blastomycosis     Created by Conversion      Calculus of kidney     Created by Conversion      Endometrial hyperplasia, unspecified     Created by Conversion      Hyperparathyroidism, unspecified (H)     Created by Conversion      Lattice degeneration      Leiomyoma of uterus, unspecified     Created by Conversion      Myalgia and myositis, unspecified     Created by Conversion      Nausea alone     Created by Conversion      Osteoporosis, unspecified     Created by Conversion      Other nonspecific finding on examination of urine     Created by Conversion      Pain in joint, site unspecified     Created by Conversion      Restless legs syndrome (RLS)     Created by Conversion      Senile osteoporosis     Created by Conversion      Ulcerative colitis, unspecified     Created by Conversion      Unspecified constipation     Created by Conversion      Unspecified sinusitis (chronic)     Created by Conversion      Unspecified symptom associated with female genital organs     Created by Conversion      Unspecified vitamin D deficiency     Created by Conversion         PS     Past Surgical History:   Procedure Laterality Date     KIDNEY STONE SURGERY  1999    removal     OTHER SURGICAL HISTORY Bilateral     venous closure     TUBAL LIGATION  1987     WISDOM TOOTH EXTRACTION  1980       Meds     Current Outpatient Medications   Medication     atorvastatin (LIPITOR) 40 MG tablet     Calcium Citrate-Vitamin D 200-250 MG-UNIT TABS     cholecalciferol 50 MCG (2000 UT) tablet     omeprazole (PRILOSEC) 20 MG DR capsule     Current Facility-Administered Medications   Medication     apraclonidine (IOPIDINE) 1 % ophthalmic solution 1 drop     denosumab (PROLIA) injection 60 mg     prednisoLONE acetate (PRED FORTE) 1 % ophthalmic susp 1 drop       Drops Currently Taking   Artificial tears.    Assessment/Plan   # Anatomic Narrow Angle  - S/P LPI each eye   - Discussed angle closure precautions  - OCT RNFL full, stable  - Monitor    # Combined age-related cataracts, both eyes  - 20/20 BCVA each eye, minimal change on repeat refraction  - Discussed presence and natural history of cataracts  - Discussed that if she has progressive angle narrowing CE/IOL may be recommended prior to visual significance  - Monitor for now  -prescription given today      Follow up:  1 year w/ V,T, D  at next visit, call if problems sooner to clinic.    Cecil Degroot MD  Fellow, Cornea, External Disease and Refractive Surgery  Department of Ophthalmology  [facility]    Attending Physician Attestation:  Complete documentation of historical and exam elements from today's encounter can be found in the full encounter summary report (not reduplicated in this progress note).  I personally obtained the chief complaint(s) and history of present illness.  I confirmed and edited as necessary the review of systems, past medical/surgical history, family history, social history, and examination findings as documented by others; and I examined the patient myself.  I personally reviewed the relevant tests,  images, and reports as documented above.  I formulated and edited as necessary the assessment and plan and discussed the findings and management plan with the patient and family. - Anaid Sloan MD

## 2023-01-06 NOTE — NURSING NOTE
Chief Complaints and History of Present Illnesses   Patient presents with     Cataract     Chief Complaint(s) and History of Present Illness(es)     Cataract            Laterality: both eyes          Comments    Pt states she has real dry eyes.   She is currently using systane prn in both eyes.   Pt does has trouble with real bright lights.  Pt does get some pain from bright lights.   Pt does not see flashes of lights in either eye.  Pt has stable floaters.     JAMIE GAVIRIA COA January 6, 2023 7:51 AM

## 2023-01-31 ENCOUNTER — OFFICE VISIT (OUTPATIENT)
Dept: INTERNAL MEDICINE | Facility: CLINIC | Age: 68
End: 2023-01-31
Payer: COMMERCIAL

## 2023-01-31 VITALS
HEIGHT: 62 IN | BODY MASS INDEX: 31.47 KG/M2 | TEMPERATURE: 97.2 F | SYSTOLIC BLOOD PRESSURE: 122 MMHG | HEART RATE: 73 BPM | OXYGEN SATURATION: 98 % | WEIGHT: 171 LBS | DIASTOLIC BLOOD PRESSURE: 88 MMHG

## 2023-01-31 DIAGNOSIS — Z13.89 SCREENING FOR BLOOD OR PROTEIN IN URINE: ICD-10-CM

## 2023-01-31 DIAGNOSIS — E21.3 HYPERPARATHYROIDISM (H): ICD-10-CM

## 2023-01-31 DIAGNOSIS — Z00.00 ENCOUNTER FOR MEDICARE ANNUAL WELLNESS EXAM: Primary | ICD-10-CM

## 2023-01-31 DIAGNOSIS — R73.01 IMPAIRED FASTING GLUCOSE: ICD-10-CM

## 2023-01-31 DIAGNOSIS — M81.0 SENILE OSTEOPOROSIS: ICD-10-CM

## 2023-01-31 DIAGNOSIS — K27.9 PEPTIC ULCER: ICD-10-CM

## 2023-01-31 DIAGNOSIS — R20.2 PARESTHESIA: ICD-10-CM

## 2023-01-31 DIAGNOSIS — K51.80 OTHER ULCERATIVE COLITIS WITHOUT COMPLICATION (H): ICD-10-CM

## 2023-01-31 DIAGNOSIS — E78.2 MIXED HYPERLIPIDEMIA: ICD-10-CM

## 2023-01-31 PROBLEM — N85.00 ENDOMETRIAL HYPERPLASIA: Status: RESOLVED | Noted: 2019-05-30 | Resolved: 2023-01-31

## 2023-01-31 PROBLEM — D25.9 LEIOMYOMA OF UTERUS, UNSPECIFIED: Status: RESOLVED | Noted: 2019-05-30 | Resolved: 2023-01-31

## 2023-01-31 PROBLEM — M79.18 MYOFASCIAL PAIN: Status: RESOLVED | Noted: 2018-02-14 | Resolved: 2023-01-31

## 2023-01-31 LAB
ALBUMIN SERPL BCG-MCNC: 4.4 G/DL (ref 3.5–5.2)
ALBUMIN UR-MCNC: NEGATIVE MG/DL
ALP SERPL-CCNC: 173 U/L (ref 35–104)
ALT SERPL W P-5'-P-CCNC: 35 U/L (ref 10–35)
ANION GAP SERPL CALCULATED.3IONS-SCNC: 13 MMOL/L (ref 7–15)
APPEARANCE UR: CLEAR
AST SERPL W P-5'-P-CCNC: 28 U/L (ref 10–35)
BACTERIA #/AREA URNS HPF: ABNORMAL /HPF
BILIRUB SERPL-MCNC: 0.4 MG/DL
BILIRUB UR QL STRIP: NEGATIVE
BUN SERPL-MCNC: 15.6 MG/DL (ref 8–23)
CALCIUM SERPL-MCNC: 10.1 MG/DL (ref 8.8–10.2)
CHLORIDE SERPL-SCNC: 106 MMOL/L (ref 98–107)
CHOLEST SERPL-MCNC: 150 MG/DL
COLOR UR AUTO: YELLOW
CREAT SERPL-MCNC: 0.76 MG/DL (ref 0.51–0.95)
DEPRECATED HCO3 PLAS-SCNC: 26 MMOL/L (ref 22–29)
ERYTHROCYTE [DISTWIDTH] IN BLOOD BY AUTOMATED COUNT: 12.5 % (ref 10–15)
FOLATE SERPL-MCNC: 11.8 NG/ML (ref 4.6–34.8)
GFR SERPL CREATININE-BSD FRML MDRD: 86 ML/MIN/1.73M2
GLUCOSE SERPL-MCNC: 112 MG/DL (ref 70–99)
GLUCOSE UR STRIP-MCNC: NEGATIVE MG/DL
HCT VFR BLD AUTO: 41.8 % (ref 35–47)
HDLC SERPL-MCNC: 48 MG/DL
HGB BLD-MCNC: 14 G/DL (ref 11.7–15.7)
HGB UR QL STRIP: NEGATIVE
KETONES UR STRIP-MCNC: NEGATIVE MG/DL
LDLC SERPL CALC-MCNC: 84 MG/DL
LEUKOCYTE ESTERASE UR QL STRIP: ABNORMAL
MAGNESIUM SERPL-MCNC: 2.5 MG/DL (ref 1.7–2.3)
MCH RBC QN AUTO: 29.7 PG (ref 26.5–33)
MCHC RBC AUTO-ENTMCNC: 33.5 G/DL (ref 31.5–36.5)
MCV RBC AUTO: 89 FL (ref 78–100)
NITRATE UR QL: POSITIVE
NONHDLC SERPL-MCNC: 102 MG/DL
PH UR STRIP: 6 [PH] (ref 5–8)
PLATELET # BLD AUTO: 306 10E3/UL (ref 150–450)
POTASSIUM SERPL-SCNC: 4.8 MMOL/L (ref 3.4–5.3)
PROT SERPL-MCNC: 7.4 G/DL (ref 6.4–8.3)
PTH-INTACT SERPL-MCNC: 41 PG/ML (ref 15–65)
RBC # BLD AUTO: 4.71 10E6/UL (ref 3.8–5.2)
RBC #/AREA URNS AUTO: ABNORMAL /HPF
SODIUM SERPL-SCNC: 145 MMOL/L (ref 136–145)
SP GR UR STRIP: 1.02 (ref 1–1.03)
SQUAMOUS #/AREA URNS AUTO: ABNORMAL /LPF
TRIGL SERPL-MCNC: 88 MG/DL
TSH SERPL DL<=0.005 MIU/L-ACNC: 0.61 UIU/ML (ref 0.3–4.2)
UROBILINOGEN UR STRIP-ACNC: 0.2 E.U./DL
VIT B12 SERPL-MCNC: 661 PG/ML (ref 232–1245)
WBC # BLD AUTO: 7.7 10E3/UL (ref 4–11)
WBC #/AREA URNS AUTO: ABNORMAL /HPF

## 2023-01-31 PROCEDURE — 80053 COMPREHEN METABOLIC PANEL: CPT | Performed by: INTERNAL MEDICINE

## 2023-01-31 PROCEDURE — 84443 ASSAY THYROID STIM HORMONE: CPT | Performed by: INTERNAL MEDICINE

## 2023-01-31 PROCEDURE — 90732 PPSV23 VACC 2 YRS+ SUBQ/IM: CPT | Performed by: INTERNAL MEDICINE

## 2023-01-31 PROCEDURE — 81001 URINALYSIS AUTO W/SCOPE: CPT | Performed by: INTERNAL MEDICINE

## 2023-01-31 PROCEDURE — 83735 ASSAY OF MAGNESIUM: CPT | Performed by: INTERNAL MEDICINE

## 2023-01-31 PROCEDURE — G0009 ADMIN PNEUMOCOCCAL VACCINE: HCPCS | Performed by: INTERNAL MEDICINE

## 2023-01-31 PROCEDURE — 82607 VITAMIN B-12: CPT | Performed by: INTERNAL MEDICINE

## 2023-01-31 PROCEDURE — 82306 VITAMIN D 25 HYDROXY: CPT | Performed by: INTERNAL MEDICINE

## 2023-01-31 PROCEDURE — 99213 OFFICE O/P EST LOW 20 MIN: CPT | Mod: 25 | Performed by: INTERNAL MEDICINE

## 2023-01-31 PROCEDURE — 96372 THER/PROPH/DIAG INJ SC/IM: CPT | Performed by: INTERNAL MEDICINE

## 2023-01-31 PROCEDURE — 83970 ASSAY OF PARATHORMONE: CPT | Performed by: INTERNAL MEDICINE

## 2023-01-31 PROCEDURE — 87086 URINE CULTURE/COLONY COUNT: CPT | Performed by: INTERNAL MEDICINE

## 2023-01-31 PROCEDURE — 80061 LIPID PANEL: CPT | Performed by: INTERNAL MEDICINE

## 2023-01-31 PROCEDURE — 83036 HEMOGLOBIN GLYCOSYLATED A1C: CPT | Performed by: INTERNAL MEDICINE

## 2023-01-31 PROCEDURE — 82746 ASSAY OF FOLIC ACID SERUM: CPT | Performed by: INTERNAL MEDICINE

## 2023-01-31 PROCEDURE — 85027 COMPLETE CBC AUTOMATED: CPT | Performed by: INTERNAL MEDICINE

## 2023-01-31 PROCEDURE — 36415 COLL VENOUS BLD VENIPUNCTURE: CPT | Performed by: INTERNAL MEDICINE

## 2023-01-31 PROCEDURE — G0402 INITIAL PREVENTIVE EXAM: HCPCS | Performed by: INTERNAL MEDICINE

## 2023-01-31 ASSESSMENT — ENCOUNTER SYMPTOMS
HEMATURIA: 0
NERVOUS/ANXIOUS: 0
ABDOMINAL PAIN: 0
EYE PAIN: 0
HEARTBURN: 0
FEVER: 0
PALPITATIONS: 0
NAUSEA: 0
CONSTIPATION: 0
DIARRHEA: 0
FREQUENCY: 0
CHILLS: 0
BREAST MASS: 0
HEMATOCHEZIA: 0
PARESTHESIAS: 0
WEAKNESS: 0
MYALGIAS: 0
DIZZINESS: 0
COUGH: 0
HEADACHES: 0
JOINT SWELLING: 0
DYSURIA: 0
ARTHRALGIAS: 0
SHORTNESS OF BREATH: 0
SORE THROAT: 0

## 2023-01-31 ASSESSMENT — ACTIVITIES OF DAILY LIVING (ADL): CURRENT_FUNCTION: NO ASSISTANCE NEEDED

## 2023-01-31 NOTE — PROGRESS NOTES
"SUBJECTIVE:   Soco is a 67 year old who presents for Preventive Visit.    Patient has been advised of split billing requirements and indicates understanding: Yes  Are you in the first 12 months of your Medicare coverage?  Yes,  Visual Acuity:  Right Eye 20/32   Left Eye: 20/32  Both Eyes 20/25    Healthy Habits:     In general, how would you rate your overall health?  Good    Frequency of exercise:  1 day/week    Duration of exercise:  N/A    Do you usually eat at least 4 servings of fruit and vegetables a day, include whole grains    & fiber and avoid regularly eating high fat or \"junk\" foods?  No    Taking medications regularly:  Yes    Medication side effects:  None    Ability to successfully perform activities of daily living:  No assistance needed    Home Safety:  No safety concerns identified    Hearing Impairment:  No hearing concerns    In the past 6 months, have you been bothered by leaking of urine?  No    In general, how would you rate your overall mental or emotional health?  Good      PHQ-2 Total Score: 0    Additional concerns today:  No      Have you ever done Advance Care Planning? (For example, a Health Directive, POLST, or a discussion with a medical provider or your loved ones about your wishes): No, advance care planning information given to patient to review.  Patient declined advance care planning discussion at this time.       Fall risk  Fallen 2 or more times in the past year?: No  Any fall with injury in the past year?: No    Cognitive Screening   1) Repeat 3 items (Leader, Season, Table)    2) Clock draw: NORMAL  3) 3 item recall: Recalls 3 objects  Results:     Mini-CogTM Copyright JENNIFER Christina. Licensed by the author for use in [facility]; reprinted with permission (angel@.[facility]). All rights reserved.      Do you have sleep apnea, excessive snoring or daytime drowsiness?: no    Reviewed and updated as needed this visit by clinical staff   Tobacco  Allergies  Meds          "     Reviewed and updated as needed this visit by Provider                 Social History     Tobacco Use     Smoking status: Never     Smokeless tobacco: Never     Tobacco comments:     in HS and 20's socail only, never pack a day smoker   Substance Use Topics     Alcohol use: Yes     Alcohol/week: 1.7 standard drinks     If you drink alcohol do you typically have >3 drinks per day or >7 drinks per week? No    Alcohol Use 1/31/2023   Prescreen: >3 drinks/day or >7 drinks/week? No   Prescreen: >3 drinks/day or >7 drinks/week? -               Current providers sharing in care for this patient include:   Patient Care Team:  Jorge Blanton MD as PCP - Janine Spain Chi, OD as MD (Optometry)  Tabby Skinner MD as MD (Ophthalmology)  Jorge Blanton MD as Assigned PCP  Anaid Sloan MD as Assigned Surgical Provider    The following health maintenance items are reviewed in Epic and correct as of today:  Health Maintenance   Topic Date Due     ANNUAL REVIEW OF HM ORDERS  Never done     MEDICARE ANNUAL WELLNESS VISIT  01/31/2024     FALL RISK ASSESSMENT  01/31/2024     MAMMO SCREENING  09/14/2024     LIPID  01/12/2027     ADVANCE CARE PLANNING  01/31/2028     DTAP/TDAP/TD IMMUNIZATION (5 - Td or Tdap) 09/29/2030     COLORECTAL CANCER SCREENING  03/15/2032     DEXA  07/28/2037     HEPATITIS C SCREENING  Completed     PHQ-2 (once per calendar year)  Completed     INFLUENZA VACCINE  Completed     Pneumococcal Vaccine: 65+ Years  Completed     ZOSTER IMMUNIZATION  Completed     COVID-19 Vaccine  Completed     IPV IMMUNIZATION  Aged Out     MENINGITIS IMMUNIZATION  Aged Out     PAP  Discontinued           Breast CA Risk Assessment (FHS-7) 1/12/2022 1/31/2023   Do you have a family history of breast, colon, or ovarian cancer? Yes No / Unknown           Pertinent mammograms are reviewed under the imaging tab.    Review of Systems   Constitutional: Negative for chills and fever.   HENT: Positive for congestion.  "Negative for ear pain, hearing loss and sore throat.    Eyes: Negative for pain and visual disturbance.   Respiratory: Negative for cough and shortness of breath.    Cardiovascular: Negative for chest pain, palpitations and peripheral edema.   Gastrointestinal: Negative for abdominal pain, constipation, diarrhea, heartburn, hematochezia and nausea.   Breasts:  Negative for tenderness, breast mass and discharge.   Genitourinary: Negative for dysuria, frequency, genital sores, hematuria, pelvic pain, urgency, vaginal bleeding and vaginal discharge.   Musculoskeletal: Negative for arthralgias, joint swelling and myalgias.   Skin: Negative for rash.   Neurological: Negative for dizziness, weakness, headaches and paresthesias.   Psychiatric/Behavioral: Negative for mood changes. The patient is not nervous/anxious.          OBJECTIVE:   /88 (BP Location: Right arm, Patient Position: Sitting)   Pulse 73   Temp 97.2  F (36.2  C)   Ht 1.562 m (5' 1.5\")   Wt 77.6 kg (171 lb)   SpO2 98%   BMI 31.79 kg/m   Estimated body mass index is 31.79 kg/m  as calculated from the following:    Height as of this encounter: 1.562 m (5' 1.5\").    Weight as of this encounter: 77.6 kg (171 lb).  Physical Exam  General Appearance: Alert, cooperative, no distress, appears stated age.  Head: Normocephalic, without obvious abnormality, atraumatic  Eyes: PERRL, conjunctiva/corneas clear, EOM's intact  Ears: Normal TM's and external ear canals, both ears  Nose: Nares normal, septum midline,mucosa normal, no drainage  Throat: Lips, mucosa, and tongue normal; teeth and gums normal  Neck: Supple, symmetrical, trachea midline, no adenopathy;  thyroid: not enlarged, symmetric, no tenderness/mass/nodules; no carotid bruit or JVD  Back: Symmetric, no curvature, ROM normal, no CVA tenderness.  Lungs: Clear to auscultation bilaterally, respirations unlabored.  Breasts: No breast masses, tenderness, asymmetry, or nipple discharge.  Heart: Regular " rate and rhythm, S1 and S2 normal, no murmur, rub, or gallop.  Abdomen: Soft, non-tender, bowel sounds active all four quadrants,  no masses, no organomegaly.  Extremities: Extremities normal, atraumatic, no cyanosis or edema.  Skin: Skin color, texture, turgor normal, no rashes or lesions.  Lymph nodes: Cervical, supraclavicular, and axillary nodes normal.  Neurologic: No focal neurological findings. Spurling negative. No cervical or lumbar radiculopathy.  External genitalia - no rash, redness, in the left pubic and large labia area where she describes tingling sensation.          ASSESSMENT / PLAN:   (Z00.00) Encounter for Medicare annual wellness exam  (primary encounter diagnosis)  Pneumo 23 today.    (E78.2) Mixed hyperlipidemia  Comment: on atorvastatin.  Plan: Comprehensive metabolic panel, Lipid panel         reflex to direct LDL Fasting, TSH with free T4         reflex            (K51.80) Other ulcerative colitis without complication (H), h/o peptic ulcer  Comment: seeing GYN, had colonoscopy in EGD in 2022. She is on omeprazole daily.  Plan: CBC with platelets, TSH with free T4 reflex            (M81.0) Senile osteoporosis  Comment: She was treated with Prolia 4149-2116, Forteo 0822-1542 and 9/2019-12/2020. She stopped Forteo 12/2020, ssince completed 2 years of treatment. On Prolia since Jan 2021. Prolia # 19 today.  DXA was done 7/2022.  Plan: Vitamin D Deficiency, TSH with free T4 reflex            (K27.9) Peptic ulcer  Comment: On omeprazole daily.      (Z13.89) Screening for blood or protein in urine  Comment:   Plan: UA reflex to Microscopic and Culture            (E21.3) Hyperparathyroidism (H)  Comment: PTH stable last year.  Plan: Parathyroid Hormone Intact            (R20.2) Paresthesia  Comment: Patient described tingling in the right upper arm, sometimes in fingers of both hands, and in the left crotch. She also has pain the right lateral hip that sometimes radiates down to the dorsum of the  foot, especially of she puts her weight on the right leg. None of these symptoms are persistent. Her exam is benign and I could not find the connection and explanation of all of these symptoms. Labs will be done today. Weight loss and core strengthening recommended. She will see Neurology.  Plan: Vitamin B12, Magnesium, Folate, Adult Neurology         Referral              Patient has been advised of split billing requirements and indicates understanding: Yes      COUNSELING:  Reviewed preventive health counseling, as reflected in patient instructions       Regular exercise       Healthy diet/nutrition        She reports that she has never smoked. She has never used smokeless tobacco.      Appropriate preventive services were discussed with this patient, including applicable screening as appropriate for cardiovascular disease, diabetes, osteopenia/osteoporosis, and glaucoma.  As appropriate for age/gender, discussed screening for colorectal cancer, prostate cancer, breast cancer, and cervical cancer. Checklist reviewing preventive services available has been given to the patient.    Reviewed patients plan of care and provided an AVS. The Basic Care Plan (routine screening as documented in Health Maintenance) for [place] meets the Care Plan requirement. This Care Plan has been established and reviewed with the Patient.      Jorge Blanton MD  [facility]    Identified Health Risks:

## 2023-01-31 NOTE — LETTER
February 2, 2023      Soco Ferguson  [address] [facility] [address]        Dear ,    We are writing to inform you of your test results.  Urine culture is negative.  Test for diabetes HgbA1c is in prediabetes range. Low carb diet, regular exercise and weight loss are recommended.  Magnesium is in a upper normal range, so you do not need to take magnesium supplements.  Cholesterol is good.  Kidney test in normal range. One of the liver tests, alkaline phosphatase is slightly higher and we should recheck it in 3 months. Please schedule follow-up visit with me.    Resulted Orders   CBC with platelets   Result Value Ref Range    WBC Count 7.7 4.0 - 11.0 10e3/uL    RBC Count 4.71 3.80 - 5.20 10e6/uL    Hemoglobin 14.0 11.7 - 15.7 g/dL    Hematocrit 41.8 35.0 - 47.0 %    MCV 89 78 - 100 fL    MCH 29.7 26.5 - 33.0 pg    MCHC 33.5 31.5 - 36.5 g/dL    RDW 12.5 10.0 - 15.0 %    Platelet Count 306 150 - 450 10e3/uL   Comprehensive metabolic panel   Result Value Ref Range    Sodium 145 136 - 145 mmol/L    Potassium 4.8 3.4 - 5.3 mmol/L    Chloride 106 98 - 107 mmol/L    Carbon Dioxide (CO2) 26 22 - 29 mmol/L    Anion Gap 13 7 - 15 mmol/L    Urea Nitrogen 15.6 8.0 - 23.0 mg/dL    Creatinine 0.76 0.51 - 0.95 mg/dL    Calcium 10.1 8.8 - 10.2 mg/dL    Glucose 112 (H) 70 - 99 mg/dL    Alkaline Phosphatase 173 (H) 35 - 104 U/L    AST 28 10 - 35 U/L    ALT 35 10 - 35 U/L    Protein Total 7.4 6.4 - 8.3 g/dL    Albumin 4.4 3.5 - 5.2 g/dL    Bilirubin Total 0.4 <=1.2 mg/dL    GFR Estimate 86 >60 mL/min/1.73m2      Comment:      eGFR calculated using 2021 CKD-EPI equation.   Lipid panel reflex to direct LDL Fasting   Result Value Ref Range    Cholesterol 150 <200 mg/dL    Triglycerides 88 <150 mg/dL    Direct Measure HDL 48 (L) >=50 mg/dL    LDL Cholesterol Calculated 84 <=100 mg/dL    Non HDL Cholesterol 102 <130 mg/dL    Narrative    Cholesterol  Desirable:  <200 mg/dL    Triglycerides  Normal:  Less than 150  mg/dL  Borderline High:  150-199 mg/dL  High:  200-499 mg/dL  Very High:  Greater than or equal to 500 mg/dL    Direct Measure HDL  Female:  Greater than or equal to 50 mg/dL   Male:  Greater than or equal to 40 mg/dL    LDL Cholesterol  Desirable:  <100mg/dL  Above Desirable:  100-129 mg/dL   Borderline High:  130-159 mg/dL   High:  160-189 mg/dL   Very High:  >= 190 mg/dL    Non HDL Cholesterol  Desirable:  130 mg/dL  Above Desirable:  130-159 mg/dL  Borderline High:  160-189 mg/dL  High:  190-219 mg/dL  Very High:  Greater than or equal to 220 mg/dL   Vitamin D Deficiency   Result Value Ref Range    Vitamin D, Total (25-Hydroxy) 74 20 - 75 ug/L    Narrative    Season, race, dietary intake, and treatment affect the concentration of 25-hydroxy-Vitamin D. Values may decrease during winter months and increase during summer months. Values 20-29 ug/L may indicate Vitamin D insufficiency and values <20 ug/L may indicate Vitamin D deficiency.    Vitamin D determination is routinely performed by an immunoassay specific for 25 hydroxyvitamin D3.  If an individual is on vitamin D2(ergocalciferol) supplementation, please specify 25 OH vitamin D2 and D3 level determination by LCMSMS test VITD23.     TSH with free T4 reflex   Result Value Ref Range    TSH 0.61 0.30 - 4.20 uIU/mL   Parathyroid Hormone Intact   Result Value Ref Range    Parathyroid Hormone Intact 41 15 - 65 pg/mL    Narrative    This result was obtained with the Roche Elecsys PTH STAT assay.   This reference range differs from PTH assays used in other [facility] laboratories.   Vitamin B12   Result Value Ref Range    Vitamin B12 661 232 - 1,245 pg/mL   Magnesium   Result Value Ref Range    Magnesium 2.5 (H) 1.7 - 2.3 mg/dL   Folate   Result Value Ref Range    Folic Acid 11.8 4.6 - 34.8 ng/mL   UA reflex to Microscopic and Culture   Result Value Ref Range    Color Urine Yellow Colorless, Straw, Light Yellow, Yellow    Appearance Urine Clear Clear     Glucose Urine Negative Negative mg/dL    Bilirubin Urine Negative Negative    Ketones Urine Negative Negative mg/dL    Specific Gravity Urine 1.020 1.005 - 1.030    Blood Urine Negative Negative    pH Urine 6.0 5.0 - 8.0    Protein Albumin Urine Negative Negative mg/dL    Urobilinogen Urine 0.2 0.2, 1.0 E.U./dL    Nitrite Urine Positive (A) Negative    Leukocyte Esterase Urine Trace (A) Negative   Urine Microscopic Exam   Result Value Ref Range    Bacteria Urine Moderate (A) None Seen /HPF    RBC Urine 0-2 0-2 /HPF /HPF    WBC Urine 10-25 (A) 0-5 /HPF /HPF    Squamous Epithelials Urine Few (A) None Seen /LPF   Urine Culture   Result Value Ref Range    Culture >100,000 CFU/mL Mixture of urogenital jonny     Narrative    Multiple morphotypes present with no predominant organism.  Growth consistent with probable contamination during collection.  Suggest repeat specimen if clinically indicated.    Hemoglobin A1c   Result Value Ref Range    Hemoglobin A1C 5.7 (H) 0.0 - 5.6 %      Comment:      Normal <5.7%   Prediabetes 5.7-6.4%    Diabetes 6.5% or higher     Note: Adopted from ADA consensus guidelines.       If you have any questions or concerns, please call the clinic at the number listed above.       Sincerely,      Jorge Blanton MD

## 2023-01-31 NOTE — PATIENT INSTRUCTIONS
Prolia 19th today. Pneumo 23 today. Labs today.  Prolia 20th in 6 months with my nurse. I will see you in 1 year for AWV and Prolia.    DXA in 7/2024 .   Phone number to schedule [phone].    Daily calcium need is 2999-6273 mg a day from the diet and supplements.  Calcium citrate is easier to digest.  Vitamin D 2000 IU daily recommended.    Risk of rebound vertebral fractures is higher when Prolia suddenly stopped or dose was missed.      Prolia and Covid vaccine should be  for at least a week.   Patient Education   Personalized Prevention Plan  You are due for the preventive services outlined below.  Your care team is available to assist you in scheduling these services.  If you have already completed any of these items, please share that information with your care team to update in your medical record.  Health Maintenance Due   Topic Date Due    ANNUAL REVIEW OF  ORDERS  Never done    Pneumococcal Vaccine (2 - PPSV23 if available, else PCV20) 01/12/2023       Exercise for a Healthier Heart  You may wonder how you can improve the health of your heart. If you re thinking about exercise, you re on the right track. You don t need to become an athlete. But you do need a certain amount of brisk exercise to help strengthen your heart. If you have been diagnosed with a heart condition, your healthcare provider may advise exercise to help stabilize your condition. To help make exercise a habit, choose safe, fun activities.      Exercise with a friend. When activity is fun, you're more likely to stick with it.   Before you start  Check with your healthcare provider before starting an exercise program. This is especially important if you have not been active for a while. It's also important if you have a long-term (chronic) health problem such as heart disease, diabetes, or obesity. Or if you are at high risk for having these problems.   Why exercise?  Exercising regularly offers many healthy rewards. It can  help you do all of the following:   Improve your blood cholesterol level to help prevent further heart trouble  Lower your blood pressure to help prevent a stroke or heart attack  Control diabetes, or reduce your risk of getting this disease  Improve your heart and lung function  Reach and stay at a healthy weight  Make your muscles stronger so you can stay active  Prevent falls and fractures by slowing the loss of bone mass (osteoporosis)  Manage stress better  Reduce your blood pressure  Improve your sense of self and your body image  Exercise tips    Ease into your routine. Set small goals. Then build on them. If you are not sure what your activity level should be, talk with your healthcare provider first before starting an exercise routine.  Exercise on most days. Aim for a total of 150 minutes (2 hours and 30 minutes) or more of moderate-intensity aerobic activity each week. Or 75 minutes (1 hour and 15 minutes) or more of vigorous-intensity aerobic activity each week. Or try for a combination of both. Moderate activity means that you breathe heavier and your heart rate increases but you can still talk. Think about doing 40 minutes of moderate exercise, 3 to 4 times a week. For best results, activity should last for about 40 minutes to lower blood pressure and cholesterol. It's OK to work up to the 40-minute period over time. Examples of moderate-intensity activity are walking 1 mile in 15 minutes. Or doing 30 to 45 minutes of yard work.  Step up your daily activity level.  Along with your exercise program, try being more active the whole day. Walk instead of drive. Or park further away so that you take more steps each day. Do more household tasks or yard work. You may not be able to meet the advised mount of physical activity. But doing some moderate- or vigorous-intensity aerobic activity can help reduce your risk for heart disease. Your healthcare provider can help you figure out what is best for  you.  Choose 1 or more activities you enjoy.  Walking is one of the easiest things you can do. You can also try swimming, riding a bike, dancing, or taking an exercise class.    When to call your healthcare provider  Call your healthcare provider if you have any of these:   Chest pain or feel dizzy or lightheaded  Burning, tightness, pressure, or heaviness in your chest, neck, shoulders, back, or arms  Abnormal shortness of breath  More joint or muscle pain  A very fast or irregular heartbeat (palpitations)  [employer] last reviewed this educational content on 7/1/2019 2000-2021 [employer]. All rights reserved. This information is not intended as a substitute for professional medical care. Always follow your healthcare professional's instructions.          Understanding USDA MyPlate  The USDA has guidelines to help you make healthy food choices. These are called MyPlate. MyPlate shows the food groups that make up healthy meals using the image of a place setting. Before you eat, think about the healthiest choices for what to put on your plate or in your cup or bowl. To learn more about building a healthy plate, visit www.choosemyplate.gov.    The food groups  Fruits. Any fruit or 100% fruit juice counts as part of the Fruit Group. Fruits may be fresh, canned, frozen, or dried, and may be whole, cut-up, or pureed. Make 1/2 of your plate fruits and vegetables.  Vegetables. Any vegetable or 100% vegetable juice counts as a member of the Vegetable Group. Vegetables may be fresh, frozen, canned, or dried. They can be served raw or cooked and may be whole, cut-up, or mashed. Make 1/2 of your plate fruits and vegetables.  Grains. All foods made from grains are part of the Grains Group. These include wheat, rice, oats, cornmeal, and barley. Grains are often used to make foods such as bread, pasta, oatmeal, cereal, tortillas, and grits. Grains should be no more than 1/4 of your plate. At least half of your  grains should be whole grains.  Protein. This group includes meat, poultry, seafood, beans and peas, eggs, processed soy products (such as tofu), nuts (including nut butters), and seeds. Make protein choices no more than 1/4 of your plate. Meat and poultry choices should be lean or low fat.  Dairy. The Dairy Group includes all fluid milk products and foods made from milk that contain calcium, such as yogurt and cheese. (Foods that have little calcium, such as cream, butter, and cream cheese, are not part of this group.) Most dairy choices should be low-fat or fat-free.  Oils. Oils aren't a food group, but they do contain essential nutrients. However it's important to watch your intake of oils. These are fats that are liquid at room temperature. They include canola, corn, olive, soybean, vegetable, and sunflower oil. Foods that are mainly oil include mayonnaise, certain salad dressings, and soft margarines. You likely already get your daily oil allowance from the foods you eat.  Things to limit  Eating healthy also means limiting these things in your diet:     Salt (sodium). Many processed foods have a lot of sodium. To keep sodium intake down, eat fresh vegetables, meats, poultry, and seafood when possible. Purchase low-sodium, reduced-sodium, or no-salt-added food products at the store. And don't add salt to your meals at home. Instead, season them with herbs and spices such as dill, oregano, cumin, and paprika. Or try adding flavor with lemon or lime zest and juice.  Saturated fat. Saturated fats are most often found in animal products such as beef, pork, and chicken. They are often solid at room temperature, such as butter. To reduce your saturated fat intake, choose leaner cuts of meat and poultry. And try healthier cooking methods such as grilling, broiling, roasting, or baking. For a simple lower-fat swap, use plain nonfat yogurt instead of mayonnaise when making potato salad or macaroni salad.  Added sugars.  These are sugars added to foods. They are in foods such as ice cream, candy, soda, fruit drinks, sports drinks, energy drinks, cookies, pastries, jams, and syrups. Cut down on added sugars by sharing sweet treats with a family member or friend. You can also choose fruit for dessert, and drink water or other unsweetened beverages.     [employer] last reviewed this educational content on 6/1/2020 2000-2021 [employer]. All rights reserved. This information is not intended as a substitute for professional medical care. Always follow your healthcare professional's instructions.

## 2023-02-01 LAB
DEPRECATED CALCIDIOL+CALCIFEROL SERPL-MC: 74 UG/L (ref 20–75)
HBA1C MFR BLD: 5.7 % (ref 0–5.6)

## 2023-02-02 LAB — BACTERIA UR CULT: NORMAL

## 2023-02-08 ENCOUNTER — ALLIED HEALTH/NURSE VISIT (OUTPATIENT)
Dept: OPHTHALMOLOGY | Facility: CLINIC | Age: 68
End: 2023-02-08
Attending: OPHTHALMOLOGY
Payer: COMMERCIAL

## 2023-02-08 DIAGNOSIS — H52.203 HYPEROPIC ASTIGMATISM OF BOTH EYES: Primary | ICD-10-CM

## 2023-02-08 PROCEDURE — 99207 PR NO BILLABLE SERVICE THIS VISIT: CPT | Performed by: OPHTHALMOLOGY

## 2023-02-08 PROCEDURE — 999N000103 HC STATISTIC NO CHARGE FACILITY FEE

## 2023-02-08 ASSESSMENT — VISUAL ACUITY
METHOD: SNELLEN - LINEAR
OD_CC: 20/20
OD_CC+: -2
OS_CC+: -1
OS_CC: 20/25
CORRECTION_TYPE: GLASSES

## 2023-02-08 ASSESSMENT — REFRACTION_WEARINGRX
OD_AXIS: 010
OS_CYLINDER: +1.50
SPECS_TYPE: PAL
OS_SPHERE: +2.00
OS_AXIS: 026
OD_ADD: +2.50
OD_SPHERE: +4.75
OS_ADD: +2.50
OD_CYLINDER: +0.50

## 2023-02-08 ASSESSMENT — REFRACTION_MANIFEST
OS_SPHERE: +2.75
OS_CYLINDER: +1.00
OD_ADD: +2.50
OD_CYLINDER: +0.75
OS_ADD: +2.50
OD_AXIS: 010
OD_SPHERE: +4.50
OS_AXIS: 005

## 2023-02-19 DIAGNOSIS — E78.2 MIXED HYPERLIPIDEMIA: ICD-10-CM

## 2023-02-20 RX ORDER — ATORVASTATIN CALCIUM 40 MG/1
TABLET, FILM COATED ORAL
Qty: 90 TABLET | Refills: 3 | Status: SHIPPED | OUTPATIENT
Start: 2023-02-20 | End: 2024-05-07

## 2023-02-20 NOTE — TELEPHONE ENCOUNTER
"Last Written Prescription Date: 1/12/22  Last Fill Quantity: 90,  # refills: 3   Last office visit provider:  1/21/23     Requested Prescriptions   Pending Prescriptions Disp Refills     atorvastatin (LIPITOR) 40 MG tablet [Pharmacy Med Name: Atorvastatin Calcium Oral Tablet 40 MG] 90 tablet 0     Sig: TAKE 1 TABLET BY MOUTH ONE TIME DAILY       Statins Protocol Passed - 2/20/2023  1:26 PM        Passed - LDL on file in past 12 months     Recent Labs   Lab Test 01/31/23  1428   LDL 84             Passed - No abnormal creatine kinase in past 12 months     No lab results found.             Passed - Recent (12 mo) or future (30 days) visit within the authorizing provider's specialty     Patient has had an office visit with the authorizing provider or a provider within the authorizing providers department within the previous 12 mos or has a future within next 30 days. See \"Patient Info\" tab in inbasket, or \"Choose Columns\" in Meds & Orders section of the refill encounter.              Passed - Medication is active on med list        Passed - Patient is age 18 or older        Passed - No active pregnancy on record        Passed - No positive pregnancy test in past 12 months             Sena Montes RN 02/20/23 1:26 PM  "

## 2023-04-17 ENCOUNTER — DOCUMENTATION ONLY (OUTPATIENT)
Dept: LAB | Facility: CLINIC | Age: 68
End: 2023-04-17
Payer: COMMERCIAL

## 2023-04-17 DIAGNOSIS — R73.03 PREDIABETES: Primary | ICD-10-CM

## 2023-04-17 DIAGNOSIS — R74.8 HIGH ALKALINE PHOSPHATASE: ICD-10-CM

## 2023-05-01 ENCOUNTER — LAB (OUTPATIENT)
Dept: LAB | Facility: CLINIC | Age: 68
End: 2023-05-01
Payer: COMMERCIAL

## 2023-05-01 DIAGNOSIS — R73.03 PREDIABETES: ICD-10-CM

## 2023-05-01 DIAGNOSIS — R74.8 HIGH ALKALINE PHOSPHATASE: ICD-10-CM

## 2023-05-01 LAB — HBA1C MFR BLD: 5.4 % (ref 0–5.6)

## 2023-05-01 PROCEDURE — 99000 SPECIMEN HANDLING OFFICE-LAB: CPT

## 2023-05-01 PROCEDURE — 84080 ASSAY ALKALINE PHOSPHATASES: CPT | Mod: 90

## 2023-05-01 PROCEDURE — 83036 HEMOGLOBIN GLYCOSYLATED A1C: CPT

## 2023-05-01 PROCEDURE — 84075 ASSAY ALKALINE PHOSPHATASE: CPT | Mod: 90

## 2023-05-01 PROCEDURE — 36415 COLL VENOUS BLD VENIPUNCTURE: CPT

## 2023-05-03 LAB
ALP BONE SERPL-CCNC: 21 U/L
ALP LIVER SERPL-CCNC: 77 U/L
ALP OTHER SERPL-CCNC: 0 U/L
ALP SERPL-CCNC: 98 U/L

## 2023-05-09 ENCOUNTER — TRANSFERRED RECORDS (OUTPATIENT)
Dept: HEALTH INFORMATION MANAGEMENT | Facility: CLINIC | Age: 68
End: 2023-05-09
Payer: COMMERCIAL

## 2023-05-12 ENCOUNTER — MEDICAL CORRESPONDENCE (OUTPATIENT)
Dept: HEALTH INFORMATION MANAGEMENT | Facility: CLINIC | Age: 68
End: 2023-05-12
Payer: COMMERCIAL

## 2023-05-15 ENCOUNTER — TRANSCRIBE ORDERS (OUTPATIENT)
Dept: OTHER | Age: 68
End: 2023-05-15

## 2023-05-15 DIAGNOSIS — J02.9 SORE THROAT: Primary | ICD-10-CM

## 2023-06-13 DIAGNOSIS — Z92.29 PERSONAL HISTORY OF OTHER DRUG THERAPY: ICD-10-CM

## 2023-06-13 DIAGNOSIS — M81.0 SENILE OSTEOPOROSIS: Primary | ICD-10-CM

## 2023-06-15 ENCOUNTER — TRANSFERRED RECORDS (OUTPATIENT)
Dept: HEALTH INFORMATION MANAGEMENT | Facility: CLINIC | Age: 68
End: 2023-06-15
Payer: COMMERCIAL

## 2023-08-01 ENCOUNTER — ALLIED HEALTH/NURSE VISIT (OUTPATIENT)
Dept: FAMILY MEDICINE | Facility: CLINIC | Age: 68
End: 2023-08-01
Payer: COMMERCIAL

## 2023-08-01 DIAGNOSIS — M81.0 SENILE OSTEOPOROSIS: Primary | ICD-10-CM

## 2023-08-01 PROCEDURE — 96372 THER/PROPH/DIAG INJ SC/IM: CPT | Performed by: INTERNAL MEDICINE

## 2023-08-01 PROCEDURE — 99207 PR NO CHARGE NURSE ONLY: CPT

## 2023-08-01 NOTE — PROGRESS NOTES
Indication: Prolia  (denosumab) is a prescription medicine used to treat osteoporosis in patients who:   Are at high risk for fracture, meaning patients who have had a fracture related to osteoporosis, or who have multiple risk factors for fracture   Cannot use another osteoporosis medicine or other osteoporosis medicines did not work well   The timeline for early/late injections would be 4 weeks early and any time after the 6 month laura. If a patient receives their injection late, then the subsequent injection would be 6 months from the date that they actually received the injection    1.  When was the last injection?  1/31/23  2.  Did they check with their insurance for this calendar year?  Yes  3.  Is there an order in the chart?  Yes  4.  Has the patient had dental work involving the bone in the past month or will have work in the next 6 months?  No   5.  Did you have the patient wait 15 minutes after the injection?  Yes  6.  Remember to use .injection under the medication notes    The following steps were completed to comply with the REMS program for Prolia:  Reviewed information in the Medication Guide and Patient Counseling Chart, including the serious risks of Prolia  and the symptoms of each risk.  Advised patient to seek prompt medical attention if they have signs or symptoms of any of the serious risks.  Provided each patient a copy of the Medication Guide and Patient Brochure.    Clinic Administered Medication Documentation      Prolia Documentation    Indication: Prolia  (denosumab) is a prescription medicine used to treat osteoporosis in patients who:   Are at high risk for fracture, meaning patients who have had a fracture related to osteoporosis, or who have multiple risk factors for fracture.  Cannot use another osteoporosis medicine or other osteoporosis medicines did not work well.  The timeline for early/late injections would be 4 weeks early and any time after the 6 month laura. If a patient  receives their injection late, then the subsequent injection would be 6 months from the date that they actually received the injection.    When was the last injection?  23  Was the last injection at least 6 months ago? Yes  Has the prior authorization been completed?  Yes  Is there an active order (written within the past 365 days, with administrations remaining, not ) in the chart?  Yes  Patient denies any dental work involving the bone (e.g. tooth extraction or dental implants) in the past 4 weeks?  Yes  Patient denies plans for any dental work involving the bone (e.g. tooth extraction or dental implants) in the next 4 weeks? Yes    The following steps were completed to comply with the REMS program for Prolia:  Reviewed information in the Medication Guide and Patient Counseling Chart, including the serious risks of Prolia  and the symptoms of each risk.  Advised patient to seek prompt medical attention if they have signs or symptoms of any of the serious risks.  Provided each patient a copy of the Medication Guide and Patient Brochure.    Prior to injection, verified patient identity using patient's name and date of birth. Medication was administered. Please see MAR and medication order for additional information. Patient instructed to remain in clinic for 15 minutes and report any adverse reaction to staff immediately.    Vial/Syringe: Single dose vial. Was entire vial of medication used? Yes  Was this medication supplied by the patient? No  Verified that the patient has refills remaining in their prescription.

## 2023-09-11 ENCOUNTER — NURSE TRIAGE (OUTPATIENT)
Dept: INTERNAL MEDICINE | Facility: CLINIC | Age: 68
End: 2023-09-11
Payer: COMMERCIAL

## 2023-09-11 NOTE — TELEPHONE ENCOUNTER
Order/Referral Request    Who is requesting: Soco     Orders being requested: Referral for Neurologist    Reason service is needed/diagnosis: tingling in arms, hands and face    When are orders needed by: ASAP    Has this been discussed with Provider: Yes    Does patient have a preference on a Group/Provider/Facility? [facility]     Does patient have an appointment scheduled?: No    Where to send orders: Place orders within Epic    Could we send this information to you in [facility] or would you prefer to receive a phone call?:   Patient would prefer a phone call   Okay to leave a detailed message?: Yes at Home number on file [phone] (home)

## 2023-09-11 NOTE — TELEPHONE ENCOUNTER
S-(situation): Patient reports that she has been having some numbness, tingling in arms, also has pain in right leg sometimes.    B-(background): This has been going on for several months, maybe years. Patient is on Prolia, most recent dose 8/1/23. Most recent calcium level WNL:   Calcium   Latest Ref Rng 8.8 - 10.2 mg/dL   1/31/2023  2:28 PM 10.1        A-(assessment): Pt reports that she has some small tingling in her right hand at this moment, this is the kind of thing that has been going on for a long time. Reports that she is able to move normally, has been going to the gym, etc. Denies abnormal heartbeats. Unsure if she has been experiencing twitching, cramps.    R-(recommendations): Advised to see Dr Blanton this week, patient agreeable. Advised to monitor for abnormal heart beats or difficulty catching breath, don't be afraid to call 911.     Sep 12, 2023  4:40 PM  (Arrive by 4:20 PM)  Provider Visit with Jorge Blanton MD  [facility] ([facility] )      Reason for Disposition   Numbness or tingling in one or both hands is a chronic symptom (recurrent or ongoing problem lasting > 4 weeks)    Additional Information   Negative: Difficult to awaken or acting confused (e.g., disoriented, slurred speech)   Negative: New neurologic deficit that is present NOW, sudden onset of ANY of the following: * Weakness of the face, arm, or leg on one side of the body* Numbness of the face, arm, or leg on one side of the body* Loss of speech or garbled speech   Negative: Sounds like a life-threatening emergency to the triager   Negative: SEVERE weakness (i.e., unable to walk or barely able to walk, requires support) and new-onset or worsening   Negative: Headache (with neurologic deficit)   Negative: Unable to urinate (or only a few drops) and bladder feels very full   Negative: Loss of bladder or bowel control (urine or bowel incontinence; wetting self, leaking  stool) of new-onset   Negative: Back pain with numbness (loss of sensation) in groin or rectal area   Negative: Confusion, disorientation, or hallucinations is main symptom   Negative: Dizziness is main symptom   Negative: Followed a head injury within last 3 days   Negative: Neurologic deficit that was brief (now gone), ANY of the following: * Weakness of the face, arm, or leg on one side of the body * Numbness of the face, arm, or leg on one side of the body * Loss of speech or garbled speech   Negative: Patient sounds very sick or weak to the triager   Negative: Neurologic deficit of gradual onset (e.g., days to weeks), ANY of the following: * Weakness of the face, arm, or leg on one side of the body* Numbness of the face, arm, or leg on one side of the body* Loss of speech or garbled speech   Negative: Palatine palsy suspected (i.e., weakness only one side of the face, developing over hours to days, no other symptoms)   Negative: Tingling (e.g., pins and needles) of the face, arm or leg on one side of the body, that is present now (Exceptions: Chronic or recurrent symptom lasting > 4 weeks; or tingling from known cause, such as: bumped elbow, carpal tunnel syndrome, pinched nerve, frostbite.)   Negative: Neck pain (with neurologic deficit)   Negative: Back pain (with neurologic deficit)   Negative: Patient wants to be seen   Negative: Loss of speech or garbled speech is a chronic symptom (recurrent or ongoing problem lasting > 4 weeks)   Negative: Weakness of arm or leg is a chronic symptom (recurrent or ongoing problem lasting > 4 weeks)    Protocols used: Neurologic Deficit-A-OH

## 2023-09-12 ENCOUNTER — OFFICE VISIT (OUTPATIENT)
Dept: INTERNAL MEDICINE | Facility: CLINIC | Age: 68
End: 2023-09-12
Payer: COMMERCIAL

## 2023-09-12 VITALS
DIASTOLIC BLOOD PRESSURE: 71 MMHG | TEMPERATURE: 97.2 F | OXYGEN SATURATION: 98 % | HEIGHT: 62 IN | WEIGHT: 167.6 LBS | RESPIRATION RATE: 16 BRPM | BODY MASS INDEX: 30.84 KG/M2 | SYSTOLIC BLOOD PRESSURE: 131 MMHG | HEART RATE: 79 BPM

## 2023-09-12 DIAGNOSIS — R20.2 TINGLING OF LEFT UPPER EXTREMITY: ICD-10-CM

## 2023-09-12 DIAGNOSIS — R73.03 PREDIABETES: ICD-10-CM

## 2023-09-12 DIAGNOSIS — R20.2 RIGHT ARM AND RIGHT FACE TINGLING: Primary | ICD-10-CM

## 2023-09-12 DIAGNOSIS — M81.0 SENILE OSTEOPOROSIS: ICD-10-CM

## 2023-09-12 LAB — HBA1C MFR BLD: 5.5 % (ref 0–5.6)

## 2023-09-12 PROCEDURE — 36415 COLL VENOUS BLD VENIPUNCTURE: CPT | Performed by: INTERNAL MEDICINE

## 2023-09-12 PROCEDURE — 99214 OFFICE O/P EST MOD 30 MIN: CPT | Performed by: INTERNAL MEDICINE

## 2023-09-12 PROCEDURE — 83735 ASSAY OF MAGNESIUM: CPT | Performed by: INTERNAL MEDICINE

## 2023-09-12 PROCEDURE — 84443 ASSAY THYROID STIM HORMONE: CPT | Performed by: INTERNAL MEDICINE

## 2023-09-12 PROCEDURE — 83036 HEMOGLOBIN GLYCOSYLATED A1C: CPT | Performed by: INTERNAL MEDICINE

## 2023-09-12 PROCEDURE — 80053 COMPREHEN METABOLIC PANEL: CPT | Performed by: INTERNAL MEDICINE

## 2023-09-12 ASSESSMENT — ENCOUNTER SYMPTOMS: NUMBNESS: 1

## 2023-09-12 NOTE — PROGRESS NOTES
"  Assessment & Plan     Right arm and right face tingling    Tingling of left upper extremity    Patient described tingling in the right upper arm, sometimes in fingers of both hands and left wrist on and off for about a year or two. Last week, she had tingling in the right cheek for 1-2 min. No other associated symptoms. She does exercise every day and the symptoms are not reproducible. She denies head trauma, weakness, rash, previous h/o CVA or stroke. She is on Prolia and is concerned that her calcium is low. She does take calcium pills every day.    She did not see Neurologist which was recommended in Jan 2023.    Neuro exam today is normal, with normal sensation and muscle strength, normal cranial nerves.    She will schedule EMG and visit with Neurologist. I will check her thyroid, electrolytes.    - Ionized Calcium; Future  - Hemoglobin A1c; Future  - Comprehensive metabolic panel; Future  - Magnesium; Future  - TSH with free T4 reflex; Future  - EMG; Future  - Adult Neurology  Referral; Future  - Ionized Calcium  - Hemoglobin A1c  - Comprehensive metabolic panel  - Magnesium  - TSH with free T4 reflex            Senile osteoporosis  Stable on Prolia, last dose was 8/1/23.  - Ionized Calcium; Future  - Ionized Calcium    Prediabetes  Stable, last HgbA1c in May was 5.4  - Hemoglobin A1c; Future  - Hemoglobin A1c             BMI:   Estimated body mass index is 31.15 kg/m  as calculated from the following:    Height as of this encounter: 1.562 m (5' 1.5\").    Weight as of this encounter: 76 kg (167 lb 9.6 oz).       Return in about 4 months (around 1/12/2024) for Follow up.     Jorge Blanton MD  [facility]    Etelvina Wan is a 68 year old, presenting for the following health issues:  Numbness (Numbness/Tingling In Hand's And Arm's  x1 Year  Face 1 Time Last Week)      9/12/2023     3:57 PM   Additional Questions   Roomed by Desiree Mercer  Associated " "symptoms include numbness.   History of Present Illness       Reason for visit:  Feeling tingling in oralia and face  Symptom onset:  More than a month  Symptom intensity:  Mild  Symptom progression:  Staying the same  Had these symptoms before:  Yes  Has tried/received treatment for these symptoms:  No  What makes it worse:  No    She eats 0-1 servings of fruits and vegetables daily.She consumes 2 sweetened beverage(s) daily.She exercises with enough effort to increase her heart rate 30 to 60 minutes per day.  She exercises with enough effort to increase her heart rate 6 days per week. She is missing 1 dose(s) of medications per week.  She is not taking prescribed medications regularly due to remembering to take.               Review of Systems   Neurological:  Positive for numbness.            Objective    /71   Pulse 79   Temp 97.2  F (36.2  C)   Resp 16   Ht 1.562 m (5' 1.5\")   Wt 76 kg (167 lb 9.6 oz)   SpO2 98%   BMI 31.15 kg/m    Body mass index is 31.15 kg/m .  Physical Exam         Constitutional:  oriented to person, place, and time, appears well-nourished. No distress.   HENT:   Head: Normocephalic.   Mouth/Throat: Oropharynx is clear and moist.   Eyes: Conjunctivae are normal. Pupils are equal, round, and reactive to light.   Neck: Normal range of motion. Neck supple.   Cardiovascular: Normal rate, regular rhythm and normal heart sounds.    Pulmonary/Chest: Effort normal and breath sounds normal.   Abdominal: Soft. Bowel sounds are normal.   Musculoskeletal: Normal range of motion.   Neurological: alert and oriented to person, place, and time. Skin: Skin is warm.   Psychiatric: normal mood and affect.               Answers submitted by the patient for this visit:  General Questionnaire (Submitted on 9/12/2023)  Chief Complaint: Chronic problems general questions HPI Form  How many servings of fruits and vegetables do you eat daily?: 0-1  On average, how many sweetened beverages do you drink " each day (Examples: soda, juice, sweet tea, etc.  Do NOT count diet or artificially sweetened beverages)?: 2  How many minutes a day do you exercise enough to make your heart beat faster?: 30 to 60  How many days a week do you exercise enough to make your heart beat faster?: 6  How many days per week do you miss taking your medication?: 1  What makes it hard for you to take your medication every day?: remembering to take  General Concern (Submitted on 9/12/2023)  Chief Complaint: Chronic problems general questions HPI Form  What is the reason for your visit today?: feeling tingling in oralia and face  When did your symptoms begin?: More than a month  How would you describe these symptoms?: Mild  Are your symptoms:: Staying the same  Have you had these symptoms before?: Yes  Have you tried or received treatment for these symptoms before?: No  Is there anything that makes you feel worse?: no

## 2023-09-13 ENCOUNTER — LAB (OUTPATIENT)
Dept: LAB | Facility: CLINIC | Age: 68
End: 2023-09-13
Payer: COMMERCIAL

## 2023-09-13 DIAGNOSIS — M81.0 SENILE OSTEOPOROSIS: ICD-10-CM

## 2023-09-13 DIAGNOSIS — R20.2 RIGHT ARM AND RIGHT FACE TINGLING: ICD-10-CM

## 2023-09-13 DIAGNOSIS — R20.2 TINGLING OF LEFT UPPER EXTREMITY: ICD-10-CM

## 2023-09-13 LAB
ALBUMIN SERPL BCG-MCNC: 4.7 G/DL (ref 3.5–5.2)
ALP SERPL-CCNC: 92 U/L (ref 35–104)
ALT SERPL W P-5'-P-CCNC: 21 U/L (ref 0–50)
ANION GAP SERPL CALCULATED.3IONS-SCNC: 11 MMOL/L (ref 7–15)
AST SERPL W P-5'-P-CCNC: 22 U/L (ref 0–45)
BILIRUB SERPL-MCNC: 0.6 MG/DL
BUN SERPL-MCNC: 24.6 MG/DL (ref 8–23)
CA-I BLD-MCNC: 4.8 MG/DL (ref 4.4–5.2)
CALCIUM SERPL-MCNC: 10 MG/DL (ref 8.8–10.2)
CHLORIDE SERPL-SCNC: 102 MMOL/L (ref 98–107)
CREAT SERPL-MCNC: 0.86 MG/DL (ref 0.51–0.95)
DEPRECATED HCO3 PLAS-SCNC: 27 MMOL/L (ref 22–29)
EGFRCR SERPLBLD CKD-EPI 2021: 74 ML/MIN/1.73M2
GLUCOSE SERPL-MCNC: 100 MG/DL (ref 70–99)
MAGNESIUM SERPL-MCNC: 2.1 MG/DL (ref 1.7–2.3)
POTASSIUM SERPL-SCNC: 4.3 MMOL/L (ref 3.4–5.3)
PROT SERPL-MCNC: 7.8 G/DL (ref 6.4–8.3)
SODIUM SERPL-SCNC: 140 MMOL/L (ref 136–145)
TSH SERPL DL<=0.005 MIU/L-ACNC: 1.22 UIU/ML (ref 0.3–4.2)

## 2023-09-13 PROCEDURE — 36415 COLL VENOUS BLD VENIPUNCTURE: CPT

## 2023-09-13 PROCEDURE — 82330 ASSAY OF CALCIUM: CPT

## 2023-09-19 ENCOUNTER — HOSPITAL ENCOUNTER (OUTPATIENT)
Dept: MAMMOGRAPHY | Facility: CLINIC | Age: 68
Discharge: HOME | End: 2023-09-19
Attending: INTERNAL MEDICINE | Admitting: INTERNAL MEDICINE
Payer: COMMERCIAL

## 2023-09-19 DIAGNOSIS — Z12.31 VISIT FOR SCREENING MAMMOGRAM: ICD-10-CM

## 2023-09-19 PROCEDURE — 77067 SCR MAMMO BI INCL CAD: CPT

## 2023-11-13 ENCOUNTER — OFFICE VISIT (OUTPATIENT)
Dept: NEUROLOGY | Facility: CLINIC | Age: 68
End: 2023-11-13
Attending: INTERNAL MEDICINE
Payer: COMMERCIAL

## 2023-11-13 DIAGNOSIS — R20.2 TINGLING OF LEFT UPPER EXTREMITY: ICD-10-CM

## 2023-11-13 DIAGNOSIS — R20.2 RIGHT ARM AND RIGHT FACE TINGLING: ICD-10-CM

## 2023-11-13 PROCEDURE — 95886 MUSC TEST DONE W/N TEST COMP: CPT | Mod: RT | Performed by: PSYCHIATRY & NEUROLOGY

## 2023-11-13 PROCEDURE — 95911 NRV CNDJ TEST 9-10 STUDIES: CPT | Performed by: PSYCHIATRY & NEUROLOGY

## 2023-11-13 NOTE — LETTER
2023         RE: Soco Dyson  [address] [facility] [address]        Dear Colleague,    Thank you for referring your patient, Soco Dyson, to the [facility] NEUROLOGY CLINIC [facility]. Please see a copy of my visit note below.                            [facility]  Electrodiagnostic Laboratory                 Department of Neurology                                                                                                         Test Date:  2023    Patient: Soco Dyson : 1955 Physician: Marlin Osborn MD   Sex: Female AGE: 68 year Ref Phys: Dr. Blanton   ID#: [id number]   Technician: Kristy Behling     History and Examination:  Referred by Dr. Blanton for a EMG to assess numbness. Symptoms have been present in both arms, the right side of face and occasionally in the legs. Overall the right arm is the worst side.       Techniques:  Motor conduction studies were done with surface recording electrodes. Sensory conduction studies were performed with surface electrodes, unless indicated otherwise by (n), designating the use of subdermal recording electrodes. Temperature was monitored and recorded throughout the study. Upper extremities were maintained at a temperature of 32 degrees Centigrade or higher.  EMG was done with a concentric needle electrode.     Results:  Motor nerve conduction studies:  Left median motor study is within normal limits although the patient is noted to have a higher amplitude recording at the elbow compared to the wrist.  There does appear to be an anastomosis with some of the median nerve stimulating the left abductor digiti minimi which is considered a normal variant.  Left ulnar motor study is also within normal limits.  Right ulnar motor study is within normal limits.  Right median motor study reveals a normal distal latency with low amplitude.  There also appears to be a anastomosis with some of the median  nerve stimulating the ADM.    Sensory nerve studies:  Antidromic median studies bilaterally are within normal limits.  Palmar study on the left reveals a difference in peak latency with median nerve stimulation compared to ulnar stimulation measuring 0.35 ms.  On the right side with palmar studies peak latency difference is normal at 2.22 ms.  Antidromic ulnar sensory studies are within normal limits.  Right radial study is within normal limits.    F waves  Right median F wave is within normal limits.    Needle examination of the right upper extremity is normal.    Interpretation:  This is a normal EMG.  There is no evidence of any large fiber nerve injury to explain the patient's current tingling symptoms.  Specifically no findings of cervical radiculopathy or large fiber peripheral neuropathy.  EMG does not rule out small fiber neuropathy and clinical correlation is recommended.      ___________________________  Marlin Osborn MD        Nerve Conduction Studies  Motor Sites      Latency Amplitude Neg. Amp Diff Segment Distance Velocity Neg. Dur Neg Area Diff Temperature Comment   Site (ms) Norm (mV) Norm %  cm m/s Norm ms %  C    Left Median (APB) Motor        Median (ADM)   Elbow 7.1 - 0.11  > 5.0      5.2  33 mg chk        Median (APB)   Wrist 3.3  < 4.4 5.2  > 5.0  Wrist-APB 8   5.1  33    Elbow 7.4 - 6.9  > 5.0 32.7 Elbow-Wrist 22 54  > 48 5.3 45.5 33    Right Median (APB) Motor        Median (ADM)   Elbow 6.7 - 0.64  > 5.0      6.9  32.6 mg chk        Median (APB)   Wrist 3.2  < 4.4 4.8  > 5.0  Wrist-APB 8   5.2  32.2    Elbow 7.2 - 7.7  > 5.0 60.4 Elbow-Wrist 22 55  > 48 5.4 42.8 32.5    Left Ulnar (ADM) Motor   Wrist 2.7  < 3.5 7.9  > 5.0  Wrist-ADM 8   4.1  33    Bel Elbow 5.7 - 7.3 - -7.6 Bel Elbow-Wrist 20 67  > 48 4.2 -1.76 33    Abv Elbow 7.1 - 7.2 - -1.37 Abv Elbow-Bel Elbow 9 64  > 48 4.3 0.60 33    Right Ulnar (ADM) Motor   Wrist 2.2  < 3.5 8.9  > 5.0  Wrist-ADM 8   4.3  32.9    Bel Elbow 5.2 - 8.4 -  -5.6 Bel Elbow-Wrist 20 67  > 48 4.1 -16.3 32.9    Abv Elbow 6.6 - 8.1 - -3.6 Abv Elbow-Bel Elbow 9 64  > 48 4.1 -1.58 32.9      Sensory Sites      Onset Lat Peak Lat Amp (O-P) Amp (P-P) Segment Distance Velocity Temperature Comment   Site ms ms  V Norm  V  cm m/s Norm  C    Left Median Sensory   Wrist-Dig II 2.3 3.1 24  > 10 54 Wrist-Dig II 14 61  > 48 32.4    Right Median Sensory   Wrist-Dig II 2.2 3.0 24  > 10 56 Wrist-Dig II 14 64  > 48 29.7    Left Median-Ulnar Palmar Sensory        Median   Palm-Wrist 1.58 2.1 74 - 85 Palm-Wrist 8 51 - 32.8         Ulnar   Palm-Wrist 1.23 1.75 21 - 21 Palm-Wrist 8 65 - 32.9    Right Median-Ulnar Palmar Sensory        Median   Palm-Wrist 1.48 2.0 55 - 62 Palm-Wrist 8 54 - 32.6         Ulnar   Palm-Wrist 1.23 1.78 19 - 21 Palm-Wrist 8 65 - 32.6    Right Radial Sensory   Forearm-Wrist 1.78 2.4 31  > 15 44 Forearm-Wrist 10 56 - 31.6    Left Ulnar Sensory   Wrist-Dig V 1.93 2.6 30  > 8 73 Wrist-Dig V 12.5 65  > 48 32.7    Right Ulnar Sensory   Wrist-Dig V 2.0 2.9 45  > 8 100 Wrist-Dig V 12.5 63  > 48 31      Inter-Nerve Comparisons     Nerve 1 Value 1 Nerve 2 Value 2 Parameter Result Normal   Sensory Sites   R Median Palm-Wrist 2.0 ms R Ulnar Palm-Wrist 1.8 ms Peak Lat Diff 0.22 ms <0.40   L Median Palm-Wrist 2.1 ms L Ulnar Palm-Wrist 1.8 ms Peak Lat Diff 0.35 ms <0.40     F Wave Studies     Min-F Max-F Dispersion Persistence Mean-F F-Norm L-R Mean-F L-R Mean-F Norm F/M Ratio F-M Lat (ms)   Right Median (Abd Poll Brev)  32.9  C   25.00 26.72 1.72 33.33 25.83 <33  <2.2 1.69 21.17       Electromyography     Side Muscle Ins Act Fibs/PSW Fasc HF Amp Dur Poly Recrt Int Pat   Right Deltoid Nml None Nml 0 Nml Nml 0 Nml Nml   Right Triceps Nml None Nml 0 Nml Nml 0 Nml Nml   Right Biceps Nml None Nml 0 Nml Nml 0 Nml Nml   Right Pronator Teres Nml None Nml 0 Nml Nml 0 Nml Nml   Right FCU Nml None Nml 0 Nml Nml 0 Nml Nml   Right FDI Nml None Nml 0 Nml Nml 0 Nml Nml   Right ADM Nml None Nml 0  Nml Nml 0 Nml Nml   Right APB Nml None Nml 0 Nml Nml 0 Nml Nml         NCS Waveforms:    Motor                Sensory                            Ultrasound Images:                Again, thank you for allowing me to participate in the care of your patient.        Sincerely,        Marlin Osborn MD

## 2023-11-13 NOTE — PROGRESS NOTES
[facility]  Electrodiagnostic Laboratory                 Department of Neurology                                                                                                         Test Date:  2023    Patient: Soco Dyson : 1955 Physician: Marlin Osborn MD   Sex: Female AGE: 68 year Ref Phys: Dr. Blanton   ID#: [id number]   Technician: Kristy Behling     History and Examination:  Referred by Dr. Blanton for a EMG to assess numbness. Symptoms have been present in both arms, the right side of face and occasionally in the legs. Overall the right arm is the worst side.       Techniques:  Motor conduction studies were done with surface recording electrodes. Sensory conduction studies were performed with surface electrodes, unless indicated otherwise by (n), designating the use of subdermal recording electrodes. Temperature was monitored and recorded throughout the study. Upper extremities were maintained at a temperature of 32 degrees Centigrade or higher.  EMG was done with a concentric needle electrode.     Results:  Motor nerve conduction studies:  Left median motor study is within normal limits although the patient is noted to have a higher amplitude recording at the elbow compared to the wrist.  There does appear to be an anastomosis with some of the median nerve stimulating the left abductor digiti minimi which is considered a normal variant.  Left ulnar motor study is also within normal limits.  Right ulnar motor study is within normal limits.  Right median motor study reveals a normal distal latency with low amplitude.  There also appears to be a anastomosis with some of the median nerve stimulating the ADM.    Sensory nerve studies:  Antidromic median studies bilaterally are within normal limits.  Palmar study on the left reveals a difference in peak latency with median nerve stimulation compared to ulnar stimulation measuring 0.35 ms.  On the right  side with palmar studies peak latency difference is normal at 2.22 ms.  Antidromic ulnar sensory studies are within normal limits.  Right radial study is within normal limits.    F waves  Right median F wave is within normal limits.    Needle examination of the right upper extremity is normal.    Interpretation:  This is a normal EMG.  There is no evidence of any large fiber nerve injury to explain the patient's current tingling symptoms.  Specifically no findings of cervical radiculopathy or large fiber peripheral neuropathy.  EMG does not rule out small fiber neuropathy and clinical correlation is recommended.      ___________________________  Marlin Osborn MD        Nerve Conduction Studies  Motor Sites      Latency Amplitude Neg. Amp Diff Segment Distance Velocity Neg. Dur Neg Area Diff Temperature Comment   Site (ms) Norm (mV) Norm %  cm m/s Norm ms %  C    Left Median (APB) Motor        Median (ADM)   Elbow 7.1 - 0.11  > 5.0      5.2  33 mg chk        Median (APB)   Wrist 3.3  < 4.4 5.2  > 5.0  Wrist-APB 8   5.1  33    Elbow 7.4 - 6.9  > 5.0 32.7 Elbow-Wrist 22 54  > 48 5.3 45.5 33    Right Median (APB) Motor        Median (ADM)   Elbow 6.7 - 0.64  > 5.0      6.9  32.6 mg chk        Median (APB)   Wrist 3.2  < 4.4 4.8  > 5.0  Wrist-APB 8   5.2  32.2    Elbow 7.2 - 7.7  > 5.0 60.4 Elbow-Wrist 22 55  > 48 5.4 42.8 32.5    Left Ulnar (ADM) Motor   Wrist 2.7  < 3.5 7.9  > 5.0  Wrist-ADM 8   4.1  33    Bel Elbow 5.7 - 7.3 - -7.6 Bel Elbow-Wrist 20 67  > 48 4.2 -1.76 33    Abv Elbow 7.1 - 7.2 - -1.37 Abv Elbow-Bel Elbow 9 64  > 48 4.3 0.60 33    Right Ulnar (ADM) Motor   Wrist 2.2  < 3.5 8.9  > 5.0  Wrist-ADM 8   4.3  32.9    Bel Elbow 5.2 - 8.4 - -5.6 Bel Elbow-Wrist 20 67  > 48 4.1 -16.3 32.9    Abv Elbow 6.6 - 8.1 - -3.6 Abv Elbow-Bel Elbow 9 64  > 48 4.1 -1.58 32.9      Sensory Sites      Onset Lat Peak Lat Amp (O-P) Amp (P-P) Segment Distance Velocity Temperature Comment   Site ms ms  V Norm  V  cm m/s Norm  C     Left Median Sensory   Wrist-Dig II 2.3 3.1 24  > 10 54 Wrist-Dig II 14 61  > 48 32.4    Right Median Sensory   Wrist-Dig II 2.2 3.0 24  > 10 56 Wrist-Dig II 14 64  > 48 29.7    Left Median-Ulnar Palmar Sensory        Median   Palm-Wrist 1.58 2.1 74 - 85 Palm-Wrist 8 51 - 32.8         Ulnar   Palm-Wrist 1.23 1.75 21 - 21 Palm-Wrist 8 65 - 32.9    Right Median-Ulnar Palmar Sensory        Median   Palm-Wrist 1.48 2.0 55 - 62 Palm-Wrist 8 54 - 32.6         Ulnar   Palm-Wrist 1.23 1.78 19 - 21 Palm-Wrist 8 65 - 32.6    Right Radial Sensory   Forearm-Wrist 1.78 2.4 31  > 15 44 Forearm-Wrist 10 56 - 31.6    Left Ulnar Sensory   Wrist-Dig V 1.93 2.6 30  > 8 73 Wrist-Dig V 12.5 65  > 48 32.7    Right Ulnar Sensory   Wrist-Dig V 2.0 2.9 45  > 8 100 Wrist-Dig V 12.5 63  > 48 31      Inter-Nerve Comparisons     Nerve 1 Value 1 Nerve 2 Value 2 Parameter Result Normal   Sensory Sites   R Median Palm-Wrist 2.0 ms R Ulnar Palm-Wrist 1.8 ms Peak Lat Diff 0.22 ms <0.40   L Median Palm-Wrist 2.1 ms L Ulnar Palm-Wrist 1.8 ms Peak Lat Diff 0.35 ms <0.40     F Wave Studies     Min-F Max-F Dispersion Persistence Mean-F F-Norm L-R Mean-F L-R Mean-F Norm F/M Ratio F-M Lat (ms)   Right Median (Abd Poll Brev)  32.9  C   25.00 26.72 1.72 33.33 25.83 <33  <2.2 1.69 21.17       Electromyography     Side Muscle Ins Act Fibs/PSW Fasc HF Amp Dur Poly Recrt Int Pat   Right Deltoid Nml None Nml 0 Nml Nml 0 Nml Nml   Right Triceps Nml None Nml 0 Nml Nml 0 Nml Nml   Right Biceps Nml None Nml 0 Nml Nml 0 Nml Nml   Right Pronator Teres Nml None Nml 0 Nml Nml 0 Nml Nml   Right FCU Nml None Nml 0 Nml Nml 0 Nml Nml   Right FDI Nml None Nml 0 Nml Nml 0 Nml Nml   Right ADM Nml None Nml 0 Nml Nml 0 Nml Nml   Right APB Nml None Nml 0 Nml Nml 0 Nml Nml         NCS Waveforms:    Motor                Sensory                            Ultrasound Images:

## 2023-11-29 ENCOUNTER — OFFICE VISIT (OUTPATIENT)
Dept: NEUROLOGY | Facility: CLINIC | Age: 68
End: 2023-11-29
Attending: INTERNAL MEDICINE
Payer: COMMERCIAL

## 2023-11-29 VITALS
WEIGHT: 165 LBS | DIASTOLIC BLOOD PRESSURE: 82 MMHG | RESPIRATION RATE: 16 BRPM | HEART RATE: 68 BPM | SYSTOLIC BLOOD PRESSURE: 132 MMHG | BODY MASS INDEX: 30.67 KG/M2

## 2023-11-29 DIAGNOSIS — R20.0 NUMBNESS: Primary | ICD-10-CM

## 2023-11-29 DIAGNOSIS — M79.661 PAIN OF RIGHT LOWER LEG: ICD-10-CM

## 2023-11-29 DIAGNOSIS — R20.2 TINGLING OF LEFT UPPER EXTREMITY: ICD-10-CM

## 2023-11-29 DIAGNOSIS — R20.2 RIGHT ARM AND RIGHT FACE TINGLING: ICD-10-CM

## 2023-11-29 PROCEDURE — 99205 OFFICE O/P NEW HI 60 MIN: CPT | Performed by: PSYCHIATRY & NEUROLOGY

## 2023-11-29 NOTE — PROGRESS NOTES
NEUROLOGY OUTPATIENT CONSULT NOTE   Nov 29, 2023     CHIEF COMPLAINT/REASON FOR VISIT/REASON FOR CONSULT  Patient presents with:  Consult: Numbness/tingling     REASON FOR CONSULTATION-numbness/tingling    REFERRAL SOURCE  Dr. Jorge Blanton   Dr. Jorge Blanton    HISTORY OF PRESENT ILLNESS  Soco Ferguson is a 68 year old female seen today for evaluation of numbness/tingling.  She reports that the symptoms came on about a year ago.  She had gotten up from sleep and was in an awkward position.  Initially there was sharp shooting pain down the right leg.  There was no associated numbness.  This lasted for some time with spreading of the symptoms to the left leg.  She then had some intermittent numbness but no pain of the right arm and occasionally of the left arm.  The right leg symptoms and the left leg symptoms have resolved though she continues to have issues in the right arm and occasionally in the left arm.  The numbness is more towards the lateral portion of the hand.  Denies any weakness.  No significant neck pain or back pain.  She does have some history of arthritis in her low back has never had a scan officially done for the arthritis.  This was based on the scan done for osteoporosis.  Denies any difficulty with balance.  No dropping things with her hands.  Denies any cognitive issues.  She has had 1 episode of right cheek numbness.    Previous history is reviewed and this is unchanged.    PAST MEDICAL/SURGICAL HISTORY  Past Medical History:   Diagnosis Date    Abdominal pain, unspecified site     Created by Conversion     Asymptomatic varicose veins     Created by Conversion     Blastomycosis     Created by Conversion     Calculus of kidney     Created by Conversion     Endometrial hyperplasia, unspecified     Created by Conversion     Hyperparathyroidism, unspecified (H24)     Created by Conversion     Lattice degeneration     Leiomyoma of uterus, unspecified     Created by Conversion     Myalgia  and myositis, unspecified     Created by Conversion     Nausea alone     Created by Conversion     Osteoporosis, unspecified     Created by Conversion     Other nonspecific finding on examination of urine     Created by Conversion     Pain in joint, site unspecified     Created by Conversion     Restless legs syndrome (RLS)     Created by Conversion     Senile osteoporosis     Created by Conversion     Ulcerative colitis, unspecified     Created by Conversion     Unspecified constipation     Created by Conversion     Unspecified sinusitis (chronic)     Created by Conversion     Unspecified symptom associated with female genital organs     Created by Conversion     Unspecified vitamin D deficiency     Created by Conversion      Patient Active Problem List   Diagnosis    Blastomycosis    Restless legs syndrome    Senile osteoporosis    Ulcerative colitis (H)    Venous insufficiency of both lower extremities    Articular disc disorder of temporomandibular joint    Peptic ulcer    Mixed hyperlipidemia   Significant for migraines, depression, cataracts, osteoporosis, GERD, peripheral arterial disease    FAMILY HISTORY  Family History   Problem Relation Age of Onset    Glaucoma No family hx of     Macular Degeneration No family hx of     Breast Cancer Other     Emphysema Mother     Varicose Veins Mother     Edema Mother     Lung Cancer Father     Breast Cancer Niece         Early 20's    Asthma Grandchild    Negative for neurological problems.    SOCIAL HISTORY  Social History     Tobacco Use    Smoking status: Never    Smokeless tobacco: Never    Tobacco comments:     in HS and 20's socail only, never pack a day smoker   Vaping Use    Vaping Use: Never used   Substance Use Topics    Alcohol use: Yes     Alcohol/week: 1.7 standard drinks of alcohol    Drug use: No       SYSTEMS REVIEW  Twelve-system ROS was done and other than the HPI this was negative/positive for arm and leg pain, joint pain, numbness/tingling, weakness  paralysis, ringing in the ears, depression, stomach pain/distress.    MEDICATIONS  atorvastatin (LIPITOR) 40 MG tablet, TAKE 1 TABLET BY MOUTH ONE TIME DAILY  Calcium Citrate-Vitamin D 200-250 MG-UNIT TABS, Take 1 tablet by mouth  omeprazole (PRILOSEC) 20 MG DR capsule, Take 20 mg by mouth daily  cholecalciferol 50 MCG (2000 UT) tablet, Take 1 tablet by mouth daily  (Patient not taking: Reported on 11/29/2023)  UNABLE TO FIND, MEDICATION NAME: Eye Drop's For Dry Eye's (Patient not taking: Reported on 11/29/2023)    apraclonidine (IOPIDINE) 1 % ophthalmic solution 1 drop  denosumab (PROLIA) injection 60 mg  prednisoLONE acetate (PRED FORTE) 1 % ophthalmic susp 1 drop         PHYSICAL EXAMINATION  VITALS: /82   Pulse 68   Resp 16   Wt 74.8 kg (165 lb)   BMI 30.67 kg/m    GENERAL: Healthy appearing, alert, no acute distress, normal habitus.  CARDIOVASCULAR: Extremities warm and well perfused. Pulses present.   NEUROLOGICAL:  Patient is awake and oriented to self, place and time.  Attention span is normal.  Memory is grossly intact.  Language is fluent and follows commands appropriately.  Appropriate fund of knowledge. Cranial nerves 2-12 are intact. There is no pronator drift.  Motor exam shows 5/5 strength in all extremities.  Tone is symmetric bilaterally in upper and lower extremities.  Reflexes are symmetric and 2+ in upper extremities and lower extremities. Sensory exam is grossly intact to light touch, pin prick and vibration.  Finger to nose and heel to shin is without dysmetria.  Romberg is negative.  Gait is normal and the patient is able to do tandem walk and walk on toes and heels.    DIAGNOSTICS  MRI Brain 2017  CONCLUSION:  1.  No evidence for acute or subacute infarct, hemorrhage, mass or obstructive type hydrocephalus.  2.  No definite abnormality along either trigeminal nerve.  3.  No pathologic contrast enhancement.  4.  Mild mucosal thickening in the maxillary, ethmoid, and sphenoid sinuses.  No air-fluid level in the sinuses. Mastoids clear.    EMG  Interpretation:  This is a normal EMG.  There is no evidence of any large fiber nerve injury to explain the patient's current tingling symptoms.  Specifically no findings of cervical radiculopathy or large fiber peripheral neuropathy.  EMG does not rule out small fiber neuropathy and clinical correlation is recommended.     RELEVANT LABS  Component      Latest Ref Rng 5/1/2023  9:30 AM 9/12/2023  4:28 PM   Sodium      136 - 145 mmol/L  140    Potassium      3.4 - 5.3 mmol/L  4.3    Chloride      98 - 107 mmol/L  102    Carbon Dioxide (CO2)      22 - 29 mmol/L  27    Anion Gap      7 - 15 mmol/L  11    Urea Nitrogen      8.0 - 23.0 mg/dL  24.6 (H)    Creatinine      0.51 - 0.95 mg/dL  0.86    Calcium      8.8 - 10.2 mg/dL  10.0    Glucose      70 - 99 mg/dL  100 (H)    Alkaline Phosphatase      35 - 104 U/L  92    AST      0 - 45 U/L  22    ALT      0 - 50 U/L  21    Protein Total      6.4 - 8.3 g/dL  7.8    Albumin      3.5 - 5.2 g/dL  4.7    Bilirubin Total      <=1.2 mg/dL  0.6    GFR Estimate      >60 mL/min/1.73m2  74    Hemoglobin A1C      0.0 - 5.6 % 5.4  5.5    Magnesium      1.7 - 2.3 mg/dL  2.1    TSH      0.30 - 4.20 uIU/mL  1.22       Legend:  (H) High    OUTSIDE RECORDS  Outside referral notes and chart notes were reviewed and pertinent information has been summarized (in addition to the HPI):-        IMPRESSION/REPORT/PLAN  Numbness in both upper extremities  Right leg pain  Rule out lumbar radiculopathy  Rule out cervical radiculopathy/cervical spine degeneration    This is a 68 year old female with initial episode of right leg pain with possible twisting/turning while sleeping.  Symptoms could be suggestive of a lumbar radiculopathy.  Exam today is noncontributory.  We will check an MRI of the L-spine and EMG to further evaluate.    She further complains of intermittent bilateral upper extremity numbness.  Exam today is noncontributory.  EMG  did not show any entrapment neuropathy.  We will check a MRI of the C-spine to evaluate for possible disc herniation causing bilateral arm numbness.    Exam does not suggest a cortical process and will hold off on an MRI of the brain.  MRI brain in 2017 was negative.  She did have some facial numbness on the right side though this was brief.  If rest of the testing is negative could consider an MRI of the brain.    Discussed treatment of cervical/lumbar radiculopathy including steroids, epidural injections, physical therapy, surgery.  We will decide on treatment plan depending on results.    I can see her back in about 6 weeks.    -     MR Cervical Spine w/o Contrast; Future  -     MR Lumbar Spine w/o Contrast; Future  -     EMG; Future    Return in about 6 weeks (around 1/10/2024) for In-Clinic Visit (must), Add on PAOR, After testing.    Over 60 minutes were spent coordinating the care for the patient on the day of the encounter.  This includes previsit, during visit and post visit activities as documented above.  Counseling patient.  Reviewing previous testing/imaging studies.  Testing ordered.  Multiple problems reviewed/addressed.  (Activities include but not inclusive of reviewing chart, reviewing outside records, reviewing labs and imaging study results as well as the images, patient visit time including getting history and exam,  use if applicable, review of test results with the patient and coming up with a plan in a shared model, counseling patient and family, education and answering patient questions, EMR , EMR diagnosis entry and problem list management, medication reconciliation and prescription management if applicable, paperwork if applicable, printing documents and documentation of the visit activities.)        Marshall Weeks MD  Neurologist  [facility]  Tel:- [phone]    This note was dictated using voice recognition software.  Any  grammatical or context distortions are unintentional and inherent to the software.

## 2023-11-29 NOTE — NURSING NOTE
Chief Complaint   Patient presents with    Consult     Numbness/tingling     Chelsey Mcdermott MA,CMA,10:30 AM

## 2023-11-29 NOTE — LETTER
11/29/2023         RE: Soco Ferguson  [address] [place] [address]        Dear Colleague,    Thank you for referring your patient, Soco Ferguson, to the [facility] [place]. Please see a copy of my visit note below.    NEUROLOGY OUTPATIENT CONSULT NOTE   Nov 29, 2023     CHIEF COMPLAINT/REASON FOR VISIT/REASON FOR CONSULT  Patient presents with:  Consult: Numbness/tingling     REASON FOR CONSULTATION-numbness/tingling    REFERRAL SOURCE  Dr. Jorge Blanton  CC Dr. Jorge Blanton    HISTORY OF PRESENT ILLNESS  Soco Ferguson is a 68 year old female seen today for evaluation of numbness/tingling.  She reports that the symptoms came on about a year ago.  She had gotten up from sleep and was in an awkward position.  Initially there was sharp shooting pain down the right leg.  There was no associated numbness.  This lasted for some time with spreading of the symptoms to the left leg.  She then had some intermittent numbness but no pain of the right arm and occasionally of the left arm.  The right leg symptoms and the left leg symptoms have resolved though she continues to have issues in the right arm and occasionally in the left arm.  The numbness is more towards the lateral portion of the hand.  Denies any weakness.  No significant neck pain or back pain.  She does have some history of arthritis in her low back has never had a scan officially done for the arthritis.  This was based on the scan done for osteoporosis.  Denies any difficulty with balance.  No dropping things with her hands.  Denies any cognitive issues.  She has had 1 episode of right cheek numbness.    Previous history is reviewed and this is unchanged.    PAST MEDICAL/SURGICAL HISTORY  Past Medical History:   Diagnosis Date     Abdominal pain, unspecified site     Created by Conversion      Asymptomatic varicose veins     Created by Conversion      Blastomycosis     Created by Conversion      Calculus of  kidney     Created by Conversion      Endometrial hyperplasia, unspecified     Created by Conversion      Hyperparathyroidism, unspecified (H24)     Created by Conversion      Lattice degeneration      Leiomyoma of uterus, unspecified     Created by Conversion      Myalgia and myositis, unspecified     Created by Conversion      Nausea alone     Created by Conversion      Osteoporosis, unspecified     Created by Conversion      Other nonspecific finding on examination of urine     Created by Conversion      Pain in joint, site unspecified     Created by Conversion      Restless legs syndrome (RLS)     Created by Conversion      Senile osteoporosis     Created by Conversion      Ulcerative colitis, unspecified     Created by Conversion      Unspecified constipation     Created by Conversion      Unspecified sinusitis (chronic)     Created by Conversion      Unspecified symptom associated with female genital organs     Created by Conversion      Unspecified vitamin D deficiency     Created by Conversion      Patient Active Problem List   Diagnosis     Blastomycosis     Restless legs syndrome     Senile osteoporosis     Ulcerative colitis (H)     Venous insufficiency of both lower extremities     Articular disc disorder of temporomandibular joint     Peptic ulcer     Mixed hyperlipidemia   Significant for migraines, depression, cataracts, osteoporosis, GERD, peripheral arterial disease    FAMILY HISTORY  Family History   Problem Relation Age of Onset     Glaucoma No family hx of      Macular Degeneration No family hx of      Breast Cancer Other      Emphysema Mother      Varicose Veins Mother      Edema Mother      Lung Cancer Father      Breast Cancer Niece         Early 20's     Asthma Grandchild    Negative for neurological problems.    SOCIAL HISTORY  Social History     Tobacco Use     Smoking status: Never     Smokeless tobacco: Never     Tobacco comments:     in HS and 20's socail only, never pack a day smoker    Vaping Use     Vaping Use: Never used   Substance Use Topics     Alcohol use: Yes     Alcohol/week: 1.7 standard drinks of alcohol     Drug use: No       SYSTEMS REVIEW  Twelve-system ROS was done and other than the HPI this was negative/positive for arm and leg pain, joint pain, numbness/tingling, weakness paralysis, ringing in the ears, depression, stomach pain/distress.    MEDICATIONS  atorvastatin (LIPITOR) 40 MG tablet, TAKE 1 TABLET BY MOUTH ONE TIME DAILY  Calcium Citrate-Vitamin D 200-250 MG-UNIT TABS, Take 1 tablet by mouth  omeprazole (PRILOSEC) 20 MG DR capsule, Take 20 mg by mouth daily  cholecalciferol 50 MCG (2000 UT) tablet, Take 1 tablet by mouth daily  (Patient not taking: Reported on 11/29/2023)  UNABLE TO FIND, MEDICATION NAME: Eye Drop's For Dry Eye's (Patient not taking: Reported on 11/29/2023)    apraclonidine (IOPIDINE) 1 % ophthalmic solution 1 drop  denosumab (PROLIA) injection 60 mg  prednisoLONE acetate (PRED FORTE) 1 % ophthalmic susp 1 drop         PHYSICAL EXAMINATION  VITALS: /82   Pulse 68   Resp 16   Wt 74.8 kg (165 lb)   BMI 30.67 kg/m    GENERAL: Healthy appearing, alert, no acute distress, normal habitus.  CARDIOVASCULAR: Extremities warm and well perfused. Pulses present.   NEUROLOGICAL:  Patient is awake and oriented to self, place and time.  Attention span is normal.  Memory is grossly intact.  Language is fluent and follows commands appropriately.  Appropriate fund of knowledge. Cranial nerves 2-12 are intact. There is no pronator drift.  Motor exam shows 5/5 strength in all extremities.  Tone is symmetric bilaterally in upper and lower extremities.  Reflexes are symmetric and 2+ in upper extremities and lower extremities. Sensory exam is grossly intact to light touch, pin prick and vibration.  Finger to nose and heel to shin is without dysmetria.  Romberg is negative.  Gait is normal and the patient is able to do tandem walk and walk on toes and  heels.    DIAGNOSTICS  MRI Brain 2017  CONCLUSION:  1.  No evidence for acute or subacute infarct, hemorrhage, mass or obstructive type hydrocephalus.  2.  No definite abnormality along either trigeminal nerve.  3.  No pathologic contrast enhancement.  4.  Mild mucosal thickening in the maxillary, ethmoid, and sphenoid sinuses. No air-fluid level in the sinuses. Mastoids clear.    EMG  Interpretation:  This is a normal EMG.  There is no evidence of any large fiber nerve injury to explain the patient's current tingling symptoms.  Specifically no findings of cervical radiculopathy or large fiber peripheral neuropathy.  EMG does not rule out small fiber neuropathy and clinical correlation is recommended.     RELEVANT LABS  Component      Latest Ref Rng 5/1/2023  9:30 AM 9/12/2023  4:28 PM   Sodium      136 - 145 mmol/L  140    Potassium      3.4 - 5.3 mmol/L  4.3    Chloride      98 - 107 mmol/L  102    Carbon Dioxide (CO2)      22 - 29 mmol/L  27    Anion Gap      7 - 15 mmol/L  11    Urea Nitrogen      8.0 - 23.0 mg/dL  24.6 (H)    Creatinine      0.51 - 0.95 mg/dL  0.86    Calcium      8.8 - 10.2 mg/dL  10.0    Glucose      70 - 99 mg/dL  100 (H)    Alkaline Phosphatase      35 - 104 U/L  92    AST      0 - 45 U/L  22    ALT      0 - 50 U/L  21    Protein Total      6.4 - 8.3 g/dL  7.8    Albumin      3.5 - 5.2 g/dL  4.7    Bilirubin Total      <=1.2 mg/dL  0.6    GFR Estimate      >60 mL/min/1.73m2  74    Hemoglobin A1C      0.0 - 5.6 % 5.4  5.5    Magnesium      1.7 - 2.3 mg/dL  2.1    TSH      0.30 - 4.20 uIU/mL  1.22       Legend:  (H) High    OUTSIDE RECORDS  Outside referral notes and chart notes were reviewed and pertinent information has been summarized (in addition to the HPI):-        IMPRESSION/REPORT/PLAN  Numbness in both upper extremities  Right leg pain  Rule out lumbar radiculopathy  Rule out cervical radiculopathy/cervical spine degeneration    This is a 68 year old female with initial episode of  right leg pain with possible twisting/turning while sleeping.  Symptoms could be suggestive of a lumbar radiculopathy.  Exam today is noncontributory.  We will check an MRI of the L-spine and EMG to further evaluate.    She further complains of intermittent bilateral upper extremity numbness.  Exam today is noncontributory.  EMG did not show any entrapment neuropathy.  We will check a MRI of the C-spine to evaluate for possible disc herniation causing bilateral arm numbness.    Exam does not suggest a cortical process and will hold off on an MRI of the brain.  MRI brain in 2017 was negative.  She did have some facial numbness on the right side though this was brief.  If rest of the testing is negative could consider an MRI of the brain.    Discussed treatment of cervical/lumbar radiculopathy including steroids, epidural injections, physical therapy, surgery.  We will decide on treatment plan depending on results.    I can see her back in about 6 weeks.    -     MR Cervical Spine w/o Contrast; Future  -     MR Lumbar Spine w/o Contrast; Future  -     EMG; Future    Return in about 6 weeks (around 1/10/2024) for In-Clinic Visit (must), Add on PAOR, After testing.    Over 60 minutes were spent coordinating the care for the patient on the day of the encounter.  This includes previsit, during visit and post visit activities as documented above.  Counseling patient.  Reviewing previous testing/imaging studies.  Testing ordered.  Multiple problems reviewed/addressed.  (Activities include but not inclusive of reviewing chart, reviewing outside records, reviewing labs and imaging study results as well as the images, patient visit time including getting history and exam,  use if applicable, review of test results with the patient and coming up with a plan in a shared model, counseling patient and family, education and answering patient questions, EMR , EMR diagnosis entry and problem list management,  medication reconciliation and prescription management if applicable, paperwork if applicable, printing documents and documentation of the visit activities.)        Marshall Weeks MD  Neurologist  [facility]  Tel:- [phone]    This note was dictated using voice recognition software.  Any grammatical or context distortions are unintentional and inherent to the software.      Again, thank you for allowing me to participate in the care of your patient.        Sincerely,        Masrhall Weeks MD

## 2023-12-23 ENCOUNTER — HOSPITAL ENCOUNTER (OUTPATIENT)
Dept: MRI IMAGING | Facility: CLINIC | Age: 68
Discharge: HOME | End: 2023-12-23
Attending: PSYCHIATRY & NEUROLOGY
Payer: COMMERCIAL

## 2023-12-23 DIAGNOSIS — R20.2 TINGLING OF LEFT UPPER EXTREMITY: ICD-10-CM

## 2023-12-23 DIAGNOSIS — R20.2 RIGHT ARM AND RIGHT FACE TINGLING: ICD-10-CM

## 2023-12-23 PROCEDURE — 72141 MRI NECK SPINE W/O DYE: CPT

## 2023-12-23 PROCEDURE — 72148 MRI LUMBAR SPINE W/O DYE: CPT

## 2024-01-18 ENCOUNTER — TELEPHONE (OUTPATIENT)
Dept: NEUROLOGY | Facility: CLINIC | Age: 69
End: 2024-01-18
Payer: COMMERCIAL

## 2024-01-18 NOTE — TELEPHONE ENCOUNTER
[facility]    Phone Message    May a detailed message be left on voicemail: yes     Reason for Call: Requesting Results     Name/type of test: MRI results   Date of test: 12/23/23  Was test done at a location other than [facility] (Please fill in the location if not [facility])?: Yes:     Pt is requesting a call back from a care team member to discuss MRI results from 12/23/22    Action Taken: Message routed to:  Other: [facility]     Travel Screening: Not Applicable

## 2024-01-18 NOTE — TELEPHONE ENCOUNTER
Called and spoke with patient and gave her the message that the provider sent through [employer]. She asked what the next step is and I told her that the EMG that she has scheduled on 2/1/24 and then follow up with Dr. Weeks. She verbalized understanding.

## 2024-01-29 ASSESSMENT — ENCOUNTER SYMPTOMS
JOINT SWELLING: 0
WEAKNESS: 0
EYE PAIN: 1
HEADACHES: 0
DIZZINESS: 0
HEARTBURN: 0
SORE THROAT: 0
COUGH: 0
NAUSEA: 0
HEMATURIA: 0
FEVER: 0
CHILLS: 0
HEMATOCHEZIA: 0
PALPITATIONS: 0
ARTHRALGIAS: 0
MYALGIAS: 0
FREQUENCY: 0
NERVOUS/ANXIOUS: 0
BREAST MASS: 0
PARESTHESIAS: 0
SHORTNESS OF BREATH: 0
DYSURIA: 0
ABDOMINAL PAIN: 0
DIARRHEA: 0
CONSTIPATION: 0

## 2024-01-29 ASSESSMENT — ACTIVITIES OF DAILY LIVING (ADL): CURRENT_FUNCTION: NO ASSISTANCE NEEDED

## 2024-02-01 ENCOUNTER — OFFICE VISIT (OUTPATIENT)
Dept: INTERNAL MEDICINE | Facility: CLINIC | Age: 69
End: 2024-02-01
Payer: COMMERCIAL

## 2024-02-01 VITALS
HEIGHT: 62 IN | TEMPERATURE: 97.8 F | WEIGHT: 178.4 LBS | BODY MASS INDEX: 32.83 KG/M2 | DIASTOLIC BLOOD PRESSURE: 80 MMHG | OXYGEN SATURATION: 97 % | HEART RATE: 72 BPM | SYSTOLIC BLOOD PRESSURE: 124 MMHG | RESPIRATION RATE: 16 BRPM

## 2024-02-01 DIAGNOSIS — M81.0 SENILE OSTEOPOROSIS: ICD-10-CM

## 2024-02-01 DIAGNOSIS — K51.80 OTHER ULCERATIVE COLITIS WITHOUT COMPLICATION (H): ICD-10-CM

## 2024-02-01 DIAGNOSIS — Z00.00 ENCOUNTER FOR ANNUAL WELLNESS VISIT (AWV) IN MEDICARE PATIENT: Primary | ICD-10-CM

## 2024-02-01 DIAGNOSIS — Z12.4 SCREENING FOR MALIGNANT NEOPLASM OF CERVIX: ICD-10-CM

## 2024-02-01 DIAGNOSIS — Z13.29 SCREENING FOR ENDOCRINE, METABOLIC AND IMMUNITY DISORDER: ICD-10-CM

## 2024-02-01 DIAGNOSIS — E78.2 MIXED HYPERLIPIDEMIA: ICD-10-CM

## 2024-02-01 DIAGNOSIS — Z13.228 SCREENING FOR ENDOCRINE, METABOLIC AND IMMUNITY DISORDER: ICD-10-CM

## 2024-02-01 DIAGNOSIS — R93.89 ABNORMAL FINDINGS ON DIAGNOSTIC IMAGING OF OTHER SPECIFIED BODY STRUCTURES: ICD-10-CM

## 2024-02-01 DIAGNOSIS — Z13.0 SCREENING FOR ENDOCRINE, METABOLIC AND IMMUNITY DISORDER: ICD-10-CM

## 2024-02-01 DIAGNOSIS — K27.9 PEPTIC ULCER: ICD-10-CM

## 2024-02-01 LAB
ALBUMIN SERPL BCG-MCNC: 4.1 G/DL (ref 3.5–5.2)
ALBUMIN UR-MCNC: NEGATIVE MG/DL
ALP SERPL-CCNC: 88 U/L (ref 40–150)
ALT SERPL W P-5'-P-CCNC: 40 U/L (ref 0–50)
ANION GAP SERPL CALCULATED.3IONS-SCNC: 10 MMOL/L (ref 7–15)
APPEARANCE UR: CLEAR
AST SERPL W P-5'-P-CCNC: 31 U/L (ref 0–45)
BACTERIA #/AREA URNS HPF: ABNORMAL /HPF
BILIRUB SERPL-MCNC: 0.5 MG/DL
BILIRUB UR QL STRIP: NEGATIVE
BUN SERPL-MCNC: 21.5 MG/DL (ref 8–23)
CALCIUM SERPL-MCNC: 9.6 MG/DL (ref 8.8–10.2)
CHLORIDE SERPL-SCNC: 105 MMOL/L (ref 98–107)
CHOLEST SERPL-MCNC: 175 MG/DL
COLOR UR AUTO: YELLOW
CREAT SERPL-MCNC: 0.83 MG/DL (ref 0.51–0.95)
DEPRECATED HCO3 PLAS-SCNC: 26 MMOL/L (ref 22–29)
EGFRCR SERPLBLD CKD-EPI 2021: 77 ML/MIN/1.73M2
ERYTHROCYTE [DISTWIDTH] IN BLOOD BY AUTOMATED COUNT: 12.8 % (ref 10–15)
FASTING STATUS PATIENT QL REPORTED: ABNORMAL
GLUCOSE SERPL-MCNC: 96 MG/DL (ref 70–99)
GLUCOSE UR STRIP-MCNC: NEGATIVE MG/DL
HBA1C MFR BLD: 5.3 % (ref 0–5.6)
HCT VFR BLD AUTO: 42.5 % (ref 35–47)
HDLC SERPL-MCNC: 49 MG/DL
HGB BLD-MCNC: 14.2 G/DL (ref 11.7–15.7)
HGB UR QL STRIP: ABNORMAL
KETONES UR STRIP-MCNC: NEGATIVE MG/DL
LDLC SERPL CALC-MCNC: 93 MG/DL
LEUKOCYTE ESTERASE UR QL STRIP: ABNORMAL
MCH RBC QN AUTO: 30 PG (ref 26.5–33)
MCHC RBC AUTO-ENTMCNC: 33.4 G/DL (ref 31.5–36.5)
MCV RBC AUTO: 90 FL (ref 78–100)
NITRATE UR QL: POSITIVE
NONHDLC SERPL-MCNC: 126 MG/DL
PH UR STRIP: 5.5 [PH] (ref 5–8)
PLATELET # BLD AUTO: 232 10E3/UL (ref 150–450)
POTASSIUM SERPL-SCNC: 4.3 MMOL/L (ref 3.4–5.3)
PROT SERPL-MCNC: 6.3 G/DL (ref 6.4–8.3)
RBC # BLD AUTO: 4.74 10E6/UL (ref 3.8–5.2)
RBC #/AREA URNS AUTO: ABNORMAL /HPF
SODIUM SERPL-SCNC: 141 MMOL/L (ref 135–145)
SP GR UR STRIP: 1.02 (ref 1–1.03)
SQUAMOUS #/AREA URNS AUTO: ABNORMAL /LPF
TRIGL SERPL-MCNC: 164 MG/DL
TSH SERPL DL<=0.005 MIU/L-ACNC: 1.67 UIU/ML (ref 0.3–4.2)
UROBILINOGEN UR STRIP-ACNC: 0.2 E.U./DL
VIT D+METAB SERPL-MCNC: 41 NG/ML (ref 20–50)
WBC # BLD AUTO: 7.8 10E3/UL (ref 4–11)
WBC #/AREA URNS AUTO: ABNORMAL /HPF
WBC CLUMPS #/AREA URNS HPF: PRESENT /HPF

## 2024-02-01 PROCEDURE — 2894A URINE CULTURE: CPT | Mod: 59 | Performed by: INTERNAL MEDICINE

## 2024-02-01 PROCEDURE — G0438 PPPS, INITIAL VISIT: HCPCS | Performed by: INTERNAL MEDICINE

## 2024-02-01 PROCEDURE — G0145 SCR C/V CYTO,THINLAYER,RESCR: HCPCS | Performed by: INTERNAL MEDICINE

## 2024-02-01 PROCEDURE — 96372 THER/PROPH/DIAG INJ SC/IM: CPT | Performed by: INTERNAL MEDICINE

## 2024-02-01 PROCEDURE — 84443 ASSAY THYROID STIM HORMONE: CPT | Performed by: INTERNAL MEDICINE

## 2024-02-01 PROCEDURE — 36415 COLL VENOUS BLD VENIPUNCTURE: CPT | Performed by: INTERNAL MEDICINE

## 2024-02-01 PROCEDURE — 80053 COMPREHEN METABOLIC PANEL: CPT | Performed by: INTERNAL MEDICINE

## 2024-02-01 PROCEDURE — 81001 URINALYSIS AUTO W/SCOPE: CPT | Performed by: INTERNAL MEDICINE

## 2024-02-01 PROCEDURE — 80061 LIPID PANEL: CPT | Performed by: INTERNAL MEDICINE

## 2024-02-01 PROCEDURE — 87186 SC STD MICRODIL/AGAR DIL: CPT | Performed by: INTERNAL MEDICINE

## 2024-02-01 PROCEDURE — 85027 COMPLETE CBC AUTOMATED: CPT | Performed by: INTERNAL MEDICINE

## 2024-02-01 PROCEDURE — 82306 VITAMIN D 25 HYDROXY: CPT | Performed by: INTERNAL MEDICINE

## 2024-02-01 PROCEDURE — 87086 URINE CULTURE/COLONY COUNT: CPT | Performed by: INTERNAL MEDICINE

## 2024-02-01 PROCEDURE — 99214 OFFICE O/P EST MOD 30 MIN: CPT | Mod: 25 | Performed by: INTERNAL MEDICINE

## 2024-02-01 PROCEDURE — 83036 HEMOGLOBIN GLYCOSYLATED A1C: CPT | Mod: GZ | Performed by: INTERNAL MEDICINE

## 2024-02-01 PROCEDURE — 87624 HPV HI-RISK TYP POOLED RSLT: CPT | Mod: GZ | Performed by: INTERNAL MEDICINE

## 2024-02-01 ASSESSMENT — ENCOUNTER SYMPTOMS
HEMATURIA: 0
DIARRHEA: 0
SHORTNESS OF BREATH: 0
ABDOMINAL PAIN: 0
JOINT SWELLING: 0
CHILLS: 0
COUGH: 0
FREQUENCY: 0
MYALGIAS: 0
EYE PAIN: 1
HEADACHES: 0
CONSTIPATION: 0
PALPITATIONS: 0
NERVOUS/ANXIOUS: 0
SORE THROAT: 0
FEVER: 0
WEAKNESS: 0
DYSURIA: 0
NAUSEA: 0
DIZZINESS: 0
ARTHRALGIAS: 0

## 2024-02-01 ASSESSMENT — ACTIVITIES OF DAILY LIVING (ADL): CURRENT_FUNCTION: NO ASSISTANCE NEEDED

## 2024-02-01 NOTE — PATIENT INSTRUCTIONS
Prolia 21st today. Labs today.  Prolia 22nd in 6 months with me.    DXA in 7/2024 .   Phone number to schedule [phone].    Daily calcium need is 7219-2225 mg a day from the diet and supplements.  Calcium citrate is easier to digest.  Vitamin D 2000 IU daily recommended.    Risk of rebound vertebral fractures is higher when Prolia suddenly stopped or dose was missed.      Prolia and RSV vaccine should be  for at least a week.

## 2024-02-01 NOTE — PROGRESS NOTES
"Preventive Care Visit  [facility]  Jorge Blanton MD, Internal Medicine  Feb 1, 2024    Assessment & Plan     Encounter for annual wellness visit (AWV) in Medicare patient  Patient checked with her insurance and PAP will be covered every 2 years. She wants the PAP today.      Other ulcerative colitis without complication (H)  Stable. No active treatment, normal colonoscopy in 2022.  - CBC with platelets; Future    Mixed hyperlipidemia  On statin  - CBC with platelets; Future  - Comprehensive metabolic panel; Future  - Lipid panel reflex to direct LDL Fasting; Future    Senile osteoporosis  Due for Prolia 21 today.  DXA in 7/2024.  The following steps were completed to comply with the REMS program for Prolia:    Reviewed information in the Medication Guide and Patient Counseling Chart, including the serious risks of Prolia  and the symptoms of each risk.    Advised patient to seek prompt medical attention if they have signs or symptoms of any of the serious risks.    Provided each patient a copy of the Medication Guide and Patient Brochure   - Vitamin D Deficiency; Future  - TSH with free T4 reflex; Future  - DX Hip/Pelvis/Spine; Future    Peptic ulcer  Stable on omeprazole.  Last EGD 6/2023.  - CBC with platelets; Future    Screening for endocrine, metabolic and immunity disorder  - UA Macroscopic with reflex to Microscopic and Culture; Future  - Hemoglobin A1c; Future    Abnormal findings on diagnostic imaging of other specified body structures    - TSH with free T4 reflex; Future    Screening for malignant neoplasm of cervix    - Gynecologic Cytology (PAP Smear); Future            BMI  Estimated body mass index is 33.16 kg/m  as calculated from the following:    Height as of this encounter: 1.562 m (5' 1.5\").    Weight as of this encounter: 80.9 kg (178 lb 6.4 oz).       Counseling  Appropriate preventive services were discussed with this patient, including applicable screening as " "appropriate for fall prevention, nutrition, physical activity, Tobacco-use cessation, weight loss and cognition.  Checklist reviewing preventive services available has been given to the patient.  Updated plan of care.  Patient reported difficulty with activities of daily living were addressed today.  Patient has been advised of split billing requirements and indicates understanding: Yes    Patient Instructions   Prolia 21st today. Labs today.  Prolia 22nd in 6 months with me.    DXA in 7/2024 .   Phone number to schedule [phone].    Daily calcium need is 0778-0728 mg a day from the diet and supplements.  Calcium citrate is easier to digest.  Vitamin D 2000 IU daily recommended.    Risk of rebound vertebral fractures is higher when Prolia suddenly stopped or dose was missed.      Prolia and RSV vaccine should be  for at least a week.      Return in about 6 months (around 8/1/2024) for Follow up, Prolia, osteoporosis.     Etelvina Wan is a 68 year old, presenting for the following:  No chief complaint on file.        2/1/2024    10:02 AM   Additional Questions   Roomed by [facility]         Health Care Directive  Patient does not have a Health Care Directive or Living Will:   Healthy Habits:     In general, how would you rate your overall health?  Good    Frequency of exercise:  None    Do you usually eat at least 4 servings of fruit and vegetables a day, include whole grains    & fiber and avoid regularly eating high fat or \"junk\" foods?  No    Taking medications regularly:  Yes    Medication side effects:  None    Ability to successfully perform activities of daily living:  No assistance needed    Home Safety:  No safety concerns identified    Hearing Impairment:  No hearing concerns    In the past 6 months, have you been bothered by leaking of urine?  No    In general, how would you rate your overall mental or emotional health?  Good    Additional concerns today:  No                1/29/2024 "   Social Factors   Worry food won't last until get money to buy more No   Food not last or not have enough money for food? No   Do you have housing?  Yes   Are you worried about losing your housing? No   Lack of transportation? No   Unable to get utilities (heat,electricity)? No         2/1/2024   Fall Risk   Fallen 2 or more times in the past year? No   Trouble with walking or balance? No        Today's PHQ-2 Score:       2/1/2024     9:53 AM   PHQ-2 ( 1999 [employer])   Q1: Little interest or pleasure in doing things 2   Q2: Feeling down, depressed or hopeless 0   PHQ-2 Score 2   Q1: Little interest or pleasure in doing things More than half the days   Q2: Feeling down, depressed or hopeless Not at all   PHQ-2 Score 2           1/29/2024   Substance Use   Alcohol more than 3/day or more than 7/wk No     Social History     Tobacco Use    Smoking status: Never     Passive exposure: Never    Smokeless tobacco: Never    Tobacco comments:     in HS and 20's socail only, never pack a day smoker   Vaping Use    Vaping Use: Never used   Substance Use Topics    Alcohol use: Yes     Alcohol/week: 1.7 standard drinks of alcohol    Drug use: No           1/12/2022   LAST FHS-7 RESULTS   1st degree relative breast or ovarian cancer Unknown   Any relative bilateral breast cancer Unknown   Any male have breast cancer No   Any woman have breast and ovarian cancer No   Any woman with breast cancer before 50yrs No   2 or more relatives with breast and/or ovarian cancer No   2 or more relatives with breast and/or bowel cancer No          History of abnormal Pap smear:         Latest Ref Rng & Units 9/29/2020     5:16 PM 6/29/2017     9:19 AM   PAP / HPV   PAP Negative for squamous intraepithelial lesion or malignancy. Negative for squamous intraepithelial lesion or malignancy  Electronically signed by Nanci Currie CT (ASCP) on 9/30/2020 at  3:59 PM    Negative for squamous intraepithelial lesion or malignancy  Electronically  signed by Chelsey Montes De Oca CT (ASCP) on 7/12/2017 at  2:48 PM        The 10-year ASCVD risk score (Rojas DK, et al., 2019) is: 6%    Values used to calculate the score:      Age: 68 years      Sex: Female      Is Non- : No      Diabetic: No      Tobacco smoker: No      Systolic Blood Pressure: 124 mmHg      Is BP treated: No      HDL Cholesterol: 48 mg/dL      Total Cholesterol: 150 mg/dL            Reviewed and updated as needed this visit by Provider                      Current providers sharing in care for this patient include:  Patient Care Team:  Jorge Blanton MD as PCP - General (Internal Medicine)  Janine Perez Chi, OD as MD (Optometry)  Tabby Skinner MD as MD (Ophthalmology)  Jorge Blanton MD as Assigned PCP  Anaid Sloan MD as Assigned Surgical Provider  Yovany Adams DO as MD (Neurology)  Uriel Abebe MD as MD (Otolaryngology)  Marshall Weeks MD as MD (Neurology)  Marshall Weeks MD as Assigned Neuroscience Provider    The following health maintenance items are reviewed in Epic and correct as of today:  Health Maintenance   Topic Date Due    ANNUAL REVIEW OF HM ORDERS  Never done    RSV VACCINE (Pregnancy & 60+) (1 - 1-dose 60+ series) Never done    MEDICARE ANNUAL WELLNESS VISIT  02/01/2025    FALL RISK ASSESSMENT  02/01/2025    MAMMO SCREENING  09/19/2025    GLUCOSE  09/12/2026    LIPID  01/31/2028    ADVANCE CARE PLANNING  01/31/2028    DTAP/TDAP/TD IMMUNIZATION (5 - Td or Tdap) 09/29/2030    COLORECTAL CANCER SCREENING  03/15/2032    DEXA  07/28/2037    HEPATITIS C SCREENING  Completed    PHQ-2 (once per calendar year)  Completed    INFLUENZA VACCINE  Completed    Pneumococcal Vaccine: 65+ Years  Completed    ZOSTER IMMUNIZATION  Completed    COVID-19 Vaccine  Completed    IPV IMMUNIZATION  Aged Out    HPV IMMUNIZATION  Aged Out    MENINGITIS IMMUNIZATION  Aged Out    RSV MONOCLONAL ANTIBODY  Aged Out    PAP  Discontinued     Review of  "Systems   Constitutional:  Negative for chills and fever.   HENT:  Negative for congestion, ear pain, hearing loss and sore throat.    Eyes:  Positive for pain. Negative for visual disturbance.   Respiratory:  Negative for cough and shortness of breath.    Cardiovascular:  Positive for chest pain. Negative for palpitations.   Gastrointestinal:  Negative for abdominal pain, constipation, diarrhea and nausea.   Genitourinary:  Negative for dysuria, frequency, genital sores, hematuria, pelvic pain, urgency, vaginal bleeding and vaginal discharge.   Musculoskeletal:  Negative for arthralgias, joint swelling and myalgias.   Skin:  Negative for rash.   Neurological:  Negative for dizziness, weakness and headaches.   Psychiatric/Behavioral:  The patient is not nervous/anxious.           Objective    Exam  /80   Pulse 72   Temp 97.8  F (36.6  C)   Resp 16   Ht 1.562 m (5' 1.5\")   Wt 80.9 kg (178 lb 6.4 oz)   LMP  (LMP Unknown)   SpO2 97%   BMI 33.16 kg/m     Estimated body mass index is 33.16 kg/m  as calculated from the following:    Height as of this encounter: 1.562 m (5' 1.5\").    Weight as of this encounter: 80.9 kg (178 lb 6.4 oz).  Physical Exam  Physical Exam:  General Appearance: Alert, cooperative, no distress, appears stated age.  Head: Normocephalic, without obvious abnormality, atraumatic  Eyes: PERRL, conjunctiva/corneas clear, EOM's intact  Ears: Normal TM's and external ear canals, both ears  Nose: Nares normal, septum midline,mucosa normal, no drainage  Throat: Lips, mucosa, and tongue normal; teeth and gums normal  Neck: Supple, symmetrical, trachea midline, no adenopathy;  thyroid: not enlarged, symmetric, no tenderness/mass/nodules; no carotid bruit or JVD  Back: Symmetric, no curvature, ROM normal, no CVA tenderness.  Lungs: Clear to auscultation bilaterally, respirations unlabored.  Breasts: No breast masses, tenderness, asymmetry, or nipple discharge.  Heart: Regular rate and rhythm, S1 " and S2 normal, no murmur, rub, or gallop.  Abdomen: Soft, non-tender, bowel sounds active all four quadrants,  no masses, no organomegaly.  Pelvic:Normal external genitalia, speculum exam done, PAP done, cervix normal, bimanual exam showed no adnexal tenderness.  Extremities: Extremities normal, atraumatic, no cyanosis or edema.  Skin: Skin color, texture, turgor normal, no rashes or lesions.  Lymph nodes: Cervical, supraclavicular, and axillary nodes normal.  Neurologic: No focal neurological findings.            2/1/2024   Mini Cog   Mini-Cog Not Completed (choose reason) Patient declines   Clock Draw Score 2 Normal    2 Normal    2 Normal   3 Item Recall 3 objects recalled    3 objects recalled    3 objects recalled   Mini Cog Total Score 5    5    5            Signed Electronically by: Jorge Blanton MD    Answers submitted by the patient for this visit:  Annual Preventive Visit (Submitted on 1/29/2024)  Chief Complaint: Annual Exam:  Blood in stool: No  heartburn: No  peripheral edema: Yes  mood changes: No  Skin sensation changes: No  tenderness: No  breast mass: No  breast discharge: No

## 2024-02-03 LAB
BACTERIA UR CULT: ABNORMAL
BACTERIA UR CULT: ABNORMAL

## 2024-02-05 DIAGNOSIS — N30.00 ACUTE CYSTITIS WITHOUT HEMATURIA: Primary | ICD-10-CM

## 2024-02-05 LAB
BKR LAB AP GYN ADEQUACY: NORMAL
BKR LAB AP GYN INTERPRETATION: NORMAL
BKR LAB AP HPV REFLEX: NORMAL
BKR LAB AP PREVIOUS ABNORMAL: NORMAL
PATH REPORT.COMMENTS IMP SPEC: NORMAL
PATH REPORT.COMMENTS IMP SPEC: NORMAL
PATH REPORT.RELEVANT HX SPEC: NORMAL

## 2024-02-05 RX ORDER — NITROFURANTOIN 25; 75 MG/1; MG/1
100 CAPSULE ORAL 2 TIMES DAILY
Qty: 14 CAPSULE | Refills: 0 | Status: SHIPPED | OUTPATIENT
Start: 2024-02-05 | End: 2024-06-26

## 2024-02-07 LAB
HUMAN PAPILLOMA VIRUS 16 DNA: NEGATIVE
HUMAN PAPILLOMA VIRUS 18 DNA: NEGATIVE
HUMAN PAPILLOMA VIRUS FINAL DIAGNOSIS: NORMAL
HUMAN PAPILLOMA VIRUS OTHER HR: NEGATIVE

## 2024-02-08 ENCOUNTER — OFFICE VISIT (OUTPATIENT)
Dept: INTERNAL MEDICINE | Facility: CLINIC | Age: 69
End: 2024-02-08
Payer: COMMERCIAL

## 2024-02-08 VITALS
SYSTOLIC BLOOD PRESSURE: 130 MMHG | OXYGEN SATURATION: 98 % | HEART RATE: 67 BPM | DIASTOLIC BLOOD PRESSURE: 4 MMHG | RESPIRATION RATE: 16 BRPM

## 2024-02-08 DIAGNOSIS — M81.0 SENILE OSTEOPOROSIS: Primary | ICD-10-CM

## 2024-02-08 DIAGNOSIS — E78.2 MIXED HYPERLIPIDEMIA: ICD-10-CM

## 2024-02-08 DIAGNOSIS — I87.2 VENOUS (PERIPHERAL) INSUFFICIENCY: ICD-10-CM

## 2024-02-08 PROBLEM — Z12.4 CERVICAL CANCER SCREENING: Status: ACTIVE | Noted: 2024-02-08

## 2024-02-08 PROCEDURE — 99214 OFFICE O/P EST MOD 30 MIN: CPT | Performed by: INTERNAL MEDICINE

## 2024-02-08 NOTE — PROGRESS NOTES
"  Assessment & Plan     Follow-up visit, patient was seen on 2/1/24 and have questions about her lab results and Prolia.  All lab results reviewed in details and she will complete Macrobid for UTI, will continue statin.    Senile osteoporosis  She had Prolia # 21 on 2/1/24.  Pt also report having moderate swelling in both her leg from ankle to hip. Wondering if this is a side effect from Prolia she got on 2/1.     She has h/o Right leg venous ablation  > 10 years ago and has venous insufficiency. She needs new compression stockings. She did painting whole day, when she noticed leg swelling, R > L, in the evening.  This is the explanation for her legs swelling.    Prolia is not the cause.    Venous (peripheral) insufficiency  See above  - Compression Sleeve/Stocking Order for DME - ONLY FOR DME    Mixed hyperlipidemia  Component      Latest Ref Rng 2/1/2024  10:54 AM   Cholesterol      <200 mg/dL 175    Triglycerides      <150 mg/dL 164 (H)    HDL Cholesterol      >=50 mg/dL 49 (L)    LDL Cholesterol Calculated      <=100 mg/dL 93    Non HDL Cholesterol      <130 mg/dL 126    Patient Fasting? Unknown       She will continue statin.            BMI  Estimated body mass index is 33.16 kg/m  as calculated from the following:    Height as of 2/1/24: 1.562 m (5' 1.5\").    Weight as of 2/1/24: 80.9 kg (178 lb 6.4 oz).             Etelvina Wan is a 68 year old, presenting for the following health issues:  Osteoporosis (Osteo F/U)      2/8/2024     3:37 PM   Additional Questions   Roomed by Desiree JULIO                   Objective    BP (!) 130/4   Pulse 67   Resp 16   LMP  (LMP Unknown)   SpO2 98%   There is no height or weight on file to calculate BMI.  Physical Exam               Signed Electronically by: Jorge Blanton MD    "

## 2024-02-14 ENCOUNTER — OFFICE VISIT (OUTPATIENT)
Dept: OPHTHALMOLOGY | Facility: CLINIC | Age: 69
End: 2024-02-14
Attending: OPHTHALMOLOGY
Payer: COMMERCIAL

## 2024-02-14 DIAGNOSIS — H25.813 COMBINED FORM OF AGE-RELATED CATARACT, BOTH EYES: ICD-10-CM

## 2024-02-14 DIAGNOSIS — H40.033 ANATOMICAL NARROW ANGLE BORDERLINE GLAUCOMA OF BOTH EYES: Primary | ICD-10-CM

## 2024-02-14 DIAGNOSIS — H04.123 DRY EYES, BILATERAL: ICD-10-CM

## 2024-02-14 PROCEDURE — G0463 HOSPITAL OUTPT CLINIC VISIT: HCPCS | Performed by: OPHTHALMOLOGY

## 2024-02-14 PROCEDURE — 92133 CPTRZD OPH DX IMG PST SGM ON: CPT | Performed by: OPHTHALMOLOGY

## 2024-02-14 PROCEDURE — 99213 OFFICE O/P EST LOW 20 MIN: CPT | Mod: GC | Performed by: OPHTHALMOLOGY

## 2024-02-14 ASSESSMENT — CONF VISUAL FIELD
OD_INFERIOR_NASAL_RESTRICTION: 0
OS_SUPERIOR_TEMPORAL_RESTRICTION: 0
OD_SUPERIOR_TEMPORAL_RESTRICTION: 0
OS_NORMAL: 1
OD_INFERIOR_TEMPORAL_RESTRICTION: 0
OS_SUPERIOR_NASAL_RESTRICTION: 0
OS_INFERIOR_NASAL_RESTRICTION: 0
OD_NORMAL: 1
METHOD: COUNTING FINGERS
OS_INFERIOR_TEMPORAL_RESTRICTION: 0
OD_SUPERIOR_NASAL_RESTRICTION: 0

## 2024-02-14 ASSESSMENT — TONOMETRY
IOP_METHOD: TONOPEN
OS_IOP_MMHG: 18
OD_IOP_MMHG: 17

## 2024-02-14 ASSESSMENT — REFRACTION_WEARINGRX
OD_SPHERE: +4.50
OS_CYLINDER: +1.00
OD_CYLINDER: +0.75
OS_ADD: +2.50
OS_SPHERE: +2.75
OD_AXIS: 010
OD_ADD: +2.50
OS_AXIS: 005

## 2024-02-14 ASSESSMENT — EXTERNAL EXAM - RIGHT EYE: OD_EXAM: NORMAL

## 2024-02-14 ASSESSMENT — VISUAL ACUITY
METHOD: SNELLEN - LINEAR
OD_CC+: -
OS_CC: 20/20
OS_CC+: -1
METHOD_MR: PT DECLINES REFRACTION
CORRECTION_TYPE: GLASSES
OD_CC: 20/25

## 2024-02-14 ASSESSMENT — SLIT LAMP EXAM - LIDS
COMMENTS: NORMAL
COMMENTS: NORMAL

## 2024-02-14 ASSESSMENT — CUP TO DISC RATIO
OD_RATIO: 0.5
OS_RATIO: 0.6

## 2024-02-14 ASSESSMENT — EXTERNAL EXAM - LEFT EYE: OS_EXAM: NORMAL

## 2024-02-14 NOTE — NURSING NOTE
Chief Complaints and History of Present Illnesses   Patient presents with    Follow Up     Combined form of age-related cataract, both eyes     Chief Complaint(s) and History of Present Illness(es)       Follow Up              Laterality: both eyes    Course: stable    Associated symptoms: dryness.  Negative for eye pain, tearing and burning    Treatments tried: artificial tears    Pain scale: 2/10    Comments: Combined form of age-related cataract, both eyes              Comments    She state that her vision seems clear/stable in each eye since her last exam.  Both eyes seem intermittently dry.  Using artificial tears does help the discomfort.     Dalila Navas, COT 7:46 AM  February 14, 2024

## 2024-02-14 NOTE — PROGRESS NOTES
Chief complaint     Chief Complaints and History of Present Illnesses   Patient presents with    Cataract       Referring Provider: Self     HPI    Soco Ferguson 68 year old female who presents for annual examiantion. Last seen with Dr. 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 Reports That her vision is quite good. She notes some sensitivity to bright lights that has been chronic and longstanding. No blurring of vision and is overall happy with her vision. No flashes, floaters, curtains.     Today 2/14/24 patient presents for one year follow up. No changes in vision. Both eyes have been feeling a little dry but improve with artificial tears. She has noticed she sometimes sees small black dots in her vision when she is watching TV. Denies large floaters or flashes of light. Black dots resolve after closing her eyes.      Past ocular history   Prior eye surgery/laser/Trauma: LPI OU  CTL wearer:no  Glasses : yes  Family Hx of eye disease:no    PMH     Past Medical History:   Diagnosis Date    Abdominal pain, unspecified site     Created by Conversion     Asymptomatic varicose veins     Created by Conversion     Blastomycosis     Created by Conversion     Calculus of kidney     Created by Conversion     Endometrial hyperplasia, unspecified     Created by Conversion     Hyperparathyroidism, unspecified (H24)     Created by Conversion     Lattice degeneration     Leiomyoma of uterus, unspecified     Created by Conversion     Myalgia and myositis, unspecified     Created by Conversion     Nausea alone     Created by Conversion     Osteoporosis, unspecified     Created by Conversion     Other nonspecific finding on examination of urine     Created by Conversion     Pain in joint, site unspecified     Created by Conversion     Restless legs syndrome (RLS)     Created by Conversion     Senile osteoporosis     Created by Conversion     Ulcerative  colitis, unspecified     Created by Conversion     Unspecified constipation     Created by Conversion     Unspecified sinusitis (chronic)     Created by Conversion     Unspecified symptom associated with female genital organs     Created by Conversion     Unspecified vitamin D deficiency     Created by Conversion        PSH     Past Surgical History:   Procedure Laterality Date    KIDNEY STONE SURGERY  1999    removal    OTHER SURGICAL HISTORY Bilateral     venous closure    TUBAL LIGATION  1987    WISDOM TOOTH EXTRACTION  1980       Meds     Current Outpatient Medications   Medication    atorvastatin (LIPITOR) 40 MG tablet    Calcium Citrate-Vitamin D 200-250 MG-UNIT TABS    cholecalciferol 50 MCG (2000 UT) tablet    nitroFURantoin macrocrystal-monohydrate (MACROBID) 100 MG capsule    omeprazole (PRILOSEC) 20 MG DR capsule    UNABLE TO FIND     Current Facility-Administered Medications   Medication    apraclonidine (IOPIDINE) 1 % ophthalmic solution 1 drop    prednisoLONE acetate (PRED FORTE) 1 % ophthalmic susp 1 drop       Drops Currently Taking   Artificial tears.    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    Jayashree Saha MD  PGY3 Ophthalmology Resident  [facility]    Attending Physician Attestation:  Complete documentation of historical and exam elements from today's encounter can be found in the full  encounter summary report (not reduplicated in this progress note).  I personally obtained the chief complaint(s) and history of present illness.  I confirmed and edited as necessary the review of systems, past medical/surgical history, family history, social history, and examination findings as documented by others; and I examined the patient myself.  I personally reviewed the relevant tests, images, and reports as documented above.  I formulated and edited as necessary the assessment and plan and discussed the findings and management plan with the patient and family. I personally reviewed the ophthalmic test(s) associated with this encounter, agree with the interpretation(s) as documented by the resident/fellow, and have edited the corresponding report(s) as necessary.- Anaid Sloan MD

## 2024-02-20 ENCOUNTER — OFFICE VISIT (OUTPATIENT)
Dept: NEUROLOGY | Facility: CLINIC | Age: 69
End: 2024-02-20
Attending: PSYCHIATRY & NEUROLOGY
Payer: COMMERCIAL

## 2024-02-20 VITALS
WEIGHT: 178 LBS | SYSTOLIC BLOOD PRESSURE: 128 MMHG | BODY MASS INDEX: 33.09 KG/M2 | HEART RATE: 70 BPM | DIASTOLIC BLOOD PRESSURE: 73 MMHG | RESPIRATION RATE: 18 BRPM

## 2024-02-20 DIAGNOSIS — R20.2 RIGHT ARM AND RIGHT FACE TINGLING: ICD-10-CM

## 2024-02-20 DIAGNOSIS — R20.0 NUMBNESS: ICD-10-CM

## 2024-02-20 DIAGNOSIS — R20.2 TINGLING OF LEFT UPPER EXTREMITY: ICD-10-CM

## 2024-02-20 DIAGNOSIS — M54.16 LUMBAR RADICULOPATHY: ICD-10-CM

## 2024-02-20 DIAGNOSIS — M79.661 PAIN OF RIGHT LOWER LEG: ICD-10-CM

## 2024-02-20 DIAGNOSIS — M54.12 CERVICAL RADICULOPATHY: Primary | ICD-10-CM

## 2024-02-20 PROCEDURE — 95886 MUSC TEST DONE W/N TEST COMP: CPT | Mod: RT | Performed by: PSYCHIATRY & NEUROLOGY

## 2024-02-20 PROCEDURE — 95908 NRV CNDJ TST 3-4 STUDIES: CPT | Performed by: PSYCHIATRY & NEUROLOGY

## 2024-02-20 PROCEDURE — 99214 OFFICE O/P EST MOD 30 MIN: CPT | Mod: 25 | Performed by: PSYCHIATRY & NEUROLOGY

## 2024-02-20 RX ORDER — PREDNISONE 20 MG/1
60 TABLET ORAL DAILY
Qty: 18 TABLET | Refills: 0 | Status: SHIPPED | OUTPATIENT
Start: 2024-02-20 | End: 2024-02-26

## 2024-02-20 NOTE — PROGRESS NOTES
NEUROLOGY OUTPATIENT PROGRESS NOTE   Feb 20, 2024     CHIEF COMPLAINT/REASON FOR VISIT/REASON FOR CONSULT  Patient presents with:  Follow Up    REASON FOR CONSULTATION-numbness/tingling    REFERRAL SOURCE  Dr. Jorge Blanton   Dr. Jorge Blanton    HISTORY OF PRESENT ILLNESS  Soco Ferguson is a 68 year old female seen today for evaluation of numbness/tingling.  She reports that the symptoms came on about a year ago.  She had gotten up from sleep and was in an awkward position.  Initially there was sharp shooting pain down the right leg.  There was no associated numbness.  This lasted for some time with spreading of the symptoms to the left leg.  She then had some intermittent numbness but no pain of the right arm and occasionally of the left arm.  The right leg symptoms and the left leg symptoms have resolved though she continues to have issues in the right arm and occasionally in the left arm.  The numbness is more towards the lateral portion of the hand.  Denies any weakness.  No significant neck pain or back pain.  She does have some history of arthritis in her low back has never had a scan officially done for the arthritis.  This was based on the scan done for osteoporosis.  Denies any difficulty with balance.  No dropping things with her hands.  Denies any cognitive issues.  She has had 1 episode of right cheek numbness.    2/20/24  Patient returns today.  She is stopped doing the exercises that she was doing at the gym where she was twisting her back lifting weights.  This has significantly helped with the symptoms.  Barely gets any numbness in her arms.  She is no longer having pain in the leg.    Previous history is reviewed and this is unchanged.    PAST MEDICAL/SURGICAL HISTORY  Past Medical History:   Diagnosis Date    Abdominal pain, unspecified site     Created by Conversion     Asymptomatic varicose veins     Created by Conversion     Blastomycosis     Created by Conversion     Calculus of  kidney     Created by Conversion     Endometrial hyperplasia, unspecified     Created by Conversion     Hyperparathyroidism, unspecified (H24)     Created by Conversion     Lattice degeneration     Leiomyoma of uterus, unspecified     Created by Conversion     Myalgia and myositis, unspecified     Created by Conversion     Nausea alone     Created by Conversion     Osteoporosis, unspecified     Created by Conversion     Other nonspecific finding on examination of urine     Created by Conversion     Pain in joint, site unspecified     Created by Conversion     Restless legs syndrome (RLS)     Created by Conversion     Senile osteoporosis     Created by Conversion     Ulcerative colitis, unspecified     Created by Conversion     Unspecified constipation     Created by Conversion     Unspecified sinusitis (chronic)     Created by Conversion     Unspecified symptom associated with female genital organs     Created by Conversion     Unspecified vitamin D deficiency     Created by Conversion      Patient Active Problem List   Diagnosis    Blastomycosis    Restless legs syndrome    Senile osteoporosis    Ulcerative colitis (H)    Venous insufficiency of both lower extremities    Articular disc disorder of temporomandibular joint    Peptic ulcer    Mixed hyperlipidemia    Cervical cancer screening   Significant for migraines, depression, cataracts, osteoporosis, GERD, peripheral arterial disease    FAMILY HISTORY  Family History   Problem Relation Age of Onset    Glaucoma No family hx of     Macular Degeneration No family hx of     Breast Cancer Other     Emphysema Mother     Varicose Veins Mother     Edema Mother     Lung Cancer Father     Breast Cancer Niece         Early 20's    Asthma Grandchild    Negative for neurological problems.    SOCIAL HISTORY  Social History     Tobacco Use    Smoking status: Never     Passive exposure: Never    Smokeless tobacco: Never    Tobacco comments:     in HS and 20's socail only, never  pack a day smoker   Vaping Use    Vaping Use: Never used   Substance Use Topics    Alcohol use: Yes     Alcohol/week: 1.7 standard drinks of alcohol    Drug use: No       SYSTEMS REVIEW  Twelve-system ROS was done and other than the HPI this was negative/positive for arm and leg pain, joint pain, numbness/tingling, weakness paralysis, ringing in the ears, depression, stomach pain/distress.  No new concerns/issues.    MEDICATIONS  atorvastatin (LIPITOR) 40 MG tablet, TAKE 1 TABLET BY MOUTH ONE TIME DAILY  Calcium Citrate-Vitamin D 200-250 MG-UNIT TABS, Take 1 tablet by mouth  cholecalciferol 50 MCG (2000 UT) tablet, Take 1 tablet by mouth daily  nitroFURantoin macrocrystal-monohydrate (MACROBID) 100 MG capsule, Take 1 capsule (100 mg) by mouth 2 times daily  omeprazole (PRILOSEC) 20 MG DR capsule, Take 20 mg by mouth daily  UNABLE TO FIND, MEDICATION NAME: Systaine Eye Drops    apraclonidine (IOPIDINE) 1 % ophthalmic solution 1 drop  prednisoLONE acetate (PRED FORTE) 1 % ophthalmic susp 1 drop         PHYSICAL EXAMINATION  VITALS: /73   Pulse 70   Resp 18   Wt 80.7 kg (178 lb)   LMP  (LMP Unknown)   BMI 33.09 kg/m    GENERAL: Healthy appearing, alert, no acute distress, normal habitus.  CARDIOVASCULAR: Extremities warm and well perfused. Pulses present.   NEUROLOGICAL:  Patient is awake and oriented to self, place and time.  Attention span is normal.  Memory is grossly intact.  Language is fluent and follows commands appropriately.  Appropriate fund of knowledge. Cranial nerves 2-12 are intact. There is no pronator drift.  Motor exam shows 5/5 strength in all extremities.  Tone is symmetric bilaterally in upper and lower extremities.  Reflexes are symmetric and 2+ in upper extremities and lower extremities. Sensory exam is grossly intact to light touch, pin prick and vibration.  Finger to nose and heel to shin is without dysmetria.  Romberg is negative.  Gait is normal and the patient is able to do tandem  walk and walk on toes and heels.  Exam stable.    DIAGNOSTICS  MRI Brain 2017  CONCLUSION:  1.  No evidence for acute or subacute infarct, hemorrhage, mass or obstructive type hydrocephalus.  2.  No definite abnormality along either trigeminal nerve.  3.  No pathologic contrast enhancement.  4.  Mild mucosal thickening in the maxillary, ethmoid, and sphenoid sinuses. No air-fluid level in the sinuses. Mastoids clear.    EMG  Interpretation:  This is a normal EMG.  There is no evidence of any large fiber nerve injury to explain the patient's current tingling symptoms.  Specifically no findings of cervical radiculopathy or large fiber peripheral neuropathy.  EMG does not rule out small fiber neuropathy and clinical correlation is recommended.     RELEVANT LABS  Component      Latest Ref Rng 5/1/2023  9:30 AM 9/12/2023  4:28 PM   Sodium      136 - 145 mmol/L  140    Potassium      3.4 - 5.3 mmol/L  4.3    Chloride      98 - 107 mmol/L  102    Carbon Dioxide (CO2)      22 - 29 mmol/L  27    Anion Gap      7 - 15 mmol/L  11    Urea Nitrogen      8.0 - 23.0 mg/dL  24.6 (H)    Creatinine      0.51 - 0.95 mg/dL  0.86    Calcium      8.8 - 10.2 mg/dL  10.0    Glucose      70 - 99 mg/dL  100 (H)    Alkaline Phosphatase      35 - 104 U/L  92    AST      0 - 45 U/L  22    ALT      0 - 50 U/L  21    Protein Total      6.4 - 8.3 g/dL  7.8    Albumin      3.5 - 5.2 g/dL  4.7    Bilirubin Total      <=1.2 mg/dL  0.6    GFR Estimate      >60 mL/min/1.73m2  74    Hemoglobin A1C      0.0 - 5.6 % 5.4  5.5    Magnesium      1.7 - 2.3 mg/dL  2.1    TSH      0.30 - 4.20 uIU/mL  1.22       Legend:  (H) High    OUTSIDE RECORDS  Outside referral notes and chart notes were reviewed and pertinent information has been summarized (in addition to the HPI):-      EMG  CLINICAL INTERPRETATION:  This is an slightly abnormal nerve conduction and EMG study.  The study does not show any evidence of neuropathy.  The needle study shows mild spontaneous  activity in the right L4-L5 muscle groups.  Further clinical correlation is needed.     MRI C spine  IMPRESSION:  1.  Multilevel spondylosis described above.     2.  C5-C6 moderate to severe right foraminal stenosis.     3.  C6-C7 moderate to severe left foraminal stenosis.     4.  No significant thecal sac stenosis.     5.  No abnormal cord signal.    MRI L spine  IMPRESSION:  1.  Transitional lumbosacral anatomy. S1-S2 well-developed disc space with bilateral S1 partial lumbarization.     2.  Mild multilevel spondylosis described above.     3.  No significant thecal sac stenosis.     4.  No significant neural foraminal stenosis.      IMPRESSION/REPORT/PLAN  Numbness in both upper extremities  Right leg pain  Suspected lumbar radiculopathy  Cervical neuroforaminal stenosis-rule out cervical radiculopathy    This is a 68 year old female with initial episode of right leg pain with possible twisting/turning while sleeping.  Symptoms could be suggestive of a lumbar radiculopathy.  EMG did show some evidence of lumbar radiculopathy though MRI was negative.    She further complains of intermittent bilateral upper extremity numbness.  Exam today is noncontributory.  EMG did not show any entrapment neuropathy.  MRI C-spine does show neuroforaminal stenosis which could be related to the symptoms.      Currently symptoms have improved with not exercising on the exercise machines.  Possibly she was not using them correctly.  Would recommend she get a  and work with a physical therapist and do proper exercises.    Discussed treatment of cervical/lumbar radiculopathy including steroids, epidural injections, physical therapy, surgery.  Will put her on oral steroids and try physical therapy for right now.    We can check back in 6 months.    -     predniSONE (DELTASONE) 20 MG tablet; Take 3 tablets (60 mg) by mouth daily for 6 days Take in AM with food.  -     Physical Therapy Referral; Future    Return in about 6  months (around 8/20/2024) for In-Clinic Visit (must).    Over 30 minutes were spent coordinating the care for the patient on the day of the encounter.  This includes previsit, during visit and post visit activities as documented above.  Counseling patient with multiple test reviewed.  Reviewing chart.  Prescription management.  (Activities include but not inclusive of reviewing chart, reviewing outside records, reviewing labs and imaging study results as well as the images, patient visit time including getting history and exam,  use if applicable, review of test results with the patient and coming up with a plan in a shared model, counseling patient and family, education and answering patient questions, EMR , EMR diagnosis entry and problem list management, medication reconciliation and prescription management if applicable, paperwork if applicable, printing documents and documentation of the visit activities.)        Marshall Weeks MD  Neurologist  [facility]  Tel:- [phone]    This note was dictated using voice recognition software.  Any grammatical or context distortions are unintentional and inherent to the software.

## 2024-02-20 NOTE — LETTER
2/20/2024         RE: Soco Ferguson  [address] [facility] [address]        Dear Colleague,    Thank you for referring your patient, Soco Ferguson, to the [facility] NEUROLOGY [facility]INIC [place]. Please see a copy of my visit note below.    NEUROLOGY OUTPATIENT PROGRESS NOTE   Feb 20, 2024     CHIEF COMPLAINT/REASON FOR VISIT/REASON FOR CONSULT  Patient presents with:  Follow Up    REASON FOR CONSULTATION-numbness/tingling    REFERRAL SOURCE  Dr. Jorge Blanton  CC Dr. Jorge Blanton    HISTORY OF PRESENT ILLNESS  Soco Ferguson is a 68 year old female seen today for evaluation of numbness/tingling.  She reports that the symptoms came on about a year ago.  She had gotten up from sleep and was in an awkward position.  Initially there was sharp shooting pain down the right leg.  There was no associated numbness.  This lasted for some time with spreading of the symptoms to the left leg.  She then had some intermittent numbness but no pain of the right arm and occasionally of the left arm.  The right leg symptoms and the left leg symptoms have resolved though she continues to have issues in the right arm and occasionally in the left arm.  The numbness is more towards the lateral portion of the hand.  Denies any weakness.  No significant neck pain or back pain.  She does have some history of arthritis in her low back has never had a scan officially done for the arthritis.  This was based on the scan done for osteoporosis.  Denies any difficulty with balance.  No dropping things with her hands.  Denies any cognitive issues.  She has had 1 episode of right cheek numbness.    2/20/24  Patient returns today.  She is stopped doing the exercises that she was doing at the gym where she was twisting her back lifting weights.  This has significantly helped with the symptoms.  Barely gets any numbness in her arms.  She is no longer having pain in the leg.    Previous history is reviewed and this is  unchanged.    PAST MEDICAL/SURGICAL HISTORY  Past Medical History:   Diagnosis Date     Abdominal pain, unspecified site     Created by Conversion      Asymptomatic varicose veins     Created by Conversion      Blastomycosis     Created by Conversion      Calculus of kidney     Created by Conversion      Endometrial hyperplasia, unspecified     Created by Conversion      Hyperparathyroidism, unspecified (H24)     Created by Conversion      Lattice degeneration      Leiomyoma of uterus, unspecified     Created by Conversion      Myalgia and myositis, unspecified     Created by Conversion      Nausea alone     Created by Conversion      Osteoporosis, unspecified     Created by Conversion      Other nonspecific finding on examination of urine     Created by Conversion      Pain in joint, site unspecified     Created by Conversion      Restless legs syndrome (RLS)     Created by Conversion      Senile osteoporosis     Created by Conversion      Ulcerative colitis, unspecified     Created by Conversion      Unspecified constipation     Created by Conversion      Unspecified sinusitis (chronic)     Created by Conversion      Unspecified symptom associated with female genital organs     Created by Conversion      Unspecified vitamin D deficiency     Created by Conversion      Patient Active Problem List   Diagnosis     Blastomycosis     Restless legs syndrome     Senile osteoporosis     Ulcerative colitis (H)     Venous insufficiency of both lower extremities     Articular disc disorder of temporomandibular joint     Peptic ulcer     Mixed hyperlipidemia     Cervical cancer screening   Significant for migraines, depression, cataracts, osteoporosis, GERD, peripheral arterial disease    FAMILY HISTORY  Family History   Problem Relation Age of Onset     Glaucoma No family hx of      Macular Degeneration No family hx of      Breast Cancer Other      Emphysema Mother      Varicose Veins Mother      Edema Mother      Lung Cancer  Father      Breast Cancer Niece         Early 20's     Asthma Grandchild    Negative for neurological problems.    SOCIAL HISTORY  Social History     Tobacco Use     Smoking status: Never     Passive exposure: Never     Smokeless tobacco: Never     Tobacco comments:     in HS and 20's [employer] only, never pack a day smoker   Vaping Use     Vaping Use: Never used   Substance Use Topics     Alcohol use: Yes     Alcohol/week: 1.7 standard drinks of alcohol     Drug use: No       SYSTEMS REVIEW  Twelve-system ROS was done and other than the HPI this was negative/positive for arm and leg pain, joint pain, numbness/tingling, weakness paralysis, ringing in the ears, depression, stomach pain/distress.  No new concerns/issues.    MEDICATIONS  atorvastatin (LIPITOR) 40 MG tablet, TAKE 1 TABLET BY MOUTH ONE TIME DAILY  Calcium Citrate-Vitamin D 200-250 MG-UNIT TABS, Take 1 tablet by mouth  cholecalciferol 50 MCG (2000 UT) tablet, Take 1 tablet by mouth daily  nitroFURantoin macrocrystal-monohydrate (MACROBID) 100 MG capsule, Take 1 capsule (100 mg) by mouth 2 times daily  omeprazole (PRILOSEC) 20 MG DR capsule, Take 20 mg by mouth daily  UNABLE TO FIND, MEDICATION NAME: Systaine Eye Drops    apraclonidine (IOPIDINE) 1 % ophthalmic solution 1 drop  prednisoLONE acetate (PRED FORTE) 1 % ophthalmic susp 1 drop         PHYSICAL EXAMINATION  VITALS: /73   Pulse 70   Resp 18   Wt 80.7 kg (178 lb)   LMP  (LMP Unknown)   BMI 33.09 kg/m    GENERAL: Healthy appearing, alert, no acute distress, normal habitus.  CARDIOVASCULAR: Extremities warm and well perfused. Pulses present.   NEUROLOGICAL:  Patient is awake and oriented to self, place and time.  Attention span is normal.  Memory is grossly intact.  Language is fluent and follows commands appropriately.  Appropriate fund of knowledge. Cranial nerves 2-12 are intact. There is no pronator drift.  Motor exam shows 5/5 strength in all extremities.  Tone is symmetric bilaterally  in upper and lower extremities.  Reflexes are symmetric and 2+ in upper extremities and lower extremities. Sensory exam is grossly intact to light touch, pin prick and vibration.  Finger to nose and heel to shin is without dysmetria.  Romberg is negative.  Gait is normal and the patient is able to do tandem walk and walk on toes and heels.  Exam stable.    DIAGNOSTICS  MRI Brain 2017  CONCLUSION:  1.  No evidence for acute or subacute infarct, hemorrhage, mass or obstructive type hydrocephalus.  2.  No definite abnormality along either trigeminal nerve.  3.  No pathologic contrast enhancement.  4.  Mild mucosal thickening in the maxillary, ethmoid, and sphenoid sinuses. No air-fluid level in the sinuses. Mastoids clear.    EMG  Interpretation:  This is a normal EMG.  There is no evidence of any large fiber nerve injury to explain the patient's current tingling symptoms.  Specifically no findings of cervical radiculopathy or large fiber peripheral neuropathy.  EMG does not rule out small fiber neuropathy and clinical correlation is recommended.     RELEVANT LABS  Component      Latest Ref Rng 5/1/2023  9:30 AM 9/12/2023  4:28 PM   Sodium      136 - 145 mmol/L  140    Potassium      3.4 - 5.3 mmol/L  4.3    Chloride      98 - 107 mmol/L  102    Carbon Dioxide (CO2)      22 - 29 mmol/L  27    Anion Gap      7 - 15 mmol/L  11    Urea Nitrogen      8.0 - 23.0 mg/dL  24.6 (H)    Creatinine      0.51 - 0.95 mg/dL  0.86    Calcium      8.8 - 10.2 mg/dL  10.0    Glucose      70 - 99 mg/dL  100 (H)    Alkaline Phosphatase      35 - 104 U/L  92    AST      0 - 45 U/L  22    ALT      0 - 50 U/L  21    Protein Total      6.4 - 8.3 g/dL  7.8    Albumin      3.5 - 5.2 g/dL  4.7    Bilirubin Total      <=1.2 mg/dL  0.6    GFR Estimate      >60 mL/min/1.73m2  74    Hemoglobin A1C      0.0 - 5.6 % 5.4  5.5    Magnesium      1.7 - 2.3 mg/dL  2.1    TSH      0.30 - 4.20 uIU/mL  1.22       Legend:  (H) High    OUTSIDE RECORDS  Outside  referral notes and chart notes were reviewed and pertinent information has been summarized (in addition to the HPI):-      EMG  CLINICAL INTERPRETATION:  This is an slightly abnormal nerve conduction and EMG study.  The study does not show any evidence of neuropathy.  The needle study shows mild spontaneous activity in the right L4-L5 muscle groups.  Further clinical correlation is needed.     MRI C spine  IMPRESSION:  1.  Multilevel spondylosis described above.     2.  C5-C6 moderate to severe right foraminal stenosis.     3.  C6-C7 moderate to severe left foraminal stenosis.     4.  No significant thecal sac stenosis.     5.  No abnormal cord signal.    MRI L spine  IMPRESSION:  1.  Transitional lumbosacral anatomy. S1-S2 well-developed disc space with bilateral S1 partial lumbarization.     2.  Mild multilevel spondylosis described above.     3.  No significant thecal sac stenosis.     4.  No significant neural foraminal stenosis.      IMPRESSION/REPORT/PLAN  Numbness in both upper extremities  Right leg pain  Suspected lumbar radiculopathy  Cervical neuroforaminal stenosis-rule out cervical radiculopathy    This is a 68 year old female with initial episode of right leg pain with possible twisting/turning while sleeping.  Symptoms could be suggestive of a lumbar radiculopathy.  EMG did show some evidence of lumbar radiculopathy though MRI was negative.    She further complains of intermittent bilateral upper extremity numbness.  Exam today is noncontributory.  EMG did not show any entrapment neuropathy.  MRI C-spine does show neuroforaminal stenosis which could be related to the symptoms.      Currently symptoms have improved with not exercising on the exercise machines.  Possibly she was not using them correctly.  Would recommend she get a  and work with a physical therapist and do proper exercises.    Discussed treatment of cervical/lumbar radiculopathy including steroids, epidural injections,  physical therapy, surgery.  Will put her on oral steroids and try physical therapy for right now.    We can check back in 6 months.    -     predniSONE (DELTASONE) 20 MG tablet; Take 3 tablets (60 mg) by mouth daily for 6 days Take in AM with food.  -     Physical Therapy Referral; Future    Return in about 6 months (around 8/20/2024) for In-Clinic Visit (must).    Over 30 minutes were spent coordinating the care for the patient on the day of the encounter.  This includes previsit, during visit and post visit activities as documented above.  Counseling patient with multiple test reviewed.  Reviewing chart.  Prescription management.  (Activities include but not inclusive of reviewing chart, reviewing outside records, reviewing labs and imaging study results as well as the images, patient visit time including getting history and exam,  use if applicable, review of test results with the patient and coming up with a plan in a shared model, counseling patient and family, education and answering patient questions, EMR , EMR diagnosis entry and problem list management, medication reconciliation and prescription management if applicable, paperwork if applicable, printing documents and documentation of the visit activities.)        Marshall Weeks MD  Neurologist  [facility]  Tel:- [phone]    This note was dictated using voice recognition software.  Any grammatical or context distortions are unintentional and inherent to the software.      Again, thank you for allowing me to participate in the care of your patient.        Sincerely,        Marshall Weeks MD

## 2024-02-20 NOTE — PROCEDURES
[facility]    Formerly [facility]  [address] [place] [address], [address]  [place] [address]  Tel:  [phone]   Fax: [phone]    Patient: Soco JUAN Gender: Female   MRN: [id number] : 1955       Visit Date: 2024 9:52:32 AM Interpreted by: Marshall Weeks MD   Age: 68 years Ref Provider: Jorge Blanton MD     Reason for visit:  Evaluate right lower. c/o no complaint in right leg/foot at this time. h/o vein surgery on right leg.  NCS+  Motor Sites      Latency Amplitude Distance Velocity   Site (ms) Norm (mV) Norm (cm) (m/s) Norm   Right Fibular (EDB)   Ankle 4.2  < 6.6 2.3  > 2.0 8     Bel Fib Head 10.4 - 2.2 - 30 48  > 40   Pop Fossa 12.6 - 2.3 - 11 50  > 40   Right Tibial (AHB)   Ankle 3.3  < 6.5 11.4  > 4.4 8     Knee 11.2 - 11.0 - 38 48  > 40     NCS+  Sensory Sites      Onset Lat Peak Lat Amp (O-P) Distance Velocity   Site ms (ms)  V Norm cm m/s Norm   Right Sural (Rec:Lat Mall)   Calf 2.6 3.3 9  > 5 14 54  > 40   Right Superficial Fibular (Rec:Ankle)   12 cm 2.1 2.9 8  > 3 12 57 -     F Wave Studies     NR F-Lat (ms) Lat Norm (ms) L-R F-Lat (ms) L-R Lat Norm   Right Tibial (Abd Hallucis)      45.61 <50  <5.7     Electromyography     Side Muscle Fib PSW Fasc IA # Amp Dur PPP RcmtRate Note Comment   Right Gluteus Med None None None N N N N N N N    Right Gluteus Max None None None N N N N N N N    Right L3 Parasp None None None N N N N N N N    Right L4 Parasp None None None N N N N N N N    Right L5 Parasp None None None N N N N N N N    Right S1 Parasp None None None N N N N N N N    Right Iliopsoas None None None N N N N N N N    Right Add Dima None None None N N N N N N N    Right Quad Femoris None None None N N N N N N N    Right Tib Anterior Occ Occ None N N N N N N N    Right Fib Longus Occ Occ None N N N N N N N    Right Tib Posterior Occ Occ None N N N N N N N    Right Gastroc MH None None None N N N N N N N    Right Gastroc  LH None None None N N N N N N N      SUMMARY  Nerve conduction and EMG study of right lower extremity shows:    Normal right peroneal distal motor latency, amplitude and conduction velocity.  Normal right tibial distal motor latency, amplitude and conduction velocity.  Normal right sural and superficial peroneal sensory SNAP.  Monopolar needle exam as above.      CLINICAL INTERPRETATION:  This is an slightly abnormal nerve conduction and EMG study.  The study does not show any evidence of neuropathy.  The needle study shows mild spontaneous activity in the right L4-L5 muscle groups.  Further clinical correlation is needed.     Marshall Weeks MD  Neurologist  [facility]  Tel:- [phone]

## 2024-02-20 NOTE — LETTER
2/20/2024         RE: Soco Ferguson  [address] [facility] [address]        Dear Colleague,    Thank you for referring your patient, Soco Ferguson, to the [facility]UR[facility] CLINIC [place]. Please see a copy of my visit note below.    See procedure note.       Again, thank you for allowing me to participate in the care of your patient.        Sincerely,        Marshall Weeks MD

## 2024-02-22 ENCOUNTER — TELEPHONE (OUTPATIENT)
Dept: NEUROLOGY | Facility: CLINIC | Age: 69
End: 2024-02-22
Payer: COMMERCIAL

## 2024-02-22 NOTE — TELEPHONE ENCOUNTER
[facility] Call Center    Phone Message    May a detailed message be left on voicemail: yes     Reason for Call: Pt asking for a call back she has some questions about her medication  predniSONE (DELTASONE) 20 MG tablet, she says it makes her very tired.  Please call Soco at [phone] to discuss further.    Action Taken: Message routed to:  Other: [facility]    Travel Screening: Not Applicable

## 2024-02-23 NOTE — TELEPHONE ENCOUNTER
Called and spoke with patient, addressing concerns related to some increased sleepiness. Provided education that this is not a typical side effect of prednisone, and if this seems mild and tolerable then it would be advised to continue the 6 day course of treatment (medications are a balance of symptom relief and tolerating potential side effects).    Patient amenable to plan to continue and monitor these potential side effects.    Tyler Murcia RN, BSN  [facility]

## 2024-02-29 ENCOUNTER — THERAPY VISIT (OUTPATIENT)
Dept: PHYSICAL THERAPY | Facility: CLINIC | Age: 69
End: 2024-02-29
Attending: PSYCHIATRY & NEUROLOGY
Payer: COMMERCIAL

## 2024-02-29 DIAGNOSIS — M54.50 CHRONIC LOW BACK PAIN: ICD-10-CM

## 2024-02-29 DIAGNOSIS — M54.12 CERVICAL RADICULOPATHY: ICD-10-CM

## 2024-02-29 DIAGNOSIS — M54.16 LUMBAR RADICULOPATHY: ICD-10-CM

## 2024-02-29 DIAGNOSIS — G89.29 CHRONIC LOW BACK PAIN: ICD-10-CM

## 2024-02-29 DIAGNOSIS — M54.2 NECK PAIN: Primary | ICD-10-CM

## 2024-02-29 PROCEDURE — 97161 PT EVAL LOW COMPLEX 20 MIN: CPT | Mod: GP | Performed by: PHYSICAL THERAPIST

## 2024-02-29 PROCEDURE — 97110 THERAPEUTIC EXERCISES: CPT | Mod: GP | Performed by: PHYSICAL THERAPIST

## 2024-02-29 NOTE — PROGRESS NOTES
"PHYSICAL THERAPY EVALUATION  Type of Visit: Evaluation    See electronic medical record for Abuse and Falls Screening details.    Subjective       Presenting condition or subjective complaint: Main concern is low back that started to get worse when she was progressing her strengthening, exercise program at [facility].  She stopped this program 3 months ago and her back pain is getting better.  Denies any symptoms into her hips or legs.  Other concern is intermittent rt >left arm symptoms, pain, tingling.  This has been better too.  Sitting is okay and feels she is sitting too much.  Standing, walking limited to 15\" and lifting.  Also, vacuuming limited to less than 5.\"  Date of onset:      Relevant medical history:     Dates & types of surgery:      Prior diagnostic imaging/testing results: MRI; X-ray (MRI cervical and lumbar.)     Prior therapy history for the same diagnosis, illness or injury: No        Living Environment  Social support: Alone   Type of home: [facility]   Stairs to enter the home: No       Ramp: No   Stairs inside the home: No       Help at home: None  Equipment owned:       Employment: No    Hobbies/Interests:      Patient goals for therapy:  Stand, walk with less pain.  Be able to return, increase exercise program at [facility].           Objective   LUMBAR SPINE EVALUATION  PAIN: Pain Level at Rest: 0/10  Pain Level with Use: 3/10  INTEGUMENTARY (edema, incisions): WNL  POSTURE: Sitting Posture: Rounded shoulders, Thoracic kyphosis increased.  Standing, increased lordosis.   GAIT:   Gait Deviations:  B trendelenburg  BALANCE/PROPRIOCEPTION:  not assessed  WEIGHTBEARING ALIGNMENT: WFL  NON-WEIGHTBEARING ALIGNMENT:  not assessed    ROM:  90-.  Ext 50-.  B SB wnls.  B seated rot 75-.  Hip prom er 25 rt and lt -.      STRENGTH:  gross shoulder er, flex, abd 4+/5.  Pain-free.  Shoulder arom wnls.  B knee ext, flex 4+/5.     MYOTOMES: WNL    NEURAL TENSION:  SLR -      FUNCTIONAL TESTS: Double Leg " Squat: Anterior knee translation, Knee valgus, and Improper use of glutes/hips.  Uses low back flexion.    PALPATION:  not assessed        Assessment & Plan   CLINICAL IMPRESSIONS  Medical Diagnosis: Cervical radiculopathy  Lumbar radiculopathy    Treatment Diagnosis: Neck, LBP, improving, core, upper, lower extremity weakness   Impression/Assessment: Patient is a 68 year old female with neck and low back pain complaints.  The following significant findings have been identified: Pain, Decreased ROM/flexibility, Decreased strength, Impaired balance, Decreased activity tolerance, and Impaired posture. These impairments interfere with their ability to perform self care tasks, recreational activities, household chores, household mobility, and community mobility as compared to previous level of function.     Clinical Decision Making (Complexity):  Clinical Presentation: Stable/Uncomplicated  Clinical Presentation Rationale: based on medical and personal factors listed in PT evaluation  Clinical Decision Making (Complexity): Low complexity    PLAN OF CARE  Treatment Interventions:  Interventions: Neuromuscular Re-education, Therapeutic Activity, Therapeutic Exercise, Self-Care/Home Management    Long Term Goals            Frequency of Treatment: 1x/week  Duration of Treatment:        Education Assessment:   Learner/Method: Patient;Listening;Demonstration;Pictures/Video  Education Comments: Patient participated in their education    Risks and benefits of evaluation/treatment have been explained.   Patient/Family/caregiver agrees with Plan of Care.     Evaluation Time:             Signing Clinician: Kirby Roblero, PT      [facility]                                                                                   OUTPATIENT PHYSICAL THERAPY      PLAN OF TREATMENT FOR OUTPATIENT REHABILITATION   Patient's Last Name, First Name, Soco Dutton YOB: 1955   Provider's  Name   [facility]   Medical Record No.  [id number]     Onset Date:    Start of Care Date:       Medical Diagnosis:  Cervical radiculopathy  Lumbar radiculopathy      PT Treatment Diagnosis:  Neck, LBP, improving, core, upper, lower extremity weakness Plan of Treatment  Frequency/Duration: 1x/week/      Certification date from   to           See note for plan of treatment details and functional goals     Kirby Roblero, PT                         I CERTIFY THE NEED FOR THESE SERVICES FURNISHED UNDER        THIS PLAN OF TREATMENT AND WHILE UNDER MY CARE     (Physician attestation of this document indicates review and certification of the therapy plan).              Referring Provider:  Marshall Weeks    Initial Assessment  See Epic Evaluation-

## 2024-03-08 ENCOUNTER — THERAPY VISIT (OUTPATIENT)
Dept: PHYSICAL THERAPY | Facility: CLINIC | Age: 69
End: 2024-03-08
Payer: COMMERCIAL

## 2024-03-08 DIAGNOSIS — M54.2 NECK PAIN: Primary | ICD-10-CM

## 2024-03-08 DIAGNOSIS — M54.50 CHRONIC LOW BACK PAIN: ICD-10-CM

## 2024-03-08 DIAGNOSIS — G89.29 CHRONIC LOW BACK PAIN: ICD-10-CM

## 2024-03-08 PROCEDURE — 97110 THERAPEUTIC EXERCISES: CPT | Mod: GP | Performed by: PHYSICAL THERAPIST

## 2024-03-08 PROCEDURE — 97112 NEUROMUSCULAR REEDUCATION: CPT | Mod: GP | Performed by: PHYSICAL THERAPIST

## 2024-03-28 ENCOUNTER — THERAPY VISIT (OUTPATIENT)
Dept: PHYSICAL THERAPY | Facility: CLINIC | Age: 69
End: 2024-03-28
Payer: COMMERCIAL

## 2024-03-28 DIAGNOSIS — M54.2 NECK PAIN: Primary | ICD-10-CM

## 2024-03-28 DIAGNOSIS — G89.29 CHRONIC LOW BACK PAIN: ICD-10-CM

## 2024-03-28 DIAGNOSIS — M54.50 CHRONIC LOW BACK PAIN: ICD-10-CM

## 2024-03-28 PROCEDURE — 97110 THERAPEUTIC EXERCISES: CPT | Mod: GP | Performed by: PHYSICAL THERAPIST

## 2024-03-28 PROCEDURE — 97112 NEUROMUSCULAR REEDUCATION: CPT | Mod: GP | Performed by: PHYSICAL THERAPIST

## 2024-05-06 DIAGNOSIS — E78.2 MIXED HYPERLIPIDEMIA: ICD-10-CM

## 2024-05-07 RX ORDER — ATORVASTATIN CALCIUM 40 MG/1
TABLET, FILM COATED ORAL
Qty: 90 TABLET | Refills: 1 | Status: SHIPPED | OUTPATIENT
Start: 2024-05-07

## 2024-05-29 PROBLEM — G89.29 CHRONIC LOW BACK PAIN: Status: RESOLVED | Noted: 2024-02-29 | Resolved: 2024-05-29

## 2024-05-29 PROBLEM — M54.50 CHRONIC LOW BACK PAIN: Status: RESOLVED | Noted: 2024-02-29 | Resolved: 2024-05-29

## 2024-05-29 PROBLEM — M54.2 NECK PAIN: Status: RESOLVED | Noted: 2024-02-29 | Resolved: 2024-05-29

## 2024-05-29 NOTE — PROGRESS NOTES
DISCHARGE  Reason for Discharge: Patient has met all goals.    Equipment Issued: none    Discharge Plan: Patient to continue home program.    Referring Provider:  Marshall Weeks

## 2024-06-12 ENCOUNTER — NURSE TRIAGE (OUTPATIENT)
Dept: NURSING | Facility: CLINIC | Age: 69
End: 2024-06-12
Payer: COMMERCIAL

## 2024-06-12 NOTE — PROGRESS NOTES
HPI:  Patient complains of pain around both eyes for the past 1.5 months. Vision is stable. Stable floaters. Patient has dry eyes and uses gel drops      Pertinent Medical History:  Blastomycosis  Hyperparathyroidism  Ulcerative colitis  Leiomyoma of uterus  Numbness both upper extremities  Hyperlipidemia    Ocular History:  Migraine with Aura  Lattice Degeneration both eyes  Narrow angle, both eyes. S/P LPI both eyes. 06/24/2019 Dr. Skinner.   Cataract, both eyes.     Eye Medications:  None    Assessment and Plan:  #   Narrow Angle, both eyes.   Educated on signs and symptoms of angle closure (ie. Eye pain, blurry vision, and redness along with GI symptoms to include nausea and vomiting) to be seen immediately.     S/P LPI both eyes. 06/24/2019 Dr. Skinner.   Per note on 12/20/2021. Noted episodes of blurry vision only waking up at night. No blurry vision in the morning or during the day under dim illumination. Symptoms are not consistent with angle closure. If the episodes becomes more frequent or is associated with pain or headaches, then the next step is cataract surgery.   Normal IOP today.   Follow up with Dr. Sloan to see if prophylaxis cataract surgery is needed.     #   Cataract, both eyes.   Contributory to narrow angles - see Dr. Sloan.   May need more lighting for near work and reading.   Recommend UV protection.   Recommend annual dilated eye exam.      #   Meibomian Gland Dysfunction, both eyes.   Symptoms of dry eyes include blurry vision, eye pain, grittiness, burning, redness, eye irritation, light sensitivity, and tearing. The goal is not to eliminate, but to improve symptoms.   Preservative free artificial tears 4 times daily both eyes. Refresh or Systane. Can use up to every 2 hours both eyes as needed.   Warm compress (with dry eye mask), 10 minutes, at dinner or bedtime, both eyes.    Systane nighttime ointment at bedtime both eyes.     #   Migraine with Aura  Stable as before.     #    Hyperopia, both eyes.   Monitor.         Patient consented to a dilated eye exam:    No. We discussed risks and benefits of a dilated eye exam.         Medical History:  Past Medical History:   Diagnosis Date    Abdominal pain, unspecified site     Created by Conversion     Asymptomatic varicose veins     Created by Conversion     Blastomycosis     Created by Conversion     Calculus of kidney     Created by Conversion     Endometrial hyperplasia, unspecified     Created by Conversion     Hyperparathyroidism, unspecified (H24)     Created by Conversion     Lattice degeneration     Leiomyoma of uterus, unspecified     Created by Conversion     Myalgia and myositis, unspecified     Created by Conversion     Nausea alone     Created by Conversion     Osteoporosis, unspecified     Created by Conversion     Other nonspecific finding on examination of urine     Created by Conversion     Pain in joint, site unspecified     Created by Conversion     Restless legs syndrome (RLS)     Created by Conversion     Senile osteoporosis     Created by Conversion     Ulcerative colitis, unspecified     Created by Conversion     Unspecified constipation     Created by Conversion     Unspecified sinusitis (chronic)     Created by Conversion     Unspecified symptom associated with female genital organs     Created by Conversion     Unspecified vitamin D deficiency     Created by Conversion        Medications:  Current Outpatient Medications   Medication Sig Dispense Refill    atorvastatin (LIPITOR) 40 MG tablet TAKE 1 TABLET BY MOUTH ONE TIME DAILY 90 tablet 1    Calcium Citrate-Vitamin D 200-250 MG-UNIT TABS Take 1 tablet by mouth      cholecalciferol 50 MCG (2000 UT) tablet Take 1 tablet by mouth daily      nitroFURantoin macrocrystal-monohydrate (MACROBID) 100 MG capsule Take 1 capsule (100 mg) by mouth 2 times daily 14 capsule 0    omeprazole (PRILOSEC) 20 MG DR capsule Take 20 mg by mouth daily      UNABLE TO FIND MEDICATION NAME:  Systaine Eye Drops     Complete documentation of historical and exam elements from today's encounter can be found in the full encounter summary report (not reduplicated in this progress note). I personally obtained the chief complaint(s) and history of present illness.  I confirmed and edited as necessary the review of systems, past medical/surgical history, family history, social history, and examination findings as documented by others; and I examined the patient myself. I personally reviewed the relevant tests, images, and reports as documented above. I formulated and edited as necessary the assessment and plan and discussed the findings and management plan with the patient and family. - Janine Ivy, MIKE

## 2024-06-12 NOTE — TELEPHONE ENCOUNTER
"Additional Information   Negative: Followed an eye injury   Negative: Eye pain from chemical in the eye   Negative: Eye pain from foreign body in eye   Negative: Has sinus pain or pressure    Answer Assessment - Initial Assessment Questions  1. ONSET: \"When did the pain start?\" (e.g., minutes, hours, days)    rt eye pain x 1 mth   2. TIMING: \"Does the pain come and go, or has it been constant since it started?\" (e.g., constant, intermittent, fleeting)      constant  3. SEVERITY: \"How bad is the pain?\"   (Scale 1-10; mild, moderate or severe)    - MILD (1-3): doesn't interfere with normal activities     - MODERATE (4-7): interferes with normal activities or awakens from sleep     - SEVERE (8-10): excruciating pain and patient unable to do normal activities      moderate  4. LOCATION: \"Where does it hurt?\"  (e.g., eyelid, eye, cheekbone)      Lateral aspect rt eye \"outer side\"   5. CAUSE: \"What do you think is causing the pain?\"     No idea  6. VISION: \"Do you have blurred vision or changes in your vision?\"       Blurred vision  7. EYE DISCHARGE: \"Is there any discharge (pus) from the eye(s)?\"  If Yes, ask: \"What color is it?\"   No discharge  8. FEVER: \"Do you have a fever?\" If Yes, ask: \"What is it, how was it measured, and when did it start?\"   no  9. OTHER SYMPTOMS: \"Do you have any other symptoms?\" (e.g., headache, nasal discharge, facial rash)      Runny nose  10. PREGNANCY: \"Is there any chance you are pregnant?\" \"When was your last menstrual period?\"        no    Protocols used: Eye Pain and Other Symptoms-A-[facility]    "

## 2024-06-12 NOTE — TELEPHONE ENCOUNTER
Pt with 1-2 month h/o of bilateral, constant pain around eyes    No redness  No vision changes    H/o Laser peripheral iridotomy for narrow angles.    Offered appt in open new time slot tomorrow with Dr. Perez and pt accepts.    Pt aware of date/time/location at [facility].    William Higginbotham RN 1:29 PM 06/12/24

## 2024-06-13 ENCOUNTER — TELEPHONE (OUTPATIENT)
Dept: OPHTHALMOLOGY | Facility: CLINIC | Age: 69
End: 2024-06-13

## 2024-06-13 ENCOUNTER — OFFICE VISIT (OUTPATIENT)
Dept: OPHTHALMOLOGY | Facility: CLINIC | Age: 69
End: 2024-06-13
Attending: OPTOMETRIST
Payer: COMMERCIAL

## 2024-06-13 DIAGNOSIS — H04.123 DRY EYES, BILATERAL: ICD-10-CM

## 2024-06-13 DIAGNOSIS — H40.033 ANATOMICAL NARROW ANGLE BORDERLINE GLAUCOMA OF BOTH EYES: Primary | ICD-10-CM

## 2024-06-13 DIAGNOSIS — H25.813 COMBINED FORM OF AGE-RELATED CATARACT, BOTH EYES: ICD-10-CM

## 2024-06-13 PROCEDURE — 92002 INTRM OPH EXAM NEW PATIENT: CPT | Performed by: OPTOMETRIST

## 2024-06-13 PROCEDURE — G0463 HOSPITAL OUTPT CLINIC VISIT: HCPCS | Performed by: OPTOMETRIST

## 2024-06-13 ASSESSMENT — PACHYMETRY
OS_CT(UM): 543
OD_CT(UM): 539

## 2024-06-13 ASSESSMENT — CONF VISUAL FIELD
OS_INFERIOR_TEMPORAL_RESTRICTION: 0
OS_SUPERIOR_NASAL_RESTRICTION: 0
OS_SUPERIOR_TEMPORAL_RESTRICTION: 0
OD_SUPERIOR_NASAL_RESTRICTION: 0
OD_INFERIOR_NASAL_RESTRICTION: 0
OD_SUPERIOR_TEMPORAL_RESTRICTION: 0
METHOD: COUNTING FINGERS
OS_INFERIOR_NASAL_RESTRICTION: 0
OD_NORMAL: 1
OS_NORMAL: 1
OD_INFERIOR_TEMPORAL_RESTRICTION: 0

## 2024-06-13 ASSESSMENT — EXTERNAL EXAM - LEFT EYE: OS_EXAM: NORMAL

## 2024-06-13 ASSESSMENT — TONOMETRY
OS_IOP_MMHG: 18
IOP_METHOD: ICARE
OD_IOP_MMHG: 16

## 2024-06-13 ASSESSMENT — CUP TO DISC RATIO
OD_RATIO: 0.5
OS_RATIO: 0.6

## 2024-06-13 ASSESSMENT — REFRACTION_WEARINGRX
OD_AXIS: 010
OS_AXIS: 005
OS_ADD: +2.50
OS_CYLINDER: +1.00
OD_SPHERE: +4.50
OD_CYLINDER: +0.75
OD_ADD: +2.50
OS_SPHERE: +2.75

## 2024-06-13 ASSESSMENT — SLIT LAMP EXAM - LIDS
COMMENTS: MEIBOMIAN GLAND DYSFUNCTION
COMMENTS: MEIBOMIAN GLAND DYSFUNCTION

## 2024-06-13 ASSESSMENT — VISUAL ACUITY
CORRECTION_TYPE: GLASSES
OD_CC+: -2
METHOD: SNELLEN - LINEAR
OS_CC: 20/20
OS_CC+: -3
OD_CC: 20/20

## 2024-06-13 ASSESSMENT — EXTERNAL EXAM - RIGHT EYE: OD_EXAM: NORMAL

## 2024-06-13 NOTE — NURSING NOTE
"Chief Complaints and History of Present Illnesses   Patient presents with    Eye Pain Both Eyes     Chief Complaint(s) and History of Present Illness(es)       Eye Pain Both Eyes               Comments    Sharp eye pain round both eyes for the the past 1.5 months. No changes in vision. Occasional floaters in both eyes that comes and goes, no changes. Pt also notes seeing a \"bar\" that floats across her vision, pt uncertain which eye. No new redness. Pt still having dryness, using drops and gel for relief.    MADELEINE Olsen June 13, 2024 8:11 AM                         "

## 2024-06-13 NOTE — TELEPHONE ENCOUNTER
[facility] Call Center    Phone Message    May a detailed message be left on voicemail: yes     Reason for Call: Other: Patient was seen with Dr. Perez today 6/13, and was told she may need to have Cataract Surgery.  She is currently scheduled to see Dr. Sloan in February but is wondering if she should follow up sooner than that.  She is asking to speak with the clinic about her options at [phone].  Thank you!      Action Taken: Message routed to:  [facility]     Travel Screening: Not Applicable     Date of Service:

## 2024-06-24 NOTE — TELEPHONE ENCOUNTER
[facility] Call Center    Phone Message    May a detailed message be left on voicemail: yes     Reason for Call: Other: Patient states someone called patient and scheduled cataract eval with Dr Sloan for today 6/24 at 9:15am but she's not on the schedule. Patient would like a call back to see why she is not scheduled for today and if she can come in soon. No documentation of anyone calling patient back or scheduling.    Please call patient back at [phone]. Thank you.    Action Taken: Message routed to:  [facility]    Travel Screening: Not Applicable

## 2024-06-24 NOTE — TELEPHONE ENCOUNTER
Called and spoke to Ambreen Crook stated she spoke to a nurse and they scheduled her for today with Dr. Sloan - NAMAN TE in the pts chart .     [facility] is not able to add Ambreen into the providers schedule for today.     I scheduled Ambreen in a new cataract spot for Wed with Dr. Sloan due to vision issues     I provided my contact information if she has any issues .    Atiya Mar Communication Facilitator on 6/24/2024 at 10:46 AM

## 2024-06-26 ENCOUNTER — OFFICE VISIT (OUTPATIENT)
Dept: OPHTHALMOLOGY | Facility: CLINIC | Age: 69
End: 2024-06-26
Attending: OPHTHALMOLOGY
Payer: COMMERCIAL

## 2024-06-26 DIAGNOSIS — H25.813 COMBINED FORM OF AGE-RELATED CATARACT, BOTH EYES: Primary | ICD-10-CM

## 2024-06-26 DIAGNOSIS — H04.123 DRY EYES, BILATERAL: ICD-10-CM

## 2024-06-26 DIAGNOSIS — H40.033 ANATOMICAL NARROW ANGLE BORDERLINE GLAUCOMA OF BOTH EYES: ICD-10-CM

## 2024-06-26 PROCEDURE — G0463 HOSPITAL OUTPT CLINIC VISIT: HCPCS | Performed by: OPHTHALMOLOGY

## 2024-06-26 PROCEDURE — 92025 CPTRIZED CORNEAL TOPOGRAPHY: CPT | Performed by: OPHTHALMOLOGY

## 2024-06-26 PROCEDURE — 99214 OFFICE O/P EST MOD 30 MIN: CPT | Performed by: OPHTHALMOLOGY

## 2024-06-26 PROCEDURE — 76519 ECHO EXAM OF EYE: CPT | Performed by: OPHTHALMOLOGY

## 2024-06-26 ASSESSMENT — CONF VISUAL FIELD
OS_NORMAL: 1
OD_SUPERIOR_TEMPORAL_RESTRICTION: 0
OS_SUPERIOR_TEMPORAL_RESTRICTION: 0
OD_NORMAL: 1
OS_SUPERIOR_NASAL_RESTRICTION: 0
OD_INFERIOR_TEMPORAL_RESTRICTION: 0
OD_SUPERIOR_NASAL_RESTRICTION: 0
OS_INFERIOR_TEMPORAL_RESTRICTION: 0
METHOD: COUNTING FINGERS
OD_INFERIOR_NASAL_RESTRICTION: 0
OS_INFERIOR_NASAL_RESTRICTION: 0

## 2024-06-26 ASSESSMENT — REFRACTION_WEARINGRX
OS_SPHERE: +2.75
OD_CYLINDER: +0.75
OD_AXIS: 010
OS_CYLINDER: +1.00
OD_SPHERE: +4.50
OS_ADD: +2.50
OD_ADD: +2.50
OS_AXIS: 005

## 2024-06-26 ASSESSMENT — PACHYMETRY
OS_CT(UM): 543
OD_CT(UM): 539

## 2024-06-26 ASSESSMENT — VISUAL ACUITY
OS_BAT_HIGH: 20/30
OD_CC: 20/25
OS_BAT_MED: 20/20-
OD_BAT_MED: 20/25
METHOD: SNELLEN - LINEAR
OS_CC+: -2
OD_BAT_LOW: 20/25
OD_BAT_HIGH: 20/30
OD_CC+: -1
OS_BAT_LOW: 20/20
OS_CC: 20/20

## 2024-06-26 ASSESSMENT — SLIT LAMP EXAM - LIDS
COMMENTS: MEIBOMIAN GLAND DYSFUNCTION
COMMENTS: MEIBOMIAN GLAND DYSFUNCTION

## 2024-06-26 ASSESSMENT — TONOMETRY
OD_IOP_MMHG: 22
OS_IOP_MMHG: 22
IOP_METHOD: TONOPEN

## 2024-06-26 ASSESSMENT — EXTERNAL EXAM - RIGHT EYE: OD_EXAM: NORMAL

## 2024-06-26 ASSESSMENT — EXTERNAL EXAM - LEFT EYE: OS_EXAM: NORMAL

## 2024-06-26 ASSESSMENT — CUP TO DISC RATIO
OS_RATIO: 0.6
OD_RATIO: 0.5

## 2024-06-26 NOTE — NURSING NOTE
Chief Complaints and History of Present Illnesses   Patient presents with    Cataract Evaluation     Chief Complaint(s) and History of Present Illness(es)       Cataract Evaluation              Laterality: both eyes    Severity: moderate    Onset: gradual    Course: gradually worsening    Pain scale: 5/10              Comments    Ambreen is here for a cataract evaluation. She is complaining of worsening vision and eye pain. She wants to move forward with removal of cataracts. She also sees floaters that are bothersome.    Prasanna Lopez COT 1:25 PM June 26, 2024

## 2024-06-26 NOTE — PROGRESS NOTES
Chief complaint     Chief Complaints and History of Present Illnesses   Patient presents with    Cataract       Referring Provider: Self     HPI    Soco Ferguson 68 year old female who presents for annual examiantion. Last seen with Dr. 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 Reports That her vision is quite good. She notes some sensitivity to bright lights that has been chronic and longstanding. No blurring of vision and is overall happy with her vision. No flashes, floaters, curtains.     Today 2/14/24 patient presents for one year follow up. No changes in vision. Both eyes have been feeling a little dry but improve with artificial tears. She has noticed she sometimes sees small black dots in her vision when she is watching TV. Denies large floaters or flashes of light. Black dots resolve after closing her eyes.    Interval hx 06/26/2024  Chief Complaint(s) and History of Present Illness(es)       Cataract Evaluation    In both eyes.  Severity is moderate.  Onset was gradual.  Since onset it is gradually worsening.  Pain was noted as 5/10.             Comments    Ambreen is here for a cataract evaluation. She is complaining of worsening vision and eye pain. She wants to move forward with removal of cataracts. She also sees floaters that are bothersome.    Prasanna Lopez COT 1:25 PM June 26, 2024                        Past ocular history   Prior eye surgery/laser/Trauma: LPI OU  CTL wearer:no  Glasses : yes  Family Hx of eye disease:no    PMH     Past Medical History:   Diagnosis Date    Abdominal pain, unspecified site     Created by Conversion     Arthritis 2/7/2024    Asymptomatic varicose veins     Created by Conversion     Blastomycosis     Created by Conversion     Calculus of kidney     Created by Conversion     Endometrial hyperplasia, unspecified     Created by Conversion     Hyperparathyroidism, unspecified (H24)     Created by  Conversion     Lattice degeneration     Leiomyoma of uterus, unspecified     Created by Conversion     Myalgia and myositis, unspecified     Created by Conversion     Nausea alone     Created by Conversion     Osteoporosis, unspecified     Created by Conversion     Other nonspecific finding on examination of urine     Created by Conversion     Pain in joint, site unspecified     Created by Conversion     Restless legs syndrome (RLS)     Created by Conversion     Senile osteoporosis     Created by Conversion     Ulcerative colitis, unspecified     Created by Conversion     Unspecified constipation     Created by Conversion     Unspecified sinusitis (chronic)     Created by Conversion     Unspecified symptom associated with female genital organs     Created by Conversion     Unspecified vitamin D deficiency     Created by Conversion        PSH     Past Surgical History:   Procedure Laterality Date    KIDNEY STONE SURGERY  1999    removal    OTHER SURGICAL HISTORY Bilateral     venous closure    TUBAL LIGATION  1987    WISDOM TOOTH EXTRACTION  1980       Meds     Current Outpatient Medications   Medication Sig Dispense Refill    atorvastatin (LIPITOR) 40 MG tablet TAKE 1 TABLET BY MOUTH ONE TIME DAILY 90 tablet 1    Calcium Citrate-Vitamin D 200-250 MG-UNIT TABS Take 1 tablet by mouth      cholecalciferol 50 MCG (2000 UT) tablet Take 1 tablet by mouth daily      denosumab (PROLIA) 60 MG/ML SOSY injection Inject 60 mg Subcutaneous every 6 months      omeprazole (PRILOSEC) 20 MG DR capsule Take 20 mg by mouth daily      Propylene Glycol (SYSTANE BALANCE OP) Apply to eye as needed      UNABLE TO FIND MEDICATION NAME: Systaine Eye Drops       Current Facility-Administered Medications   Medication Dose Route Frequency Provider Last Rate Last Admin    apraclonidine (IOPIDINE) 1 % ophthalmic solution 1 drop  1 drop Left Eye Once Tabby Skinner MD        prednisoLONE acetate (PRED FORTE) 1 % ophthalmic susp 1 drop  1 drop  Right Eye 4x Daily Tabby Skinner MD   1 drop at 06/10/19 1001       Drops Currently Taking   Artificial tears.    Assessment/Plan   # Combined age-related cataracts, both eyes  Increased IOP, worsening vision each eye, worse with glare testing   High risk for angle closure  Patient is having difficulty with daily activities due to visual impairment. Patient feels that they are no longer managing with current correction and would like to move forward with cataract surgery. Explained benefits alternatives and risks including but not limited to loss of vision, severe infection, retinal detachment, need for more surgery, visual disturbances etc...  Informed patient that reaching uncorrected vision aim (without glasses) after cataract surgery is not certain. Made patient aware they may need glasses, laser vision correction or in worst case scenario, IOL exchange.      Dilates well  no PXF  no Flomax  no guttae    -Aim: distance ou  - dominant eye  -Previous refractive surgery status:-  -Corneal astigmatism<1  Informed patient that floaters will persist after surgery      # Anatomic Narrow Angle  - S/P LPI each eye   - Discussed angle closure precautions  - OCT RNFL full, stable    # Dry eyes, bilateral  - Doing well with artificial tears  - continue PFAT prn      Follow up:  After surgery  Attending Physician Attestation:  Complete documentation of historical and exam elements from today's encounter can be found in the full encounter summary report (not reduplicated in this progress note).  I personally obtained the chief complaint(s) and history of present illness.  I confirmed and edited as necessary the review of systems, past medical/surgical history, family history, social history, and examination findings as documented by others; and I examined the patient myself.  I personally reviewed the relevant tests, images, and reports as documented above.  I formulated and edited as necessary the assessment and plan and  discussed the findings and management plan with the patient and family. - Anaid Sloan MD

## 2024-06-28 ENCOUNTER — TELEPHONE (OUTPATIENT)
Dept: OPHTHALMOLOGY | Facility: CLINIC | Age: 69
End: 2024-06-28
Payer: COMMERCIAL

## 2024-06-28 PROBLEM — H25.813 COMBINED FORM OF AGE-RELATED CATARACT, BOTH EYES: Status: ACTIVE | Noted: 2024-06-26

## 2024-06-28 NOTE — TELEPHONE ENCOUNTER
Called patient to schedule surgery with Dr Sloan    Spoke with: patient    Date of Surgery: 7/25 (Right) and 8/8 (Left)     Patient aware of approximate arrival time: No      Location of surgery: [facility] ([facility]) and [facility] (Left)     Pre-Op H&P: [facility]     Informed patient that they need to call to schedule pre-op H&P with [facility]  within 30 days of surgery date: Yes    Post-Op Appt Dates: 7/26 @ 1:00, 8/9 @ 3:00, 8/30 @ 10:00     Discussed with patient pre-op RN will call 2-3 days prior to surgery with arrival time and instructions:  Yes    Discussed with patient PAC RN will provide arrival time and instructions for surgery at the time of the appointment: [[place] locations only]: Not Applicable      Standard Surgery Packet Sent: Yes 06/28/24  via Mail - Standard      Surgical Soap Discussed with Patient: No      Additional Information Sent in Packet: Post-op Appointment Itinerary      Informed patient that they will need an adult  to bring patient home from surgery: Yes  : Patient is aware of the need for an adult          Additional Comments:        All patients questions were answered and was instructed to review surgical packet and call back [phone] with any questions or concerns.       Sravani Clark on 6/28/2024 at 11:04 AM

## 2024-07-12 ENCOUNTER — OFFICE VISIT (OUTPATIENT)
Dept: FAMILY MEDICINE | Facility: CLINIC | Age: 69
End: 2024-07-12
Payer: COMMERCIAL

## 2024-07-12 VITALS
TEMPERATURE: 97.3 F | RESPIRATION RATE: 14 BRPM | HEIGHT: 62 IN | SYSTOLIC BLOOD PRESSURE: 118 MMHG | OXYGEN SATURATION: 97 % | DIASTOLIC BLOOD PRESSURE: 80 MMHG | WEIGHT: 177 LBS | BODY MASS INDEX: 32.57 KG/M2 | HEART RATE: 70 BPM

## 2024-07-12 DIAGNOSIS — Z01.818 PREOP GENERAL PHYSICAL EXAM: Primary | ICD-10-CM

## 2024-07-12 DIAGNOSIS — H25.813 COMBINED FORM OF AGE-RELATED CATARACT, BOTH EYES: ICD-10-CM

## 2024-07-12 PROCEDURE — 99213 OFFICE O/P EST LOW 20 MIN: CPT | Performed by: NURSE PRACTITIONER

## 2024-07-12 RX ORDER — DESONIDE 0.5 MG/G
OINTMENT TOPICAL
COMMUNITY
Start: 2023-08-30

## 2024-07-12 RX ORDER — FLUOCINONIDE TOPICAL SOLUTION USP, 0.05% 0.5 MG/ML
SOLUTION TOPICAL
COMMUNITY
Start: 2023-08-30

## 2024-07-12 NOTE — PROGRESS NOTES
Preoperative Evaluation  [facility]  [address] [address]  Phone: [phone]  Fax: [phone]  Primary Provider: Jorge Blanton MD  Pre-op Performing Provider: Yanet Delcid NP  Jul 12, 2024 7/11/2024   Surgical Information   What procedure is being done? Cataract surgery   Facility or Hospital where procedure/surgery will be performed: [facility]   Who is doing the procedure / surgery? Dr. Sloan   Date of surgery / procedure: 7/25 and 8/8   Time of surgery / procedure: ?   Where do you plan to recover after surgery? at home with family        Fax number for surgical facility: Note does not need to be faxed, will be available electronically in Epic.    Assessment & Plan     The proposed surgical procedure is considered LOW risk.    Preop general physical exam    Combined form of age-related cataract, both eyes            - No identified additional risk factors other than previously addressed    Preoperative Medication Instructions  Antiplatelet or Anticoagulation Medication Instructions   - Patient is on no antiplatelet or anticoagulation medications.    Additional Medication Instructions  Take all scheduled medications on the day of surgery EXCEPT for modifications listed below:   - Herbal medications and vitamins: DO NOT TAKE 14 days prior to surgery.    Recommendation  Approval given to proceed with proposed procedure, without further diagnostic evaluation.    Etelvina Crook is a 68 year old, presenting for the following:  Pre-Op Exam      HPI related to upcoming procedure:     Patient scheduled for bilateral cataract surgery        7/11/2024   Pre-Op Questionnaire   Have you ever had a heart attack or stroke? No   Have you ever had surgery on your heart or blood vessels, such as a stent placement, a coronary artery bypass, or surgery on an artery in your head, neck, heart, or legs? No   Do you have chest  pain with activity? No   Do you have a history of heart failure? No   Do you currently have a cold, bronchitis or symptoms of other infection? No   Do you have a cough, shortness of breath, or wheezing? No   Do you or anyone in your family have previous history of blood clots? No   Do you or does anyone in your family have a serious bleeding problem such as prolonged bleeding following surgeries or cuts? No   Have you ever had problems with anemia or been told to take iron pills? No   Have you had any abnormal blood loss such as black, tarry or bloody stools, or abnormal vaginal bleeding? No   Have you ever had a blood transfusion? No   Are you willing to have a blood transfusion if it is medically needed before, during, or after your surgery? Yes   Have you or any of your relatives ever had problems with anesthesia? No   Do you have sleep apnea, excessive snoring or daytime drowsiness? No   Do you have any artifical heart valves or other implanted medical devices like a pacemaker, defibrillator, or continuous glucose monitor? No   Do you have artificial joints? No   Are you allergic to latex? No        Health Care Directive  Patient does not have a Health Care Directive or Living Will: Patient states has Advance Directive and will bring in a copy to clinic.    Preoperative Review of    reviewed - no record of controlled substances prescribed.      Patient Active Problem List    Diagnosis Date Noted    Combined form of age-related cataract, both eyes 06/26/2024     Priority: Medium    Cervical cancer screening 02/08/2024     Priority: Medium     No further cervical cancer screening recommended.     Pt age 68  Pt needs 3 consecutive negative pap tests or 2 consecutive negative cotests within 10 years with the last one being in the last 5 years, before pap screening can be discontinued.    No history of JASPAL 2 or greater found in epic.   Pap history below.  01/25/13 NIL Pap, Neg HR HPV   06/19/17 NIL Pap, Neg HR  HPV   09/29/20 NIL Pap   02/1/24 NIL Pap, Neg HR HPV           Mixed hyperlipidemia 01/31/2023     Priority: Medium    Peptic ulcer 07/28/2022     Priority: Medium    Blastomycosis 05/30/2019     Priority: Medium     Overview:   Created by Conversion      Restless legs syndrome 05/30/2019     Priority: Medium     Overview:   Created by Conversion      Senile osteoporosis 05/30/2019     Priority: Medium     Overview:   Created by Conversion      Ulcerative colitis (H) 05/30/2019     Priority: Medium     Overview:   Created by Conversion    Replacement Utility updated for latest IMO load      Articular disc disorder of temporomandibular joint 02/14/2018     Priority: Medium    Venous insufficiency of both lower extremities 11/30/2016     Priority: Medium      Past Medical History:   Diagnosis Date    Abdominal pain, unspecified site     Created by Conversion     Arthritis 2/7/2024    Asymptomatic varicose veins     Created by Conversion     Blastomycosis     Created by Conversion     Calculus of kidney     Created by Conversion     Endometrial hyperplasia, unspecified     Created by Conversion     Hyperparathyroidism, unspecified (H24)     Created by Conversion     Lattice degeneration     Leiomyoma of uterus, unspecified     Created by Conversion     Myalgia and myositis, unspecified     Created by Conversion     Nausea alone     Created by Conversion     Osteoporosis, unspecified     Created by Conversion     Other nonspecific finding on examination of urine     Created by Conversion     Pain in joint, site unspecified     Created by Conversion     Restless legs syndrome (RLS)     Created by Conversion     Senile osteoporosis     Created by Conversion     Ulcerative colitis, unspecified     Created by Conversion     Unspecified constipation     Created by Conversion     Unspecified sinusitis (chronic)     Created by Conversion     Unspecified symptom associated with female genital organs     Created by Conversion      Unspecified vitamin D deficiency     Created by Conversion      Past Surgical History:   Procedure Laterality Date    KIDNEY STONE SURGERY  1999    removal    OTHER SURGICAL HISTORY Bilateral     venous closure    TUBAL LIGATION  1987    WISDOM TOOTH EXTRACTION  1980     Current Outpatient Medications   Medication Sig Dispense Refill    atorvastatin (LIPITOR) 40 MG tablet TAKE 1 TABLET BY MOUTH ONE TIME DAILY 90 tablet 1    Calcium Citrate-Vitamin D 200-250 MG-UNIT TABS Take 1 tablet by mouth      cholecalciferol 50 MCG (2000 UT) tablet Take 1 tablet by mouth daily      denosumab (PROLIA) 60 MG/ML SOSY injection Inject 60 mg Subcutaneous every 6 months      desonide (DESOWEN) 0.05 % external ointment apply thin layer topically to the vaginal area for 14 days as needed for flares*      fluocinonide (LIDEX) 0.05 % external solution Apply and massage 1 application topically onto itchy spots on scalp at bedtime for 14 days, then as needed for itching*      omeprazole (PRILOSEC) 20 MG DR capsule Take 20 mg by mouth daily      Propylene Glycol (SYSTANE BALANCE OP) Apply to eye as needed         Allergies   Allergen Reactions    Comtrex Other (See Comments)     Acet/dextromethorphan/phenylephrine    Other (Do Not Use)         Social History     Tobacco Use    Smoking status: Never     Passive exposure: Never    Smokeless tobacco: Never    Tobacco comments:     in HS and 20's socail only, never pack a day smoker   Substance Use Topics    Alcohol use: Yes     Alcohol/week: 1.7 standard drinks of alcohol     Family History   Problem Relation Age of Onset    Breast Cancer Other     Emphysema Mother     Varicose Veins Mother     Edema Mother     Lung Cancer Father     Breast Cancer Niece         Early 20's    Asthma Grandchild     Diabetes Brother     Glaucoma No family hx of     Macular Degeneration No family hx of      History   Drug Use No             Review of Systems  CONSTITUTIONAL: NEGATIVE for fever, chills, change  "in weight  ENT/MOUTH: NEGATIVE for ear, mouth and throat problems  RESP: NEGATIVE for significant cough or SOB  CV: NEGATIVE for chest pain, palpitations or peripheral edema    Objective    /80 (BP Location: Right arm, Patient Position: Sitting, Cuff Size: Adult Regular)   Pulse 70   Temp 97.3  F (36.3  C) (Temporal)   Resp 14   Ht 1.562 m (5' 1.5\")   Wt 80.3 kg (177 lb)   LMP  (LMP Unknown)   SpO2 97%   BMI 32.90 kg/m     Estimated body mass index is 32.9 kg/m  as calculated from the following:    Height as of this encounter: 1.562 m (5' 1.5\").    Weight as of this encounter: 80.3 kg (177 lb).  Physical Exam  GENERAL: alert and no distress  EYES: Eyes grossly normal to inspection, PERRL and conjunctivae and sclerae normal  HENT: ear canals and TM's normal, nose and mouth without ulcers or lesions  NECK: no adenopathy, no asymmetry, masses, or scars  RESP: lungs clear to auscultation - no rales, rhonchi or wheezes  CV: regular rate and rhythm, normal S1 S2, no S3 or S4, no murmur, click or rub, no peripheral edema  ABDOMEN: soft, nontender, no hepatosplenomegaly, no masses and bowel sounds normal  MS: no gross musculoskeletal defects noted, no edema  SKIN: no suspicious lesions or rashes  NEURO: Normal strength and tone, mentation intact and speech normal  PSYCH: mentation appears normal, affect normal/bright    Recent Labs   Lab Test 02/01/24  1054 09/12/23  1628   HGB 14.2  --      --     140   POTASSIUM 4.3 4.3   CR 0.83 0.86   A1C 5.3 5.5        Diagnostics  No labs were ordered during this visit.   No EKG required for low risk surgery (cataract, skin procedure, breast biopsy, etc).    Revised Cardiac Risk Index (RCRI)  The patient has the following serious cardiovascular risks for perioperative complications:   - No serious cardiac risks = 0 points     RCRI Interpretation: 0 points: Class I (very low risk - 0.4% complication rate)         Signed Electronically by: Yanet Delcid, " NP  Copy of this evaluation report is provided to requesting physician.

## 2024-07-12 NOTE — PATIENT INSTRUCTIONS

## 2024-07-18 DIAGNOSIS — M81.0 SENILE OSTEOPOROSIS: Primary | ICD-10-CM

## 2024-07-18 DIAGNOSIS — Z92.29 PERSONAL HISTORY OF OTHER DRUG THERAPY: ICD-10-CM

## 2024-07-24 ENCOUNTER — ANESTHESIA EVENT (OUTPATIENT)
Dept: SURGERY | Facility: AMBULATORY SURGERY CENTER | Age: 69
End: 2024-07-24
Payer: COMMERCIAL

## 2024-07-25 ENCOUNTER — ANESTHESIA (OUTPATIENT)
Dept: SURGERY | Facility: AMBULATORY SURGERY CENTER | Age: 69
End: 2024-07-25
Payer: COMMERCIAL

## 2024-07-25 ENCOUNTER — HOSPITAL ENCOUNTER (OUTPATIENT)
Facility: AMBULATORY SURGERY CENTER | Age: 69
Discharge: HOME | End: 2024-07-25
Attending: OPHTHALMOLOGY
Payer: COMMERCIAL

## 2024-07-25 VITALS
SYSTOLIC BLOOD PRESSURE: 127 MMHG | HEIGHT: 61 IN | TEMPERATURE: 98.2 F | OXYGEN SATURATION: 95 % | BODY MASS INDEX: 33.42 KG/M2 | WEIGHT: 177 LBS | HEART RATE: 64 BPM | RESPIRATION RATE: 16 BRPM | DIASTOLIC BLOOD PRESSURE: 69 MMHG

## 2024-07-25 PROCEDURE — 66984 XCAPSL CTRC RMVL W/O ECP: CPT | Mod: RT | Performed by: OPHTHALMOLOGY

## 2024-07-25 PROCEDURE — 66984 XCAPSL CTRC RMVL W/O ECP: CPT | Mod: RT

## 2024-07-25 PROCEDURE — 66984 XCAPSL CTRC RMVL W/O ECP: CPT | Performed by: NURSE ANESTHETIST, CERTIFIED REGISTERED

## 2024-07-25 PROCEDURE — 66984 XCAPSL CTRC RMVL W/O ECP: CPT | Performed by: ANESTHESIOLOGY

## 2024-07-25 DEVICE — IMPLANTABLE DEVICE: Type: IMPLANTABLE DEVICE | Site: EYE | Status: FUNCTIONAL

## 2024-07-25 RX ORDER — BALANCED SALT SOLUTION 6.4; .75; .48; .3; 3.9; 1.7 MG/ML; MG/ML; MG/ML; MG/ML; MG/ML; MG/ML
SOLUTION OPHTHALMIC PRN
Status: DISCONTINUED | OUTPATIENT
Start: 2024-07-25 | End: 2024-07-25 | Stop reason: HOSPADM

## 2024-07-25 RX ORDER — TRIAMCINOLONE ACETONIDE 40 MG/ML
INJECTION, SUSPENSION INTRA-ARTICULAR; INTRAMUSCULAR PRN
Status: DISCONTINUED | OUTPATIENT
Start: 2024-07-25 | End: 2024-07-25 | Stop reason: HOSPADM

## 2024-07-25 RX ORDER — PROPARACAINE HYDROCHLORIDE 5 MG/ML
1 SOLUTION/ DROPS OPHTHALMIC ONCE
Status: COMPLETED | OUTPATIENT
Start: 2024-07-25 | End: 2024-07-25

## 2024-07-25 RX ORDER — OXYCODONE HYDROCHLORIDE 5 MG/1
10 TABLET ORAL
Status: DISCONTINUED | OUTPATIENT
Start: 2024-07-25 | End: 2024-07-26 | Stop reason: HOSPADM

## 2024-07-25 RX ORDER — LIDOCAINE HYDROCHLORIDE 10 MG/ML
INJECTION, SOLUTION EPIDURAL; INFILTRATION; INTRACAUDAL; PERINEURAL PRN
Status: DISCONTINUED | OUTPATIENT
Start: 2024-07-25 | End: 2024-07-25 | Stop reason: HOSPADM

## 2024-07-25 RX ORDER — DEXAMETHASONE SODIUM PHOSPHATE 10 MG/ML
4 INJECTION, SOLUTION INTRAMUSCULAR; INTRAVENOUS
Status: DISCONTINUED | OUTPATIENT
Start: 2024-07-25 | End: 2024-07-26 | Stop reason: HOSPADM

## 2024-07-25 RX ORDER — NALOXONE HYDROCHLORIDE 0.4 MG/ML
0.1 INJECTION, SOLUTION INTRAMUSCULAR; INTRAVENOUS; SUBCUTANEOUS
Status: DISCONTINUED | OUTPATIENT
Start: 2024-07-25 | End: 2024-07-26 | Stop reason: HOSPADM

## 2024-07-25 RX ORDER — LIDOCAINE 40 MG/G
CREAM TOPICAL
Status: DISCONTINUED | OUTPATIENT
Start: 2024-07-25 | End: 2024-07-26 | Stop reason: HOSPADM

## 2024-07-25 RX ORDER — ACETAMINOPHEN 325 MG/1
325-650 TABLET ORAL EVERY 6 HOURS PRN
COMMUNITY

## 2024-07-25 RX ORDER — ONDANSETRON 4 MG/1
4 TABLET, ORALLY DISINTEGRATING ORAL EVERY 30 MIN PRN
Status: DISCONTINUED | OUTPATIENT
Start: 2024-07-25 | End: 2024-07-26 | Stop reason: HOSPADM

## 2024-07-25 RX ORDER — TETRACAINE HYDROCHLORIDE 5 MG/ML
SOLUTION OPHTHALMIC PRN
Status: DISCONTINUED | OUTPATIENT
Start: 2024-07-25 | End: 2024-07-25 | Stop reason: HOSPADM

## 2024-07-25 RX ORDER — OXYCODONE HYDROCHLORIDE 5 MG/1
5 TABLET ORAL
Status: DISCONTINUED | OUTPATIENT
Start: 2024-07-25 | End: 2024-07-26 | Stop reason: HOSPADM

## 2024-07-25 RX ORDER — ACETAMINOPHEN 325 MG/1
975 TABLET ORAL ONCE
Status: COMPLETED | OUTPATIENT
Start: 2024-07-25 | End: 2024-07-25

## 2024-07-25 RX ORDER — SODIUM CHLORIDE, SODIUM LACTATE, POTASSIUM CHLORIDE, CALCIUM CHLORIDE 600; 310; 30; 20 MG/100ML; MG/100ML; MG/100ML; MG/100ML
INJECTION, SOLUTION INTRAVENOUS CONTINUOUS
Status: DISCONTINUED | OUTPATIENT
Start: 2024-07-25 | End: 2024-07-26 | Stop reason: HOSPADM

## 2024-07-25 RX ORDER — FENTANYL CITRATE 50 UG/ML
25 INJECTION, SOLUTION INTRAMUSCULAR; INTRAVENOUS
Status: DISCONTINUED | OUTPATIENT
Start: 2024-07-25 | End: 2024-07-26 | Stop reason: HOSPADM

## 2024-07-25 RX ORDER — CYCLOPENTOLAT/TROPIC/PHENYLEPH 1%-1%-2.5%
1 DROPS (EA) OPHTHALMIC (EYE)
Status: COMPLETED | OUTPATIENT
Start: 2024-07-25 | End: 2024-07-25

## 2024-07-25 RX ORDER — MOXIFLOXACIN IN NACL,ISO-OS/PF 0.3MG/0.3
SYRINGE (ML) INTRAOCULAR PRN
Status: DISCONTINUED | OUTPATIENT
Start: 2024-07-25 | End: 2024-07-25 | Stop reason: HOSPADM

## 2024-07-25 RX ORDER — ONDANSETRON 2 MG/ML
4 INJECTION INTRAMUSCULAR; INTRAVENOUS EVERY 30 MIN PRN
Status: DISCONTINUED | OUTPATIENT
Start: 2024-07-25 | End: 2024-07-26 | Stop reason: HOSPADM

## 2024-07-25 RX ADMIN — Medication 1 DROP: at 10:01

## 2024-07-25 RX ADMIN — Medication 1 DROP: at 10:12

## 2024-07-25 RX ADMIN — ACETAMINOPHEN 975 MG: 325 TABLET ORAL at 09:59

## 2024-07-25 RX ADMIN — PROPARACAINE HYDROCHLORIDE 1 DROP: 5 SOLUTION/ DROPS OPHTHALMIC at 10:01

## 2024-07-25 RX ADMIN — Medication 1 DROP: at 10:07

## 2024-07-25 RX ADMIN — SODIUM CHLORIDE, SODIUM LACTATE, POTASSIUM CHLORIDE, CALCIUM CHLORIDE: 600; 310; 30; 20 INJECTION, SOLUTION INTRAVENOUS at 10:00

## 2024-07-25 NOTE — ANESTHESIA POSTPROCEDURE EVALUATION
Patient: Soco Ferguson    Procedure: Procedure(s):  RIGHT EYE PHACOEMULSIFICATION, CATARACT, WITH STANDARD INTRAOCULAR LENS IMPLANT INSERTION       Anesthesia Type:  MAC    Note:  Disposition: Outpatient   Postop Pain Control: Uneventful            Sign Out: Well controlled pain   PONV: No   Neuro/Psych: Uneventful            Sign Out: Acceptable/Baseline neuro status   Airway/Respiratory: Uneventful            Sign Out: Acceptable/Baseline resp. status   CV/Hemodynamics: Uneventful            Sign Out: Acceptable CV status   Other NRE: NONE   DID A NON-ROUTINE EVENT OCCUR? No           Last vitals:  Vitals Value Taken Time   /69 07/25/24 1130   Temp 36.8  C (98.2  F) 07/25/24 1130   Pulse 64 07/25/24 1130   Resp 16 07/25/24 1130   SpO2 95 % 07/25/24 1130       Electronically Signed By: Roberto Carlos Churchill MD  July 25, 2024  11:53 AM

## 2024-07-25 NOTE — BRIEF OP NOTE
[facility]    Brief Operative Note    Pre-operative diagnosis: Combined form of age-related cataract, both eyes [H25.813]  Post-operative diagnosis Same as pre-operative diagnosis    Procedure: RIGHT EYE PHACOEMULSIFICATION, CATARACT, WITH STANDARD INTRAOCULAR LENS IMPLANT INSERTION, Right - Eye    Surgeon: Surgeons and Role:     * Anaid Sloan MD - Primary     * Celeste Brooke MD - Resident - Assisting  Anesthesia: MAC   Estimated Blood Loss: None    Drains: None  Specimens: * No specimens in log *  Findings:   None.  Complications: None.  Implants:   Implant Name Type Inv. Item Serial No.  Lot No. LRB No. Used Action   LENS [id number] 27.0 [employer] UV ASPHERIC BICONVEX IOL - [id number] Lens/Eye Implant LENS [id number] 27.0 [employer] UV ASPHERIC BICONVEX IOL [id number] [employer]  Right 1 Implanted

## 2024-07-25 NOTE — DISCHARGE INSTRUCTIONS
Post-Operative Instructions     FIRST 24 HOURS AFTER SURGERY:  You are allowed to Tylenol (acetaminophen) 650mg every four hours as needed for pain unless you have liver disease or are allergic to Tylenol..  Continue taking your regular medications and eye drops in the non-operative eye.  Do not remove the metal or plastic shield unless otherwise instructed by your surgeon.  Do not operate a car, motorcycle, or machinery for 24 hours after surgery.  Call ED immediately if you have SEVERE PAIN unrelieved with Tylenol. And  ask for the resident on call.     MEDICATION INSTRUCTIONS:  -You will not have treatment eye drops prescription as medications were injected into your eye.  -If you feel your eyes are dry and itchy, you can use regular lubricating drops for one month after the surgery. These eye drops can be found over the counter Brand names example: Systane, Refresh. Please choose preservative free drops. To be used four times a day and as needed for 1 month  -Instilling the eye drops directly into the eye.    -If you had previously any glaucoma eye drops, You can remove the cover and instill the drops as prescribed before and after the procedure      GENERAL INSTRUCTIONS:  Hygiene of the operated eye  Wash your hands thoroughly before caring for the eye.  If the lids are sticky or itchy in the morning, debris, or matter can be gently wiped away with a cotton ball moistened with tap water. DO NOT press on the lids or eyeball.     FIVE MINUTES APART. If ointment is prescribed, it should be applied last.  If you have been taking medications in the non-operated eye, continue as they were prescribed.  If you take medications by mouth for a medical problem, continue as they were prescribed (unless otherwise instructed by physician)    EYE PROTECTION  From the time of surgery until the time of your post-operative day 1 appointment, wear the eye shield at all times. Then, wear it whenever sleeping, for 1  week.  Protective sunglasses may be worn as needed for your comfort, and are suggested for outdoor activities.    ACTIVITIES  Avoid vigorous exertion and heavy lifting for the first week after surgery  You may take a bath or shower, but avoid getting water directly into your eye for one week after surgery, usually by keeping your eyes closed during the bath.  You should discuss driving and traveling with your surgeon. You may ride in a car and fly in an airplane unless otherwise instructed.  Avoid swimming or using a hot tube/sauna/pool/lake for 2 weeks after the surgery.      WHAT TO EXPECT:  Mild irritation and discomfort are normal.    Call the doctor if you experience any of the following:  Severe eye pain  Nausea  Vomiting  Severe headache  [facility]  Home Care Following Anesthesia  For 24 hours after surgery:  Get plenty of rest.  A responsible adult must stay with you for at least 24 hours after you leave the surgery center.  Do not drive or use heavy equipment.  If you have weakness or tingling, don't drive or use heavy equipment until this feeling goes away.   Do not drink alcohol.   Avoid strenuous or risky activities.  Ask for help when climbing stairs.  You may feel lightheaded.  IF so, sit for a few minutes before standing.  Have someone help you get up.   If you have nausea (feel sick to your stomach): Drink only clear liquids such as apple juice, ginger ale, broth or 7-Up.  Rest may also help.  Be sure to drink enough fluids.  Move to a regular diet as you feel able.   You may have a slight fever.  Call the doctor if your fever is over 100 F (37.7 C) (taken under the tongue) or lasts longer than 24 hours.  You may have a dry mouth, a sore throat, muscle aches or trouble sleeping. These should go away after 24 hours.  Do not make important or legal decisions.   It is recommended to avoid smoking.               Tips for taking pain medications  To get the best pain  relief possible, remember these points:  Take pain medications as directed, before pain becomes severe.  Pain medication can upset your stomach: taking it with food may help.  Constipation is a common side effect of pain medication. Drink plenty of  fluids.  Eat foods high in fiber. Take a stool softener if recommended by your doctor or pharmacist.  Do not drink alcohol, drive or operate machinery while taking pain medications.  Ask about other ways to control pain, such as with heat, ice or relaxation.    Tylenol/Acetaminophen Consumption    If you feel your pain relief is insufficient, you may take Tylenol/Acetaminophen in addition to your narcotic pain medication.   Be careful not to exceed 4,000 mg of Tylenol/Acetaminophen in a 24 hour period from all sources.  If you are taking extra strength Tylenol/acetaminophen (500 mg), the maximum dose is 8 tablets in 24 hours.  If you are taking regular strength acetaminophen (325 mg), the maximum dose is 12 tablets in 24 hours.    Call a doctor for any of the following:  Signs of infection (fever, growing tenderness at the surgery site, a large amount of drainage or bleeding, severe pain, foul-smelling drainage, redness, swelling).  It has been over 8 to 10 hours since surgery and you are still not able to urinate (pass water).  Headache for over 24 hours.  Numbness, tingling or weakness the day after surgery (if you had spinal anesthesia).  Signs of Covid-19 infection (temperature over 100 degrees, shortness of breath, cough, loss of taste/smell, generalized body aches, persistent headache, chills, sore throat, nausea/vomiting/diarrhea)  Your doctor is:  Dr. Anaid Sloan, Ophthalmology: [phone]                    Or dial [phone] and ask for the resident on call for:  Ophthalmology  For emergency care, call the:  [facility]:  [phone] (TTY for hearing impaired: [phone])

## 2024-07-25 NOTE — ANESTHESIA PREPROCEDURE EVALUATION
Anesthesia Pre-Procedure Evaluation    Patient: Soco Ferguson   MRN: [id number] : 1955        Procedure : Procedure(s):  RIGHT EYE PHACOEMULSIFICATION, CATARACT, WITH STANDARD INTRAOCULAR LENS IMPLANT INSERTION          Past Medical History:   Diagnosis Date    Abdominal pain, unspecified site     Created by Conversion     Arthritis 2024    Asymptomatic varicose veins     Created by Conversion     Blastomycosis     Created by Conversion     Calculus of kidney     Created by Conversion     Endometrial hyperplasia, unspecified     Created by Conversion     Hyperparathyroidism, unspecified (H24)     Created by Conversion     Lattice degeneration     Leiomyoma of uterus, unspecified     Created by Conversion     Myalgia and myositis, unspecified     Created by Conversion     Nausea alone     Created by Conversion     Osteoporosis, unspecified     Created by Conversion     Other nonspecific finding on examination of urine     Created by Conversion     Pain in joint, site unspecified     Created by Conversion     Restless legs syndrome (RLS)     Created by Conversion     Senile osteoporosis     Created by Conversion     Ulcerative colitis, unspecified     Created by Conversion     Unspecified constipation     Created by Conversion     Unspecified sinusitis (chronic)     Created by Conversion     Unspecified symptom associated with female genital organs     Created by Conversion     Unspecified vitamin D deficiency     Created by Conversion       Past Surgical History:   Procedure Laterality Date    KIDNEY STONE SURGERY      removal    OTHER SURGICAL HISTORY Bilateral     venous closure    TUBAL LIGATION      WISDOM TOOTH EXTRACTION        Allergies   Allergen Reactions    Comtrex Other (See Comments)     Acet/dextromethorphan/phenylephrine    Other (Do Not Use)       Social History     Tobacco Use    Smoking status: Never     Passive exposure: Never    Smokeless tobacco: Never    Tobacco  "comments:     in HS and 20's socail only, never pack a day smoker   Substance Use Topics    Alcohol use: Yes     Alcohol/week: 1.7 standard drinks of alcohol      Wt Readings from Last 1 Encounters:   07/25/24 80.3 kg (177 lb)        Anesthesia Evaluation            ROS/MED HX  ENT/Pulmonary:       Neurologic:       Cardiovascular:     (+) Dyslipidemia - -   -  - -                                      METS/Exercise Tolerance:     Hematologic:       Musculoskeletal:   (+)  arthritis,             GI/Hepatic:     (+)       Inflammatory bowel disease,             Renal/Genitourinary:       Endo:       Psychiatric/Substance Use:       Infectious Disease:       Malignancy:       Other:            Physical Exam    Airway  airway exam normal      Mallampati: II   TM distance: > 3 FB   Neck ROM: full   Mouth opening: > 3 cm    Respiratory Devices and Support         Dental       (+) Completely normal teeth      Cardiovascular          Rhythm and rate: regular and normal     Pulmonary   pulmonary exam normal        breath sounds clear to auscultation           OUTSIDE LABS:  CBC:   Lab Results   Component Value Date    WBC 7.8 02/01/2024    WBC 7.7 01/31/2023    HGB 14.2 02/01/2024    HGB 14.0 01/31/2023    HCT 42.5 02/01/2024    HCT 41.8 01/31/2023     02/01/2024     01/31/2023     BMP:   Lab Results   Component Value Date     02/01/2024     09/12/2023    POTASSIUM 4.3 02/01/2024    POTASSIUM 4.3 09/12/2023    CHLORIDE 105 02/01/2024    CHLORIDE 102 09/12/2023    CO2 26 02/01/2024    CO2 27 09/12/2023    BUN 21.5 02/01/2024    BUN 24.6 (H) 09/12/2023    CR 0.83 02/01/2024    CR 0.86 09/12/2023    GLC 96 02/01/2024     (H) 09/12/2023     COAGS:   Lab Results   Component Value Date    PTT 29 12/09/2020    INR 0.96 12/09/2020     POC: No results found for: \"BGM\", \"HCG\", \"HCGS\"  HEPATIC:   Lab Results   Component Value Date    ALBUMIN 4.1 02/01/2024    PROTTOTAL 6.3 (L) 02/01/2024    ALT 40 " "02/01/2024    AST 31 02/01/2024    ALKPHOS 88 02/01/2024    BILITOTAL 0.5 02/01/2024     OTHER:   Lab Results   Component Value Date    A1C 5.3 02/01/2024    JEREMY 9.6 02/01/2024    PHOS 3.8 08/02/2019    MAG 2.1 09/12/2023    TSH 1.67 02/01/2024       Anesthesia Plan    ASA Status:  2    NPO Status:  NPO Appropriate    Anesthesia Type: MAC.     - Reason for MAC: immobility needed, straight local not clinically adequate   Induction: Intravenous.           Consents    Anesthesia Plan(s) and associated risks, benefits, and realistic alternatives discussed. Questions answered and patient/representative(s) expressed understanding.     - Discussed:     - Discussed with:  Patient      - Extended Intubation/Ventilatory Support Discussed: No.      - Patient is DNR/DNI Status: No     Use of blood products discussed: No .     Postoperative Care    Pain management: IV analgesics, Oral pain medications, Multi-modal analgesia.   PONV prophylaxis: Ondansetron (or other 5HT-3)     Comments:               Roberto Carlos Churchill MD    I have reviewed the pertinent notes and labs in the chart from the past 30 days and (re)examined the patient.  Any updates or changes from those notes are reflected in this note.              # Obesity: Estimated body mass index is 32.91 kg/m  as calculated from the following:    Height as of this encounter: 1.562 m (5' 1.5\").    Weight as of this encounter: 80.3 kg (177 lb).      "

## 2024-07-25 NOTE — OP NOTE
Operative  Report    Patient Name: Soco Ferguson    MRN: [id number]    Date of Surgery: 7/25/2024    Surgeon: Anaid Sloan M.D    Assistant surgeon: Celeste Brooke    Preoperative Diagnosis: Visually significant cataract, right eye    Postoperative Diagnosis: Same    Procedure: Phacoemulsification with intraocular lens implant, right eye    Implant: AIM 0.00 D  Implant Name Type Inv. Item Serial No.  Lot No. LRB No. Used Action   LENS [id number] 27.0 [employer] UV ASPHERIC BICONVEX IOL - [id number] Lens/Eye Implant LENS [id number] 27.0 [employer] UV ASPHERIC BICONVEX IOL [id number] [employer]  Right 1 Implanted       Anesthesia Type: MAC    Estimated Blood Loss: Trace    Complications: None    INDICATIONS FOR PROCEDURE: Patient has a history of visually significant cataract affecting the patient's activity of daily living. After a discussion with the patient, the patient has elected to have the listed procedure(s) to maximize visual potential. All of the appropriate consent forms have been signed and all of the patient's questions have been answered.    DETAILS OF THE PROCEDURE: Patient was identified in the pre operative area and given pre operative eye drops. The patient was then brought into the operating room and intravenous sedation was begun after a time out was performed. The patient was prepped and draped in the usual sterile ophthalmic fashion.     A lid speculum was placed and the operating microscope was brought into position. A paracentesis was made and viscoelastic was injected into the anterior chamber. A clear corneal incision was made using a keratome blade. A cystotome needle and Utrata forceps were used to create an anterior continuous curvilinear capsulorhexis. Then BSS on a blunt tipped cannula was used for hydrodissection and hydrodelineation. Using the phacoemulsification unit, the nucleus was then removed from the eye. Using irrigation and  aspiration, the remaining cortical material was removed from the eye. The capsular bag was infused with viscoelastic material. The intraocular lens was then placed into the capsular bag and secured into position. The remaining viscoelastic material was then removed from the eye via irrigation and aspiration.  BSS on a blunt tipped cannula was used to hydrate all of the wounds. At the end of the case, the wounds were noted to be watertight, the pupil was noted to be round, the lens appeared to be in good position, and the pressure was palpated to be physiologic. Intracameral moxifloxacin and a subconjunctival injection of Kenalog was administered.     Post operative ointment was applied and the eye was patched and shielded. Patient tolerated procedure and was brought to the recovery area in good condition. Patient will follow in the eye clinic in one day.

## 2024-07-25 NOTE — ANESTHESIA CARE TRANSFER NOTE
Patient: Soco Ferguson    Procedure: Procedure(s):  RIGHT EYE PHACOEMULSIFICATION, CATARACT, WITH STANDARD INTRAOCULAR LENS IMPLANT INSERTION       Diagnosis: Combined form of age-related cataract, both eyes [H25.813]  Diagnosis Additional Information: No value filed.    Anesthesia Type:   MAC     Note:    Oropharynx: oropharynx clear of all foreign objects and spontaneously breathing  Level of Consciousness: awake  Oxygen Supplementation: room air    Independent Airway: airway patency satisfactory and stable  Dentition: dentition unchanged  Vital Signs Stable: post-procedure vital signs reviewed and stable  Report to RN Given: handoff report given  Patient transferred to: Phase II    Handoff Report: Identifed the Patient, Identified the Reponsible Provider, Reviewed the pertinent medical history, Discussed the surgical course, Reviewed Intra-OP anesthesia mangement and issues during anesthesia, Set expectations for post-procedure period and Allowed opportunity for questions and acknowledgement of understanding      Vitals:  Vitals Value Taken Time   BP     Temp 36.3  C (97.3  F) 07/25/24 1108   Pulse 61 07/25/24 1108   Resp 16 07/25/24 1108   SpO2 95 % 07/25/24 1108       Electronically Signed By: BRONWYN Fox CRNA  July 25, 2024  11:12 AM

## 2024-07-26 ENCOUNTER — OFFICE VISIT (OUTPATIENT)
Dept: OPHTHALMOLOGY | Facility: CLINIC | Age: 69
End: 2024-07-26
Attending: OPHTHALMOLOGY
Payer: COMMERCIAL

## 2024-07-26 DIAGNOSIS — Z96.1 PSEUDOPHAKIA: Primary | ICD-10-CM

## 2024-07-26 PROCEDURE — 99024 POSTOP FOLLOW-UP VISIT: CPT | Performed by: OPHTHALMOLOGY

## 2024-07-26 PROCEDURE — G0463 HOSPITAL OUTPT CLINIC VISIT: HCPCS | Performed by: OPHTHALMOLOGY

## 2024-07-26 ASSESSMENT — EXTERNAL EXAM - LEFT EYE: OS_EXAM: NORMAL

## 2024-07-26 ASSESSMENT — VISUAL ACUITY
OS_CC: 20/30
OS_PH_CC+: -1
OS_PH_CC: 20/25
OD_SC+: -2
OD_PH_SC: 20/30
OD_SC: 20/30
METHOD: SNELLEN - LINEAR
OS_CC+: -1

## 2024-07-26 ASSESSMENT — TONOMETRY
OS_IOP_MMHG: 19
OD_IOP_MMHG: 14
IOP_METHOD: ICARE

## 2024-07-26 ASSESSMENT — SLIT LAMP EXAM - LIDS
COMMENTS: MEIBOMIAN GLAND DYSFUNCTION
COMMENTS: MEIBOMIAN GLAND DYSFUNCTION

## 2024-07-26 ASSESSMENT — EXTERNAL EXAM - RIGHT EYE: OD_EXAM: NORMAL

## 2024-07-26 NOTE — NURSING NOTE
Chief Complaints and History of Present Illnesses   Patient presents with    Post Op (Ophthalmology) Right Eye     Chief Complaint(s) and History of Present Illness(es)       Post Op (Ophthalmology) Right Eye              Laterality: right eye    Associated symptoms: Negative for dryness, eye pain, flashes, floaters, itching and burning    Pain scale: 0/10              Comments    Ambreen is here one day post right cataract surgery with IOL implant. She says right eye feels good today and denies pain.    Prasanna Lopez COT 12:56 PM July 26, 2024

## 2024-07-26 NOTE — PROGRESS NOTES
Chief complaint     Chief Complaints and History of Present Illnesses   Patient presents with    Cataract       Referring Provider: Self     HPI    Soco Ferguson 68 year old female who presents for annual examiantion. Last seen with Dr. 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 Reports That her vision is quite good. She notes some sensitivity to bright lights that has been chronic and longstanding. No blurring of vision and is overall happy with her vision. No flashes, floaters, curtains.     Today 2/14/24 patient presents for one year follow up. No changes in vision. Both eyes have been feeling a little dry but improve with artificial tears. She has noticed she sometimes sees small black dots in her vision when she is watching TV. Denies large floaters or flashes of light. Black dots resolve after closing her eyes.    Interval hx 07/26/2024  Chief Complaint(s) and History of Present Illness(es)       Cataract Evaluation    In both eyes.  Severity is moderate.  Onset was gradual.  Since onset it is gradually worsening.  Pain was noted as 5/10.             Comments    Ambreen is here for a cataract evaluation. She is complaining of worsening vision and eye pain. She wants to move forward with removal of cataracts. She also sees floaters that are bothersome.    Prasanna Lopez COT 1:25 PM June 26, 2024                        Past ocular history   Prior eye surgery/laser/Trauma: LPI OU  CTL wearer:no  Glasses : yes  Family Hx of eye disease:no    PMH     Past Medical History:   Diagnosis Date    Abdominal pain, unspecified site     Created by Conversion     Arthritis 2/7/2024    Asymptomatic varicose veins     Created by Conversion     Blastomycosis     Created by Conversion     Calculus of kidney     Created by Conversion     Endometrial hyperplasia, unspecified     Created by Conversion     Hyperparathyroidism, unspecified (H24)     Created by  Conversion     Lattice degeneration     Leiomyoma of uterus, unspecified     Created by Conversion     Myalgia and myositis, unspecified     Created by Conversion     Nausea alone     Created by Conversion     Osteoporosis, unspecified     Created by Conversion     Other nonspecific finding on examination of urine     Created by Conversion     Pain in joint, site unspecified     Created by Conversion     Restless legs syndrome (RLS)     Created by Conversion     Senile osteoporosis     Created by Conversion     Ulcerative colitis, unspecified     Created by Conversion     Unspecified constipation     Created by Conversion     Unspecified sinusitis (chronic)     Created by Conversion     Unspecified symptom associated with female genital organs     Created by Conversion     Unspecified vitamin D deficiency     Created by Conversion        PSH     Past Surgical History:   Procedure Laterality Date    CATARACT IOL, RT/LT      KIDNEY STONE SURGERY  01/01/1999    removal    OTHER SURGICAL HISTORY Bilateral     venous closure    PHACOEMULSIFICATION WITH STANDARD INTRAOCULAR LENS IMPLANT Right 07/25/2024    Procedure: RIGHT EYE PHACOEMULSIFICATION, CATARACT, WITH STANDARD INTRAOCULAR LENS IMPLANT INSERTION;  Surgeon: Anaid Sloan MD;  Location: [facility] OR    TUBAL LIGATION  01/01/1987    WISDOM TOOTH EXTRACTION  01/01/1980       Meds     Current Outpatient Medications   Medication Sig Dispense Refill    acetaminophen (TYLENOL) 325 MG tablet Take 325-650 mg by mouth every 6 hours as needed for mild pain      atorvastatin (LIPITOR) 40 MG tablet TAKE 1 TABLET BY MOUTH ONE TIME DAILY 90 tablet 1    Calcium Citrate-Vitamin D 200-250 MG-UNIT TABS Take 1 tablet by mouth      cholecalciferol 50 MCG (2000 UT) tablet Take 1 tablet by mouth daily      denosumab (PROLIA) 60 MG/ML SOSY injection Inject 60 mg Subcutaneous every 6 months      desonide (DESOWEN) 0.05 % external ointment apply thin layer topically to the vaginal area for  14 days as needed for flares*      fluocinonide (LIDEX) 0.05 % external solution Apply and massage 1 application topically onto itchy spots on scalp at bedtime for 14 days, then as needed for itching*      omeprazole (PRILOSEC) 20 MG DR capsule Take 20 mg by mouth daily      Propylene Glycol (SYSTANE BALANCE OP) Apply to eye as needed       Current Facility-Administered Medications   Medication Dose Route Frequency Provider Last Rate Last Admin    denosumab (PROLIA) injection 60 mg  60 mg Subcutaneous Q6 Months            Drops Currently Taking   Artificial tears.    Assessment/Plan   #pseudophakia right eye    Good post op apearence  No complications of the procedure  No water in the eye for 1 week  Cover the eye for 1 week  1 suture removed today    # Combined age-related cataracts, both eyes  Increased IOP, worsening vision each eye, worse with glare testing   High risk for angle closure  Patient is having difficulty with daily activities due to visual impairment. Patient feels that they are no longer managing with current correction and would like to move forward with cataract surgery. Explained benefits alternatives and risks including but not limited to loss of vision, severe infection, retinal detachment, need for more surgery, visual disturbances etc...  Informed patient that reaching uncorrected vision aim (without glasses) after cataract surgery is not certain. Made patient aware they may need glasses, laser vision correction or in worst case scenario, IOL exchange.      Dilates well  no PXF  no Flomax  no guttae    -Aim: distance ou  - dominant eye  -Previous refractive surgery status:-  -Corneal astigmatism<1  Informed patient that floaters will persist after surgery      # Anatomic Narrow Angle  - S/P LPI each eye   - Discussed angle closure precautions  - OCT RNFL full, stable    # Dry eyes, bilateral  - Doing well with artificial tears  - continue PFAT prn      Follow up:  After surgery  Attending  Physician Attestation:  Complete documentation of historical and exam elements from today's encounter can be found in the full encounter summary report (not reduplicated in this progress note).  I personally obtained the chief complaint(s) and history of present illness.  I confirmed and edited as necessary the review of systems, past medical/surgical history, family history, social history, and examination findings as documented by others; and I examined the patient myself.  I personally reviewed the relevant tests, images, and reports as documented above.  I formulated and edited as necessary the assessment and plan and discussed the findings and management plan with the patient and family. - Anaid Sloan MD

## 2024-08-01 ENCOUNTER — OFFICE VISIT (OUTPATIENT)
Dept: INTERNAL MEDICINE | Facility: CLINIC | Age: 69
End: 2024-08-01
Payer: COMMERCIAL

## 2024-08-01 VITALS
SYSTOLIC BLOOD PRESSURE: 120 MMHG | TEMPERATURE: 97 F | RESPIRATION RATE: 16 BRPM | HEART RATE: 80 BPM | OXYGEN SATURATION: 95 % | WEIGHT: 179.7 LBS | HEIGHT: 62 IN | DIASTOLIC BLOOD PRESSURE: 70 MMHG | BODY MASS INDEX: 33.07 KG/M2

## 2024-08-01 DIAGNOSIS — K51.80 OTHER ULCERATIVE COLITIS WITHOUT COMPLICATION (H): ICD-10-CM

## 2024-08-01 DIAGNOSIS — K27.9 PEPTIC ULCER: ICD-10-CM

## 2024-08-01 DIAGNOSIS — M81.0 SENILE OSTEOPOROSIS: Primary | ICD-10-CM

## 2024-08-01 PROCEDURE — 96372 THER/PROPH/DIAG INJ SC/IM: CPT | Mod: RT | Performed by: INTERNAL MEDICINE

## 2024-08-01 PROCEDURE — 99214 OFFICE O/P EST MOD 30 MIN: CPT | Mod: 25 | Performed by: INTERNAL MEDICINE

## 2024-08-01 NOTE — PATIENT INSTRUCTIONS
Prolia 22nd today.  Prolia 23rd in 6 months with AWV.    DXA due now .   Phone number to schedule [phone].    Daily calcium need is 6399-2733 mg a day from the diet and supplements.  Calcium citrate is easier to digest.  Vitamin D 2000 IU daily recommended.    Risk of rebound vertebral fractures is higher when Prolia suddenly stopped or dose was missed.      Prolia and Covid vaccine should be  for at least a week.    Patient education:  Patients advised to maintain good oral hygiene during treatment, because of the risk for osteonecrosis of the jaw.   The risk of osteonecrosis of the jaw with Prolia therapy is extremely low.   If a patient is late for Prolia therapy (>3 wks) a rapid rebound of bone loss can occur which is highly concerning and important to try to prevent to optimize bone health.   Therefore if an invasive dental procedure is required, primarily extraction or implant, while on Prolia therapy, the recommendation is to time the dental procedure optimally 4 months after the most recent dose of Prolia allowing 6-8 weeks for healing prior to next dose of Prolia.   This is based on the updated 2022 Recommendations from the American Association of Oral and Maxillofacial Surgeons.   We also recommend discussing with dentist or oral surgeon if pretreatment prophylactic oral rinses and antibiotics would be beneficial.   Optimizing oral hygiene before any invasive dental procedure significantly lowers ONJ risk.     There is a greater risk of severe hypocalcemia following denosumab administration and patient is advised that we will have to monitor calcium, phosphorous, and magnesium levels. Risk of infection is increased with denosumab use, so patient will inform me if has more frequent infections.     Atypical femur fracture  has been reported in patients receiving denosumab. The fractures may occur anywhere along the femoral shaft (may be bilateral) and commonly occur with minimal to no trauma to  the area. Some patients experience prodromal thigh or groin pain weeks or months before the fracture occurs. Contralateral limb should be assessed if AFF occurs.     Multiple vertebral column fracture has been reported following discontinuation of therapy. Hypertension and increased cholesterol were reported with denosumab use.      Discussed 10-year data of Prolia. Discussed the importance of being on time and consistent with Prolia use to prevent rapid bone of osteoclastic activity.  Discussed that if Prolia is discontinued we will likely need to utilize an antiresorptive, such as Reclast, to help prevent rapid rebound of osteoclast activity. Often more than 1 dose is required.  Labs yearly to ensure calcium and vitamin D optimized while patient on Prolia.

## 2024-08-01 NOTE — PROGRESS NOTES
"  (M81.0) Senile osteoporosis  (primary encounter diagnosis)  Comment: She was treated with Prolia 6916-8068, Forteo 3968-5515 and 9/2019-12/2020. She stopped Forteo 12/2020, since completed 2 years of treatment. On Prolia again since 2021.  Prolia # 22 today.    She is due for DXA scan. We will review this result on the next visit and decide if can possibly switch to Reclast infusion yearly before the trial of the \"Drug holiday\".  Component      Latest Ref Rng 2/1/2024  10:54 AM   Vitamin D, Total (25-Hydroxy)      20 - 50 ng/mL 41      Component      Latest Ref Rng 2/1/2024  10:54 AM   Sodium      135 - 145 mmol/L 141    Potassium      3.4 - 5.3 mmol/L 4.3    Carbon Dioxide (CO2)      22 - 29 mmol/L 26    Anion Gap      7 - 15 mmol/L 10    Urea Nitrogen      8.0 - 23.0 mg/dL 21.5    Creatinine      0.51 - 0.95 mg/dL 0.83    GFR Estimate      >60 mL/min/1.73m2 77    Calcium      8.8 - 10.2 mg/dL 9.6    Chloride      98 - 107 mmol/L 105    Glucose      70 - 99 mg/dL 96    Alkaline Phosphatase      40 - 150 U/L 88    AST      0 - 45 U/L 31    ALT      0 - 50 U/L 40    Protein Total      6.4 - 8.3 g/dL 6.3 (L)    Albumin      3.5 - 5.2 g/dL 4.1    Bilirubin Total      <=1.2 mg/dL 0.5           Considering reported general safety on long treatment with Prolia, more than 10 years, I recommended continuing Prolia treatment. Risk of rebound fractures and decline in bone density is reported with Prolia discontinuation and even with switching to bisphosphonate.     Patient education - all discussed with the patient during this visit:  Patients advised to maintain good oral hygiene during treatment, because of the risk for osteonecrosis of the jaw.   The risk of osteonecrosis of the jaw with Prolia therapy is extremely low.   If a patient is late for Prolia therapy (>3 wks) a rapid rebound of bone loss can occur which is highly concerning and important to try to prevent to optimize bone health.   Therefore if an invasive " dental procedure is required, primarily extraction or implant, while on Prolia therapy, the recommendation is to time the dental procedure optimally 4 months after the most recent dose of Prolia allowing 6-8 weeks for healing prior to next dose of Prolia.   This is based on the updated 2022 Recommendations from the American Association of Oral and Maxillofacial Surgeons.   We also recommend discussing with dentist or oral surgeon if pretreatment prophylactic oral rinses and antibiotics would be beneficial.   Optimizing oral hygiene before any invasive dental procedure significantly lowers ONJ risk.     There is a greater risk of severe hypocalcemia following denosumab administration and patient is advised that we will have to monitor calcium, phosphorous, and magnesium levels. Risk of infection is increased with denosumab use, so patient will inform me if has more frequent infections.     Atypical femur fracture  has been reported in patients receiving denosumab. The fractures may occur anywhere along the femoral shaft (may be bilateral) and commonly occur with minimal to no trauma to the area. Some patients experience prodromal thigh or groin pain weeks or months before the fracture occurs. Contralateral limb should be assessed if AFF occurs.     Multiple vertebral column fracture has been reported following discontinuation of therapy. Hypertension and increased cholesterol were reported with denosumab use.      Discussed 10-year data of Prolia. Discussed the importance of being on time and consistent with Prolia use to prevent rapid bone of osteoclastic activity.  Discussed that if Prolia is discontinued we will likely need to utilize an antiresorptive, such as Reclast, to help prevent rapid rebound of osteoclast activity. Often more than 1 dose is required.  Labs yearly to ensure calcium and vitamin D optimized while patient on Prolia.     Plan: DX Bone Density          H/o Peptic ulcer in the past, stable on PPI  now    Ulcerative colitis initially diagnosed at age 22. 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      The longitudinal plan of care for the diagnosis(es)/condition(s) as documented were addressed during this visit. Due to the added complexity in care, I will continue to support Ambreen in the subsequent management and with ongoing continuity of care.    Patient was educated on safety of Prolia utilizing Patient Counseling Chart for Healthcare Providers, as outlined by the Prolia REMS progam.     Return in about 6 months (around 2/1/2025) for AWV, Prolia.    Patient Instructions   Prolia 22nd today.  Prolia 23rd in 6 months with AWV.    DXA due now .   Phone number to schedule [phone].    Daily calcium need is 3153-0350 mg a day from the diet and supplements.  Calcium citrate is easier to digest.  Vitamin D 2000 IU daily recommended.    Risk of rebound vertebral fractures is higher when Prolia suddenly stopped or dose was missed.      Prolia and Covid vaccine should be  for at least a week.    Patient education:  Patients advised to maintain good oral hygiene during treatment, because of the risk for osteonecrosis of the jaw.   The risk of osteonecrosis of the jaw with Prolia therapy is extremely low.   If a patient is late for Prolia therapy (>3 wks) a rapid rebound of bone loss can occur which is highly concerning and important to try to prevent to optimize bone health.   Therefore if an invasive dental procedure is required, primarily extraction or implant, while on Prolia therapy, the recommendation is to time the dental procedure optimally 4 months after the most recent dose of Prolia allowing 6-8 weeks for healing prior to next dose of Prolia.   This is based on the updated 2022 Recommendations from the American Association of Oral and Maxillofacial Surgeons.   We also recommend discussing with dentist or oral surgeon if pretreatment  "prophylactic oral rinses and antibiotics would be beneficial.   Optimizing oral hygiene before any invasive dental procedure significantly lowers ONJ risk.     There is a greater risk of severe hypocalcemia following denosumab administration and patient is advised that we will have to monitor calcium, phosphorous, and magnesium levels. Risk of infection is increased with denosumab use, so patient will inform me if has more frequent infections.     Atypical femur fracture  has been reported in patients receiving denosumab. The fractures may occur anywhere along the femoral shaft (may be bilateral) and commonly occur with minimal to no trauma to the area. Some patients experience prodromal thigh or groin pain weeks or months before the fracture occurs. Contralateral limb should be assessed if AFF occurs.     Multiple vertebral column fracture has been reported following discontinuation of therapy. Hypertension and increased cholesterol were reported with denosumab use.      Discussed 10-year data of Prolia. Discussed the importance of being on time and consistent with Prolia use to prevent rapid bone of osteoclastic activity.  Discussed that if Prolia is discontinued we will likely need to utilize an antiresorptive, such as Reclast, to help prevent rapid rebound of osteoclast activity. Often more than 1 dose is required.  Labs yearly to ensure calcium and vitamin D optimized while patient on Prolia.            /70   Pulse 80   Temp 97  F (36.1  C)   Resp 16   Ht 1.562 m (5' 1.5\")   Wt 81.5 kg (179 lb 11.2 oz)   LMP  (LMP Unknown)   SpO2 95%   BMI 33.40 kg/m        Did you experience any problems with previous Prolia injection? no  Any medication change in the last 6 months? no  Did you take prednisone or other immunosupressant drugs in the last 6 months   (chemo, transplant, rheum, dermatology conditions)? no  Did you have any serious infection in the last 6 months?no  Any recent hospitalizations?no  Do " you plan any dental work in the next 2-3 months?no  How much calcium do you take daily from the diet and supplements?1200 mg  How much vit D do you take daily? 2000 IU  Last DXA? 7/2022 Reviewed and discussed      Patient is here today for the Prolia injection. Patient tolerated previous injections well.   We discussed calcium and vit D daily needs today.       We discussed high risk of rebound vertebral fractures when Prolia suddenly stopped.    Next Prolia injection will be in 6 months.           This note has been dictated using voice recognition software. Any grammatical or context distortions are unintentional and inherent to the software      Patient Active Problem List   Diagnosis    Blastomycosis    Restless legs syndrome    Senile osteoporosis    Ulcerative colitis (H)    Venous insufficiency of both lower extremities    Articular disc disorder of temporomandibular joint    Peptic ulcer    Mixed hyperlipidemia    Cervical cancer screening    Combined form of age-related cataract, both eyes       Current Outpatient Medications   Medication Sig Dispense Refill    acetaminophen (TYLENOL) 325 MG tablet Take 325-650 mg by mouth every 6 hours as needed for mild pain      atorvastatin (LIPITOR) 40 MG tablet TAKE 1 TABLET BY MOUTH ONE TIME DAILY 90 tablet 1    Calcium Citrate-Vitamin D 200-250 MG-UNIT TABS Take 1 tablet by mouth      cholecalciferol 50 MCG (2000 UT) tablet Take 1 tablet by mouth daily      denosumab (PROLIA) 60 MG/ML SOSY injection Inject 60 mg Subcutaneous every 6 months      desonide (DESOWEN) 0.05 % external ointment apply thin layer topically to the vaginal area for 14 days as needed for flares*      fluocinonide (LIDEX) 0.05 % external solution Apply and massage 1 application topically onto itchy spots on scalp at bedtime for 14 days, then as needed for itching*      omeprazole (PRILOSEC) 20 MG DR capsule Take 20 mg by mouth daily      Propylene Glycol (SYSTANE BALANCE OP) Apply to eye as  needed       Current Facility-Administered Medications   Medication Dose Route Frequency Provider Last Rate Last Admin    denosumab (PROLIA) injection 60 mg  60 mg Subcutaneous Q6 Months

## 2024-08-07 ENCOUNTER — ANESTHESIA EVENT (OUTPATIENT)
Dept: SURGERY | Facility: AMBULATORY SURGERY CENTER | Age: 69
End: 2024-08-07
Payer: COMMERCIAL

## 2024-08-07 RX ORDER — NALOXONE HYDROCHLORIDE 0.4 MG/ML
0.1 INJECTION, SOLUTION INTRAMUSCULAR; INTRAVENOUS; SUBCUTANEOUS
Status: DISCONTINUED | OUTPATIENT
Start: 2024-08-07 | End: 2024-08-09 | Stop reason: HOSPADM

## 2024-08-07 RX ORDER — ONDANSETRON 2 MG/ML
4 INJECTION INTRAMUSCULAR; INTRAVENOUS EVERY 30 MIN PRN
Status: DISCONTINUED | OUTPATIENT
Start: 2024-08-07 | End: 2024-08-09 | Stop reason: HOSPADM

## 2024-08-07 RX ORDER — OXYCODONE HYDROCHLORIDE 5 MG/1
10 TABLET ORAL
Status: DISCONTINUED | OUTPATIENT
Start: 2024-08-07 | End: 2024-08-09 | Stop reason: HOSPADM

## 2024-08-07 RX ORDER — DEXAMETHASONE SODIUM PHOSPHATE 10 MG/ML
4 INJECTION, SOLUTION INTRAMUSCULAR; INTRAVENOUS
Status: DISCONTINUED | OUTPATIENT
Start: 2024-08-07 | End: 2024-08-09 | Stop reason: HOSPADM

## 2024-08-07 RX ORDER — OXYCODONE HYDROCHLORIDE 5 MG/1
5 TABLET ORAL
Status: DISCONTINUED | OUTPATIENT
Start: 2024-08-07 | End: 2024-08-09 | Stop reason: HOSPADM

## 2024-08-07 RX ORDER — ONDANSETRON 4 MG/1
4 TABLET, ORALLY DISINTEGRATING ORAL EVERY 30 MIN PRN
Status: DISCONTINUED | OUTPATIENT
Start: 2024-08-07 | End: 2024-08-09 | Stop reason: HOSPADM

## 2024-08-07 RX ORDER — FENTANYL CITRATE 50 UG/ML
25 INJECTION, SOLUTION INTRAMUSCULAR; INTRAVENOUS EVERY 5 MIN PRN
Status: DISCONTINUED | OUTPATIENT
Start: 2024-08-07 | End: 2024-08-09 | Stop reason: HOSPADM

## 2024-08-07 RX ORDER — SODIUM CHLORIDE, SODIUM LACTATE, POTASSIUM CHLORIDE, CALCIUM CHLORIDE 600; 310; 30; 20 MG/100ML; MG/100ML; MG/100ML; MG/100ML
INJECTION, SOLUTION INTRAVENOUS CONTINUOUS
Status: DISCONTINUED | OUTPATIENT
Start: 2024-08-07 | End: 2024-08-09 | Stop reason: HOSPADM

## 2024-08-07 RX ORDER — LABETALOL HYDROCHLORIDE 5 MG/ML
10 INJECTION, SOLUTION INTRAVENOUS
Status: DISCONTINUED | OUTPATIENT
Start: 2024-08-07 | End: 2024-08-09 | Stop reason: HOSPADM

## 2024-08-07 RX ORDER — HYDROMORPHONE HYDROCHLORIDE 1 MG/ML
0.4 INJECTION, SOLUTION INTRAMUSCULAR; INTRAVENOUS; SUBCUTANEOUS EVERY 5 MIN PRN
Status: DISCONTINUED | OUTPATIENT
Start: 2024-08-07 | End: 2024-08-09 | Stop reason: HOSPADM

## 2024-08-07 RX ORDER — HYDROMORPHONE HYDROCHLORIDE 1 MG/ML
0.2 INJECTION, SOLUTION INTRAMUSCULAR; INTRAVENOUS; SUBCUTANEOUS EVERY 5 MIN PRN
Status: DISCONTINUED | OUTPATIENT
Start: 2024-08-07 | End: 2024-08-09 | Stop reason: HOSPADM

## 2024-08-07 RX ORDER — FENTANYL CITRATE 50 UG/ML
50 INJECTION, SOLUTION INTRAMUSCULAR; INTRAVENOUS EVERY 5 MIN PRN
Status: DISCONTINUED | OUTPATIENT
Start: 2024-08-07 | End: 2024-08-09 | Stop reason: HOSPADM

## 2024-08-08 ENCOUNTER — ANESTHESIA (OUTPATIENT)
Dept: SURGERY | Facility: AMBULATORY SURGERY CENTER | Age: 69
End: 2024-08-08
Payer: COMMERCIAL

## 2024-08-08 ENCOUNTER — HOSPITAL ENCOUNTER (OUTPATIENT)
Facility: AMBULATORY SURGERY CENTER | Age: 69
Discharge: HOME | End: 2024-08-08
Attending: OPHTHALMOLOGY
Payer: COMMERCIAL

## 2024-08-08 VITALS
DIASTOLIC BLOOD PRESSURE: 75 MMHG | RESPIRATION RATE: 12 BRPM | WEIGHT: 179.68 LBS | BODY MASS INDEX: 33.92 KG/M2 | OXYGEN SATURATION: 96 % | HEIGHT: 61 IN | TEMPERATURE: 97 F | SYSTOLIC BLOOD PRESSURE: 126 MMHG

## 2024-08-08 PROCEDURE — 66984 XCAPSL CTRC RMVL W/O ECP: CPT | Mod: LT

## 2024-08-08 PROCEDURE — 66984 XCAPSL CTRC RMVL W/O ECP: CPT | Performed by: NURSE ANESTHETIST, CERTIFIED REGISTERED

## 2024-08-08 PROCEDURE — 66984 XCAPSL CTRC RMVL W/O ECP: CPT | Performed by: STUDENT IN AN ORGANIZED HEALTH CARE EDUCATION/TRAINING PROGRAM

## 2024-08-08 PROCEDURE — 66984 XCAPSL CTRC RMVL W/O ECP: CPT | Mod: 79 | Performed by: OPHTHALMOLOGY

## 2024-08-08 DEVICE — LENS CC60WF 23.5 CLAREON UV ASPHERIC BICONVEX IOL: Type: IMPLANTABLE DEVICE | Site: EYE | Status: FUNCTIONAL

## 2024-08-08 RX ORDER — ACETAMINOPHEN 325 MG/1
975 TABLET ORAL ONCE
Status: COMPLETED | OUTPATIENT
Start: 2024-08-08 | End: 2024-08-08

## 2024-08-08 RX ORDER — TETRACAINE HYDROCHLORIDE 5 MG/ML
SOLUTION OPHTHALMIC PRN
Status: DISCONTINUED | OUTPATIENT
Start: 2024-08-08 | End: 2024-08-08 | Stop reason: HOSPADM

## 2024-08-08 RX ORDER — TRIAMCINOLONE ACETONIDE 40 MG/ML
INJECTION, SUSPENSION INTRA-ARTICULAR; INTRAMUSCULAR PRN
Status: DISCONTINUED | OUTPATIENT
Start: 2024-08-08 | End: 2024-08-08 | Stop reason: HOSPADM

## 2024-08-08 RX ORDER — BALANCED SALT SOLUTION 6.4; .75; .48; .3; 3.9; 1.7 MG/ML; MG/ML; MG/ML; MG/ML; MG/ML; MG/ML
SOLUTION OPHTHALMIC PRN
Status: DISCONTINUED | OUTPATIENT
Start: 2024-08-08 | End: 2024-08-08 | Stop reason: HOSPADM

## 2024-08-08 RX ORDER — LIDOCAINE HYDROCHLORIDE 10 MG/ML
INJECTION, SOLUTION EPIDURAL; INFILTRATION; INTRACAUDAL; PERINEURAL PRN
Status: DISCONTINUED | OUTPATIENT
Start: 2024-08-08 | End: 2024-08-08 | Stop reason: HOSPADM

## 2024-08-08 RX ORDER — SODIUM CHLORIDE, SODIUM LACTATE, POTASSIUM CHLORIDE, CALCIUM CHLORIDE 600; 310; 30; 20 MG/100ML; MG/100ML; MG/100ML; MG/100ML
INJECTION, SOLUTION INTRAVENOUS CONTINUOUS
Status: DISCONTINUED | OUTPATIENT
Start: 2024-08-08 | End: 2024-08-09 | Stop reason: HOSPADM

## 2024-08-08 RX ORDER — MOXIFLOXACIN IN NACL,ISO-OS/PF 0.3MG/0.3
SYRINGE (ML) INTRAOCULAR PRN
Status: DISCONTINUED | OUTPATIENT
Start: 2024-08-08 | End: 2024-08-08 | Stop reason: HOSPADM

## 2024-08-08 RX ORDER — LIDOCAINE 40 MG/G
CREAM TOPICAL
Status: DISCONTINUED | OUTPATIENT
Start: 2024-08-08 | End: 2024-08-09 | Stop reason: HOSPADM

## 2024-08-08 RX ORDER — CYCLOPENTOLAT/TROPIC/PHENYLEPH 1%-1%-2.5%
1 DROPS (EA) OPHTHALMIC (EYE)
Status: COMPLETED | OUTPATIENT
Start: 2024-08-08 | End: 2024-08-08

## 2024-08-08 RX ORDER — PROPARACAINE HYDROCHLORIDE 5 MG/ML
1 SOLUTION/ DROPS OPHTHALMIC ONCE
Status: COMPLETED | OUTPATIENT
Start: 2024-08-08 | End: 2024-08-08

## 2024-08-08 RX ORDER — FENTANYL CITRATE 50 UG/ML
INJECTION, SOLUTION INTRAMUSCULAR; INTRAVENOUS PRN
Status: DISCONTINUED | OUTPATIENT
Start: 2024-08-08 | End: 2024-08-08

## 2024-08-08 RX ADMIN — Medication 1 DROP: at 07:51

## 2024-08-08 RX ADMIN — PROPARACAINE HYDROCHLORIDE 1 DROP: 5 SOLUTION/ DROPS OPHTHALMIC at 07:45

## 2024-08-08 RX ADMIN — ACETAMINOPHEN 975 MG: 325 TABLET ORAL at 07:44

## 2024-08-08 RX ADMIN — SODIUM CHLORIDE, SODIUM LACTATE, POTASSIUM CHLORIDE, CALCIUM CHLORIDE: 600; 310; 30; 20 INJECTION, SOLUTION INTRAVENOUS at 07:49

## 2024-08-08 RX ADMIN — Medication 1 DROP: at 07:45

## 2024-08-08 RX ADMIN — Medication 1 DROP: at 07:54

## 2024-08-08 RX ADMIN — FENTANYL CITRATE 50 MCG: 50 INJECTION, SOLUTION INTRAMUSCULAR; INTRAVENOUS at 08:45

## 2024-08-08 NOTE — ANESTHESIA CARE TRANSFER NOTE
Patient: Soco Ferguson    Procedure: Procedure(s):  LEFT EYE PHACOEMULSIFICATION, CATARACT, WITH STANDARD INTRAOCULAR LENS IMPLANT INSERTION       Diagnosis: Combined form of age-related cataract, both eyes [H25.813]  Diagnosis Additional Information: No value filed.    Anesthesia Type:   MAC     Note:    Oropharynx: oropharynx clear of all foreign objects and spontaneously breathing  Level of Consciousness: awake  Oxygen Supplementation: room air    Independent Airway: airway patency satisfactory and stable  Dentition: dentition unchanged  Vital Signs Stable: post-procedure vital signs reviewed and stable  Report to RN Given: handoff report given  Patient transferred to: Phase II    Handoff Report: Identifed the Patient, Identified the Reponsible Provider, Reviewed the pertinent medical history, Discussed the surgical course, Reviewed Intra-OP anesthesia mangement and issues during anesthesia, Set expectations for post-procedure period and Allowed opportunity for questions and acknowledgement of understanding      Vitals:  Vitals Value Taken Time   /68 08/08/24 0918   Temp 36.2  C (97.1  F) 08/08/24 0918   Pulse     Resp 12 08/08/24 0918   SpO2 93 % 08/08/24 0918       Electronically Signed By: BRONWYN Fox CRNA  August 8, 2024  9:21 AM

## 2024-08-08 NOTE — ANESTHESIA POSTPROCEDURE EVALUATION
Patient: Soco Ferguson    Procedure: Procedure(s):  LEFT EYE PHACOEMULSIFICATION, CATARACT, WITH STANDARD INTRAOCULAR LENS IMPLANT INSERTION       Anesthesia Type:  MAC    Note:  Disposition: Outpatient   Postop Pain Control: Uneventful            Sign Out: Well controlled pain   PONV: No   Neuro/Psych: Uneventful            Sign Out: Acceptable/Baseline neuro status   Airway/Respiratory: Uneventful            Sign Out: Acceptable/Baseline resp. status   CV/Hemodynamics: Uneventful            Sign Out: Acceptable CV status; No obvious hypovolemia; No obvious fluid overload   Other NRE: NONE   DID A NON-ROUTINE EVENT OCCUR? No           Last vitals:  Vitals Value Taken Time   /68 08/08/24 0918   Temp 36.2  C (97.1  F) 08/08/24 0918   Pulse     Resp 12 08/08/24 0918   SpO2 93 % 08/08/24 0918       Electronically Signed By: Rock Barragan MD  August 8, 2024  9:20 AM

## 2024-08-08 NOTE — ANESTHESIA PREPROCEDURE EVALUATION
Anesthesia Pre-Procedure Evaluation    Patient: Soco Ferguson   MRN: [id number] : 1955        Procedure : Procedure(s):  LEFT EYE PHACOEMULSIFICATION, CATARACT, WITH STANDARD INTRAOCULAR LENS IMPLANT INSERTION          Past Medical History:   Diagnosis Date    Abdominal pain, unspecified site     Created by Conversion     Arthritis 2024    Asymptomatic varicose veins     Created by Conversion     Blastomycosis     Created by Conversion     Calculus of kidney     Created by Conversion     Endometrial hyperplasia, unspecified     Created by Conversion     Hyperparathyroidism, unspecified (H24)     Created by Conversion     Lattice degeneration     Leiomyoma of uterus, unspecified     Created by Conversion     Myalgia and myositis, unspecified     Created by Conversion     Nausea alone     Created by Conversion     Osteoporosis, unspecified     Created by Conversion     Other nonspecific finding on examination of urine     Created by Conversion     Pain in joint, site unspecified     Created by Conversion     Restless legs syndrome (RLS)     Created by Conversion     Senile osteoporosis     Created by Conversion     Ulcerative colitis, unspecified     Created by Conversion     Unspecified constipation     Created by Conversion     Unspecified sinusitis (chronic)     Created by Conversion     Unspecified symptom associated with female genital organs     Created by Conversion     Unspecified vitamin D deficiency     Created by Conversion       Past Surgical History:   Procedure Laterality Date    CATARACT IOL, RT/LT      KIDNEY STONE SURGERY  1999    removal    OTHER SURGICAL HISTORY Bilateral     venous closure    PHACOEMULSIFICATION WITH STANDARD INTRAOCULAR LENS IMPLANT Right 2024    Procedure: RIGHT EYE PHACOEMULSIFICATION, CATARACT, WITH STANDARD INTRAOCULAR LENS IMPLANT INSERTION;  Surgeon: Anaid Sloan MD;  Location: [facility] OR    TUBAL LIGATION  1987    WISDOM TOOTH EXTRACTION   01/01/1980      Allergies   Allergen Reactions    Comtrex Other (See Comments)     Acet/dextromethorphan/phenylephrine    Other (Do Not Use)       Social History     Tobacco Use    Smoking status: Never     Passive exposure: Never    Smokeless tobacco: Never    Tobacco comments:     in HS and 20's socail only, never pack a day smoker   Substance Use Topics    Alcohol use: Yes     Alcohol/week: 1.7 standard drinks of alcohol      Wt Readings from Last 1 Encounters:   08/08/24 81.5 kg (179 lb 10.8 oz)        Anesthesia Evaluation   Pt has had prior anesthetic.         ROS/MED HX  ENT/Pulmonary:       Neurologic:       Cardiovascular:     (+) Dyslipidemia - -   -  - -                                      METS/Exercise Tolerance:     Hematologic:       Musculoskeletal:   (+)  arthritis,             GI/Hepatic:     (+)       Inflammatory bowel disease,             Renal/Genitourinary:       Endo:       Psychiatric/Substance Use:       Infectious Disease:       Malignancy:       Other:            Physical Exam    Airway        Mallampati: II   TM distance: > 3 FB   Neck ROM: full   Mouth opening: > 3 cm    Respiratory Devices and Support         Dental       (+) Completely normal teeth      Cardiovascular   cardiovascular exam normal          Pulmonary   pulmonary exam normal                OUTSIDE LABS:  CBC:   Lab Results   Component Value Date    WBC 7.8 02/01/2024    WBC 7.7 01/31/2023    HGB 14.2 02/01/2024    HGB 14.0 01/31/2023    HCT 42.5 02/01/2024    HCT 41.8 01/31/2023     02/01/2024     01/31/2023     BMP:   Lab Results   Component Value Date     02/01/2024     09/12/2023    POTASSIUM 4.3 02/01/2024    POTASSIUM 4.3 09/12/2023    CHLORIDE 105 02/01/2024    CHLORIDE 102 09/12/2023    CO2 26 02/01/2024    CO2 27 09/12/2023    BUN 21.5 02/01/2024    BUN 24.6 (H) 09/12/2023    CR 0.83 02/01/2024    CR 0.86 09/12/2023    GLC 96 02/01/2024     (H) 09/12/2023     COAGS:   Lab Results  "  Component Value Date    PTT 29 12/09/2020    INR 0.96 12/09/2020     POC: No results found for: \"BGM\", \"HCG\", \"HCGS\"  HEPATIC:   Lab Results   Component Value Date    ALBUMIN 4.1 02/01/2024    PROTTOTAL 6.3 (L) 02/01/2024    ALT 40 02/01/2024    AST 31 02/01/2024    ALKPHOS 88 02/01/2024    BILITOTAL 0.5 02/01/2024     OTHER:   Lab Results   Component Value Date    A1C 5.3 02/01/2024    JEREMY 9.6 02/01/2024    PHOS 3.8 08/02/2019    MAG 2.1 09/12/2023    TSH 1.67 02/01/2024       Anesthesia Plan    ASA Status:  2    NPO Status:  NPO Appropriate    Anesthesia Type: MAC.     - Reason for MAC: immobility needed, straight local not clinically adequate              Consents    Anesthesia Plan(s) and associated risks, benefits, and realistic alternatives discussed. Questions answered and patient/representative(s) expressed understanding.     - Discussed:     - Discussed with:  Patient            Postoperative Care       PONV prophylaxis: Background Propofol Infusion, Ondansetron (or other 5HT-3)     Comments:               Rock Barragan MD    I have reviewed the pertinent notes and labs in the chart from the past 30 days and (re)examined the patient.  Any updates or changes from those notes are reflected in this note.              # Obesity: Estimated body mass index is 33.49 kg/m  as calculated from the following:    Height as of this encounter: 1.56 m (5' 1.42\").    Weight as of this encounter: 81.5 kg (179 lb 10.8 oz).      "

## 2024-08-08 NOTE — OP NOTE
Operative  Report    Patient Name: Soco Ferguson    MRN: [id number]    Date of Surgery: 8/8/2024    Surgeon: Anaid Sloan M.D    Assistant surgeon: Celeste Brooke    Preoperative Diagnosis: Visually significant cataract, left eye    Postoperative Diagnosis: Same    Procedure: Phacoemulsification with intraocular lens implant, left eye    Implant: AIM 0.00 D  Implant Name Type Inv. Item Serial No.  Lot No. LRB No. Used Action   LENS [id number] 23.5 [employer] UV ASPHERIC BICONVEX IOL - [id number] Lens/Eye Implant LENS [id number] 23.5 [employer] UV ASPHERIC BICONVEX IOL [id number] [employer]  Left 1 Implanted       Anesthesia Type: MAC    Estimated Blood Loss: Trace    Complications: None    INDICATIONS FOR PROCEDURE: Patient has a history of visually significant cataract affecting the patient's activity of daily living. After a discussion with the patient, the patient has elected to have the listed procedure(s) to maximize visual potential. All of the appropriate consent forms have been signed and all of the patient's questions have been answered.    DETAILS OF THE PROCEDURE: Patient was identified in the pre operative area and given pre operative eye drops. The patient was then brought into the operating room and intravenous sedation was begun after a time out was performed. The patient was prepped and draped in the usual sterile ophthalmic fashion.     A lid speculum was placed and the operating microscope was brought into position. A paracentesis was made and lidocain and viscoelastic was injected into the anterior chamber. A clear corneal incision was made using a keratome blade. A cystotome needle and Utrata forceps were used to create an anterior continuous curvilinear capsulorhexis. Then BSS on a blunt tipped cannula was used for hydrodissection and hydrodelineation. Using the phacoemulsification unit, the nucleus was then removed from the eye. Using  irrigation and aspiration, the remaining cortical material was removed from the eye. The capsular bag was infused with viscoelastic material. The intraocular lens was then placed into the capsular bag and secured into position. The remaining viscoelastic material was then removed from the eye via irrigation and aspiration. BSS on a blunt tipped cannula was used to hydrate all of the wounds. At the end of the case, the wounds were noted to be watertight, the pupil was noted to be round, the lens appeared to be in good position, and the pressure was palpated to be physiologic. Intracameral moxifloxacin and a subconjunctival injection of Kenalog were administered.     Post operative ointment was applied and the eye was patched and shielded. Patient tolerated procedure and was brought to the recovery area in good condition. Patient will follow in the eye clinic in one day.

## 2024-08-08 NOTE — DISCHARGE INSTRUCTIONS
[facility]  Home Care Following Anesthesia  For 24 hours after surgery:  Get plenty of rest.  A responsible adult must stay with you for at least 24 hours after you leave the surgery center.  Do not drive or use heavy equipment.  If you have weakness or tingling, don't drive or use heavy equipment until this feeling goes away.   Do not drink alcohol.   Avoid strenuous or risky activities.  Ask for help when climbing stairs.  You may feel lightheaded.  IF so, sit for a few minutes before standing.  Have someone help you get up.   If you have nausea (feel sick to your stomach): Drink only clear liquids such as apple juice, ginger ale, broth or 7-Up.  Rest may also help.  Be sure to drink enough fluids.  Move to a regular diet as you feel able.   You may have a slight fever.  Call the doctor if your fever is over 100 F (37.7 C) (taken under the tongue) or lasts longer than 24 hours.  You may have a dry mouth, a sore throat, muscle aches or trouble sleeping. These should go away after 24 hours.  Do not make important or legal decisions.   It is recommended to avoid smoking.               Tips for taking pain medications  To get the best pain relief possible, remember these points:  Take pain medications as directed, before pain becomes severe.  Pain medication can upset your stomach: taking it with food may help.  Constipation is a common side effect of pain medication. Drink plenty of  fluids.  Eat foods high in fiber. Take a stool softener if recommended by your doctor or pharmacist.  Do not drink alcohol, drive or operate machinery while taking pain medications.  Ask about other ways to control pain, such as with heat, ice or relaxation.    Tylenol/Acetaminophen Consumption    If you feel your pain relief is insufficient, you may take Tylenol/Acetaminophen in addition to your narcotic pain medication.   Be careful not to exceed 4,000 mg of Tylenol/Acetaminophen in a 24 hour  period from all sources.  If you are taking extra strength Tylenol/acetaminophen (500 mg), the maximum dose is 8 tablets in 24 hours.  If you are taking regular strength acetaminophen (325 mg), the maximum dose is 12 tablets in 24 hours.  Tylenol 975 mg given at 7:45 pm.   Ok to take more after 1:45 pm.       Call a doctor for any of the following:  Signs of infection (fever, growing tenderness at the surgery site, a large amount of drainage or bleeding, severe pain, foul-smelling drainage, redness, swelling).  It has been over 8 to 10 hours since surgery and you are still not able to urinate (pass water).  Headache for over 24 hours.  Signs of Covid-19 infection (temperature over 100 degrees, shortness of breath, cough, loss of taste/smell, generalized body aches, persistent headache, chills, sore throat, nausea/vomiting/diarrhea)  Your doctor is:  Dr. Anaid Sloan, Ophthalmology: [phone]                    Or dial [phone] and ask for the resident on call for:  Ophthalmology  For emergency care, call the:  [facility]:  [phone] (TTY for hearing impaired: [phone])                  Post-Operative Instructions     FIRST 24 HOURS AFTER SURGERY:  You are allowed to Tylenol (acetaminophen) 650mg every four hours as needed for pain unless you have liver disease or are allergic to Tylenol..  Continue taking your regular medications and eye drops in the non-operative eye.  Do not remove the metal or plastic shield unless otherwise instructed by your surgeon.  Do not operate a car, motorcycle, or machinery for 24 hours after surgery.  Call ED immediately if you have SEVERE PAIN unrelieved with Tylenol. And  ask for the resident on call.     MEDICATION INSTRUCTIONS:  -You will not have treatment eye drops prescription as medications were injected into your eye.  -If you feel your eyes are dry and itchy, you can use regular lubricating drops for one month after the surgery. These eye  drops can be found over the counter Brand names example: Systane, Refresh. Please choose preservative free drops. To be used four times a day and as needed for 1 month  -Instilling the eye drops directly into the eye.    -If you had previously any glaucoma eye drops, You can remove the cover and instill the drops as prescribed before and after the procedure      GENERAL INSTRUCTIONS:  Hygiene of the operated eye  Wash your hands thoroughly before caring for the eye.  If the lids are sticky or itchy in the morning, debris, or matter can be gently wiped away with a cotton ball moistened with tap water. DO NOT press on the lids or eyeball.     FIVE MINUTES APART. If ointment is prescribed, it should be applied last.  If you have been taking medications in the non-operated eye, continue as they were prescribed.  If you take medications by mouth for a medical problem, continue as they were prescribed (unless otherwise instructed by physician)    EYE PROTECTION  From the time of surgery until the time of your post-operative day 1 appointment, wear the eye shield at all times. Then, wear it whenever sleeping, for 1 week.  Protective sunglasses may be worn as needed for your comfort, and are suggested for outdoor activities.    ACTIVITIES  Avoid vigorous exertion and heavy lifting for the first week after surgery  You may take a bath or shower, but avoid getting water directly into your eye for one week after surgery, usually by keeping your eyes closed during the bath.  You should discuss driving and traveling with your surgeon. You may ride in a car and fly in an airplane unless otherwise instructed.  Avoid swimming or using a hot tube/sauna/pool/lake for 2 weeks after the surgery.      WHAT TO EXPECT:  Mild irritation and discomfort are normal.    Call the doctor if you experience any of the following:  Severe eye pain  Nausea  Vomiting  Severe headache

## 2024-08-09 ENCOUNTER — OFFICE VISIT (OUTPATIENT)
Dept: OPHTHALMOLOGY | Facility: CLINIC | Age: 69
End: 2024-08-09
Attending: OPHTHALMOLOGY
Payer: COMMERCIAL

## 2024-08-09 DIAGNOSIS — Z98.890 POSTSURGICAL STATE, EYE: Primary | ICD-10-CM

## 2024-08-09 PROCEDURE — 99024 POSTOP FOLLOW-UP VISIT: CPT | Performed by: OPHTHALMOLOGY

## 2024-08-09 ASSESSMENT — EXTERNAL EXAM - RIGHT EYE: OD_EXAM: NORMAL

## 2024-08-09 ASSESSMENT — SLIT LAMP EXAM - LIDS
COMMENTS: MEIBOMIAN GLAND DYSFUNCTION
COMMENTS: MEIBOMIAN GLAND DYSFUNCTION

## 2024-08-09 ASSESSMENT — TONOMETRY
IOP_METHOD: TONOPEN
OS_IOP_MMHG: 16
OD_IOP_MMHG: 14

## 2024-08-09 ASSESSMENT — VISUAL ACUITY
OD_SC: 20/25
METHOD: SNELLEN - LINEAR
OS_SC: 20/30

## 2024-08-09 ASSESSMENT — EXTERNAL EXAM - LEFT EYE: OS_EXAM: NORMAL

## 2024-08-09 NOTE — PROGRESS NOTES
Chief complaint     Chief Complaints and History of Present Illnesses   Patient presents with    Cataract       Referring Provider: Self     HPI    Soco Ferguson 69 year old female who presents for annual examiantion. Last seen with Dr. 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 Reports That her vision is quite good. She notes some sensitivity to bright lights that has been chronic and longstanding. No blurring of vision and is overall happy with her vision. No flashes, floaters, curtains.     Today 2/14/24 patient presents for one year follow up. No changes in vision. Both eyes have been feeling a little dry but improve with artificial tears. She has noticed she sometimes sees small black dots in her vision when she is watching TV. Denies large floaters or flashes of light. Black dots resolve after closing her eyes.    Interval hx 08/09/2024  Chief Complaint(s) and History of Present Illness(es)       Cataract Evaluation    In both eyes.  Severity is moderate.  Onset was gradual.  Since onset it is gradually worsening.  Pain was noted as 5/10.             Comments    Ambreen is here for a cataract evaluation. She is complaining of worsening vision and eye pain. She wants to move forward with removal of cataracts. She also sees floaters that are bothersome.    Prasanna Lopez COT 1:25 PM June 26, 2024                    Interval hx 08/09/2024  Chief Complaint(s) and History of Present Illness(es)       Post Op (Ophthalmology) Left Eye     Additional comments: No complains after sx                       Past ocular history   Prior eye surgery/laser/Trauma: LPI OU  CTL wearer:no  Glasses : yes  Family Hx of eye disease:no    PMH     Past Medical History:   Diagnosis Date    Abdominal pain, unspecified site     Created by Conversion     Arthritis 2/7/2024    Asymptomatic varicose veins     Created by Conversion     Blastomycosis     Created by  Conversion     Calculus of kidney     Created by Conversion     Endometrial hyperplasia, unspecified     Created by Conversion     Hyperparathyroidism, unspecified (H24)     Created by Conversion     Lattice degeneration     Leiomyoma of uterus, unspecified     Created by Conversion     Myalgia and myositis, unspecified     Created by Conversion     Nausea alone     Created by Conversion     Osteoporosis, unspecified     Created by Conversion     Other nonspecific finding on examination of urine     Created by Conversion     Pain in joint, site unspecified     Created by Conversion     Restless legs syndrome (RLS)     Created by Conversion     Senile osteoporosis     Created by Conversion     Ulcerative colitis, unspecified     Created by Conversion     Unspecified constipation     Created by Conversion     Unspecified sinusitis (chronic)     Created by Conversion     Unspecified symptom associated with female genital organs     Created by Conversion     Unspecified vitamin D deficiency     Created by Conversion        PSH     Past Surgical History:   Procedure Laterality Date    CATARACT IOL, RT/LT      KIDNEY STONE SURGERY  01/01/1999    removal    OTHER SURGICAL HISTORY Bilateral     venous closure    PHACOEMULSIFICATION WITH STANDARD INTRAOCULAR LENS IMPLANT Right 07/25/2024    Procedure: RIGHT EYE PHACOEMULSIFICATION, CATARACT, WITH STANDARD INTRAOCULAR LENS IMPLANT INSERTION;  Surgeon: Anaid Sloan MD;  Location: [facility] OR    PHACOEMULSIFICATION WITH STANDARD INTRAOCULAR LENS IMPLANT Left 8/8/2024    Procedure: LEFT EYE PHACOEMULSIFICATION, CATARACT, WITH STANDARD INTRAOCULAR LENS IMPLANT INSERTION;  Surgeon: Anaid Sloan MD;  Location: [facility] OR    TUBAL LIGATION  01/01/1987    WISDOM TOOTH EXTRACTION  01/01/1980       Meds     Current Outpatient Medications   Medication Sig Dispense Refill    acetaminophen (TYLENOL) 325 MG tablet Take 325-650 mg by mouth every 6 hours as needed for mild pain       atorvastatin (LIPITOR) 40 MG tablet TAKE 1 TABLET BY MOUTH ONE TIME DAILY 90 tablet 1    Calcium Citrate-Vitamin D 200-250 MG-UNIT TABS Take 1 tablet by mouth      cholecalciferol 50 MCG (2000 UT) tablet Take 1 tablet by mouth daily      denosumab (PROLIA) 60 MG/ML SOSY injection Inject 60 mg Subcutaneous every 6 months      desonide (DESOWEN) 0.05 % external ointment apply thin layer topically to the vaginal area for 14 days as needed for flares*      fluocinonide (LIDEX) 0.05 % external solution Apply and massage 1 application topically onto itchy spots on scalp at bedtime for 14 days, then as needed for itching*      omeprazole (PRILOSEC) 20 MG DR capsule Take 20 mg by mouth daily      Propylene Glycol (SYSTANE BALANCE OP) Apply to eye as needed       Current Facility-Administered Medications   Medication Dose Route Frequency Provider Last Rate Last Admin    denosumab (PROLIA) injection 60 mg  60 mg Subcutaneous Q6 Months    60 mg at 08/01/24 1251       Drops Currently Taking   Artificial tears.    Assessment/Plan   #pseudophakia OU    Good post op apearence  No complications of the procedure  No water in the eye for 1 week  Cover the eye for 1 week  1 suture removed today    # Anatomic Narrow Angle  - S/P LPI each eye   - Discussed angle closure precautions  - OCT RNFL full, stable    # Dry eyes, bilateral  - Doing well with artificial tears  - continue PFAT prn      Follow up:  2-3 weeks  Attending Physician Attestation:  Complete documentation of historical and exam elements from today's encounter can be found in the full encounter summary report (not reduplicated in this progress note).  I personally obtained the chief complaint(s) and history of present illness.  I confirmed and edited as necessary the review of systems, past medical/surgical history, family history, social history, and examination findings as documented by others; and I examined the patient myself.  I personally reviewed the relevant tests,  images, and reports as documented above.  I formulated and edited as necessary the assessment and plan and discussed the findings and management plan with the patient and family. - Anaid Sloan MD

## 2024-08-14 ENCOUNTER — TELEPHONE (OUTPATIENT)
Dept: OPHTHALMOLOGY | Facility: CLINIC | Age: 69
End: 2024-08-14
Payer: COMMERCIAL

## 2024-08-14 NOTE — TELEPHONE ENCOUNTER
Health Call Center    Phone Message    May a detailed message be left on voicemail: yes     Reason for Call: Medication Question or concern regarding medication   Prescription Clarification  Name of Medication: systane (night gel)  Prescribing Provider: Dr. Sloan    What on the order needs clarification? Patient is asking for a call back to discuss Systane medication.  She states when she applies it at night that her eyes burn and she feels as though she is having an allergic reaction.  She is asking if she should continue to take.  Best number to reach her is [phone].  Thank you!       Action Taken: Message routed to:  [facility] Eye     Travel Screening: Not Applicable     Date of Service:

## 2024-08-16 ENCOUNTER — TELEPHONE (OUTPATIENT)
Dept: INTERNAL MEDICINE | Facility: CLINIC | Age: 69
End: 2024-08-16
Payer: COMMERCIAL

## 2024-08-16 NOTE — TELEPHONE ENCOUNTER
Spoke to pt at 0836    Pt having stinging/irritation after systane gel use before bed.    Reviewed trying preservative free celluvisc gel drops vs trying preservative free Systane nighttime ointment.    Pt will try Systane nighttime ointment at this time and reach out if having any continued symptoms/concerns after trying.    William Higginbotham RN 8:40 AM 08/16/24

## 2024-08-16 NOTE — TELEPHONE ENCOUNTER
Patient is calling in wanting to clarify that she is taking the correct supplements. She is seeing different wording in her paperwork than what she is taking. States she discussed with Dr. Blanton.     She is taking Citracal 2 tablets daily as well as Vitamin D3 4,000 units daily.     Pt just had cataract surgery and was having a hard time reading the details on the bottles.     Routing to PCP for review as last OV states otherwise.

## 2024-08-21 ENCOUNTER — OFFICE VISIT (OUTPATIENT)
Dept: NEUROLOGY | Facility: CLINIC | Age: 69
End: 2024-08-21
Payer: COMMERCIAL

## 2024-08-21 VITALS
HEIGHT: 61 IN | SYSTOLIC BLOOD PRESSURE: 142 MMHG | DIASTOLIC BLOOD PRESSURE: 78 MMHG | WEIGHT: 181.7 LBS | BODY MASS INDEX: 34.31 KG/M2 | HEART RATE: 65 BPM

## 2024-08-21 DIAGNOSIS — R20.2 TINGLING OF LEFT UPPER EXTREMITY: ICD-10-CM

## 2024-08-21 DIAGNOSIS — M54.12 CERVICAL RADICULOPATHY: Primary | ICD-10-CM

## 2024-08-21 DIAGNOSIS — R20.2 RIGHT ARM AND RIGHT FACE TINGLING: ICD-10-CM

## 2024-08-21 DIAGNOSIS — M54.16 LUMBAR RADICULOPATHY: ICD-10-CM

## 2024-08-21 DIAGNOSIS — M54.2 NECK PAIN: ICD-10-CM

## 2024-08-21 PROCEDURE — 99214 OFFICE O/P EST MOD 30 MIN: CPT | Performed by: PSYCHIATRY & NEUROLOGY

## 2024-08-21 NOTE — PROGRESS NOTES
NEUROLOGY OUTPATIENT PROGRESS NOTE   Aug 21, 2024     CHIEF COMPLAINT/REASON FOR VISIT/REASON FOR CONSULT  Patient presents with:  Numbness: Patient states she is having light tingling on her right arm. She states the tingling has improved.    REASON FOR CONSULTATION-numbness/tingling    REFERRAL SOURCE  Dr. Jorge Blanton   Dr. Jorge Blanton    HISTORY OF PRESENT ILLNESS  Soco Ferguson is a 69 year old female seen today for evaluation of numbness/tingling.  She reports that the symptoms came on about a year ago.  She had gotten up from sleep and was in an awkward position.  Initially there was sharp shooting pain down the right leg.  There was no associated numbness.  This lasted for some time with spreading of the symptoms to the left leg.  She then had some intermittent numbness but no pain of the right arm and occasionally of the left arm.  The right leg symptoms and the left leg symptoms have resolved though she continues to have issues in the right arm and occasionally in the left arm.  The numbness is more towards the lateral portion of the hand.  Denies any weakness.  No significant neck pain or back pain.  She does have some history of arthritis in her low back has never had a scan officially done for the arthritis.  This was based on the scan done for osteoporosis.  Denies any difficulty with balance.  No dropping things with her hands.  Denies any cognitive issues.  She has had 1 episode of right cheek numbness.    2/20/24  Patient returns today.  She is stopped doing the exercises that she was doing at the gym where she was twisting her back lifting weights.  This has significantly helped with the symptoms.  Barely gets any numbness in her arms.  She is no longer having pain in the leg.    8/21/24  Patient returns today.  Has recently had cataract surgery.  Has not been too active.  Since June she has been having some neck pain and that is leading to headaches.  This is not very severe but the  symptoms are present every day.  Did do physical therapy which was helpful but has not been doing the exercises as she was concerned that it might make things worse.  Steroids did help initially.  Currently does not have any numbness or tingling except for rare right hand numbness.  No weakness in the hands.    Previous history is reviewed and this is unchanged.    PAST MEDICAL/SURGICAL HISTORY  Past Medical History:   Diagnosis Date    Abdominal pain, unspecified site     Created by Conversion     Arthritis 2/7/2024    Asymptomatic varicose veins     Created by Conversion     Blastomycosis     Created by Conversion     Calculus of kidney     Created by Conversion     Endometrial hyperplasia, unspecified     Created by Conversion     Hyperparathyroidism, unspecified (H24)     Created by Conversion     Lattice degeneration     Leiomyoma of uterus, unspecified     Created by Conversion     Myalgia and myositis, unspecified     Created by Conversion     Nausea alone     Created by Conversion     Osteoporosis, unspecified     Created by Conversion     Other nonspecific finding on examination of urine     Created by Conversion     Pain in joint, site unspecified     Created by Conversion     Restless legs syndrome (RLS)     Created by Conversion     Senile osteoporosis     Created by Conversion     Ulcerative colitis, unspecified     Created by Conversion     Unspecified constipation     Created by Conversion     Unspecified sinusitis (chronic)     Created by Conversion     Unspecified symptom associated with female genital organs     Created by Conversion     Unspecified vitamin D deficiency     Created by Conversion      Patient Active Problem List   Diagnosis    Blastomycosis    Restless legs syndrome    Senile osteoporosis    Ulcerative colitis (H)    Venous insufficiency of both lower extremities    Articular disc disorder of temporomandibular joint    Peptic ulcer    Mixed hyperlipidemia    Cervical cancer  screening    Combined form of age-related cataract, both eyes   Significant for migraines, depression, cataracts, osteoporosis, GERD, peripheral arterial disease    FAMILY HISTORY  Family History   Problem Relation Age of Onset    Breast Cancer Other     Emphysema Mother     Varicose Veins Mother     Edema Mother     Lung Cancer Father     Breast Cancer Niece         Early 20's    Asthma Grandchild     Diabetes Brother     Glaucoma No family hx of     Macular Degeneration No family hx of    Negative for neurological problems.    SOCIAL HISTORY  Social History     Tobacco Use    Smoking status: Never     Passive exposure: Never    Smokeless tobacco: Never    Tobacco comments:     in HS and 20's socail only, never pack a day smoker   Vaping Use    Vaping status: Never Used   Substance Use Topics    Alcohol use: Yes     Alcohol/week: 1.7 standard drinks of alcohol    Drug use: No       SYSTEMS REVIEW  Twelve-system ROS was done and other than the HPI this was negative/positive for arm and leg pain, joint pain, numbness/tingling, weakness paralysis, ringing in the ears, depression, stomach pain/distress.  No new symptoms/issues.    MEDICATIONS  Current Outpatient Medications   Medication Sig Dispense Refill    acetaminophen (TYLENOL) 325 MG tablet Take 325-650 mg by mouth every 6 hours as needed for mild pain      atorvastatin (LIPITOR) 40 MG tablet TAKE 1 TABLET BY MOUTH ONE TIME DAILY 90 tablet 1    cholecalciferol 50 MCG (2000 UT) tablet Take 1 tablet by mouth daily      denosumab (PROLIA) 60 MG/ML SOSY injection Inject 60 mg Subcutaneous every 6 months      desonide (DESOWEN) 0.05 % external ointment apply thin layer topically to the vaginal area for 14 days as needed for flares*      fluocinonide (LIDEX) 0.05 % external solution Apply and massage 1 application topically onto itchy spots on scalp at bedtime for 14 days, then as needed for itching*      omeprazole (PRILOSEC) 20 MG DR capsule Take 20 mg by mouth daily  "     Propylene Glycol (SYSTANE BALANCE OP) Apply to eye as needed       Current Facility-Administered Medications   Medication Dose Route Frequency Provider Last Rate Last Admin    denosumab (PROLIA) injection 60 mg  60 mg Subcutaneous Q6 Months    60 mg at 08/01/24 1251        PHYSICAL EXAMINATION  VITALS: BP (!) 142/78   Pulse 65   Ht 1.549 m (5' 1\")   Wt 82.4 kg (181 lb 11.2 oz)   LMP  (LMP Unknown)   BMI 34.33 kg/m    GENERAL: Healthy appearing, alert, no acute distress, normal habitus.  CARDIOVASCULAR: Extremities warm and well perfused. Pulses present.   NEUROLOGICAL:  Patient is awake and oriented to self, place and time.  Attention span is normal.  Memory is grossly intact.  Language is fluent and follows commands appropriately.  Appropriate fund of knowledge. Cranial nerves 2-12 are intact. There is no pronator drift.  Motor exam shows 5/5 strength in all extremities.  Tone is symmetric bilaterally in upper and lower extremities.  Reflexes are symmetric and 2+ in upper extremities and lower extremities. Sensory exam is grossly intact to light touch, pin prick and vibration.  Finger to nose and heel to shin is without dysmetria.  Romberg is negative.  Gait is normal and the patient is able to do tandem walk and walk on toes and heels.  No new concerns.    DIAGNOSTICS  MRI Brain 2017  CONCLUSION:  1.  No evidence for acute or subacute infarct, hemorrhage, mass or obstructive type hydrocephalus.  2.  No definite abnormality along either trigeminal nerve.  3.  No pathologic contrast enhancement.  4.  Mild mucosal thickening in the maxillary, ethmoid, and sphenoid sinuses. No air-fluid level in the sinuses. Mastoids clear.    EMG  Interpretation:  This is a normal EMG.  There is no evidence of any large fiber nerve injury to explain the patient's current tingling symptoms.  Specifically no findings of cervical radiculopathy or large fiber peripheral neuropathy.  EMG does not rule out small fiber neuropathy " and clinical correlation is recommended.     RELEVANT LABS  Component      Latest Ref Rng 5/1/2023  9:30 AM 9/12/2023  4:28 PM   Sodium      136 - 145 mmol/L  140    Potassium      3.4 - 5.3 mmol/L  4.3    Chloride      98 - 107 mmol/L  102    Carbon Dioxide (CO2)      22 - 29 mmol/L  27    Anion Gap      7 - 15 mmol/L  11    Urea Nitrogen      8.0 - 23.0 mg/dL  24.6 (H)    Creatinine      0.51 - 0.95 mg/dL  0.86    Calcium      8.8 - 10.2 mg/dL  10.0    Glucose      70 - 99 mg/dL  100 (H)    Alkaline Phosphatase      35 - 104 U/L  92    AST      0 - 45 U/L  22    ALT      0 - 50 U/L  21    Protein Total      6.4 - 8.3 g/dL  7.8    Albumin      3.5 - 5.2 g/dL  4.7    Bilirubin Total      <=1.2 mg/dL  0.6    GFR Estimate      >60 mL/min/1.73m2  74    Hemoglobin A1C      0.0 - 5.6 % 5.4  5.5    Magnesium      1.7 - 2.3 mg/dL  2.1    TSH      0.30 - 4.20 uIU/mL  1.22       Legend:  (H) High    OUTSIDE RECORDS  Outside referral notes and chart notes were reviewed and pertinent information has been summarized (in addition to the HPI):-      EMG  CLINICAL INTERPRETATION:  This is an slightly abnormal nerve conduction and EMG study.  The study does not show any evidence of neuropathy.  The needle study shows mild spontaneous activity in the right L4-L5 muscle groups.  Further clinical correlation is needed.     MRI C spine  IMPRESSION:  1.  Multilevel spondylosis described above.     2.  C5-C6 moderate to severe right foraminal stenosis.     3.  C6-C7 moderate to severe left foraminal stenosis.     4.  No significant thecal sac stenosis.     5.  No abnormal cord signal.    MRI L spine  IMPRESSION:  1.  Transitional lumbosacral anatomy. S1-S2 well-developed disc space with bilateral S1 partial lumbarization.     2.  Mild multilevel spondylosis described above.     3.  No significant thecal sac stenosis.     4.  No significant neural foraminal stenosis.      IMPRESSION/REPORT/PLAN  Numbness in both upper extremities  Right  leg pain  Suspected lumbar radiculopathy  Cervical neuroforaminal stenosis-rule out cervical radiculopathy  Neck pain/headaches    This is a 69 year old female with initial episode of right leg pain with possible twisting/turning while sleeping.  Symptoms could be suggestive of a lumbar radiculopathy.  EMG did show some evidence of lumbar radiculopathy though MRI was negative.    She further complains of intermittent bilateral upper extremity numbness.  Exam today is noncontributory.  EMG did not show any entrapment neuropathy.  MRI C-spine does show neuroforaminal stenosis which could be related to the symptoms.      She now has some neck pain and some associated headaches which could also be related to the cervical neuroforaminal stenosis.    I have encouraged her to go back to exercise and do the exercises given to her by the /physical therapist so she does the exercises correctly.  If this does not resolve the headaches could consider medications.  Previously steroids were tried which were helpful.    Discussed treatment of cervical/lumbar radiculopathy including steroids, epidural injections, physical therapy, surgery.      Will monitor for right now.  Symptoms are not related to the cataract.  Follow-up with ophthalmologist for the cataract surgery.    Return back in 1 year.    -     Continue exercising.    Return in about 1 year (around 8/21/2025) for In-Clinic Visit (must), After testing.    Over 31 minutes were spent coordinating the care for the patient on the day of the encounter.  This includes previsit, during visit and post visit activities as documented above.  Counseling patient.  Reviewing chart.  Multiple problems reviewed.  New problem.  (Activities include but not inclusive of reviewing chart, reviewing outside records, reviewing labs and imaging study results as well as the images, patient visit time including getting history and exam,  use if applicable, review of  test results with the patient and coming up with a plan in a shared model, counseling patient and family, education and answering patient questions, EMR , EMR diagnosis entry and problem list management, medication reconciliation and prescription management if applicable, paperwork if applicable, printing documents and documentation of the visit activities.)        Marshall Weeks MD  Neurologist  [facility]  Tel:- [phone]    This note was dictated using voice recognition software.  Any grammatical or context distortions are unintentional and inherent to the software.

## 2024-08-21 NOTE — NURSING NOTE
Chief Complaint   Patient presents with    Numbness     Patient states she is having light tingling on her right arm. She states the tingling has improved.     Kendy Henry on 8/21/2024 at 10:37 AM

## 2024-08-21 NOTE — LETTER
8/21/2024      Soco Ferguson  [address] [facility] [address]      Dear Colleague,    Thank you for referring your patient, Soco Ferguson, to the [facility] [place]. Please see a copy of my visit note below.    NEUROLOGY OUTPATIENT PROGRESS NOTE   Aug 21, 2024     CHIEF COMPLAINT/REASON FOR VISIT/REASON FOR CONSULT  Patient presents with:  Numbness: Patient states she is having light tingling on her right arm. She states the tingling has improved.    REASON FOR CONSULTATION-numbness/tingling    REFERRAL SOURCE  Dr. Jorge Blanton  CC Dr. Jorge Blanton    HISTORY OF PRESENT ILLNESS  Soco Ferguson is a 69 year old female seen today for evaluation of numbness/tingling.  She reports that the symptoms came on about a year ago.  She had gotten up from sleep and was in an awkward position.  Initially there was sharp shooting pain down the right leg.  There was no associated numbness.  This lasted for some time with spreading of the symptoms to the left leg.  She then had some intermittent numbness but no pain of the right arm and occasionally of the left arm.  The right leg symptoms and the left leg symptoms have resolved though she continues to have issues in the right arm and occasionally in the left arm.  The numbness is more towards the lateral portion of the hand.  Denies any weakness.  No significant neck pain or back pain.  She does have some history of arthritis in her low back has never had a scan officially done for the arthritis.  This was based on the scan done for osteoporosis.  Denies any difficulty with balance.  No dropping things with her hands.  Denies any cognitive issues.  She has had 1 episode of right cheek numbness.    2/20/24  Patient returns today.  She is stopped doing the exercises that she was doing at the gym where she was twisting her back lifting weights.  This has significantly helped with the symptoms.  Barely gets any numbness in her arms.  She  is no longer having pain in the leg.    8/21/24  Patient returns today.  Has recently had cataract surgery.  Has not been too active.  Since June she has been having some neck pain and that is leading to headaches.  This is not very severe but the symptoms are present every day.  Did do physical therapy which was helpful but has not been doing the exercises as she was concerned that it might make things worse.  Steroids did help initially.  Currently does not have any numbness or tingling except for rare right hand numbness.  No weakness in the hands.    Previous history is reviewed and this is unchanged.    PAST MEDICAL/SURGICAL HISTORY  Past Medical History:   Diagnosis Date     Abdominal pain, unspecified site     Created by Conversion      Arthritis 2/7/2024     Asymptomatic varicose veins     Created by Conversion      Blastomycosis     Created by Conversion      Calculus of kidney     Created by Conversion      Endometrial hyperplasia, unspecified     Created by Conversion      Hyperparathyroidism, unspecified (H24)     Created by Conversion      Lattice degeneration      Leiomyoma of uterus, unspecified     Created by Conversion      Myalgia and myositis, unspecified     Created by Conversion      Nausea alone     Created by Conversion      Osteoporosis, unspecified     Created by Conversion      Other nonspecific finding on examination of urine     Created by Conversion      Pain in joint, site unspecified     Created by Conversion      Restless legs syndrome (RLS)     Created by Conversion      Senile osteoporosis     Created by Conversion      Ulcerative colitis, unspecified     Created by Conversion      Unspecified constipation     Created by Conversion      Unspecified sinusitis (chronic)     Created by Conversion      Unspecified symptom associated with female genital organs     Created by Conversion      Unspecified vitamin D deficiency     Created by Conversion      Patient Active Problem List    Diagnosis     Blastomycosis     Restless legs syndrome     Senile osteoporosis     Ulcerative colitis (H)     Venous insufficiency of both lower extremities     Articular disc disorder of temporomandibular joint     Peptic ulcer     Mixed hyperlipidemia     Cervical cancer screening     Combined form of age-related cataract, both eyes   Significant for migraines, depression, cataracts, osteoporosis, GERD, peripheral arterial disease    FAMILY HISTORY  Family History   Problem Relation Age of Onset     Breast Cancer Other      Emphysema Mother      Varicose Veins Mother      Edema Mother      Lung Cancer Father      Breast Cancer Niece         Early 20's     Asthma Grandchild      Diabetes Brother      Glaucoma No family hx of      Macular Degeneration No family hx of    Negative for neurological problems.    SOCIAL HISTORY  Social History     Tobacco Use     Smoking status: Never     Passive exposure: Never     Smokeless tobacco: Never     Tobacco comments:     in HS and 20's socail only, never pack a day smoker   Vaping Use     Vaping status: Never Used   Substance Use Topics     Alcohol use: Yes     Alcohol/week: 1.7 standard drinks of alcohol     Drug use: No       SYSTEMS REVIEW  Twelve-system ROS was done and other than the HPI this was negative/positive for arm and leg pain, joint pain, numbness/tingling, weakness paralysis, ringing in the ears, depression, stomach pain/distress.  No new symptoms/issues.    MEDICATIONS  Current Outpatient Medications   Medication Sig Dispense Refill     acetaminophen (TYLENOL) 325 MG tablet Take 325-650 mg by mouth every 6 hours as needed for mild pain       atorvastatin (LIPITOR) 40 MG tablet TAKE 1 TABLET BY MOUTH ONE TIME DAILY 90 tablet 1     cholecalciferol 50 MCG (2000 UT) tablet Take 1 tablet by mouth daily       denosumab (PROLIA) 60 MG/ML SOSY injection Inject 60 mg Subcutaneous every 6 months       desonide (DESOWEN) 0.05 % external ointment apply thin layer  "topically to the vaginal area for 14 days as needed for flares*       fluocinonide (LIDEX) 0.05 % external solution Apply and massage 1 application topically onto itchy spots on scalp at bedtime for 14 days, then as needed for itching*       omeprazole (PRILOSEC) 20 MG DR capsule Take 20 mg by mouth daily       Propylene Glycol (SYSTANE BALANCE OP) Apply to eye as needed       Current Facility-Administered Medications   Medication Dose Route Frequency Provider Last Rate Last Admin     denosumab (PROLIA) injection 60 mg  60 mg Subcutaneous Q6 Months    60 mg at 08/01/24 1251        PHYSICAL EXAMINATION  VITALS: BP (!) 142/78   Pulse 65   Ht 1.549 m (5' 1\")   Wt 82.4 kg (181 lb 11.2 oz)   LMP  (LMP Unknown)   BMI 34.33 kg/m    GENERAL: Healthy appearing, alert, no acute distress, normal habitus.  CARDIOVASCULAR: Extremities warm and well perfused. Pulses present.   NEUROLOGICAL:  Patient is awake and oriented to self, place and time.  Attention span is normal.  Memory is grossly intact.  Language is fluent and follows commands appropriately.  Appropriate fund of knowledge. Cranial nerves 2-12 are intact. There is no pronator drift.  Motor exam shows 5/5 strength in all extremities.  Tone is symmetric bilaterally in upper and lower extremities.  Reflexes are symmetric and 2+ in upper extremities and lower extremities. Sensory exam is grossly intact to light touch, pin prick and vibration.  Finger to nose and heel to shin is without dysmetria.  Romberg is negative.  Gait is normal and the patient is able to do tandem walk and walk on toes and heels.  No new concerns.    DIAGNOSTICS  MRI Brain 2017  CONCLUSION:  1.  No evidence for acute or subacute infarct, hemorrhage, mass or obstructive type hydrocephalus.  2.  No definite abnormality along either trigeminal nerve.  3.  No pathologic contrast enhancement.  4.  Mild mucosal thickening in the maxillary, ethmoid, and sphenoid sinuses. No air-fluid level in the " sinuses. Mastoids clear.    EMG  Interpretation:  This is a normal EMG.  There is no evidence of any large fiber nerve injury to explain the patient's current tingling symptoms.  Specifically no findings of cervical radiculopathy or large fiber peripheral neuropathy.  EMG does not rule out small fiber neuropathy and clinical correlation is recommended.     RELEVANT LABS  Component      Latest Ref Rng 5/1/2023  9:30 AM 9/12/2023  4:28 PM   Sodium      136 - 145 mmol/L  140    Potassium      3.4 - 5.3 mmol/L  4.3    Chloride      98 - 107 mmol/L  102    Carbon Dioxide (CO2)      22 - 29 mmol/L  27    Anion Gap      7 - 15 mmol/L  11    Urea Nitrogen      8.0 - 23.0 mg/dL  24.6 (H)    Creatinine      0.51 - 0.95 mg/dL  0.86    Calcium      8.8 - 10.2 mg/dL  10.0    Glucose      70 - 99 mg/dL  100 (H)    Alkaline Phosphatase      35 - 104 U/L  92    AST      0 - 45 U/L  22    ALT      0 - 50 U/L  21    Protein Total      6.4 - 8.3 g/dL  7.8    Albumin      3.5 - 5.2 g/dL  4.7    Bilirubin Total      <=1.2 mg/dL  0.6    GFR Estimate      >60 mL/min/1.73m2  74    Hemoglobin A1C      0.0 - 5.6 % 5.4  5.5    Magnesium      1.7 - 2.3 mg/dL  2.1    TSH      0.30 - 4.20 uIU/mL  1.22       Legend:  (H) High    OUTSIDE RECORDS  Outside referral notes and chart notes were reviewed and pertinent information has been summarized (in addition to the HPI):-      EMG  CLINICAL INTERPRETATION:  This is an slightly abnormal nerve conduction and EMG study.  The study does not show any evidence of neuropathy.  The needle study shows mild spontaneous activity in the right L4-L5 muscle groups.  Further clinical correlation is needed.     MRI C spine  IMPRESSION:  1.  Multilevel spondylosis described above.     2.  C5-C6 moderate to severe right foraminal stenosis.     3.  C6-C7 moderate to severe left foraminal stenosis.     4.  No significant thecal sac stenosis.     5.  No abnormal cord signal.    MRI L spine  IMPRESSION:  1.  Transitional  lumbosacral anatomy. S1-S2 well-developed disc space with bilateral S1 partial lumbarization.     2.  Mild multilevel spondylosis described above.     3.  No significant thecal sac stenosis.     4.  No significant neural foraminal stenosis.      IMPRESSION/REPORT/PLAN  Numbness in both upper extremities  Right leg pain  Suspected lumbar radiculopathy  Cervical neuroforaminal stenosis-rule out cervical radiculopathy  Neck pain/headaches    This is a 69 year old female with initial episode of right leg pain with possible twisting/turning while sleeping.  Symptoms could be suggestive of a lumbar radiculopathy.  EMG did show some evidence of lumbar radiculopathy though MRI was negative.    She further complains of intermittent bilateral upper extremity numbness.  Exam today is noncontributory.  EMG did not show any entrapment neuropathy.  MRI C-spine does show neuroforaminal stenosis which could be related to the symptoms.      She now has some neck pain and some associated headaches which could also be related to the cervical neuroforaminal stenosis.    I have encouraged her to go back to exercise and do the exercises given to her by the /physical therapist so she does the exercises correctly.  If this does not resolve the headaches could consider medications.  Previously steroids were tried which were helpful.    Discussed treatment of cervical/lumbar radiculopathy including steroids, epidural injections, physical therapy, surgery.      Will monitor for right now.  Symptoms are not related to the cataract.  Follow-up with ophthalmologist for the cataract surgery.    Return back in 1 year.    -     Continue exercising.    Return in about 1 year (around 8/21/2025) for In-Clinic Visit (must), After testing.    Over 31 minutes were spent coordinating the care for the patient on the day of the encounter.  This includes previsit, during visit and post visit activities as documented above.  Counseling  patient.  Reviewing chart.  Multiple problems reviewed.  New problem.  (Activities include but not inclusive of reviewing chart, reviewing outside records, reviewing labs and imaging study results as well as the images, patient visit time including getting history and exam,  use if applicable, review of test results with the patient and coming up with a plan in a shared model, counseling patient and family, education and answering patient questions, EMR , EMR diagnosis entry and problem list management, medication reconciliation and prescription management if applicable, paperwork if applicable, printing documents and documentation of the visit activities.)        Marshall Weeks MD  Neurologist  [facility]rology [facility]  Tel:- [phone]    This note was dictated using voice recognition software.  Any grammatical or context distortions are unintentional and inherent to the software.      Again, thank you for allowing me to participate in the care of your patient.        Sincerely,        Marshall Weeks MD

## 2024-08-22 ENCOUNTER — TELEPHONE (OUTPATIENT)
Dept: OPHTHALMOLOGY | Facility: CLINIC | Age: 69
End: 2024-08-22
Payer: COMMERCIAL

## 2024-08-22 NOTE — TELEPHONE ENCOUNTER
Patient called in stating that they lost their RIGHT EYE lens card and would like a new one sent out. Routing to team to send out. Writer confirmed address. Mare Sfoia on 8/22/2024 at 9:14 AM

## 2024-08-30 ENCOUNTER — OFFICE VISIT (OUTPATIENT)
Dept: OPHTHALMOLOGY | Facility: CLINIC | Age: 69
End: 2024-08-30
Attending: OPHTHALMOLOGY
Payer: COMMERCIAL

## 2024-08-30 DIAGNOSIS — Z96.1 PSEUDOPHAKIA: Primary | ICD-10-CM

## 2024-08-30 DIAGNOSIS — Z98.890 POSTSURGICAL STATE, EYE: ICD-10-CM

## 2024-08-30 PROCEDURE — 99024 POSTOP FOLLOW-UP VISIT: CPT | Mod: GC | Performed by: OPHTHALMOLOGY

## 2024-08-30 PROCEDURE — G0463 HOSPITAL OUTPT CLINIC VISIT: HCPCS | Performed by: OPHTHALMOLOGY

## 2024-08-30 PROCEDURE — 92015 DETERMINE REFRACTIVE STATE: CPT

## 2024-08-30 ASSESSMENT — CONF VISUAL FIELD
OD_INFERIOR_NASAL_RESTRICTION: 0
METHOD: COUNTING FINGERS
OD_NORMAL: 1
OS_INFERIOR_NASAL_RESTRICTION: 0
OS_NORMAL: 1
OS_SUPERIOR_TEMPORAL_RESTRICTION: 0
OD_SUPERIOR_TEMPORAL_RESTRICTION: 0
OS_INFERIOR_TEMPORAL_RESTRICTION: 0
OD_INFERIOR_TEMPORAL_RESTRICTION: 0
OD_SUPERIOR_NASAL_RESTRICTION: 0
OS_SUPERIOR_NASAL_RESTRICTION: 0

## 2024-08-30 ASSESSMENT — REFRACTION_WEARINGRX
OD_CYLINDER: +0.75
OD_ADD: +2.50
OS_AXIS: 005
OS_ADD: +2.50
OD_AXIS: 010
OS_SPHERE: +2.75
OS_CYLINDER: +1.00
OD_SPHERE: +4.50

## 2024-08-30 ASSESSMENT — TONOMETRY
OD_IOP_MMHG: 14
IOP_METHOD: TONOPEN
OS_IOP_MMHG: 15

## 2024-08-30 ASSESSMENT — REFRACTION_MANIFEST
OD_CYLINDER: +0.50
OS_AXIS: 180
OD_ADD: +2.50
OS_ADD: +2.50
OS_CYLINDER: +0.50
OS_SPHERE: PLANO
OD_SPHERE: -0.50
OD_AXIS: 180

## 2024-08-30 ASSESSMENT — VISUAL ACUITY
OD_SC+: -2
OS_SC: 20/20
OD_SC: 20/20
METHOD: SNELLEN - LINEAR
OS_SC+: +1

## 2024-08-30 ASSESSMENT — SLIT LAMP EXAM - LIDS
COMMENTS: MEIBOMIAN GLAND DYSFUNCTION
COMMENTS: MEIBOMIAN GLAND DYSFUNCTION

## 2024-08-30 ASSESSMENT — EXTERNAL EXAM - LEFT EYE: OS_EXAM: NORMAL

## 2024-08-30 ASSESSMENT — EXTERNAL EXAM - RIGHT EYE: OD_EXAM: NORMAL

## 2024-08-30 NOTE — NURSING NOTE
Chief Complaints and History of Present Illnesses   Patient presents with    Follow Up     3 week follow up s/p CE/IOL BE     Chief Complaint(s) and History of Present Illness(es)       Follow Up              Comments: 3 week follow up s/p CE/IOL BE              Comments    Pt states vision has been good in the distance. Hard time reading up close. No new flashes or floaters.  No redness.Dryness in each eye, relief with drops and gel at night.   No DM.    MADELEINE Olsen August 30, 2024 9:54 AM

## 2024-08-30 NOTE — PROGRESS NOTES
Chief complaint     Chief Complaints and History of Present Illnesses   Patient presents with    Cataract       Referring Provider: Self     HPI    Soco Ferguson 69 year old female who presents for annual examiantion. Last seen with Dr. 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 Reports That her vision is quite good. She notes some sensitivity to bright lights that has been chronic and longstanding. No blurring of vision and is overall happy with her vision. No flashes, floaters, curtains.     Today 2/14/24 patient presents for one year follow up. No changes in vision. Both eyes have been feeling a little dry but improve with artificial tears. She has noticed she sometimes sees small black dots in her vision when she is watching TV. Denies large floaters or flashes of light. Black dots resolve after closing her eyes.    Interval hx 08/30/2024  Chief Complaint(s) and History of Present Illness(es)       Follow Up     Additional comments: 3 week follow up s/p CE/IOL BE             Comments    Pt states vision has been good in the distance. Hard time reading up close. No new flashes or floaters.  No redness.Dryness in each eye, relief with drops and gel at night.   No DM.    MADELEINE Olsen August 30, 2024 9:54 AM                         Past ocular history   Prior eye surgery/laser/Trauma: LPI OU  CTL wearer:no  Glasses : yes  Family Hx of eye disease:no    PMH     Past Medical History:   Diagnosis Date    Abdominal pain, unspecified site     Created by Conversion     Arthritis 2/7/2024    Asymptomatic varicose veins     Created by Conversion     Blastomycosis     Created by Conversion     Calculus of kidney     Created by Conversion     Endometrial hyperplasia, unspecified     Created by Conversion     Hyperparathyroidism, unspecified (H24)     Created by Conversion     Lattice degeneration     Leiomyoma of uterus, unspecified      Created by Conversion     Myalgia and myositis, unspecified     Created by Conversion     Nausea alone     Created by Conversion     Osteoporosis, unspecified     Created by Conversion     Other nonspecific finding on examination of urine     Created by Conversion     Pain in joint, site unspecified     Created by Conversion     Restless legs syndrome (RLS)     Created by Conversion     Senile osteoporosis     Created by Conversion     Ulcerative colitis, unspecified     Created by Conversion     Unspecified constipation     Created by Conversion     Unspecified sinusitis (chronic)     Created by Conversion     Unspecified symptom associated with female genital organs     Created by Conversion     Unspecified vitamin D deficiency     Created by Conversion        PSH     Past Surgical History:   Procedure Laterality Date    CATARACT IOL, RT/LT      KIDNEY STONE SURGERY  01/01/1999    removal    OTHER SURGICAL HISTORY Bilateral     venous closure    PHACOEMULSIFICATION WITH STANDARD INTRAOCULAR LENS IMPLANT Right 07/25/2024    Procedure: RIGHT EYE PHACOEMULSIFICATION, CATARACT, WITH STANDARD INTRAOCULAR LENS IMPLANT INSERTION;  Surgeon: Anaid Sloan MD;  Location: [facility] OR    PHACOEMULSIFICATION WITH STANDARD INTRAOCULAR LENS IMPLANT Left 8/8/2024    Procedure: LEFT EYE PHACOEMULSIFICATION, CATARACT, WITH STANDARD INTRAOCULAR LENS IMPLANT INSERTION;  Surgeon: Anaid Sloan MD;  Location: [facility] OR    TUBAL LIGATION  01/01/1987    WISDOM TOOTH EXTRACTION  01/01/1980       Meds     Current Outpatient Medications   Medication Sig Dispense Refill    acetaminophen (TYLENOL) 325 MG tablet Take 325-650 mg by mouth every 6 hours as needed for mild pain      atorvastatin (LIPITOR) 40 MG tablet TAKE 1 TABLET BY MOUTH ONE TIME DAILY 90 tablet 1    cholecalciferol 50 MCG (2000 UT) tablet Take 1 tablet by mouth daily      denosumab (PROLIA) 60 MG/ML SOSY injection Inject 60 mg Subcutaneous every 6 months      desonide  (DESOWEN) 0.05 % external ointment apply thin layer topically to the vaginal area for 14 days as needed for flares*      fluocinonide (LIDEX) 0.05 % external solution Apply and massage 1 application topically onto itchy spots on scalp at bedtime for 14 days, then as needed for itching*      omeprazole (PRILOSEC) 20 MG DR capsule Take 20 mg by mouth daily      Propylene Glycol (SYSTANE BALANCE OP) Apply to eye as needed       Current Facility-Administered Medications   Medication Dose Route Frequency Provider Last Rate Last Admin    denosumab (PROLIA) injection 60 mg  60 mg Subcutaneous Q6 Months    60 mg at 08/01/24 1251       Drops Currently Taking   Artificial tears QID  Artificial tear QHS     Assessment/Plan   #pseudophakia OU    Good post op apearence  No complications of the procedure      # Anatomic Narrow Angle  - S/P LPI each eye   - Discussed angle closure precautions  - OCT RNFL full, stable    # Dry eyes, bilateral  - Doing well with artificial tears  - continue PFAT prn      Follow up:  2-3 weeks  Attending Physician Attestation:  Complete documentation of historical and exam elements from today's encounter can be found in the full encounter summary report (not reduplicated in this progress note).  I personally obtained the chief complaint(s) and history of present illness.  I confirmed and edited as necessary the review of systems, past medical/surgical history, family history, social history, and examination findings as documented by others; and I examined the patient myself.  I personally reviewed the relevant tests, images, and reports as documented above.  I formulated and edited as necessary the assessment and plan and discussed the findings and management plan with the patient and family. - Anaid Sloan MD

## 2024-09-05 ENCOUNTER — ANCILLARY PROCEDURE (OUTPATIENT)
Dept: BONE DENSITY | Facility: CLINIC | Age: 69
End: 2024-09-05
Attending: INTERNAL MEDICINE
Payer: COMMERCIAL

## 2024-09-05 DIAGNOSIS — M81.0 SENILE OSTEOPOROSIS: ICD-10-CM

## 2024-09-05 PROCEDURE — 77089 TBS DXA CAL W/I&R FX RISK: CPT | Performed by: PHYSICIAN ASSISTANT

## 2024-09-05 PROCEDURE — 77080 DXA BONE DENSITY AXIAL: CPT | Mod: TC | Performed by: PHYSICIAN ASSISTANT

## 2024-09-15 ENCOUNTER — ANCILLARY PROCEDURE (OUTPATIENT)
Dept: GENERAL RADIOLOGY | Facility: CLINIC | Age: 69
End: 2024-09-15
Attending: FAMILY MEDICINE
Payer: COMMERCIAL

## 2024-09-15 ENCOUNTER — OFFICE VISIT (OUTPATIENT)
Dept: URGENT CARE | Facility: URGENT CARE | Age: 69
End: 2024-09-15
Payer: COMMERCIAL

## 2024-09-15 VITALS
BODY MASS INDEX: 34.2 KG/M2 | DIASTOLIC BLOOD PRESSURE: 76 MMHG | HEART RATE: 74 BPM | SYSTOLIC BLOOD PRESSURE: 118 MMHG | WEIGHT: 181 LBS | OXYGEN SATURATION: 100 % | TEMPERATURE: 97.9 F

## 2024-09-15 DIAGNOSIS — S69.92XA HAND INJURY, LEFT, INITIAL ENCOUNTER: ICD-10-CM

## 2024-09-15 DIAGNOSIS — S69.92XA WRIST INJURY, LEFT, INITIAL ENCOUNTER: Primary | ICD-10-CM

## 2024-09-15 DIAGNOSIS — S69.92XA WRIST INJURY, LEFT, INITIAL ENCOUNTER: ICD-10-CM

## 2024-09-15 PROCEDURE — 73130 X-RAY EXAM OF HAND: CPT | Mod: TC | Performed by: RADIOLOGY

## 2024-09-15 PROCEDURE — 99213 OFFICE O/P EST LOW 20 MIN: CPT | Performed by: FAMILY MEDICINE

## 2024-09-15 PROCEDURE — 73090 X-RAY EXAM OF FOREARM: CPT | Mod: TC | Performed by: RADIOLOGY

## 2024-09-15 NOTE — PROGRESS NOTES
SUBJECTIVE:  Chief Complaint   Patient presents with    Urgent Care     Tripped on the side walk and fell, injured her left wrist yesterday      Soco Ferguson is a 69 year old female who presents with a chief complaint of fall injury, left wrist pain.    Accidentally fell forward, was walking and tripped on sidewalk.  Left wrist was bent when fell, endorse pain right away.  Has been using ice, tylenol without improvement    Patient is right handed.    Did have flu and COVID vaccination recently and upper arms are more sore.        Past Medical History:   Diagnosis Date    Abdominal pain, unspecified site     Created by Conversion     Arthritis 2/7/2024    Asymptomatic varicose veins     Created by Conversion     Blastomycosis     Created by Conversion     Calculus of kidney     Created by Conversion     Endometrial hyperplasia, unspecified     Created by Conversion     Hyperparathyroidism, unspecified (H24)     Created by Conversion     Lattice degeneration     Leiomyoma of uterus, unspecified     Created by Conversion     Myalgia and myositis, unspecified     Created by Conversion     Nausea alone     Created by Conversion     Osteoporosis, unspecified     Created by Conversion     Other nonspecific finding on examination of urine     Created by Conversion     Pain in joint, site unspecified     Created by Conversion     Restless legs syndrome (RLS)     Created by Conversion     Senile osteoporosis     Created by Conversion     Ulcerative colitis, unspecified     Created by Conversion     Unspecified constipation     Created by Conversion     Unspecified sinusitis (chronic)     Created by Conversion     Unspecified symptom associated with female genital organs     Created by Conversion     Unspecified vitamin D deficiency     Created by Conversion      Current Outpatient Medications   Medication Sig Dispense Refill    acetaminophen (TYLENOL) 325 MG tablet Take 325-650 mg by mouth every 6 hours as needed for  mild pain      atorvastatin (LIPITOR) 40 MG tablet TAKE 1 TABLET BY MOUTH ONE TIME DAILY 90 tablet 1    cholecalciferol 50 MCG (2000 UT) tablet Take 1 tablet by mouth daily      denosumab (PROLIA) 60 MG/ML SOSY injection Inject 60 mg Subcutaneous every 6 months      desonide (DESOWEN) 0.05 % external ointment apply thin layer topically to the vaginal area for 14 days as needed for flares*      fluocinonide (LIDEX) 0.05 % external solution Apply and massage 1 application topically onto itchy spots on scalp at bedtime for 14 days, then as needed for itching*      omeprazole (PRILOSEC) 20 MG DR capsule Take 20 mg by mouth daily      Propylene Glycol (SYSTANE BALANCE OP) Apply to eye as needed       Social History     Tobacco Use    Smoking status: Never     Passive exposure: Never    Smokeless tobacco: Never    Tobacco comments:     in HS and 20's socail only, never pack a day smoker   Substance Use Topics    Alcohol use: Yes     Alcohol/week: 1.7 standard drinks of alcohol       ROS:  Review of systems negative except as stated above.    EXAM:   /76   Pulse 74   Temp 97.9  F (36.6  C)   Wt 82.1 kg (181 lb)   LMP  (LMP Unknown)   SpO2 100%   BMI 34.20 kg/m    GENERAL APPEARANCE: healthy, alert and no distress  EXTREMITIES: peripheral pulses normal, left hand with mild swelling on lateral aspect/metacarpals with mild tenderness, left wrist more tenderness on radial aspect.  Able to supinate and pronate  SKIN: no suspicious lesions or rashes  PSYCH:alert, affect bright    X-RAY was done - left hand - no acute fracture personally viewed by me    X-RAY was done - left forearm - no acute fracture personally viewed by me      ASSESSMENT/PLAN:  (S69.92XA) Wrist injury, left, initial encounter  (primary encounter diagnosis)  Comment: s/p fall injury  Plan: Orthopedic  Referral, Wrist/Arm/Hand         Bracing Supplies Order Wrist Brace; Left; with         thumb spica, CANCELED: XR Wrist Left G/E 3          Views            (S69.92XA) Hand injury, left, initial encounter  Comment: s/p fall injury  Plan: XR Hand Left G/E 3 Views, Orthopedic          Referral, Wrist/Arm/Hand Bracing Supplies Order        Wrist Brace; Left; with thumb spica            Reassurance given, reviewed most likely sprain/bone bruising from fall injury.  Encourage tylenol, ibuprofen, rest, ice.  DME wrist spica brace for support.  Will follow up on formal xray report and notify if any abnormalities.  Referral placed for Orthopedics for follow up if no improvement in 1 week      Sergo Herrera MD  September 15, 2024 2:56 PM

## 2024-09-16 ENCOUNTER — PATIENT OUTREACH (OUTPATIENT)
Dept: CARE COORDINATION | Facility: CLINIC | Age: 69
End: 2024-09-16
Payer: COMMERCIAL

## 2024-09-20 ENCOUNTER — NURSE TRIAGE (OUTPATIENT)
Dept: NURSING | Facility: CLINIC | Age: 69
End: 2024-09-20
Payer: COMMERCIAL

## 2024-09-20 NOTE — TELEPHONE ENCOUNTER
Spoke to pt at 1040    Pt seen by [facility] resident and following visit ok to follow up yearly and may see another eye provider per pt--did not need cornea specialist.    Pt with intermittent blurry vision and eye feels swollen at times since last visit in August.    Scheduled with Dr. Pickett for evalauation next Monday at [facility] location.    Pt aware of date/time/location.    Pt using artificial tears and lubricating eye ointment currently.    William Higginbotham RN 10:49 AM 09/20/24

## 2024-09-20 NOTE — TELEPHONE ENCOUNTER
Nurse Triage SBAR    Is this a 2nd Level Triage? NO    Situation: Blurred intermittent vision in right eye since surgery    Background: 7/25/24-RIGHT EYE PHACOEMULSIFICATION, CATARACT, WITH STANDARD INTRAOCULAR LENS IMPLANT INSERTION -Dr Sloan  Patient states she has had intermittent blurred vision that is mild. Thought that she just had to get eye settled down after surgery but it has not improved and she is wondering if the stitch let loose.   Denies flashes of light. Feels some discomfort in the eye, rates it 2/10, more a different feeling in the eye compared with the left eye.   Had a larger floater yesterday, gone today.    Assessment: Intermittent blurred vision in right eye only.     Protocol Recommended Disposition:   See in Office Within 3 Days    Recommendation: Please call patient at [phone] with recommendations. She also has lost her wallet card regarding her surgery and is hoping to get another one.      Routed to provider/team  [facility] ADULT OPHTHALMOLOGY [facility]    Charity Lucio RN  [facility] Central Nursing/Red Flag Triage & Med Refill Team       Reason for Disposition   Single floater (i.e., small speck seems to float across the eye)  (Exception: Floater(s) are a chronic symptom and this is unchanged from patient's baseline pattern.)    Additional Information   Negative: Weakness of the face, arm or leg on one side of the body   Negative: Followed getting substance in the eye   Negative: Foreign body stuck in the eye   Negative: Followed an eye injury   Negative: Followed sun lamp or sun exposure (UV keratitis)   Negative: Yellow or green discharge (pus) in the eye   Negative: Pregnant   Negative: Complete loss of vision in one or both eyes   Negative: SEVERE eye pain   Negative: SEVERE headache   Negative: Double vision   Negative: Blurred vision or visual changes and present now and sudden onset or new (e.g., minutes, hours, days)  (Exception: Seeing floaters / black specks OR previously diagnosed  "migraine headaches with same symptoms.)   Negative: Patient sounds very sick or weak to the triager   Negative: Flashes of light  (Exception: Brief from pressing on the eyeball.)   Negative: Many floaters in the eye (Exception: Floater(s) are a chronic symptom and this is unchanged from patient's baseline pattern.)   Negative: Eye pain and brief (now gone) blurred vision or visual changes   Negative: Taking digoxin (e.g., Lanoxin, Digitek, Cardoxin, Lanoxicaps; Toloxin in [place]) and blurred vision, yellow vision, or yellow-green halos   Negative: Jaw pain while eating and age > 50 years   Negative: Headache and age > 50 years   Negative: Patient wants to be seen    Answer Assessment - Initial Assessment Questions  1. DESCRIPTION: \"How has your vision changed?\" (e.g., complete vision loss, blurred vision, double vision, floaters, etc.)      Blurred vision at times  2. LOCATION: \"One or both eyes?\" If one, ask: \"Which eye?\"      Right eye  3. SEVERITY: \"Can you see anything?\" If Yes, ask: \"What can you see?\" (e.g., fine print)      Mildly   4. ONSET: \"When did this begin?\" \"Did it start suddenly or has this been gradual?\"      One month  5. PATTERN: \"Does this come and go, or has it been constant since it started?\"      Intermittent , not every day  6. PAIN: \"Is there any pain in your eye(s)?\"  (Scale 1-10; or mild, moderate, severe)    - NONE (0): No pain.    - MILD (1-3): Doesn't interfere with normal activities.    - MODERATE (4-7): Interferes with normal activities or awakens from sleep.     - SEVERE (8-10): Excruciating pain, unable to do any normal activities.      2/10 not always related to blurred vision  7. CONTACTS-GLASSES: \"Do you wear contacts or glasses?\"      Reading glasses  8. CAUSE: \"What do you think is causing this visual problem?\"      Not sure if stitch came free  9. OTHER SYMPTOMS: \"Do you have any other symptoms?\" (e.g., confusion, headache, arm or leg weakness, speech problems)      Occasional " "headache not new  10. PREGNANCY: \"Is there any chance you are pregnant?\" \"When was your last menstrual period?\"        na    Protocols used: Vision Loss or Change-A-OH    "

## 2024-09-23 ENCOUNTER — OFFICE VISIT (OUTPATIENT)
Dept: OPHTHALMOLOGY | Facility: CLINIC | Age: 69
End: 2024-09-23
Attending: OPTOMETRIST
Payer: COMMERCIAL

## 2024-09-23 DIAGNOSIS — H26.491 PCO (POSTERIOR CAPSULAR OPACIFICATION), RIGHT: ICD-10-CM

## 2024-09-23 DIAGNOSIS — H52.203 HYPEROPIC ASTIGMATISM OF BOTH EYES: ICD-10-CM

## 2024-09-23 DIAGNOSIS — H57.11 PAIN IN AND AROUND EYE, RIGHT: ICD-10-CM

## 2024-09-23 DIAGNOSIS — H04.123 DRY EYES, BILATERAL: ICD-10-CM

## 2024-09-23 DIAGNOSIS — Z96.1 PSEUDOPHAKIA: Primary | ICD-10-CM

## 2024-09-23 PROCEDURE — 92133 CPTRZD OPH DX IMG PST SGM ON: CPT | Performed by: OPTOMETRIST

## 2024-09-23 PROCEDURE — G0463 HOSPITAL OUTPT CLINIC VISIT: HCPCS | Performed by: OPTOMETRIST

## 2024-09-23 PROCEDURE — 92012 INTRM OPH EXAM EST PATIENT: CPT | Performed by: OPTOMETRIST

## 2024-09-23 ASSESSMENT — CONF VISUAL FIELD
OS_SUPERIOR_TEMPORAL_RESTRICTION: 0
OD_INFERIOR_TEMPORAL_RESTRICTION: 0
OS_INFERIOR_NASAL_RESTRICTION: 0
OS_NORMAL: 1
OS_INFERIOR_TEMPORAL_RESTRICTION: 0
OD_INFERIOR_NASAL_RESTRICTION: 0
OS_SUPERIOR_NASAL_RESTRICTION: 0
OD_SUPERIOR_TEMPORAL_RESTRICTION: 0
OD_NORMAL: 1
OD_SUPERIOR_NASAL_RESTRICTION: 0
METHOD: COUNTING FINGERS

## 2024-09-23 ASSESSMENT — VISUAL ACUITY
OD_SC+: -2
METHOD: SNELLEN - LINEAR
OS_SC: 20/20
OD_SC: 20/20

## 2024-09-23 ASSESSMENT — EXTERNAL EXAM - RIGHT EYE: OD_EXAM: NORMAL

## 2024-09-23 ASSESSMENT — CUP TO DISC RATIO
OS_RATIO: 0.4
OD_RATIO: 0.4

## 2024-09-23 ASSESSMENT — TONOMETRY
IOP_METHOD: TONOPEN
OD_IOP_MMHG: 15
OS_IOP_MMHG: 15

## 2024-09-23 ASSESSMENT — REFRACTION_WEARINGRX
OS_ADD: +2.50
OS_AXIS: 005
OD_SPHERE: +4.50
OD_ADD: +2.50
OD_CYLINDER: +0.75
OS_CYLINDER: +1.00
OS_SPHERE: +2.75
OD_AXIS: 010

## 2024-09-23 ASSESSMENT — EXTERNAL EXAM - LEFT EYE: OS_EXAM: NORMAL

## 2024-09-23 ASSESSMENT — SLIT LAMP EXAM - LIDS
COMMENTS: MEIBOMIAN GLAND DYSFUNCTION
COMMENTS: MEIBOMIAN GLAND DYSFUNCTION

## 2024-09-23 NOTE — PATIENT INSTRUCTIONS
Pt reports blur right eye with color difference right eye< left eye   Pain in and around right eye   No  redness     Red top test normal   RFNL each eye normal thickness  Dry eyes cont lubricants Systane  Recommended artificial tears 2-4 X times daily for dry eyes.  Drink plenty of water  Avoid lots of caffiene  Take fish oil vitamin 2000 mg daily   PCO right eye grade 1 follow  Recheck 3mths

## 2024-09-23 NOTE — PROGRESS NOTES
Assessment & Plan       Soco Ferguson is a 69 year old female with the following diagnoses:   1. Pseudophakia - Both Eyes    2. Dry eyes, bilateral - Both Eyes    3. Hyperopic astigmatism of both eyes - Both Eyes    4. Pain in and around eye, right - Right Eye    5. PCO (posterior capsular opacification), right - Right Eye     Pt reports blur right eye with color difference right eye< left eye   Pain in and around right eye   No  redness     Red top test normal   RFNL each eye normal thickness  Dry eyes cont lubricants Systane  Recommended artificial tears 2-4 X times daily for dry eyes.  Drink plenty of water  Avoid lots of caffiene  Take fish oil vitamin 2000 mg daily   PCO right eye grade 1 follow  Recheck 3mths         Patient disposition:   No follow-ups on file.          Complete documentation of historical and exam elements from today's encounter can be found in the full encounter summary report (not reduplicated in this progress note). I personally obtained the chief complaint(s) and history of present illness.  I confirmed and edited as necessary the review of systems, past medical/surgical history, family history, social history, and examination findings as documented by others; and I examined the patient myself. I personally reviewed the relevant tests, images, and reports as documented above. I formulated and edited as necessary the assessment and plan and discussed the findings and management plan with the patient and family.  Dr. Jose Maria Pickett

## 2024-09-23 NOTE — NURSING NOTE
Chief Complaints and History of Present Illnesses   Patient presents with    Blurred Vision Right Eye     Chief Complaint(s) and History of Present Illness(es)       Blurred Vision Right Eye               Comments    Pt states vision in RE is not as clear as LE. Colors do not appear as vibrant in RE vs LE.  One new floater in RE that comes and goes. No flashes. Occasional throbbing eye pain in RE that comes and goes for the past few weeks.  Pt reports that she fell down and hurt her left hand about 1 week ago. Pt denies hitting her head or having any head injury.    Mp Mendez,  [facility] September 23, 2024 8:00 AM

## 2024-09-24 NOTE — PROGRESS NOTES
ASSESSMENT & PLAN  Patient Instructions     1. Contusion of left wrist, initial encounter    2. Wrist injury, left, initial encounter    3. Hand injury, left, initial encounter      -Patient has acute left wrist pain after a fall onto concrete 11 days ago due to a contusion  -I reviewed x-rays taken of the left hand and forearm which shows no acute fracture, dislocation or osseous abnormalities  -Patient has full range of motion on exam today and normal strength.  Patient has minimal tenderness to palpation over the proximal fifth metacarpal and triquetrum  -Patient may return to workouts and activities as her pain allows  -Patient discontinue wrist splint as tolerated  -Will follow-up if pain worsens  -Call direct clinic number [[phone]] at any time with questions or concerns.    Albert Yeo MD [facility]ics and Sports Medicine  [facility]        -----    SUBJECTIVE  Soco Ferguson is a/an 69 year old Right handed female who is seen in consultation at the request of  Sergo Herrera M.D. for evaluation of left wrist pain. The patient is seen by themselves.    Onset: 11 day(s) ago. Patient describes injury as she was walking on the sidewalk, and tripped over some uneven ground, falling straight forward onto her left wrist.  Location of Pain: left wrist/forearm  Rating of Pain at worst: 9/10  Rating of Pain Currently: 5/10  Worsened by: wrist flexion and extension  Better with: bracing  Treatments tried: casting/splinting/bracing, tylenol, ice  Associated symptoms: swelling, bruising  Orthopedic history: NO  Relevant surgical history: NO  Social history: social history: retired    Past Medical History:   Diagnosis Date    Abdominal pain, unspecified site     Created by Conversion     Arthritis 2/7/2024    Asymptomatic varicose veins     Created by Conversion     Blastomycosis     Created by Conversion     Calculus of kidney     Created by Conversion     Endometrial hyperplasia,  unspecified     Created by Conversion     Hyperparathyroidism, unspecified (H24)     Created by Conversion     Lattice degeneration     Leiomyoma of uterus, unspecified     Created by Conversion     Myalgia and myositis, unspecified     Created by Conversion     Nausea alone     Created by Conversion     Osteoporosis, unspecified     Created by Conversion     Other nonspecific finding on examination of urine     Created by Conversion     Pain in joint, site unspecified     Created by Conversion     Restless legs syndrome (RLS)     Created by Conversion     Senile osteoporosis     Created by Conversion     Ulcerative colitis, unspecified     Created by Conversion     Unspecified constipation     Created by Conversion     Unspecified sinusitis (chronic)     Created by Conversion     Unspecified symptom associated with female genital organs     Created by Conversion     Unspecified vitamin D deficiency     Created by Conversion      Social History     Socioeconomic History    Marital status:    Tobacco Use    Smoking status: Never     Passive exposure: Never    Smokeless tobacco: Never    Tobacco comments:     in HS and 20's socail only, never pack a day smoker   Vaping Use    Vaping status: Never Used   Substance and Sexual Activity    Alcohol use: Yes     Alcohol/week: 1.7 standard drinks of alcohol    Drug use: No    Sexual activity: Not Currently     Comment:     Social History Narrative    .  2 kids.  [employer] .     Social Determinants of Health     Financial Resource Strain: Low Risk  (7/31/2024)    Financial Resource Strain     Within the past 12 months, have you or your family members you live with been unable to get utilities (heat, electricity) when it was really needed?: No   Food Insecurity: High Risk (7/31/2024)    Food Insecurity     Within the past 12 months, did you worry that your food would run out before you got money to buy more?: No     Within the past  "12 months, did the food you bought just not last and you didn t have money to get more?: Yes   Transportation Needs: Low Risk  (7/31/2024)    Transportation Needs     Within the past 12 months, has lack of transportation kept you from medical appointments, getting your medicines, non-medical meetings or appointments, work, or from getting things that you need?: No   Interpersonal Safety: Low Risk  (8/1/2024)    Interpersonal Safety     Do you feel physically and emotionally safe where you currently live?: Yes     Within the past 12 months, have you been hit, slapped, kicked or otherwise physically hurt by someone?: No     Within the past 12 months, have you been humiliated or emotionally abused in other ways by your partner or ex-partner?: No   Housing Stability: Low Risk  (7/31/2024)    Housing Stability     Do you have housing? : Yes     Are you worried about losing your housing?: No         Patient's past medical, surgical, social, and family histories were reviewed today and no changes are noted.    REVIEW OF SYSTEMS:  10 point ROS is negative other than symptoms noted above in HPI, Past Medical History or as stated below  Constitutional: NEGATIVE for fever, chills, change in weight  Skin: NEGATIVE for worrisome rashes, moles or lesions  GI/: NEGATIVE for bowel or bladder changes  Neuro: NEGATIVE for weakness, dizziness or paresthesias    OBJECTIVE:  /76   Pulse 72   Ht 1.549 m (5' 1\")   Wt 82.1 kg (181 lb)   LMP  (LMP Unknown)   BMI 34.20 kg/m     General: healthy, alert and in no distress  HEENT: no scleral icterus or conjunctival erythema  Skin: no suspicious lesions or rash. No jaundice.  CV: regular rhythm by palpation  Resp: normal respiratory effort without conversational dyspnea   Psych: normal mood and affect  Gait: normal steady gait with appropriate coordination and balance  Neuro: Normal sensory exam of bilateral hands. Normal 2 pt discrimination.   MSK:  LEFT WRIST  Inspection:    No " swelling or obvious deformity or asymmetry  Palpation:    Tender about the triquetrum. Remainder of bony and ligamentous line marks are nontender.     Metacarpals: 5th metacarpal    Thumb: normal    Fingers: normal  Range of Motion:    Full (active and passive) flexion, extension, pronation/supination, and ulnar/radial deviation.  Strength:    No deficits in flexion, extension, ulnar/radial deviation, or  strength.. Normal pinch strength.  Special Tests:    Positive: None        Independent visualization of the below image:  No results found for this or any previous visit (from the past 24 hour(s)).    Review of left forearm x-ray performed 9/15/2024 shows no acute fracture or dislocation.  Small enthesophyte along the medial humeral epicondyle.    Review of left hand x-ray performed on 9/15/2024 shows no acute fracture, dislocation or osseous abnormalities.        Albert Yeo MD [facility] Orth[facility]    Yes

## 2024-09-25 ENCOUNTER — OFFICE VISIT (OUTPATIENT)
Dept: ORTHOPEDICS | Facility: CLINIC | Age: 69
End: 2024-09-25
Attending: FAMILY MEDICINE
Payer: COMMERCIAL

## 2024-09-25 VITALS
BODY MASS INDEX: 34.17 KG/M2 | WEIGHT: 181 LBS | SYSTOLIC BLOOD PRESSURE: 117 MMHG | HEIGHT: 61 IN | HEART RATE: 72 BPM | DIASTOLIC BLOOD PRESSURE: 76 MMHG

## 2024-09-25 DIAGNOSIS — S60.212A CONTUSION OF LEFT WRIST, INITIAL ENCOUNTER: Primary | ICD-10-CM

## 2024-09-25 DIAGNOSIS — S69.92XA HAND INJURY, LEFT, INITIAL ENCOUNTER: ICD-10-CM

## 2024-09-25 DIAGNOSIS — S69.92XA WRIST INJURY, LEFT, INITIAL ENCOUNTER: ICD-10-CM

## 2024-09-25 PROCEDURE — 99203 OFFICE O/P NEW LOW 30 MIN: CPT | Performed by: FAMILY MEDICINE

## 2024-09-25 NOTE — LETTER
9/25/2024      Soco Ferguson  [address] [place] [id number]      Dear Colleague,    Thank you for referring your patient, Soco Ferguson, to the [facility] [place]. Please see a copy of my visit note below.    ASSESSMENT & PLAN  Patient Instructions     1. Contusion of left wrist, initial encounter    2. Wrist injury, left, initial encounter    3. Hand injury, left, initial encounter      -Patient has acute left wrist pain after a fall onto concrete 11 days ago due to a contusion  -I reviewed x-rays taken of the left hand and forearm which shows no acute fracture, dislocation or osseous abnormalities  -Patient has full range of motion on exam today and normal strength.  Patient has minimal tenderness to palpation over the proximal fifth metacarpal and triquetrum  -Patient may return to workouts and activities as her pain allows  -Patient discontinue wrist splint as tolerated  -Will follow-up if pain worsens  -Call direct clinic number [[phone]] at any time with questions or concerns.    Albert Yeo MD [facility] Orthopedics and Sports Medicine  [facility] [place]        -----    SUBJECTIVE  Soco Ferguson is a/an 69 year old Right handed female who is seen in consultation at the request of  Sergo Herrera M.D. for evaluation of left wrist pain. The patient is seen by themselves.    Onset: 11 day(s) ago. Patient describes injury as she was walking on the sidewalk, and tripped over some uneven ground, falling straight forward onto her left wrist.  Location of Pain: left wrist/forearm  Rating of Pain at worst: 9/10  Rating of Pain Currently: 5/10  Worsened by: wrist flexion and extension  Better with: bracing  Treatments tried: casting/splinting/bracing, tylenol, ice  Associated symptoms: swelling, bruising  Orthopedic history: NO  Relevant surgical history: NO  Social history: social history: retired    Past Medical History:   Diagnosis Date      Abdominal pain, unspecified site     Created by Conversion      Arthritis 2/7/2024     Asymptomatic varicose veins     Created by Conversion      Blastomycosis     Created by Conversion      Calculus of kidney     Created by Conversion      Endometrial hyperplasia, unspecified     Created by Conversion      Hyperparathyroidism, unspecified (H24)     Created by Conversion      Lattice degeneration      Leiomyoma of uterus, unspecified     Created by Conversion      Myalgia and myositis, unspecified     Created by Conversion      Nausea alone     Created by Conversion      Osteoporosis, unspecified     Created by Conversion      Other nonspecific finding on examination of urine     Created by Conversion      Pain in joint, site unspecified     Created by Conversion      Restless legs syndrome (RLS)     Created by Conversion      Senile osteoporosis     Created by Conversion      Ulcerative colitis, unspecified     Created by Conversion      Unspecified constipation     Created by Conversion      Unspecified sinusitis (chronic)     Created by Conversion      Unspecified symptom associated with female genital organs     Created by Conversion      Unspecified vitamin D deficiency     Created by Conversion      Social History     Socioeconomic History     Marital status:    Tobacco Use     Smoking status: Never     Passive exposure: Never     Smokeless tobacco: Never     Tobacco comments:     in HS and 20's socail only, never pack a day smoker   Vaping Use     Vaping status: Never Used   Substance and Sexual Activity     Alcohol use: Yes     Alcohol/week: 1.7 standard drinks of alcohol     Drug use: No     Sexual activity: Not Currently     Comment:     Social History Narrative    .  2 kids.  [employer] .     Social Determinants of Health     Financial Resource Strain: Low Risk  (7/31/2024)    Financial Resource Strain      Within the past 12 months, have you or your family  "members you live with been unable to get utilities (heat, electricity) when it was really needed?: No   Food Insecurity: High Risk (7/31/2024)    Food Insecurity      Within the past 12 months, did you worry that your food would run out before you got money to buy more?: No      Within the past 12 months, did the food you bought just not last and you didn t have money to get more?: Yes   Transportation Needs: Low Risk  (7/31/2024)    Transportation Needs      Within the past 12 months, has lack of transportation kept you from medical appointments, getting your medicines, non-medical meetings or appointments, work, or from getting things that you need?: No   Interpersonal Safety: Low Risk  (8/1/2024)    Interpersonal Safety      Do you feel physically and emotionally safe where you currently live?: Yes      Within the past 12 months, have you been hit, slapped, kicked or otherwise physically hurt by someone?: No      Within the past 12 months, have you been humiliated or emotionally abused in other ways by your partner or ex-partner?: No   Housing Stability: Low Risk  (7/31/2024)    Housing Stability      Do you have housing? : Yes      Are you worried about losing your housing?: No         Patient's past medical, surgical, social, and family histories were reviewed today and no changes are noted.    REVIEW OF SYSTEMS:  10 point ROS is negative other than symptoms noted above in HPI, Past Medical History or as stated below  Constitutional: NEGATIVE for fever, chills, change in weight  Skin: NEGATIVE for worrisome rashes, moles or lesions  GI/: NEGATIVE for bowel or bladder changes  Neuro: NEGATIVE for weakness, dizziness or paresthesias    OBJECTIVE:  /76   Pulse 72   Ht 1.549 m (5' 1\")   Wt 82.1 kg (181 lb)   LMP  (LMP Unknown)   BMI 34.20 kg/m     General: healthy, alert and in no distress  HEENT: no scleral icterus or conjunctival erythema  Skin: no suspicious lesions or rash. No jaundice.  CV: " regular rhythm by palpation  Resp: normal respiratory effort without conversational dyspnea   Psych: normal mood and affect  Gait: normal steady gait with appropriate coordination and balance  Neuro: Normal sensory exam of bilateral hands. Normal 2 pt discrimination.   MSK:  LEFT WRIST  Inspection:    No swelling or obvious deformity or asymmetry  Palpation:    Tender about the triquetrum. Remainder of bony and ligamentous line marks are nontender.     Metacarpals: 5th metacarpal    Thumb: normal    Fingers: normal  Range of Motion:    Full (active and passive) flexion, extension, pronation/supination, and ulnar/radial deviation.  Strength:    No deficits in flexion, extension, ulnar/radial deviation, or  strength.. Normal pinch strength.  Special Tests:    Positive: None        Independent visualization of the below image:  No results found for this or any previous visit (from the past 24 hour(s)).    Review of left forearm x-ray performed 9/15/2024 shows no acute fracture or dislocation.  Small enthesophyte along the medial humeral epicondyle.    Review of left hand x-ray performed on 9/15/2024 shows no acute fracture, dislocation or osseous abnormalities.        Albert Yeo MD [facility]       Again, thank you for allowing me to participate in the care of your patient.        Sincerely,        Albert Yeo, MD

## 2024-09-25 NOTE — PATIENT INSTRUCTIONS
1. Contusion of left wrist, initial encounter    2. Wrist injury, left, initial encounter    3. Hand injury, left, initial encounter      -Patient has acute left wrist pain after a fall onto concrete 11 days ago due to a contusion  -I reviewed x-rays taken of the left hand and forearm which shows no acute fracture, dislocation or osseous abnormalities  -Patient has full range of motion on exam today and normal strength.  Patient has minimal tenderness to palpation over the proximal fifth metacarpal and triquetrum  -Patient may return to workouts and activities as her pain allows  -Patient discontinue wrist splint as tolerated  -Will follow-up if pain worsens  -Call direct clinic number [[phone]] at any time with questions or concerns.    Albert Yeo MD [facility]  [facility] [place]

## 2024-09-26 ENCOUNTER — HOSPITAL ENCOUNTER (OUTPATIENT)
Dept: MAMMOGRAPHY | Facility: CLINIC | Age: 69
Discharge: HOME | End: 2024-09-26
Admitting: INTERNAL MEDICINE
Payer: COMMERCIAL

## 2024-09-26 DIAGNOSIS — Z12.31 VISIT FOR SCREENING MAMMOGRAM: ICD-10-CM

## 2024-09-26 PROCEDURE — 77063 BREAST TOMOSYNTHESIS BI: CPT

## 2024-11-07 ENCOUNTER — TRANSFERRED RECORDS (OUTPATIENT)
Dept: HEALTH INFORMATION MANAGEMENT | Facility: CLINIC | Age: 69
End: 2024-11-07
Payer: COMMERCIAL

## 2024-12-01 ENCOUNTER — OFFICE VISIT (OUTPATIENT)
Dept: URGENT CARE | Facility: URGENT CARE | Age: 69
End: 2024-12-01
Payer: COMMERCIAL

## 2024-12-01 VITALS
WEIGHT: 178 LBS | RESPIRATION RATE: 16 BRPM | HEART RATE: 76 BPM | TEMPERATURE: 98 F | DIASTOLIC BLOOD PRESSURE: 80 MMHG | SYSTOLIC BLOOD PRESSURE: 132 MMHG | BODY MASS INDEX: 33.63 KG/M2 | OXYGEN SATURATION: 97 %

## 2024-12-01 DIAGNOSIS — R07.0 THROAT PAIN: Primary | ICD-10-CM

## 2024-12-01 LAB — DEPRECATED S PYO AG THROAT QL EIA: NEGATIVE

## 2024-12-01 PROCEDURE — 87651 STREP A DNA AMP PROBE: CPT | Performed by: FAMILY MEDICINE

## 2024-12-01 PROCEDURE — 99213 OFFICE O/P EST LOW 20 MIN: CPT | Performed by: FAMILY MEDICINE

## 2024-12-01 RX ORDER — LEVOCETIRIZINE DIHYDROCHLORIDE 5 MG/1
5 TABLET, FILM COATED ORAL EVERY EVENING
Qty: 30 TABLET | Refills: 3 | Status: SHIPPED | OUTPATIENT
Start: 2024-12-01

## 2024-12-01 ASSESSMENT — ENCOUNTER SYMPTOMS: SORE THROAT: 1

## 2024-12-01 NOTE — PROGRESS NOTES
"  Assessment & Plan     Throat pain  Seems to be allergies   Seems to be sensitive to medications  Will try xyzal as it is a single enantiomer and may not cause as many side effects   Warned of possible price   Encouraged follow up with primary.    - Streptococcus A Rapid Screen w/Reflex to PCR - Clinic Collect  - Group A Streptococcus PCR Throat Swab  - levocetirizine (XYZAL) 5 MG tablet; Take 1 tablet (5 mg) by mouth every evening.          BMI  Estimated body mass index is 33.63 kg/m  as calculated from the following:    Height as of 9/25/24: 1.549 m (5' 1\").    Weight as of this encounter: 80.7 kg (178 lb).       No follow-ups on file.    Subjective   Ambreen is a 69 year old, presenting for the following health issues:  Pharyngitis (2 months-had strep test 3 weeks ago-neg strep, bilateral ear pain)    Pharyngitis          2 months has been having sore throat   3 weeks ago had strep   Ears and throat are bothering her  Was feeling better last week until a few days ago   Getting worse again     Has ulcerative colitis to seems to only take omeprazole for it   No immune suppressants on her med list     Reports left ear is the most bothersome part   Reports that she was taking claritin was causing breaking out on her nose   Flonase was making her cough a lot in the past           Objective    /80   Pulse 76   Temp 98  F (36.7  C)   Resp 16   Wt 80.7 kg (178 lb)   LMP  (LMP Unknown)   SpO2 97%   BMI 33.63 kg/m    Body mass index is 33.63 kg/m .  Physical Exam  Constitutional:       General: She is not in acute distress.  HENT:      Right Ear: Tympanic membrane and ear canal normal.      Left Ear: Tympanic membrane and ear canal normal.      Nose: Nose normal.      Mouth/Throat:      Mouth: Mucous membranes are moist.      Pharynx: No oropharyngeal exudate or posterior oropharyngeal erythema.   Eyes:      General: No scleral icterus.     Conjunctiva/sclera: Conjunctivae normal.   Cardiovascular:      Rate and " Rhythm: Normal rate and regular rhythm.   Pulmonary:      Effort: No respiratory distress.   Neurological:      Mental Status: She is alert.   Psychiatric:         Mood and Affect: Mood normal.         Behavior: Behavior normal.              Signed Electronically by: Prasanna Abebe DO

## 2024-12-02 LAB — GROUP A STREP BY PCR: NOT DETECTED

## 2025-02-02 SDOH — HEALTH STABILITY: PHYSICAL HEALTH: ON AVERAGE, HOW MANY DAYS PER WEEK DO YOU ENGAGE IN MODERATE TO STRENUOUS EXERCISE (LIKE A BRISK WALK)?: 2 DAYS

## 2025-02-02 SDOH — HEALTH STABILITY: PHYSICAL HEALTH: ON AVERAGE, HOW MANY MINUTES DO YOU ENGAGE IN EXERCISE AT THIS LEVEL?: 40 MIN

## 2025-02-02 ASSESSMENT — SOCIAL DETERMINANTS OF HEALTH (SDOH): HOW OFTEN DO YOU GET TOGETHER WITH FRIENDS OR RELATIVES?: ONCE A WEEK

## 2025-02-05 ENCOUNTER — OFFICE VISIT (OUTPATIENT)
Dept: INTERNAL MEDICINE | Facility: CLINIC | Age: 70
End: 2025-02-05
Payer: COMMERCIAL

## 2025-02-05 ENCOUNTER — TELEPHONE (OUTPATIENT)
Dept: CARDIOLOGY | Facility: CLINIC | Age: 70
End: 2025-02-05

## 2025-02-05 VITALS
HEIGHT: 62 IN | SYSTOLIC BLOOD PRESSURE: 116 MMHG | HEART RATE: 66 BPM | WEIGHT: 176 LBS | OXYGEN SATURATION: 98 % | BODY MASS INDEX: 32.39 KG/M2 | RESPIRATION RATE: 16 BRPM | DIASTOLIC BLOOD PRESSURE: 70 MMHG | TEMPERATURE: 97.3 F

## 2025-02-05 DIAGNOSIS — K51.80 OTHER ULCERATIVE COLITIS WITHOUT COMPLICATION (H): ICD-10-CM

## 2025-02-05 DIAGNOSIS — R06.09 DYSPNEA ON EXERTION: ICD-10-CM

## 2025-02-05 DIAGNOSIS — L72.9 SKIN CYST: ICD-10-CM

## 2025-02-05 DIAGNOSIS — F41.9 ANXIETY: ICD-10-CM

## 2025-02-05 DIAGNOSIS — R73.03 PREDIABETES: ICD-10-CM

## 2025-02-05 DIAGNOSIS — R06.09 DYSPNEA ON EXERTION: Primary | ICD-10-CM

## 2025-02-05 DIAGNOSIS — Z00.00 ANNUAL PHYSICAL EXAM: Primary | ICD-10-CM

## 2025-02-05 DIAGNOSIS — E78.2 MIXED HYPERLIPIDEMIA: ICD-10-CM

## 2025-02-05 DIAGNOSIS — R82.90 FOUL SMELLING URINE: ICD-10-CM

## 2025-02-05 DIAGNOSIS — Z13.1 SCREENING FOR DIABETES MELLITUS: ICD-10-CM

## 2025-02-05 DIAGNOSIS — M81.0 SENILE OSTEOPOROSIS: ICD-10-CM

## 2025-02-05 DIAGNOSIS — K27.9 PEPTIC ULCER: ICD-10-CM

## 2025-02-05 DIAGNOSIS — N76.0 VAGINOSIS: ICD-10-CM

## 2025-02-05 PROBLEM — Z12.4 CERVICAL CANCER SCREENING: Status: RESOLVED | Noted: 2024-02-08 | Resolved: 2025-02-05

## 2025-02-05 LAB
ALBUMIN SERPL BCG-MCNC: 4.5 G/DL (ref 3.5–5.2)
ALBUMIN UR-MCNC: NEGATIVE MG/DL
ALP SERPL-CCNC: 109 U/L (ref 40–150)
ALT SERPL W P-5'-P-CCNC: 28 U/L (ref 0–50)
ANION GAP SERPL CALCULATED.3IONS-SCNC: 12 MMOL/L (ref 7–15)
APPEARANCE UR: CLEAR
AST SERPL W P-5'-P-CCNC: 24 U/L (ref 0–45)
BACTERIA #/AREA URNS HPF: ABNORMAL /HPF
BILIRUB SERPL-MCNC: 0.5 MG/DL
BILIRUB UR QL STRIP: NEGATIVE
BUN SERPL-MCNC: 19.4 MG/DL (ref 8–23)
CALCIUM SERPL-MCNC: 10 MG/DL (ref 8.8–10.4)
CHLORIDE SERPL-SCNC: 105 MMOL/L (ref 98–107)
CHOLEST SERPL-MCNC: 162 MG/DL
CLUE CELLS: ABNORMAL
COLOR UR AUTO: YELLOW
CREAT SERPL-MCNC: 0.81 MG/DL (ref 0.51–0.95)
EGFRCR SERPLBLD CKD-EPI 2021: 79 ML/MIN/1.73M2
ERYTHROCYTE [DISTWIDTH] IN BLOOD BY AUTOMATED COUNT: 12.8 % (ref 10–15)
EST. AVERAGE GLUCOSE BLD GHB EST-MCNC: 120 MG/DL
FASTING STATUS PATIENT QL REPORTED: YES
FASTING STATUS PATIENT QL REPORTED: YES
GLUCOSE SERPL-MCNC: 107 MG/DL (ref 70–99)
GLUCOSE UR STRIP-MCNC: NEGATIVE MG/DL
HBA1C MFR BLD: 5.8 % (ref 0–5.6)
HCO3 SERPL-SCNC: 26 MMOL/L (ref 22–29)
HCT VFR BLD AUTO: 44.4 % (ref 35–47)
HDLC SERPL-MCNC: 44 MG/DL
HGB BLD-MCNC: 14.9 G/DL (ref 11.7–15.7)
HGB UR QL STRIP: ABNORMAL
KETONES UR STRIP-MCNC: NEGATIVE MG/DL
LDLC SERPL CALC-MCNC: 85 MG/DL
LEUKOCYTE ESTERASE UR QL STRIP: ABNORMAL
MCH RBC QN AUTO: 29 PG (ref 26.5–33)
MCHC RBC AUTO-ENTMCNC: 33.6 G/DL (ref 31.5–36.5)
MCV RBC AUTO: 86 FL (ref 78–100)
NITRATE UR QL: POSITIVE
NONHDLC SERPL-MCNC: 118 MG/DL
PH UR STRIP: 5.5 [PH] (ref 5–8)
PLATELET # BLD AUTO: 225 10E3/UL (ref 150–450)
POTASSIUM SERPL-SCNC: 4.8 MMOL/L (ref 3.4–5.3)
PROT SERPL-MCNC: 7.4 G/DL (ref 6.4–8.3)
RBC # BLD AUTO: 5.14 10E6/UL (ref 3.8–5.2)
RBC #/AREA URNS AUTO: ABNORMAL /HPF
SODIUM SERPL-SCNC: 143 MMOL/L (ref 135–145)
SP GR UR STRIP: 1.02 (ref 1–1.03)
SQUAMOUS #/AREA URNS AUTO: ABNORMAL /LPF
TRICHOMONAS, WET PREP: ABNORMAL
TRIGL SERPL-MCNC: 165 MG/DL
TSH SERPL DL<=0.005 MIU/L-ACNC: 1.96 UIU/ML (ref 0.3–4.2)
UROBILINOGEN UR STRIP-ACNC: 0.2 E.U./DL
VIT D+METAB SERPL-MCNC: 68 NG/ML (ref 20–50)
WBC # BLD AUTO: 9.3 10E3/UL (ref 4–11)
WBC #/AREA URNS AUTO: ABNORMAL /HPF
WBC'S/HIGH POWER FIELD, WET PREP: ABNORMAL
YEAST, WET PREP: ABNORMAL

## 2025-02-05 PROCEDURE — 80061 LIPID PANEL: CPT | Performed by: INTERNAL MEDICINE

## 2025-02-05 PROCEDURE — 81001 URINALYSIS AUTO W/SCOPE: CPT | Performed by: INTERNAL MEDICINE

## 2025-02-05 PROCEDURE — 87210 SMEAR WET MOUNT SALINE/INK: CPT | Performed by: INTERNAL MEDICINE

## 2025-02-05 PROCEDURE — 80053 COMPREHEN METABOLIC PANEL: CPT | Performed by: INTERNAL MEDICINE

## 2025-02-05 PROCEDURE — 82306 VITAMIN D 25 HYDROXY: CPT | Performed by: INTERNAL MEDICINE

## 2025-02-05 PROCEDURE — 83036 HEMOGLOBIN GLYCOSYLATED A1C: CPT | Performed by: INTERNAL MEDICINE

## 2025-02-05 PROCEDURE — 36415 COLL VENOUS BLD VENIPUNCTURE: CPT | Performed by: INTERNAL MEDICINE

## 2025-02-05 PROCEDURE — 84443 ASSAY THYROID STIM HORMONE: CPT | Performed by: INTERNAL MEDICINE

## 2025-02-05 PROCEDURE — 85027 COMPLETE CBC AUTOMATED: CPT | Performed by: INTERNAL MEDICINE

## 2025-02-05 RX ORDER — MEPERIDINE HYDROCHLORIDE 25 MG/ML
25 INJECTION INTRAMUSCULAR; INTRAVENOUS; SUBCUTANEOUS
OUTPATIENT
Start: 2025-02-12

## 2025-02-05 RX ORDER — ACETAMINOPHEN 325 MG/1
650 TABLET ORAL
OUTPATIENT
Start: 2025-02-12

## 2025-02-05 RX ORDER — ESCITALOPRAM OXALATE 5 MG/1
5 TABLET ORAL DAILY
Qty: 60 TABLET | Refills: 1 | Status: SHIPPED | OUTPATIENT
Start: 2025-02-05

## 2025-02-05 RX ORDER — DIPHENHYDRAMINE HYDROCHLORIDE 50 MG/ML
25 INJECTION INTRAMUSCULAR; INTRAVENOUS
Start: 2025-02-12

## 2025-02-05 RX ORDER — HEPARIN SODIUM,PORCINE 10 UNIT/ML
5-20 VIAL (ML) INTRAVENOUS DAILY PRN
OUTPATIENT
Start: 2025-02-12

## 2025-02-05 RX ORDER — ALBUTEROL SULFATE 90 UG/1
1-2 INHALANT RESPIRATORY (INHALATION)
Start: 2025-02-12

## 2025-02-05 RX ORDER — HEPARIN SODIUM (PORCINE) LOCK FLUSH IV SOLN 100 UNIT/ML 100 UNIT/ML
5 SOLUTION INTRAVENOUS
OUTPATIENT
Start: 2025-02-12

## 2025-02-05 RX ORDER — ALBUTEROL SULFATE 0.83 MG/ML
2.5 SOLUTION RESPIRATORY (INHALATION)
OUTPATIENT
Start: 2025-02-12

## 2025-02-05 RX ORDER — ZOLEDRONIC ACID 0.05 MG/ML
5 INJECTION, SOLUTION INTRAVENOUS ONCE
Start: 2025-02-12

## 2025-02-05 RX ORDER — EPINEPHRINE 1 MG/ML
0.3 INJECTION, SOLUTION, CONCENTRATE INTRAVENOUS EVERY 5 MIN PRN
OUTPATIENT
Start: 2025-02-12

## 2025-02-05 RX ORDER — CEPHALEXIN 500 MG/1
500 CAPSULE ORAL 2 TIMES DAILY
Qty: 14 CAPSULE | Refills: 0 | Status: SHIPPED | OUTPATIENT
Start: 2025-02-05

## 2025-02-05 RX ORDER — METHYLPREDNISOLONE SODIUM SUCCINATE 40 MG/ML
40 INJECTION INTRAMUSCULAR; INTRAVENOUS
Start: 2025-02-12

## 2025-02-05 RX ORDER — DIPHENHYDRAMINE HYDROCHLORIDE 50 MG/ML
50 INJECTION INTRAMUSCULAR; INTRAVENOUS
Start: 2025-02-12

## 2025-02-05 NOTE — TELEPHONE ENCOUNTER
Health Call Center    Phone Message    May a detailed message be left on voicemail: yes    Reason for Call: Patient called to schedule a Echocardiogram Exercise Stress Test. Please call back to further coordinate.    Thank you!  [facility]

## 2025-02-05 NOTE — PROGRESS NOTES
Preventive Care Visit  [facility]  Jorge Blanton MD, Internal Medicine  Feb 5, 2025      Assessment & Plan     Annual physical exam      Mixed hyperlipidemia  On statin  - CBC with platelets; Future  - Comprehensive metabolic panel; Future  - Lipid panel reflex to direct LDL Fasting; Future  - TSH with free T4 reflex; Future  - CBC with platelets  - Comprehensive metabolic panel  - Lipid panel reflex to direct LDL Fasting  - TSH with free T4 reflex    Other ulcerative colitis without complication (H)  Stable, she had colonoscopy in 2022 which showed quiescent ulcerative proctitis. No active treatment. They recommended colonoscopy again in 10 years.    Senile osteoporosis  She was treated with Prolia [id number], Forteo [id number] and 9/2019-12/2020. She stopped Forteo 12/2020, since completed 2 years of treatment. On Prolia again since 2021.     DXA scan 9/2024 showed stable osteopenia.  We will switch to Reclast infusion yearly.  Follow-up DXA in 3/2026.    - TSH with free T4 reflex; Future  - Vitamin D Deficiency; Future  - TSH with free T4 reflex  - Vitamin D Deficiency    Foul smelling urine  We will wait for urine culture result. Wet prep negative.  - UA Macroscopic with reflex to Microscopic and Culture; Future  - UA Macroscopic with reflex to Microscopic and Culture  - Urine Microscopic Exam  - Urine Culture    Vaginosis  Patient reports no discharge, but the strange fishy smell.  Wet prep was done today and is negative for bacterial vaginosis. We will wait for urine culture result.  - Wet prep - Clinic Collect; Future  - Wet prep - Clinic Collect    Screening for diabetes mellitus    - Hemoglobin A1c; Future  - Hemoglobin A1c    Prediabetes  Component      Latest Ref Rng 2/5/2025  11:01 AM   Estimated Average Glucose      <117 mg/dL 120 (H)    Hemoglobin A1C      0.0 - 5.6 % 5.8 (H)       Low glucose diet recommended, as also weight loss and regular exercise.  - Hemoglobin A1c;  Future  - Hemoglobin A1c    Peptic ulcer  Per history, patient cannot recall when she had the ulcer. Last two EGDs in 2022 and 2023 did not show the ulcer. She is on omeprazole.    Dyspnea on exertion  Patient reports increased fatigue and significant dyspnea on exertion which decreased her ability to exercise.  She also has feeling of chest pain occasionally.  She had CTA in 2021 which showed:  Mild coronary atherosclerosis with no obstructive plaque identified.     She will schedule the stress test.  - Echocardiogram Exercise Stress; Future    Anxiety  A lot of stress with some problems in her family. Noticed insomnia, generalized worries and increased fatigue, some anhedonia too. She denies suicidal ideas.   We will start Lexapro, follow-up in 1 month.  - escitalopram (LEXAPRO) 5 MG tablet; Take 1 tablet (5 mg) by mouth daily. Take 5 mg daily for 2 weeks, then increase to 10 mg daily.    Skin cyst  She has tender inflamed cyst in the pubic area, possible folliculitis. Home care discussed. Keflex prescribed.  - cephALEXin (KEFLEX) 500 MG capsule; Take 1 capsule (500 mg) by mouth 2 times daily.        Total time spent on the date of this encounter doing: chart review, review of test results, patient visit, physical exam, education, counseling, developing plan of care, and documenting =  51   minutes.     The longitudinal plan of care for the diagnosis(es)/condition(s) as documented were addressed during this visit. Due to the added complexity in care, I will continue to support Ambreen in the subsequent management and with ongoing continuity of care.    Patient Instructions   Labs today.    Cyst treatment - Keflex Rx will be sent.    Depression and anxiety - Rx for Lexapro will be sent. Follow-up 1-2 months.    You can schedule the heart stress test -     DXA scan in 3/2026.    We will switch from Prolia to Reclast IV infusion.  You can call  infusion center to schedule -       Treatment Options discussed:    Bisphosphonates    IV - Zoledronic acid (Reclast) once a year for 3 years total      -discussed studies have shown that three years of zolendronic acid protects forthe following 3 years as much as being on zolendronic acid for 6 years straight        Flu-like symptoms can occur within the first 24-72 hours of your first infusion of the IVbisphosphonate. This includes low-grade fever, muscle and joint pains. Ibuprofen and/or Tylenol can help these symptoms. Low calcium levels can also occur with the IV bisphosphonates.     Osteonecrosis of the jaw(ONJ) has been rarely associated with bisphosphonate use for osteoporosis.The risk is approximately 1/1700-1/100,000, with development most likely related to invasive dental procedures (extractions, dental implants) and poordental hygiene. Be sure to continue regular dental visits and alert me and/or your dentist for any issues. If you do require invasive dental work, please contact me and we will stop the medication 3 months prior.      -The data available at this time suggests that there is probably a small increase risk of atypical (nontraumatic) subtrochanteric fractures of the femur in patients on bisphosphonate therapy compared to those not onit. One large study suggested that for every 100 fractures prevented with bisphosphonate therapy, less than one femur fracture will occur. Other studies suggest one episode per 2,500 patient years. Patient should call withleg pain.     -Calcium: total calcium intake should be 1200mg per day from dietary sources. If not achieved with diet alone, add in calcium tablet(s). No more than 500mg at one time. Dietary sources: 8 ounces of milk,4 ounces of yogurt or 1 ounce of cheese contain about 300mg calcium per serving, green veggies, almonds, beans, oranges, along with various other plant sources.     -Vitamin D3 recommendations: 2000 IU daily.    -Bone Stimulating Exercise: impact and weight-bearing exercise like walking, jogging,  "yoga, Redd-Chi, stair-climbing, dancing, dry aerobics, and tennis 3-5 times per week for at least 30 minutes & weight-lifting orresistance training 2 times per week, may improve your bone desity and increase your strength, thereby decreasing your risk of fracture.    -Dr. Marylou Sykes has a book on Yoga for Osteoporosis and a 12 pose regimenyou can look up online.     -Fall Prevention: avoiding ice, use of [employer] Tracks http://[employer].LOYAL3/ to cover shoes in the winter, avoiding loose gravel and uneven terrain, clearing house of cords, throw rugs,avoiding ladders, using caution with stairs and around dogs and small children.        BMI  Estimated body mass index is 32.72 kg/m  as calculated from the following:    Height as of this encounter: 1.562 m (5' 1.5\").    Weight as of this encounter: 79.8 kg (176 lb).       Counseling  Appropriate preventive services were addressed with this patient via screening, questionnaire, or discussion as appropriate for fall prevention, nutrition, physical activity, Tobacco-use cessation, social engagement, weight loss and cognition.  Checklist reviewing preventive services available has been given to the patient.  Reviewed patient's diet, addressing concerns and/or questions.   She is at risk for lack of exercise and has been provided with information to increase physical activity for the benefit of her well-being.   She is at risk for psychosocial distress and has been provided with information to reduce risk.           Eetlvina Crook is a 69 year old, presenting for the following:  Wellness Visit (Mammo 9/26/24 DXA 9/05/24 Colon 3/15/22)        2/5/2025     9:53 AM   Additional Questions   Roomed by Desiree JULIO          Health Care Directive  Patient does not have a Health Care Directive: Discussed advance care planning with patient; however, patient declined at this time.      2/2/2025   General Health   How would you rate your overall physical health? Good   Feel stress " (tense, anxious, or unable to sleep) To some extent   (!) STRESS CONCERN      2/2/2025   Nutrition   Diet: Regular (no restrictions)         2/2/2025   Exercise   Days per week of moderate/strenous exercise 2 days   Average minutes spent exercising at this level 40 min   (!) EXERCISE CONCERN      2/2/2025   Social Factors   Frequency of gathering with friends or relatives Once a week   Worry food won't last until get money to buy more Yes   Food not last or not have enough money for food? No   Do you have housing? (Housing is defined as stable permanent housing and does not include staying ouside in a car, in a tent, in an abandoned building, in an overnight shelter, or couch-surfing.) Yes   Are you worried about losing your housing? No   Lack of transportation? No   Unable to get utilities (heat,electricity)? No   (!) FOOD SECURITY CONCERN PRESENT      2/5/2025   Fall Risk   Fallen 2 or more times in the past year? No   Trouble with walking or balance? No          2/2/2025   Activities of Daily Living- Home Safety   Needs help with the following daily activites None of the above   Safety concerns in the home None of the above         2/2/2025   Dental   Dentist two times every year? Yes         2/2/2025   Hearing Screening   Hearing concerns? None of the above         2/2/2025   Driving Risk Screening   Patient/family members have concerns about driving No         2/2/2025   General Alertness/Fatigue Screening   Have you been more tired than usual lately? No         2/2/2025   Urinary Incontinence Screening   Bothered by leaking urine in past 6 months No         2/2/2025   TB Screening   Were you born outside of the [place]? No         Today's PHQ-2 Score:       2/5/2025     9:51 AM   PHQ-2 ( 1999 Pf[employer])   Q1: Little interest or pleasure in doing things 1   Q2: Feeling down, depressed or hopeless 1   PHQ-2 Score 2    Q1: Little interest or pleasure in doing things Several days   Q2: Feeling down, depressed or hopeless  Several days   PHQ-2 Score 2       Patient-reported           2/2/2025   Substance Use   Alcohol more than 3/day or more than 7/wk No   Do you have a current opioid prescription? No   How severe/bad is pain from 1 to 10? 3/10   Do you use any other substances recreationally? (!) DECLINE     Social History     Tobacco Use    Smoking status: Never     Passive exposure: Never    Smokeless tobacco: Never    Tobacco comments:     in HS and 20's socail only, never pack a day smoker   Vaping Use    Vaping status: Never Used   Substance Use Topics    Alcohol use: Yes     Alcohol/week: 1.7 standard drinks of alcohol    Drug use: No           9/26/2024   LAST FHS-7 RESULTS   1st degree relative breast or ovarian cancer No   Any relative bilateral breast cancer No   Any male have breast cancer No   Any ONE woman have BOTH breast AND ovarian cancer No   Any woman with breast cancer before 50yrs Yes   2 or more relatives with breast AND/OR ovarian cancer No   2 or more relatives with breast AND/OR bowel cancer No              History of abnormal Pap smear:         Latest Ref Rng & Units 2/1/2024    10:52 AM 9/29/2020     5:16 PM 6/29/2017     9:19 AM   PAP / HPV   PAP  Negative for Intraepithelial Lesion or Malignancy (NILM)  Negative for squamous intraepithelial lesion or malignancy  Electronically signed by Nanci Currie CT (ASCP) on 9/30/2020 at  3:59 PM    Negative for squamous intraepithelial lesion or malignancy  Electronically signed by Chelsey Montes De Oca CT (ASCP) on 7/12/2017 at  2:48 PM      HPV 16 DNA Negative Negative      HPV 18 DNA Negative Negative      Other HR HPV Negative Negative        ASCVD Risk   The 10-year ASCVD risk score (Rojas REDDY, et al., 2019) is: 6.2%    Values used to calculate the score:      Age: 69 years      Sex: Female      Is Non- : No      Diabetic: No      Tobacco smoker: No      Systolic Blood Pressure: 116 mmHg      Is BP treated: No      HDL  "Cholesterol: 49 mg/dL      Total Cholesterol: 175 mg/dL            Reviewed and updated as needed this visit by Provider                      Current providers sharing in care for this patient include:  Patient Care Team:  Jorge Blanton MD as PCP - General (Internal Medicine)  Janine Perez Chi, OD as MD (Optometry)  Tabby Skinner MD as MD (Ophthalmology)  Jorge Blanton MD as Assigned PCP  Anaid Sloan MD as Assigned Surgical Provider  Yovany Adams DO as MD (Neurology)  Uriel Abebe MD as MD (Otolaryngology)  Marshall Weeks MD as MD (Neurology)  Marshall Weeks MD as Assigned Neuroscience Provider  Yeo, Albert, MD as Assigned Musculoskeletal Provider    The following health maintenance items are reviewed in Epic and correct as of today:  Health Maintenance   Topic Date Due    ANNUAL REVIEW OF HM ORDERS  Never done    LIPID  02/01/2025    MEDICARE ANNUAL WELLNESS VISIT  02/01/2025    COVID-19 Vaccine (9 - 2024-25 season) 03/13/2025    FALL RISK ASSESSMENT  02/05/2026    MAMMO SCREENING  09/26/2026    GLUCOSE  02/01/2027    ADVANCE CARE PLANNING  07/12/2029    DTAP/TDAP/TD IMMUNIZATION (5 - Td or Tdap) 09/29/2030    COLORECTAL CANCER SCREENING  03/15/2032    DEXA  09/05/2039    HEPATITIS C SCREENING  Completed    PHQ-2 (once per calendar year)  Completed    INFLUENZA VACCINE  Completed    Pneumococcal Vaccine: 50+ Years  Completed    ZOSTER IMMUNIZATION  Completed    RSV VACCINE  Completed    HPV IMMUNIZATION  Aged Out    MENINGITIS IMMUNIZATION  Aged Out    PAP  Discontinued            Objective    Exam  /70   Pulse 66   Temp 97.3  F (36.3  C) (Temporal)   Resp 16   Ht 1.562 m (5' 1.5\")   Wt 79.8 kg (176 lb)   LMP  (LMP Unknown)   SpO2 98%   BMI 32.72 kg/m     Estimated body mass index is 32.72 kg/m  as calculated from the following:    Height as of this encounter: 1.562 m (5' 1.5\").    Weight as of this encounter: 79.8 kg (176 lb).    Physical Exam  General " Appearance: Alert, cooperative, no distress, appears stated age.  Head: Normocephalic, without obvious abnormality, atraumatic  Eyes: PERRL, conjunctiva/corneas clear, EOM's intact  Ears: Normal TM's and external ear canals, both ears  Nose: Nares normal, septum midline,mucosa normal, no drainage  Throat: Lips, mucosa, and tongue normal; teeth and gums normal  Neck: Supple, symmetrical, trachea midline, no adenopathy;  thyroid: not enlarged, symmetric, no tenderness/mass/nodules; no carotid bruit or JVD  Back: Symmetric, no curvature, ROM normal, no CVA tenderness.  Lungs: Clear to auscultation bilaterally, respirations unlabored.  Breasts: No breast masses, tenderness, asymmetry, or nipple discharge.  Heart: Regular rate and rhythm, S1 and S2 normal, no murmur, rub, or gallop.  Abdomen: Soft, non-tender, bowel sounds active all four quadrants,  no masses, no organomegaly.  Extremities: Extremities normal, atraumatic, no cyanosis or edema.  Skin: Skin color, texture, turgor normal, no rashes. Small inflamed cyst in pubic area..  Lymph nodes: Cervical, supraclavicular, and axillary nodes normal.  Neurologic: No focal neurological findings.          2/1/2024   Mini Cog   Mini-Cog Not Completed (choose reason) Patient declines   Clock Draw Score 2 Normal    2 Normal    2 Normal   3 Item Recall 3 objects recalled    3 objects recalled    3 objects recalled   Mini Cog Total Score 5    5    5       Multiple values from one day are sorted in reverse-chronological order              Signed Electronically by: Jorge Blanton MD

## 2025-02-05 NOTE — PATIENT INSTRUCTIONS
Labs today.    Cyst treatment - Keflex Rx will be sent.    Depression and anxiety - Rx for Lexapro will be sent. Follow-up 1-2 months.    You can schedule the heart stress test -     DXA scan in 3/2026.    We will switch from Prolia to Reclast IV infusion.  You can call  infusion center to schedule -       Treatment Options discussed:   Bisphosphonates    IV - Zoledronic acid (Reclast) once a year for 3 years total      -discussed studies have shown that three years of zolendronic acid protects forthe following 3 years as much as being on zolendronic acid for 6 years straight        Flu-like symptoms can occur within the first 24-72 hours of your first infusion of the IVbisphosphonate. This includes low-grade fever, muscle and joint pains. Ibuprofen and/or Tylenol can help these symptoms. Low calcium levels can also occur with the IV bisphosphonates.     Osteonecrosis of the jaw(ONJ) has been rarely associated with bisphosphonate use for osteoporosis.The risk is approximately 1/1700-1/100,000, with development most likely related to invasive dental procedures (extractions, dental implants) and poordental hygiene. Be sure to continue regular dental visits and alert me and/or your dentist for any issues. If you do require invasive dental work, please contact me and we will stop the medication 3 months prior.      -The data available at this time suggests that there is probably a small increase risk of atypical (nontraumatic) subtrochanteric fractures of the femur in patients on bisphosphonate therapy compared to those not onit. One large study suggested that for every 100 fractures prevented with bisphosphonate therapy, less than one femur fracture will occur. Other studies suggest one episode per 2,500 patient years. Patient should call withle pain.     -Calcium: total calcium intake should be 1200mg per day from dietary sources. If not achieved with diet alone, add in calcium tablet(s). No more than 500mg at one  time. Dietary sources: 8 ounces of milk,4 ounces of yogurt or 1 ounce of cheese contain about 300mg calcium per serving, green veggies, almonds, beans, oranges, along with various other plant sources.     -Vitamin D3 recommendations: 2000 IU daily.    -Bone Stimulating Exercise: impact and weight-bearing exercise like walking, jogging, yoga, Redd-Chi, stair-climbing, dancing, dry aerobics, and tennis 3-5 times per week for at least 30 minutes & weight-lifting orresistance training 2 times per week, may improve your bone desity and increase your strength, thereby decreasing your risk of fracture.    -Dr. Marylou Sykes has a book on Yoga for Osteoporosis and a 12 pose regimenyou can look up online.     -Fall Prevention: avoiding ice, use of [employer] Tr[employer] http://[employer]/ to cover shoes in the winter, avoiding loose gravel and uneven terrain, clearing house of cords, throw rugs,avoiding ladders, using caution with stairs and around dogs and small children.

## 2025-02-06 DIAGNOSIS — R82.90 FOUL SMELLING URINE: Primary | ICD-10-CM

## 2025-02-06 LAB — BACTERIA UR CULT: NORMAL

## 2025-02-09 LAB
BACTERIA UR CULT: ABNORMAL
BACTERIA UR CULT: ABNORMAL

## 2025-02-10 ENCOUNTER — TELEPHONE (OUTPATIENT)
Dept: CARDIOLOGY | Facility: CLINIC | Age: 70
End: 2025-02-10
Payer: COMMERCIAL

## 2025-02-10 DIAGNOSIS — N39.0 ACUTE UTI: Primary | ICD-10-CM

## 2025-02-10 RX ORDER — SULFAMETHOXAZOLE AND TRIMETHOPRIM 800; 160 MG/1; MG/1
1 TABLET ORAL 2 TIMES DAILY
Qty: 14 TABLET | Refills: 0 | Status: SHIPPED | OUTPATIENT
Start: 2025-02-10 | End: 2025-02-17

## 2025-02-10 NOTE — TELEPHONE ENCOUNTER
El calling from [facility] stating that with patients recent EKG showing a Left Bundle Branch Block, the Stress Echo is non- diagnostic, for ischemia.     Call back if needed:     El: [phone]

## 2025-02-10 NOTE — TELEPHONE ENCOUNTER
Outgoing call to El to gather more information.     Stress echo not recommended for patient's situation as it will not be able to show ischemia and mostly likely the results will be non-diagnostic. He is recommending a Lexiscan stress test if the provider is looking for ischemia.     Routing to provider for review.

## 2025-02-10 NOTE — TELEPHONE ENCOUNTER
[facility] Call Center    Phone Message    May a detailed message be left on voicemail: yes     Reason for Call: Other: Pt would like a call back to discuss if she can have the stress test with the medication she is taking     Action Taken: Other: Cardio    Travel Screening: Not Applicable     Date of Service:

## 2025-02-10 NOTE — TELEPHONE ENCOUNTER
Outgoing call to patient and relayed provider message. She will call radiology once she is home to get the test scheduled. No further questions at this time.

## 2025-02-11 NOTE — TELEPHONE ENCOUNTER
Spoke with pt and informed her that she can continue her daily medications like normal.   Informed pt to not have any caffeine 12 hours prior to the test and no eating 3 hours prior.  Pt gave verbal understanding.

## 2025-02-21 ENCOUNTER — HOSPITAL ENCOUNTER (OUTPATIENT)
Dept: CARDIOLOGY | Facility: CLINIC | Age: 70
Setting detail: NUCLEAR MEDICINE
Discharge: HOME | End: 2025-02-21
Attending: INTERNAL MEDICINE
Payer: COMMERCIAL

## 2025-02-21 ENCOUNTER — HOSPITAL ENCOUNTER (OUTPATIENT)
Dept: NUCLEAR MEDICINE | Facility: CLINIC | Age: 70
Setting detail: NUCLEAR MEDICINE
Discharge: HOME | End: 2025-02-21
Attending: INTERNAL MEDICINE
Payer: COMMERCIAL

## 2025-02-21 DIAGNOSIS — R06.09 DYSPNEA ON EXERTION: ICD-10-CM

## 2025-02-21 LAB
CV STRESS MAX HR HE: 110
NUC STRESS EJECTION FRACTION: 68 %
RATE PRESSURE PRODUCT: NORMAL
STRESS ECHO BASELINE DIASTOLIC HE: 86
STRESS ECHO BASELINE HR: 76 BPM
STRESS ECHO BASELINE SYSTOLIC BP: 161
STRESS ECHO CALCULATED PERCENT HR: 72 %
STRESS ECHO LAST STRESS DIASTOLIC BP: 79
STRESS ECHO LAST STRESS SYSTOLIC BP: 122
STRESS ECHO TARGET HR: 152

## 2025-02-21 PROCEDURE — 78452 HT MUSCLE IMAGE SPECT MULT: CPT | Mod: 26 | Performed by: INTERNAL MEDICINE

## 2025-02-21 PROCEDURE — 250N000011 HC RX IP 250 OP 636: Performed by: INTERNAL MEDICINE

## 2025-02-21 PROCEDURE — 93017 CV STRESS TEST TRACING ONLY: CPT

## 2025-02-21 PROCEDURE — 93016 CV STRESS TEST SUPVJ ONLY: CPT | Performed by: INTERNAL MEDICINE

## 2025-02-21 PROCEDURE — 93018 CV STRESS TEST I&R ONLY: CPT | Performed by: INTERNAL MEDICINE

## 2025-02-21 PROCEDURE — 343N000001 HC RX 343 MED OP 636: Performed by: INTERNAL MEDICINE

## 2025-02-21 PROCEDURE — 78452 HT MUSCLE IMAGE SPECT MULT: CPT

## 2025-02-21 PROCEDURE — A9500 TC99M SESTAMIBI: HCPCS | Performed by: INTERNAL MEDICINE

## 2025-02-21 RX ORDER — NITROGLYCERIN 0.4 MG/1
0.4 TABLET SUBLINGUAL EVERY 5 MIN PRN
Status: ACTIVE | OUTPATIENT
Start: 2025-02-21 | End: 2025-02-21

## 2025-02-21 RX ORDER — REGADENOSON 0.08 MG/ML
0.4 INJECTION, SOLUTION INTRAVENOUS ONCE
Status: COMPLETED | OUTPATIENT
Start: 2025-02-21 | End: 2025-02-21

## 2025-02-21 RX ORDER — REGADENOSON 0.08 MG/ML
INJECTION, SOLUTION INTRAVENOUS
Status: COMPLETED
Start: 2025-02-21 | End: 2025-02-21

## 2025-02-21 RX ORDER — SODIUM CHLORIDE 9 MG/ML
INJECTION, SOLUTION INTRAVENOUS CONTINUOUS PRN
Status: ACTIVE | OUTPATIENT
Start: 2025-02-21 | End: 2025-02-21

## 2025-02-21 RX ORDER — LIDOCAINE 40 MG/G
CREAM TOPICAL
Status: DISCONTINUED | OUTPATIENT
Start: 2025-02-21 | End: 2025-02-22 | Stop reason: HOSPADM

## 2025-02-21 RX ORDER — ALBUTEROL SULFATE 90 UG/1
2 INHALANT RESPIRATORY (INHALATION) EVERY 5 MIN PRN
Status: DISCONTINUED | OUTPATIENT
Start: 2025-02-21 | End: 2025-02-22 | Stop reason: HOSPADM

## 2025-02-21 RX ORDER — AMINOPHYLLINE 25 MG/ML
50-100 INJECTION, SOLUTION INTRAVENOUS
Status: DISCONTINUED | OUTPATIENT
Start: 2025-02-21 | End: 2025-02-22 | Stop reason: HOSPADM

## 2025-02-21 RX ADMIN — REGADENOSON 0.4 MG: 0.08 INJECTION, SOLUTION INTRAVENOUS at 10:55

## 2025-02-21 RX ADMIN — Medication 10.5 MILLICURIE: at 09:52

## 2025-02-21 RX ADMIN — Medication 33 MILLICURIE: at 11:01

## 2025-02-24 ENCOUNTER — TELEPHONE (OUTPATIENT)
Dept: INTERNAL MEDICINE | Facility: CLINIC | Age: 70
End: 2025-02-24

## 2025-02-24 ENCOUNTER — HOSPITAL ENCOUNTER (OUTPATIENT)
Facility: CLINIC | Age: 70
Setting detail: OBSERVATION
Discharge: HOME | End: 2025-02-25
Attending: EMERGENCY MEDICINE | Admitting: EMERGENCY MEDICINE
Payer: COMMERCIAL

## 2025-02-24 ENCOUNTER — OFFICE VISIT (OUTPATIENT)
Dept: OPHTHALMOLOGY | Facility: CLINIC | Age: 70
End: 2025-02-24
Attending: OPTOMETRIST
Payer: COMMERCIAL

## 2025-02-24 ENCOUNTER — OFFICE VISIT (OUTPATIENT)
Dept: URGENT CARE | Facility: URGENT CARE | Age: 70
End: 2025-02-24
Payer: COMMERCIAL

## 2025-02-24 VITALS
RESPIRATION RATE: 18 BRPM | SYSTOLIC BLOOD PRESSURE: 118 MMHG | WEIGHT: 176 LBS | HEART RATE: 88 BPM | TEMPERATURE: 97.9 F | BODY MASS INDEX: 32.72 KG/M2 | OXYGEN SATURATION: 96 % | DIASTOLIC BLOOD PRESSURE: 74 MMHG

## 2025-02-24 DIAGNOSIS — R06.00 DYSPNEA, UNSPECIFIED TYPE: ICD-10-CM

## 2025-02-24 DIAGNOSIS — Z96.1 PSEUDOPHAKIA: Primary | ICD-10-CM

## 2025-02-24 DIAGNOSIS — I20.0 UNSTABLE ANGINA (H): Primary | ICD-10-CM

## 2025-02-24 DIAGNOSIS — I20.0 UNSTABLE ANGINA PECTORIS (H): ICD-10-CM

## 2025-02-24 DIAGNOSIS — H04.123 DRY EYES, BILATERAL: ICD-10-CM

## 2025-02-24 PROBLEM — N20.0 NEPHROLITHIASIS: Status: ACTIVE | Noted: 2025-02-24

## 2025-02-24 PROBLEM — M54.2 NECK PAIN: Status: ACTIVE | Noted: 2025-02-24

## 2025-02-24 PROBLEM — M26.629 ARTHRALGIA OF TEMPOROMANDIBULAR JOINT: Status: ACTIVE | Noted: 2025-02-24

## 2025-02-24 PROBLEM — K21.00 GASTRO-ESOPHAGEAL REFLUX DISEASE WITH ESOPHAGITIS: Status: ACTIVE | Noted: 2025-02-24

## 2025-02-24 PROBLEM — K29.70 GASTRITIS: Status: ACTIVE | Noted: 2022-06-10

## 2025-02-24 PROBLEM — M54.9 BACKACHE: Status: ACTIVE | Noted: 2025-02-24

## 2025-02-24 PROBLEM — M79.89 LEG SWELLING: Status: ACTIVE | Noted: 2020-02-13

## 2025-02-24 PROBLEM — G25.81 RESTLESS LEGS SYNDROME: Status: ACTIVE | Noted: 2019-05-30

## 2025-02-24 PROBLEM — K44.9 DIAPHRAGMATIC HERNIA: Status: ACTIVE | Noted: 2022-06-10

## 2025-02-24 PROBLEM — K22.89 ESOPHAGEAL PAIN: Status: ACTIVE | Noted: 2025-02-24

## 2025-02-24 PROBLEM — R68.84 JAW PAIN: Status: ACTIVE | Noted: 2025-02-24

## 2025-02-24 PROBLEM — R10.33 PERIUMBILICAL PAIN: Status: ACTIVE | Noted: 2025-02-24

## 2025-02-24 LAB
ALBUMIN SERPL BCG-MCNC: 4.4 G/DL (ref 3.5–5.2)
ALP SERPL-CCNC: 151 U/L (ref 40–150)
ALT SERPL W P-5'-P-CCNC: 53 U/L (ref 0–50)
ANION GAP SERPL CALCULATED.3IONS-SCNC: 12 MMOL/L (ref 7–15)
APTT PPP: 27 SECONDS (ref 22–38)
AST SERPL W P-5'-P-CCNC: 39 U/L (ref 0–45)
ATRIAL RATE - MUSE: 87 BPM
BASOPHILS # BLD AUTO: 0.1 10E3/UL (ref 0–0.2)
BASOPHILS NFR BLD AUTO: 1 %
BILIRUB SERPL-MCNC: 0.5 MG/DL
BUN SERPL-MCNC: 26.9 MG/DL (ref 8–23)
CALCIUM SERPL-MCNC: 10.1 MG/DL (ref 8.8–10.4)
CHLORIDE SERPL-SCNC: 103 MMOL/L (ref 98–107)
CREAT SERPL-MCNC: 0.9 MG/DL (ref 0.51–0.95)
DIASTOLIC BLOOD PRESSURE - MUSE: NORMAL MMHG
EGFRCR SERPLBLD CKD-EPI 2021: 69 ML/MIN/1.73M2
EOSINOPHIL # BLD AUTO: 0.3 10E3/UL (ref 0–0.7)
EOSINOPHIL NFR BLD AUTO: 3 %
ERYTHROCYTE [DISTWIDTH] IN BLOOD BY AUTOMATED COUNT: 13.1 % (ref 10–15)
GLUCOSE SERPL-MCNC: 101 MG/DL (ref 70–99)
HCO3 SERPL-SCNC: 28 MMOL/L (ref 22–29)
HCT VFR BLD AUTO: 43.6 % (ref 35–47)
HGB BLD-MCNC: 14.7 G/DL (ref 11.7–15.7)
IMM GRANULOCYTES # BLD: 0 10E3/UL
IMM GRANULOCYTES NFR BLD: 0 %
INR PPP: 0.95 (ref 0.85–1.15)
INTERPRETATION ECG - MUSE: NORMAL
LYMPHOCYTES # BLD AUTO: 2.2 10E3/UL (ref 0.8–5.3)
LYMPHOCYTES NFR BLD AUTO: 21 %
MCH RBC QN AUTO: 28.8 PG (ref 26.5–33)
MCHC RBC AUTO-ENTMCNC: 33.7 G/DL (ref 31.5–36.5)
MCV RBC AUTO: 86 FL (ref 78–100)
MONOCYTES # BLD AUTO: 1 10E3/UL (ref 0–1.3)
MONOCYTES NFR BLD AUTO: 10 %
NEUTROPHILS # BLD AUTO: 6.7 10E3/UL (ref 1.6–8.3)
NEUTROPHILS NFR BLD AUTO: 65 %
NRBC # BLD AUTO: 0 10E3/UL
NRBC BLD AUTO-RTO: 0 /100
NT-PROBNP SERPL-MCNC: 68 PG/ML (ref 0–900)
P AXIS - MUSE: 30 DEGREES
PLATELET # BLD AUTO: 249 10E3/UL (ref 150–450)
POTASSIUM SERPL-SCNC: 4.3 MMOL/L (ref 3.4–5.3)
PR INTERVAL - MUSE: 164 MS
PROT SERPL-MCNC: 7.3 G/DL (ref 6.4–8.3)
QRS DURATION - MUSE: 132 MS
QT - MUSE: 378 MS
QTC - MUSE: 454 MS
R AXIS - MUSE: -29 DEGREES
RBC # BLD AUTO: 5.1 10E6/UL (ref 3.8–5.2)
SODIUM SERPL-SCNC: 143 MMOL/L (ref 135–145)
SYSTOLIC BLOOD PRESSURE - MUSE: NORMAL MMHG
T AXIS - MUSE: 72 DEGREES
TROPONIN T SERPL HS-MCNC: 8 NG/L
TROPONIN T SERPL HS-MCNC: <6 NG/L
VENTRICULAR RATE- MUSE: 87 BPM
WBC # BLD AUTO: 10.3 10E3/UL (ref 4–11)

## 2025-02-24 PROCEDURE — 93005 ELECTROCARDIOGRAM TRACING: CPT | Performed by: EMERGENCY MEDICINE

## 2025-02-24 PROCEDURE — 99207 PR APP CREDIT; MD BILLING SHARED VISIT: CPT | Mod: FS | Performed by: INTERNAL MEDICINE

## 2025-02-24 PROCEDURE — 85730 THROMBOPLASTIN TIME PARTIAL: CPT | Performed by: EMERGENCY MEDICINE

## 2025-02-24 PROCEDURE — 99223 1ST HOSP IP/OBS HIGH 75: CPT | Mod: FS

## 2025-02-24 PROCEDURE — 85025 COMPLETE CBC W/AUTO DIFF WBC: CPT | Performed by: EMERGENCY MEDICINE

## 2025-02-24 PROCEDURE — 82947 ASSAY GLUCOSE BLOOD QUANT: CPT | Performed by: EMERGENCY MEDICINE

## 2025-02-24 PROCEDURE — G0463 HOSPITAL OUTPT CLINIC VISIT: HCPCS | Performed by: OPHTHALMOLOGY

## 2025-02-24 PROCEDURE — 84484 ASSAY OF TROPONIN QUANT: CPT | Performed by: EMERGENCY MEDICINE

## 2025-02-24 PROCEDURE — 82040 ASSAY OF SERUM ALBUMIN: CPT | Performed by: EMERGENCY MEDICINE

## 2025-02-24 PROCEDURE — 36415 COLL VENOUS BLD VENIPUNCTURE: CPT | Performed by: EMERGENCY MEDICINE

## 2025-02-24 PROCEDURE — G0378 HOSPITAL OBSERVATION PER HR: HCPCS

## 2025-02-24 PROCEDURE — 83880 ASSAY OF NATRIURETIC PEPTIDE: CPT | Performed by: EMERGENCY MEDICINE

## 2025-02-24 PROCEDURE — 99285 EMERGENCY DEPT VISIT HI MDM: CPT | Mod: 25

## 2025-02-24 PROCEDURE — 99207 PR NON-BILLABLE SERV PER CHARTING: CPT | Performed by: PHYSICIAN ASSISTANT

## 2025-02-24 PROCEDURE — 250N000013 HC RX MED GY IP 250 OP 250 PS 637

## 2025-02-24 PROCEDURE — 99418 PROLNG IP/OBS E/M EA 15 MIN: CPT | Mod: FS

## 2025-02-24 PROCEDURE — 85610 PROTHROMBIN TIME: CPT | Performed by: EMERGENCY MEDICINE

## 2025-02-24 RX ORDER — PANTOPRAZOLE SODIUM 40 MG/1
40 TABLET, DELAYED RELEASE ORAL
Status: DISCONTINUED | OUTPATIENT
Start: 2025-02-25 | End: 2025-02-25 | Stop reason: HOSPADM

## 2025-02-24 RX ORDER — OMEPRAZOLE 20 MG/1
20 CAPSULE, DELAYED RELEASE ORAL DAILY
Status: DISCONTINUED | OUTPATIENT
Start: 2025-02-25 | End: 2025-02-24

## 2025-02-24 RX ORDER — ATORVASTATIN CALCIUM 40 MG/1
40 TABLET, FILM COATED ORAL AT BEDTIME
Status: DISCONTINUED | OUTPATIENT
Start: 2025-02-24 | End: 2025-02-25 | Stop reason: HOSPADM

## 2025-02-24 RX ORDER — ACETAMINOPHEN 325 MG/1
650 TABLET ORAL EVERY 4 HOURS PRN
Status: DISCONTINUED | OUTPATIENT
Start: 2025-02-24 | End: 2025-02-25 | Stop reason: HOSPADM

## 2025-02-24 RX ORDER — ONDANSETRON 4 MG/1
4 TABLET, ORALLY DISINTEGRATING ORAL EVERY 6 HOURS PRN
Status: DISCONTINUED | OUTPATIENT
Start: 2025-02-24 | End: 2025-02-25 | Stop reason: HOSPADM

## 2025-02-24 RX ORDER — HEPARIN SODIUM 10000 [USP'U]/100ML
0-5000 INJECTION, SOLUTION INTRAVENOUS CONTINUOUS
Status: DISCONTINUED | OUTPATIENT
Start: 2025-02-24 | End: 2025-02-25

## 2025-02-24 RX ORDER — ONDANSETRON 2 MG/ML
4 INJECTION INTRAMUSCULAR; INTRAVENOUS EVERY 6 HOURS PRN
Status: DISCONTINUED | OUTPATIENT
Start: 2025-02-24 | End: 2025-02-25 | Stop reason: HOSPADM

## 2025-02-24 RX ORDER — AMOXICILLIN 250 MG
2 CAPSULE ORAL 2 TIMES DAILY PRN
Status: DISCONTINUED | OUTPATIENT
Start: 2025-02-24 | End: 2025-02-25 | Stop reason: HOSPADM

## 2025-02-24 RX ORDER — ACETAMINOPHEN 650 MG/1
650 SUPPOSITORY RECTAL EVERY 4 HOURS PRN
Status: DISCONTINUED | OUTPATIENT
Start: 2025-02-24 | End: 2025-02-25 | Stop reason: HOSPADM

## 2025-02-24 RX ORDER — AMOXICILLIN 250 MG
1 CAPSULE ORAL 2 TIMES DAILY PRN
Status: DISCONTINUED | OUTPATIENT
Start: 2025-02-24 | End: 2025-02-25 | Stop reason: HOSPADM

## 2025-02-24 RX ORDER — PROCHLORPERAZINE MALEATE 5 MG/1
5 TABLET ORAL EVERY 6 HOURS PRN
Status: DISCONTINUED | OUTPATIENT
Start: 2025-02-24 | End: 2025-02-25 | Stop reason: HOSPADM

## 2025-02-24 RX ADMIN — ATORVASTATIN CALCIUM 40 MG: 40 TABLET, FILM COATED ORAL at 23:59

## 2025-02-24 ASSESSMENT — CONF VISUAL FIELD
METHOD: COUNTING FINGERS
OS_INFERIOR_TEMPORAL_RESTRICTION: 0
OS_INFERIOR_NASAL_RESTRICTION: 0
OD_NORMAL: 1
OS_SUPERIOR_NASAL_RESTRICTION: 0
OD_INFERIOR_NASAL_RESTRICTION: 0
OD_SUPERIOR_NASAL_RESTRICTION: 0
OD_INFERIOR_TEMPORAL_RESTRICTION: 0
OS_NORMAL: 1
OS_SUPERIOR_TEMPORAL_RESTRICTION: 0
OD_SUPERIOR_TEMPORAL_RESTRICTION: 0

## 2025-02-24 ASSESSMENT — TONOMETRY
OS_IOP_MMHG: 10
OD_IOP_MMHG: 12
IOP_METHOD: ICARE

## 2025-02-24 ASSESSMENT — PACHYMETRY
OS_CT(UM): 543
OD_CT(UM): 539

## 2025-02-24 ASSESSMENT — ACTIVITIES OF DAILY LIVING (ADL)
ADLS_ACUITY_SCORE: 41
ADLS_ACUITY_SCORE: 46
ADLS_ACUITY_SCORE: 41

## 2025-02-24 ASSESSMENT — VISUAL ACUITY
OS_SC+: -1
OD_SC+: -2
OD_SC: 20/20
METHOD: SNELLEN - LINEAR
OS_SC: 20/20

## 2025-02-24 ASSESSMENT — EXTERNAL EXAM - LEFT EYE: OS_EXAM: NORMAL

## 2025-02-24 ASSESSMENT — COLUMBIA-SUICIDE SEVERITY RATING SCALE - C-SSRS
6. HAVE YOU EVER DONE ANYTHING, STARTED TO DO ANYTHING, OR PREPARED TO DO ANYTHING TO END YOUR LIFE?: NO
1. IN THE PAST MONTH, HAVE YOU WISHED YOU WERE DEAD OR WISHED YOU COULD GO TO SLEEP AND NOT WAKE UP?: NO
2. HAVE YOU ACTUALLY HAD ANY THOUGHTS OF KILLING YOURSELF IN THE PAST MONTH?: NO

## 2025-02-24 ASSESSMENT — SLIT LAMP EXAM - LIDS
COMMENTS: MEIBOMIAN GLAND DYSFUNCTION
COMMENTS: MEIBOMIAN GLAND DYSFUNCTION

## 2025-02-24 ASSESSMENT — EXTERNAL EXAM - RIGHT EYE: OD_EXAM: NORMAL

## 2025-02-24 NOTE — PROGRESS NOTES
Chief complaint     Chief Complaints and History of Present Illnesses   Patient presents with    Cataract       Referring Provider: Self     HPI    Soco Ferguson 69 year old female who presents for annual examiantion. Last seen with Dr. 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 Reports That her vision is quite good. She notes some sensitivity to bright lights that has been chronic and longstanding. No blurring of vision and is overall happy with her vision. No flashes, floaters, curtains.     Today 2/14/24 patient presents for one year follow up. No changes in vision. Both eyes have been feeling a little dry but improve with artificial tears. She has noticed she sometimes sees small black dots in her vision when she is watching TV. Denies large floaters or flashes of light. Black dots resolve after closing her eyes.    Interval hx 02/24/2025  Chief Complaint(s) and History of Present Illness(es)       Follow Up     Additional comments: Dry eyes, bilateral  Anatomic Narrow Angle  - S/P LPI each eye                Comments    Pt states no change in VA since last visit  Dryness same as before   No flashes or floaters   No eye pain     Zeinab Cuellar COT 9:46 AM February 24, 2025           Past ocular history   Prior eye surgery/laser/Trauma: LPI OU  CTL wearer:no  Glasses : yes  Family Hx of eye disease:no    PMH     Past Medical History:   Diagnosis Date    Abdominal pain, unspecified site     Created by Conversion     Arthritis 2/7/2024    Asymptomatic varicose veins     Created by Conversion     Blastomycosis     Created by Conversion     Calculus of kidney     Created by Conversion     Endometrial hyperplasia, unspecified     Created by Conversion     Hyperparathyroidism, unspecified     Created by Conversion     Lattice degeneration     Leiomyoma of uterus, unspecified     Created by Conversion     Myalgia and myositis, unspecified      Created by Conversion     Nausea alone     Created by Conversion     Osteoporosis, unspecified     Created by Conversion     Other nonspecific finding on examination of urine     Created by Conversion     Pain in joint, site unspecified     Created by Conversion     Restless legs syndrome (RLS)     Created by Conversion     Senile osteoporosis     Created by Conversion     Ulcerative colitis, unspecified     Created by Conversion     Unspecified constipation     Created by Conversion     Unspecified sinusitis (chronic)     Created by Conversion     Unspecified symptom associated with female genital organs     Created by Conversion     Unspecified vitamin D deficiency     Created by Conversion        PSH     Past Surgical History:   Procedure Laterality Date    CATARACT IOL, RT/LT      KIDNEY STONE SURGERY  01/01/1999    removal    OTHER SURGICAL HISTORY Bilateral     venous closure    PHACOEMULSIFICATION WITH STANDARD INTRAOCULAR LENS IMPLANT Right 07/25/2024    Procedure: RIGHT EYE PHACOEMULSIFICATION, CATARACT, WITH STANDARD INTRAOCULAR LENS IMPLANT INSERTION;  Surgeon: Anaid Sloan MD;  Location: [facility] OR    PHACOEMULSIFICATION WITH STANDARD INTRAOCULAR LENS IMPLANT Left 8/8/2024    Procedure: LEFT EYE PHACOEMULSIFICATION, CATARACT, WITH STANDARD INTRAOCULAR LENS IMPLANT INSERTION;  Surgeon: Anaid Sloan MD;  Location: [facility] OR    TUBAL LIGATION  01/01/1987    WISDOM TOOTH EXTRACTION  01/01/1980       Meds     Current Outpatient Medications   Medication Sig Dispense Refill    acetaminophen (TYLENOL) 325 MG tablet Take 325-650 mg by mouth every 6 hours as needed for mild pain      atorvastatin (LIPITOR) 40 MG tablet TAKE 1 TABLET BY MOUTH ONE TIME DAILY 90 tablet 0    cephALEXin (KEFLEX) 500 MG capsule Take 1 capsule (500 mg) by mouth 2 times daily. 14 capsule 0    cholecalciferol 50 MCG (2000 UT) tablet Take 1 tablet by mouth daily      denosumab (PROLIA) 60 MG/ML SOSY injection Inject 60 mg Subcutaneous  every 6 months      desonide (DESOWEN) 0.05 % external ointment apply thin layer topically to the vaginal area for 14 days as needed for flares*      escitalopram (LEXAPRO) 5 MG tablet Take 1 tablet (5 mg) by mouth daily. Take 5 mg daily for 2 weeks, then increase to 10 mg daily. 60 tablet 1    fluocinonide (LIDEX) 0.05 % external solution Apply and massage 1 application topically onto itchy spots on scalp at bedtime for 14 days, then as needed for itching*      omeprazole (PRILOSEC) 20 MG DR capsule Take 20 mg by mouth daily      Propylene Glycol (SYSTANE BALANCE OP) Apply to eye as needed       Current Facility-Administered Medications   Medication Dose Route Frequency Provider Last Rate Last Admin    denosumab (PROLIA) injection 60 mg  60 mg Subcutaneous Q6 Months    60 mg at 08/01/24 1251       Drops Currently Taking   2/24/2025   Artificial tears QID each eye   Artificial tear gel QHS each eye     Assessment/Plan   #pseudophakia OU  - good surgical result, each eye    # PCO, each eye  Progressing     # Anatomic Narrow Angle  - S/P LPI each eye     # Dry eyes, bilateral  - Doing well with artificial tears  - continue PFAT prn    Plan: 2/24/2025  F/up 1 year  OCT of RNFL + GCL next visit    Laure Barlow MD  Cornea and External Disease Fellow  [facility]    Attending Physician Attestation:  Complete documentation of historical and exam elements from today's encounter can be found in the full encounter summary report (not reduplicated in this progress note).  I personally obtained the chief complaint(s) and history of present illness.  I confirmed and edited as necessary the review of systems, past medical/surgical history, family history, social history, and examination findings as documented by others; and I examined the patient myself.  I personally reviewed the relevant tests, images, and reports as documented above.  I formulated and edited as necessary the assessment and plan and discussed the  findings and management plan with the patient and family. - Anaid Sloan MD

## 2025-02-24 NOTE — TELEPHONE ENCOUNTER
Called to patient, relayed provider's message in detail.     Patient is agreeable to come to urgent care first.     No further questions at this time.    Jaguar, RN BSN  [facility]  [phone]

## 2025-02-24 NOTE — TELEPHONE ENCOUNTER
If patient is having shortness of breath on exertion due to her history of abnormal stress test it was recommended by her primary care provider and I agree as well that she will need to be seen in the ER.  Coming in just for an EKG is not appropriate.  If she is truly not wanting to do the ER she can start by coming into urgent care where they can determine if it is appropriate for her to be seen in the urgent care or if they recommend she be transferred to the ER.    Sincerely,     Maria L Alfred, CNP

## 2025-02-24 NOTE — TELEPHONE ENCOUNTER
S-(situation): Patient request EKG due to increased fatigue and significant dyspnea on exertion     B-(background): OV 2/5/285  Dyspnea on exertion  Patient reports increased fatigue and significant dyspnea on exertion which decreased her ability to exercise.  She also has feeling of chest pain occasionally.  She had CTA in 2021 which showed:  Mild coronary atherosclerosis with no obstructive plaque identified.      She will schedule the stress test.  - Echocardiogram Exercise Stress        A-(assessment):     Patient called [facility] ([phone]), she was informed that she had already done all the tests.     Patient had also talked with someone, they inform her to get an EKG.   She is seeing a heart doctor tomorrow 2/25 at the [facility], to go over her test results as well.     She report having a little chest pain and sob, after walking today, she has been taking it easy.    The chest pain comes and goes, started around yesterday. Per pt, the pain is not bad, sharp pain when it comes on.     I offer to triage her due to red flags symptoms. She decline. Patient just wanted to make sure she is getting all the tests done and correctly.      I inform her that PCP is not in the clinic today, she is okay with covering response.    She is hoping for a response back today.      R-(recommendations): Routing to PCP to further advise.      ROQUE Montesinos BSN  [facility]  [phone]

## 2025-02-24 NOTE — TELEPHONE ENCOUNTER
Order/Referral Request    Who is requesting: Patient    Orders being requested: EKG    Reason service is needed/diagnosis:     When are orders needed by: increased fatigue and significant dyspnea on exertion. Discussed at Pt's 02/05/2025 appointment.    Has this been discussed with Provider: Yes    Does patient have a preference on a Group/Provider/Facility? [facility]    Does patient have an appointment scheduled?: No    Where to send orders: Place orders within Epic    Could we [facility] this [facility] or would you prefer to receive a phone call?:   Patient would prefer a phone call     Okay to leave a detailed message?: Yes at Cell number on file:    Telephone Information:   Mobile [phone]     Kassandra Hall

## 2025-02-24 NOTE — NURSING NOTE
Chief Complaints and History of Present Illnesses   Patient presents with    Follow Up     Dry eyes, bilateral  Anatomic Narrow Angle  - S/P LPI each eye        Chief Complaint(s) and History of Present Illness(es)       Follow Up              Comments: Dry eyes, bilateral  Anatomic Narrow Angle  - S/P LPI each eye                 Comments    Pt states no change in VA since last visit  Dryness same as before   No flashes or floaters   No eye pain     Zeinab Cuellar COT 9:46 AM February 24, 2025

## 2025-02-25 ENCOUNTER — APPOINTMENT (OUTPATIENT)
Dept: CT IMAGING | Facility: CLINIC | Age: 70
End: 2025-02-25
Attending: INTERNAL MEDICINE
Payer: COMMERCIAL

## 2025-02-25 VITALS
BODY MASS INDEX: 32.47 KG/M2 | SYSTOLIC BLOOD PRESSURE: 117 MMHG | HEART RATE: 58 BPM | TEMPERATURE: 98.5 F | WEIGHT: 172 LBS | RESPIRATION RATE: 18 BRPM | HEIGHT: 61 IN | OXYGEN SATURATION: 96 % | DIASTOLIC BLOOD PRESSURE: 59 MMHG

## 2025-02-25 LAB
ANION GAP SERPL CALCULATED.3IONS-SCNC: 8 MMOL/L (ref 7–15)
BSA FOR ECHO PROCEDURE: 1.77 M2
BUN SERPL-MCNC: 25.5 MG/DL (ref 8–23)
CALCIUM SERPL-MCNC: 9.8 MG/DL (ref 8.8–10.4)
CCTA ASCENDING AORTA: 3.2
CCTA SINUS: 3.1
CHLORIDE SERPL-SCNC: 105 MMOL/L (ref 98–107)
CREAT SERPL-MCNC: 0.82 MG/DL (ref 0.51–0.95)
EGFRCR SERPLBLD CKD-EPI 2021: 77 ML/MIN/1.73M2
ERYTHROCYTE [DISTWIDTH] IN BLOOD BY AUTOMATED COUNT: 13.2 % (ref 10–15)
GLUCOSE SERPL-MCNC: 102 MG/DL (ref 70–99)
HCO3 SERPL-SCNC: 29 MMOL/L (ref 22–29)
HCT VFR BLD AUTO: 41.8 % (ref 35–47)
HGB BLD-MCNC: 14.2 G/DL (ref 11.7–15.7)
LVEF ECHO: NORMAL
MCH RBC QN AUTO: 28.9 PG (ref 26.5–33)
MCHC RBC AUTO-ENTMCNC: 34 G/DL (ref 31.5–36.5)
MCV RBC AUTO: 85 FL (ref 78–100)
PLATELET # BLD AUTO: 213 10E3/UL (ref 150–450)
POTASSIUM SERPL-SCNC: 4 MMOL/L (ref 3.4–5.3)
RADIOLOGIST FLAGS: NORMAL
RBC # BLD AUTO: 4.91 10E6/UL (ref 3.8–5.2)
SODIUM SERPL-SCNC: 142 MMOL/L (ref 135–145)
UFH PPP CHRO-ACNC: 0.84 IU/ML
WBC # BLD AUTO: 8.1 10E3/UL (ref 4–11)

## 2025-02-25 PROCEDURE — 96365 THER/PROPH/DIAG IV INF INIT: CPT | Mod: 59

## 2025-02-25 PROCEDURE — 250N000013 HC RX MED GY IP 250 OP 250 PS 637

## 2025-02-25 PROCEDURE — 250N000013 HC RX MED GY IP 250 OP 250 PS 637: Performed by: INTERNAL MEDICINE

## 2025-02-25 PROCEDURE — 36415 COLL VENOUS BLD VENIPUNCTURE: CPT

## 2025-02-25 PROCEDURE — 96366 THER/PROPH/DIAG IV INF ADDON: CPT

## 2025-02-25 PROCEDURE — 75574 CT ANGIO HRT W/3D IMAGE: CPT | Mod: 26 | Performed by: GENERAL ACUTE CARE HOSPITAL

## 2025-02-25 PROCEDURE — G0378 HOSPITAL OBSERVATION PER HR: HCPCS

## 2025-02-25 PROCEDURE — 75574 CT ANGIO HRT W/3D IMAGE: CPT

## 2025-02-25 PROCEDURE — 85520 HEPARIN ASSAY: CPT | Performed by: INTERNAL MEDICINE

## 2025-02-25 PROCEDURE — 250N000011 HC RX IP 250 OP 636: Performed by: INTERNAL MEDICINE

## 2025-02-25 PROCEDURE — 85014 HEMATOCRIT: CPT

## 2025-02-25 PROCEDURE — 99207 PR APP CREDIT; MD BILLING SHARED VISIT: CPT | Mod: FS

## 2025-02-25 PROCEDURE — 250N000009 HC RX 250: Performed by: INTERNAL MEDICINE

## 2025-02-25 PROCEDURE — 96375 TX/PRO/DX INJ NEW DRUG ADDON: CPT

## 2025-02-25 PROCEDURE — 99238 HOSP IP/OBS DSCHRG MGMT 30/<: CPT | Mod: FS | Performed by: INTERNAL MEDICINE

## 2025-02-25 PROCEDURE — 80048 BASIC METABOLIC PNL TOTAL CA: CPT

## 2025-02-25 PROCEDURE — 82565 ASSAY OF CREATININE: CPT

## 2025-02-25 RX ORDER — ASPIRIN 81 MG/1
81 TABLET ORAL DAILY
Status: DISCONTINUED | OUTPATIENT
Start: 2025-02-25 | End: 2025-02-25 | Stop reason: HOSPADM

## 2025-02-25 RX ORDER — NITROGLYCERIN 0.4 MG/1
0.4 TABLET SUBLINGUAL
Status: DISCONTINUED | OUTPATIENT
Start: 2025-02-25 | End: 2025-02-25 | Stop reason: HOSPADM

## 2025-02-25 RX ORDER — ASPIRIN 81 MG/1
81 TABLET ORAL DAILY
Qty: 30 TABLET | Refills: 0 | Status: SHIPPED | OUTPATIENT
Start: 2025-02-26

## 2025-02-25 RX ORDER — METOPROLOL TARTRATE 1 MG/ML
5-20 INJECTION, SOLUTION INTRAVENOUS
Status: DISCONTINUED | OUTPATIENT
Start: 2025-02-25 | End: 2025-02-25 | Stop reason: HOSPADM

## 2025-02-25 RX ORDER — IOPAMIDOL 755 MG/ML
100 INJECTION, SOLUTION INTRAVASCULAR ONCE
Status: COMPLETED | OUTPATIENT
Start: 2025-02-25 | End: 2025-02-25

## 2025-02-25 RX ADMIN — ASPIRIN 81 MG: 81 TABLET, COATED ORAL at 12:03

## 2025-02-25 RX ADMIN — HEPARIN SODIUM 950 UNITS/HR: 10000 INJECTION, SOLUTION INTRAVENOUS at 00:07

## 2025-02-25 RX ADMIN — NITROGLYCERIN 0.4 MG: 0.4 TABLET, ORALLY DISINTEGRATING SUBLINGUAL at 11:14

## 2025-02-25 RX ADMIN — PANTOPRAZOLE SODIUM 40 MG: 40 TABLET, DELAYED RELEASE ORAL at 08:16

## 2025-02-25 RX ADMIN — METOPROLOL TARTRATE 5 MG: 5 INJECTION INTRAVENOUS at 11:18

## 2025-02-25 RX ADMIN — IOPAMIDOL 100 ML: 755 INJECTION, SOLUTION INTRAVENOUS at 11:10

## 2025-02-25 RX ADMIN — ACETAMINOPHEN 650 MG: 325 TABLET ORAL at 12:03

## 2025-02-25 ASSESSMENT — ACTIVITIES OF DAILY LIVING (ADL)
ADLS_ACUITY_SCORE: 52
ADLS_ACUITY_SCORE: 48
ADLS_ACUITY_SCORE: 48
ADLS_ACUITY_SCORE: 52
ADLS_ACUITY_SCORE: 48
ADLS_ACUITY_SCORE: 46
ADLS_ACUITY_SCORE: 52
ADLS_ACUITY_SCORE: 48
ADLS_ACUITY_SCORE: 48
ADLS_ACUITY_SCORE: 52
ADLS_ACUITY_SCORE: 52
ADLS_ACUITY_SCORE: 48
ADLS_ACUITY_SCORE: 52

## 2025-02-25 NOTE — H&P
"[facility]    History and Physical - Hospitalist Service       Date of Admission:  2/24/2025    Assessment & Plan      Soco Ferguson is a 69 year old female with PMHx significant for hyperlipidemia, osteoporosis, GERD, ulcerative colitis, prediabetes, anxiety who was admitted on 2/24/2025 for Cardiology evaluation with plan for likely cardiac catheterization tomorrow for unstable angina.     ED Course: Hemodynamically stable, afebrile. Lab workup including CBC, BMP, LFT unremarkable. proBNP normal at 68. Troponin T negative, repeat pending. INR 0.95, PTT 27. ECG shows sinus rhythm, known LBBB, no obvious acute ischemic changes per my read.  ED provider discussed with cardiology, who recommended admission here with plan for transfer to [facility] for cardiac catheterization likely tomorrow.      Unstable angina  Hyperlipidemia   Presents with intermittent episodes of dyspnea on exertion and chest pain both with exertion and at rest since summer 2024, with frequency of up to several episodes per day. Also reports increased fatigue. Recently had abnormal nuclear medicine stress test on 2/21/2025 revealing \"a small area of nontransmural infarction in the distal anterior segment(s) of the left ventricle associated with a mild degree of jac-infarct ischemia.\"; LVEF at stress 68%. Most recent CTA coronaries 1/2021 showed mild nonobstructive CAD.  Initial troponin negative and ECG with known LBBB, no obvious acute ischemic changes. Patient denies prior history of MI or other cardiac disorders.  - Cardiology consult - plan for cath lab tomorrow  - Initiate IV heparin  - Telemetry  - Resume home atorvastatin  - Repeat troponin in process  - AM labs: CBC, BMP    GERD  Peptic ulcer disease   - Resume home omeprazole    Prediabetes - Noted, most recent A1c 5.8% (as of 2/5/2025).    Ulcerative colitis - Stable, not on any PTA medications for this problem.       Observation Goals: -diagnostic tests " "and consults completed and resulted, -vital signs normal or at patient baseline, Nurse to notify provider when observation goals have been met and patient is ready for discharge.  Diet: NPO per Anesthesia Guidelines for Procedure/Surgery Except for: Meds  DVT Prophylaxis: IV heparin  Harden Catheter: Not present  Lines: None     Cardiac Monitoring: ACTIVE order. Indication: Chest pain/ ACS rule out (24 hours)  Code Status: Full Code    Clinically Significant Risk Factors Present on Admission                             # Obesity: Estimated body mass index is 32.5 kg/m  as calculated from the following:    Height as of this encounter: 1.549 m (5' 1\").    Weight as of this encounter: 78 kg (172 lb).              Disposition Plan     Medically Ready for Discharge: Anticipated Tomorrow         The patient's care was discussed with the Attending Physician, Dr. Allan Alford .    Sena Wang PA-C  Hospitalist Service  [facility]  Securely message with [facility] (more info)  Text page via [facility] Paging/Directory     ______________________________________________________________________    Chief Complaint   Chest pain, abnormal stress test    History is obtained from the patient    History of Present Illness   Soco Ferguson is a 69 year old female with PMHx significant for hyperlipidemia, osteoporosis, GERD, ulcerative colitis, prediabetes, anxiety who presented to the ED for further evaluation of chest pain and dyspnea.    Patient states that she had first episode of chest pain and dyspnea on exertion while going for a walk sometime last summer 2024.  Since then, patient has had intermittent episodes of dyspnea with exertion and chest pain both with exertion and at rest.  She states that when she gets the chest discomfort with exertion, her heart pounds.  She also noted a recent episode where she was sitting and watching TV and she felt short of breath and chest discomfort. She now has up to " several episodes per day, but some days she does not notice the pain. The pain is localized to the left chest usually. She denies increased lower extremity edema.  She denies orthopnea, though she notes that she sleeps propped up at baseline due to her GERD.  She otherwise denies lightheadedness, dizziness, syncope, nausea, vomiting, diarrhea, urinary symptoms.  Never used tobacco, no other drug use.  No alcohol use.    Patient denies any pertinent cardiac history personally.  She does note that her brother had a heart attack and what sounds like a CABG procedure when he was 68 years old.  She also notes that both her mother and father as well as a grandchild have irregular heartbeats, though she does not know the exact diagnoses.       Recent NM Stress Echocardiogram - 2/21/2025    The nuclear stress test is abnormal.    There is a small area of nontransmural infarction in the distal anterior segment(s) of the left ventricle associated with a mild degree of jac-infarct ischemia.    Left ventricular function is normal.    The left ventricular ejection fraction at stress is 68%.    There is no prior study for comparison.      Past Medical History    Past Medical History:   Diagnosis Date    Abdominal pain, unspecified site     Created by Conversion     Arthritis 2/7/2024    Asymptomatic varicose veins     Created by Conversion     Blastomycosis     Created by Conversion     Calculus of kidney     Created by Conversion     Endometrial hyperplasia, unspecified     Created by Conversion     Hyperparathyroidism, unspecified     Created by Conversion     Lattice degeneration     Leiomyoma of uterus, unspecified     Created by Conversion     Myalgia and myositis, unspecified     Created by Conversion     Nausea alone     Created by Conversion     Osteoporosis, unspecified     Created by Conversion     Other nonspecific finding on examination of urine     Created by Conversion     Pain in joint, site unspecified      Created by Conversion     Restless legs syndrome (RLS)     Created by Conversion     Senile osteoporosis     Created by Conversion     Ulcerative colitis, unspecified     Created by Conversion     Unspecified constipation     Created by Conversion     Unspecified sinusitis (chronic)     Created by Conversion     Unspecified symptom associated with female genital organs     Created by Conversion     Unspecified vitamin D deficiency     Created by Conversion        Past Surgical History   Past Surgical History:   Procedure Laterality Date    CATARACT IOL, RT/LT      KIDNEY STONE SURGERY  01/01/1999    removal    OTHER SURGICAL HISTORY Bilateral     venous closure    PHACOEMULSIFICATION WITH STANDARD INTRAOCULAR LENS IMPLANT Right 07/25/2024    Procedure: RIGHT EYE PHACOEMULSIFICATION, CATARACT, WITH STANDARD INTRAOCULAR LENS IMPLANT INSERTION;  Surgeon: Anaid Sloan MD;  Location: [facility] OR    PHACOEMULSIFICATION WITH STANDARD INTRAOCULAR LENS IMPLANT Left 8/8/2024    Procedure: LEFT EYE PHACOEMULSIFICATION, CATARACT, WITH STANDARD INTRAOCULAR LENS IMPLANT INSERTION;  Surgeon: Anaid Sloan MD;  Location: [facility] OR    TUBAL LIGATION  01/01/1987    WISDOM TOOTH EXTRACTION  01/01/1980       Prior to Admission Medications   Prior to Admission Medications   Prescriptions Last Dose Informant Patient Reported? Taking?   Propylene Glycol (SYSTANE BALANCE OP) 2/24/2025 Morning  Yes Yes   Sig: Apply 1 drop to eye as needed.   acetaminophen (TYLENOL) 325 MG tablet 2/24/2025 at  3:00 PM  Yes Yes   Sig: Take 325-650 mg by mouth every 6 hours as needed for mild pain   atorvastatin (LIPITOR) 40 MG tablet 2/23/2025 Bedtime  No Yes   Sig: TAKE 1 TABLET BY MOUTH ONE TIME DAILY   calcium citrate-vitamin D (CITRACAL) 200-6.25 MG-MCG TABS per tablet 2/24/2025 Evening  Yes Yes   Sig: Take 2 tablets by mouth every evening. With dinner   cholecalciferol 50 MCG (2000 UT) tablet 2/24/2025 Evening  Yes Yes   Sig: Take 2 tablets by mouth  every evening. With dinner   denosumab (PROLIA) 60 MG/ML SOSY injection Past Month  Yes Yes   Sig: Inject 60 mg Subcutaneous every 6 months   fluocinonide (LIDEX) 0.05 % external solution Past Week  Yes Yes   Sig: Apply and massage 1 application topically onto itchy spots on scalp at bedtime for 14 days, then as needed for itching*   hypromellose (GENTEAL SEVERE) ophthalmic gel 0.3% 2/23/2025 Bedtime  Yes Yes   Sig: Place 1 drop into both eyes nightly as needed for dry eyes.   omeprazole (PRILOSEC) 20 MG DR capsule 2/23/2025 Morning  Yes Yes   Sig: Take 20 mg by mouth daily. Before a meal      Facility-Administered Medications Last Administration Doses Remaining   denosumab (PROLIA) injection 60 mg 8/1/2024 12:51 PM 1           Review of Systems    The 10 point Review of Systems is negative other than noted in the HPI or here.     Social History   I have reviewed this patient's social history and updated it with pertinent information if needed.  Social History     Tobacco Use    Smoking status: Never     Passive exposure: Never    Smokeless tobacco: Never    Tobacco comments:     in HS and 20's socail only, never pack a day smoker   Vaping Use    Vaping status: Never Used   Substance Use Topics    Alcohol use: Yes     Alcohol/week: 1.7 standard drinks of alcohol    Drug use: No         Allergies   Allergies   Allergen Reactions    Comtrex Dizziness     Acet/dextromethorphan/phenylephrine    Other (Do Not Use)         Physical Exam   Vital Signs: Temp: 97.7  F (36.5  C)   BP: 137/73 Pulse: 74   Resp: 23 SpO2: 94 % O2 Device: None (Room air)    Weight: 172 lbs 0 oz    General Appearance: Awake, alert, in no acute distress.  Respiratory: Clear to auscultation bilaterally. Normal respiratory effort on room air.  Cardiovascular: Regular rate and rhythm, no murmur. Extremities are warm and well-perfused. Does have reproducible left chest wall tenderness to palpation.  GI: Soft, non-tender, non-distended. Normal bowel  sounds.  Skin: No obvious rashes or lesions on observed skin.  Musculoskeletal: Able to move all extremities freely. Trace bilateral lower extremity edema.  Neurologic: Alert and oriented x 3. Strength and sensation are grossly equal and intact in bilateral upper and lower extremities.  Psychiatric: Calm, pleasant, cooperative with exam.      Medical Decision Making       60 MINUTES SPENT BY ME on the date of service doing chart review, history, exam, documentation & further activities per the note.      Data     I have personally reviewed the following data over the past 24 hrs:    10.3  \   14.7   / 249     143 103 26.9 (H) /  101 (H)   4.3 28 0.90 \     ALT: 53 (H) AST: 39 AP: 151 (H) TBILI: 0.5   ALB: 4.4 TOT PROTEIN: 7.3 LIPASE: N/A     Trop: <6 BNP: 68     INR:  0.95 PTT:  27   D-dimer:  N/A Fibrinogen:  N/A       Imaging results reviewed over the past 24 hrs:   No results found for this or any previous visit (from the past 24 hours).

## 2025-02-25 NOTE — PROGRESS NOTES
CCTA completed as ordered.  One dose of metoprolol given for elevated HR greater than 70.  Nitroglycerin given per protocol.  Vital signs stable.  Pt c/o headache.  Interpretation pending.  Alivia Viera RN

## 2025-02-25 NOTE — PROGRESS NOTES
Chief Complaint   Patient presents with    Shortness of Breath     Pt is having current SOB and chest pain. Spoke with primary and nurse triage, advised to go to ED. Pt stated they wanted her to come to  for initial EKG.        Assessment & Plan  Unstable Angina  - Worsening chest pain at rest and abnormal stress test results suggest unstable angina.  - Immediate referral to the emergency room for further evaluation and management.     ICD-10-CM    1. Unstable angina (H)  I20.0           SUBJECTIVE:  History of Present Illness-Ambreen Ferguson, a 69-year-old female, reports experiencing significant fatigue and shortness of breath, particularly when climbing stairs, which has been ongoing for quite a while. She also experiences chest pain at rest, which has been occurring daily and appears to be worsening. She recently underwent a stress test a few days ago, which was abnormal.     ROS: Pertinent ROS neg other than the symptoms noted above in the HPI.     OBJECTIVE:  /74   Pulse 88   Temp 97.9  F (36.6  C) (Oral)   Resp 18   Wt 79.8 kg (176 lb)   LMP  (LMP Unknown)   SpO2 96%   BMI 32.72 kg/m         GENERAL: alert and no distress    DIAGNOSTICS      No results found for any visits on 02/24/25.     Deon Clinton PA-C    Consent was obtained from the patient to use an AI documentation tool in the creation of this note.  Any grammatical or spelling errors are non-intentional. Please contact the author of this note directly if you are in need of any clarification.     Current Outpatient Medications   Medication Sig Dispense Refill    acetaminophen (TYLENOL) 325 MG tablet Take 325-650 mg by mouth every 6 hours as needed for mild pain      atorvastatin (LIPITOR) 40 MG tablet TAKE 1 TABLET BY MOUTH ONE TIME DAILY 90 tablet 0    cephALEXin (KEFLEX) 500 MG capsule Take 1 capsule (500 mg) by mouth 2 times daily. 14 capsule 0    cholecalciferol 50 MCG (2000 UT) tablet Take 1 tablet by mouth daily      denosumab  (PROLIA) 60 MG/ML SOSY injection Inject 60 mg Subcutaneous every 6 months      desonide (DESOWEN) 0.05 % external ointment apply thin layer topically to the vaginal area for 14 days as needed for flares*      escitalopram (LEXAPRO) 5 MG tablet Take 1 tablet (5 mg) by mouth daily. Take 5 mg daily for 2 weeks, then increase to 10 mg daily. 60 tablet 1    fluocinonide (LIDEX) 0.05 % external solution Apply and massage 1 application topically onto itchy spots on scalp at bedtime for 14 days, then as needed for itching*      omeprazole (PRILOSEC) 20 MG DR capsule Take 20 mg by mouth daily      Propylene Glycol (SYSTANE BALANCE OP) Apply to eye as needed       Current Facility-Administered Medications   Medication Dose Route Frequency Provider Last Rate Last Admin    denosumab (PROLIA) injection 60 mg  60 mg Subcutaneous Q6 Months    60 mg at 08/01/24 1251      Patient Active Problem List   Diagnosis    Blastomycosis    Restless legs syndrome    Senile osteoporosis    Ulcerative colitis (H)    Venous insufficiency of both lower extremities    Articular disc disorder of temporomandibular joint    Peptic ulcer    Mixed hyperlipidemia    Combined form of age-related cataract, both eyes      Past Medical History:   Diagnosis Date    Abdominal pain, unspecified site     Created by Conversion     Arthritis 2/7/2024    Asymptomatic varicose veins     Created by Conversion     Blastomycosis     Created by Conversion     Calculus of kidney     Created by Conversion     Endometrial hyperplasia, unspecified     Created by Conversion     Hyperparathyroidism, unspecified     Created by Conversion     Lattice degeneration     Leiomyoma of uterus, unspecified     Created by Conversion     Myalgia and myositis, unspecified     Created by Conversion     Nausea alone     Created by Conversion     Osteoporosis, unspecified     Created by Conversion     Other nonspecific finding on examination of urine     Created by Conversion     Pain in  joint, site unspecified     Created by Conversion     Restless legs syndrome (RLS)     Created by Conversion     Senile osteoporosis     Created by Conversion     Ulcerative colitis, unspecified     Created by Conversion     Unspecified constipation     Created by Conversion     Unspecified sinusitis (chronic)     Created by Conversion     Unspecified symptom associated with female genital organs     Created by Conversion     Unspecified vitamin D deficiency     Created by Conversion      Past Surgical History:   Procedure Laterality Date    CATARACT IOL, RT/LT      KIDNEY STONE SURGERY  01/01/1999    removal    OTHER SURGICAL HISTORY Bilateral     venous closure    PHACOEMULSIFICATION WITH STANDARD INTRAOCULAR LENS IMPLANT Right 07/25/2024    Procedure: RIGHT EYE PHACOEMULSIFICATION, CATARACT, WITH STANDARD INTRAOCULAR LENS IMPLANT INSERTION;  Surgeon: Anaid Sloan MD;  Location: [facility] [place]    PHACOEMULSIFICATION WITH STANDARD INTRAOCULAR LENS IMPLANT Left 8/8/2024    Procedure: LEFT EYE PHACOEMULSIFICATION, CATARACT, WITH STANDARD INTRAOCULAR LENS IMPLANT INSERTION;  Surgeon: Anaid Sloan MD;  Location: [facility] [place]    TUBAL LIGATION  01/01/1987    WISDOM TOOTH EXTRACTION  01/01/1980     Family History   Problem Relation Age of Onset    Breast Cancer Other     Emphysema Mother     Varicose Veins Mother     Edema Mother     Lung Cancer Father     Breast Cancer Niece         Early 20's    Asthma Grandchild     Diabetes Brother     Glaucoma No family hx of     Macular Degeneration No family hx of      Social History     Tobacco Use    Smoking status: Never     Passive exposure: Never    Smokeless tobacco: Never    Tobacco comments:     in HS and 20's socail only, never pack a day smoker   Substance Use Topics    Alcohol use: Yes     Alcohol/week: 1.7 standard drinks of alcohol

## 2025-02-25 NOTE — PROGRESS NOTES
OUTPATIENT/OBSERVATION GOALS TO BE MET BEFORE DISCHARGE:  1. Pain Status: Improved-controlled with oral pain medications.    2. Return to near baseline physical activity: Yes    3. Cleared for discharge by consultants (if involved): No    Discharge Planner Nurse   Safe discharge environment identified: Yes  Barriers to discharge: Yes       Entered by: Demetrice Howard RN 02/25/2025 12:55 PM

## 2025-02-25 NOTE — ED PROVIDER NOTES
EMERGENCY DEPARTMENT ENCOUNTER      NAME: Soco Ferguson  AGE: 69 year old female  YOB: 1955  MRN: [id number]  EVALUATION DATE & TIME: 2025  6:31 PM    PCP: Jorge Blanton    ED PROVIDER: Marlin Sanon M.D.      Chief Complaint   Patient presents with    Chest Pain    Generalized Weakness       FINAL IMPRESSION:  1. Dyspnea, unspecified type    2. Unstable angina pectoris (H)        ED COURSE & MEDICAL DECISION MAKIN.  Shortness of breath with exertion, none at rest.  Chest pain intermittently at rest and with exertion.  Abnormal stress test from 2025 however symptoms are not clearly anginal in nature.  Troponin less than 6, EKG similar to previous, history of left bundle branch block.  EKG today unchanged from .  Some risk factors for ACS, family history and history of hyperlipidemia.  I consulted cardiology, no indication to transfer or to start anticoagulation at this time given troponin less than 6.  She does not appear to be fluid overloaded, I have low suspicion for PE as she has no hypoxia and she has no consistent CP and SOB together.  I admitted patient to medicine for further testing as needed.      9:27 PM I met with the patient to gather history and to perform my initial exam. I discussed the plan for care while in the Emergency Department.  ED Course as of 25 1028   Mon  I spoke to Dr. Vuong, cardiology. No indication for anticoagulation. Keep at [facility] and cath can be done tomorrow likely.    I spoke to hospitalist for admission.     Pertinent Labs & Imaging studies reviewed. (See chart for details).    Medical Decision Making  Obtained supplemental history:Supplemental history obtained?: No  Reviewed external records: External records reviewed?: Documented in chart and Other: Office visit 2025 for annual physical exam with primary care note reviewed.  Stress test from 2025 reviewed.  Telephone notes from 2025 and  urgent care note from 2/24/2025 reviewed.  Care impacted by chronic illness:Documented in Chart and Other: Mixed hyperlipidemia on statin, history of ulcerative colitis, osteoporosis, history of peptic ulcer, last EGD 2023 stable, no ulcer.  Anxiety.   Did you consider but not order tests?: Work up considered but not performed and documented in chart, if applicable  Did you interpret images independently?: Independent interpretation of ECG and images noted in documentation, when applicable.  Consultation discussion with other provider:Did you involve another provider (consultant, , pharmacy, etc.)?: I discussed the care with another health care provider, see documentation for details.  Admit.    MIPS (CTPE, Dental pain, Harden, Sinusitis, Asthma/COPD, Head Trauma): Not Applicable        At the conclusion of the encounter I discussed the results of all of the tests and the disposition. The questions were answered. The patient or family acknowledged understanding and was agreeable with the care plan.      CRITICAL CARE:  N/A    HPI    Patient information was obtained from: patient.    Use of : N/A.       Soco Ferguson is a 69 year old female who presents for shortness of breath associated with palpitations since July or August 2024, increased severity of symptoms starting last month.  She has intermittent left sternal pain sometimes at rest and sometimes with activity usually once a day, lasts a few minutes and then goes away.  She denies any shortness of breath with lying down. She denies any personal history of pulmonary disease such as asthma, sleep apnea, smoking.  She denies any history of DVT or PE, she denies leg swelling or pain unilaterally.  She is not on aspirin or Plavix.  She has a family history of her brother requiring heart surgery, she believes bypass surgery, at age 68.  She had a stress test done 2/21/2025 that was abnormal, she contacted her doctor's office due to her symptoms and  was told to go to the ER.  She went to the urgent care first and they told her to go to the ER.    Per Chart Review: See MDM above as well as stress test results below:    The nuclear stress test is abnormal.    There is a small area of nontransmural infarction in the distal anterior segment(s) of the left ventricle associated with a mild degree of jac-infarct ischemia.    Left ventricular function is normal.    The left ventricular ejection fraction at stress is 68%.    There is no prior study for comparison.      REVIEW OF SYSTEMS  All other systems negative unless noted in HPI.    PAST MEDICAL HISTORY:  Past Medical History:   Diagnosis Date    Abdominal pain, unspecified site     Created by Conversion     Arthritis 2/7/2024    Asymptomatic varicose veins     Created by Conversion     Blastomycosis     Created by Conversion     Calculus of kidney     Created by Conversion     Endometrial hyperplasia, unspecified     Created by Conversion     Hyperparathyroidism, unspecified     Created by Conversion     Lattice degeneration     Leiomyoma of uterus, unspecified     Created by Conversion     Myalgia and myositis, unspecified     Created by Conversion     Nausea alone     Created by Conversion     Osteoporosis, unspecified     Created by Conversion     Other nonspecific finding on examination of urine     Created by Conversion     Pain in joint, site unspecified     Created by Conversion     Restless legs syndrome (RLS)     Created by Conversion     Senile osteoporosis     Created by Conversion     Ulcerative colitis, unspecified     Created by Conversion     Unspecified constipation     Created by Conversion     Unspecified sinusitis (chronic)     Created by Conversion     Unspecified symptom associated with female genital organs     Created by Conversion     Unspecified vitamin D deficiency     Created by Conversion        PAST SURGICAL HISTORY:  Past Surgical History:   Procedure Laterality Date    CATARACT IOL,  RT/LT      KIDNEY STONE SURGERY  01/01/1999    removal    OTHER SURGICAL HISTORY Bilateral     venous closure    PHACOEMULSIFICATION WITH STANDARD INTRAOCULAR LENS IMPLANT Right 07/25/2024    Procedure: RIGHT EYE PHACOEMULSIFICATION, CATARACT, WITH STANDARD INTRAOCULAR LENS IMPLANT INSERTION;  Surgeon: Anaid Sloan MD;  Location: [facility] [place]    PHACOEMULSIFICATION WITH STANDARD INTRAOCULAR LENS IMPLANT Left 8/8/2024    Procedure: LEFT EYE PHACOEMULSIFICATION, CATARACT, WITH STANDARD INTRAOCULAR LENS IMPLANT INSERTION;  Surgeon: Anaid Sloan MD;  Location: [facility] [place]    TUBAL LIGATION  01/01/1987    WISDOM TOOTH EXTRACTION  01/01/1980         CURRENT MEDICATIONS:    Current Facility-Administered Medications   Medication Dose Route Frequency Provider Last Rate Last Admin    denosumab (PROLIA) injection 60 mg  60 mg Subcutaneous Q6 Months    60 mg at 08/01/24 1251     Current Outpatient Medications   Medication Sig Dispense Refill    acetaminophen (TYLENOL) 325 MG tablet Take 325-650 mg by mouth every 6 hours as needed for mild pain      aspirin 81 MG EC tablet Take 1 tablet (81 mg) by mouth daily. 30 tablet 0    atorvastatin (LIPITOR) 40 MG tablet TAKE 1 TABLET BY MOUTH ONE TIME DAILY 90 tablet 0    calcium citrate-vitamin D (CITRACAL) 200-6.25 MG-MCG TABS per tablet Take 2 tablets by mouth every evening. With dinner      cholecalciferol 50 MCG (2000 UT) tablet Take 2 tablets by mouth every evening. With dinner      denosumab (PROLIA) 60 MG/ML SOSY injection Inject 60 mg Subcutaneous every 6 months      fluocinonide (LIDEX) 0.05 % external solution Apply and massage 1 application topically onto itchy spots on scalp at bedtime for 14 days, then as needed for itching*      hypromellose (GENTEAL SEVERE) ophthalmic gel 0.3% Place 1 drop into both eyes nightly as needed for dry eyes.      omeprazole (PRILOSEC) 20 MG DR capsule Take 20 mg by mouth daily. Before a meal      Propylene Glycol (SYSTANE BALANCE OP) Apply 1  drop to eye as needed.           ALLERGIES:  Allergies   Allergen Reactions    Comtrex Dizziness     Acet/dextromethorphan/phenylephrine    Other (Do Not Use)        FAMILY HISTORY:  Family History   Problem Relation Age of Onset    Breast Cancer Other     Emphysema Mother     Varicose Veins Mother     Edema Mother     Lung Cancer Father     Breast Cancer Niece         Early 20's    Asthma Grandchild     Diabetes Brother     Glaucoma No family hx of     Macular Degeneration No family hx of        SOCIAL HISTORY:  Social History     Socioeconomic History    Marital status:    Tobacco Use    Smoking status: Never     Passive exposure: Never    Smokeless tobacco: Never    Tobacco comments:     in HS and 20's socail only, never pack a day smoker   Vaping Use    Vaping status: Never Used   Substance and Sexual Activity    Alcohol use: Yes     Alcohol/week: 1.7 standard drinks of alcohol    Drug use: No    Sexual activity: Not Currently     Comment:     Social History Narrative    .  2 kids.  [employer] .     Social Drivers of Health     Financial Resource Strain: Low Risk  (2/2/2025)    Financial Resource Strain     Within the past 12 months, have you or your family members you live with been unable to get utilities (heat, electricity) when it was really needed?: No   Food Insecurity: High Risk (2/2/2025)    Food Insecurity     Within the past 12 months, did you worry that your food would run out before you got money to buy more?: No     Within the past 12 months, did the food you bought just not last and you didn t have money to get more?: Yes   Transportation Needs: Low Risk  (2/2/2025)    Transportation Needs     Within the past 12 months, has lack of transportation kept you from medical appointments, getting your medicines, non-medical meetings or appointments, work, or from getting things that you need?: No   Physical Activity: Insufficiently Active (2/2/2025)    Exercise  "Vital Sign     Days of Exercise per Week: 2 days     Minutes of Exercise per Session: 40 min   Stress: Stress Concern Present (2/2/2025)    [place] [employer] - Occupational Stress Questionnaire     Feeling of Stress : To some extent   Social Connections: Unknown (2/2/2025)    Social Connection and Isolation Panel [NHANES]     Frequency of Social Gatherings with Friends and Family: Once a week   Interpersonal Safety: Low Risk  (2/5/2025)    Interpersonal Safety     Do you feel physically and emotionally safe where you currently live?: Yes     Within the past 12 months, have you been hit, slapped, kicked or otherwise physically hurt by someone?: No     Within the past 12 months, have you been humiliated or emotionally abused in other ways by your partner or ex-partner?: No   Housing Stability: Low Risk  (2/2/2025)    Housing Stability     Do you have housing? : Yes     Are you worried about losing your housing?: No       VITALS:  No data found.      PHYSICAL EXAM    VITAL SIGNS: /59 (BP Location: Left arm)   Pulse 58   Temp 98.5  F (36.9  C) (Oral)   Resp 18   Ht 1.549 m (5' 1\")   Wt 78 kg (172 lb)   LMP  (LMP Unknown)   SpO2 96%   BMI 32.50 kg/m    Physical Exam  Vitals and nursing note reviewed.   Constitutional:       General: She is not in acute distress.     Appearance: She is not toxic-appearing.   Eyes:      General: No scleral icterus.        Right eye: No discharge.         Left eye: No discharge.   Cardiovascular:      Rate and Rhythm: Normal rate and regular rhythm.      Pulses: Normal pulses.   Pulmonary:      Effort: Pulmonary effort is normal. No respiratory distress.      Breath sounds: Normal breath sounds.   Abdominal:      General: There is no distension.      Palpations: Abdomen is soft.      Tenderness: There is no abdominal tenderness.   Musculoskeletal:         General: No swelling or deformity.      Cervical back: Neck supple. No rigidity.   Skin:     " General: Skin is warm and dry.      Capillary Refill: Capillary refill takes less than 2 seconds.      Findings: No bruising or erythema.   Neurological:      General: No focal deficit present.      Mental Status: She is alert and oriented to person, place, and time. Mental status is at baseline.      Comments: No slurred speech, following commands spontaneously. No facial droop.   Psychiatric:         Mood and Affect: Mood normal.         Behavior: Behavior normal.         LABS  Labs Ordered and Resulted from Time of ED Arrival to Time of ED Departure   COMPREHENSIVE METABOLIC PANEL - Abnormal       Result Value    Sodium 143      Potassium 4.3      Carbon Dioxide (CO2) 28      Anion Gap 12      Urea Nitrogen 26.9 (*)     Creatinine 0.90      GFR Estimate 69      Calcium 10.1      Chloride 103      Glucose 101 (*)     Alkaline Phosphatase 151 (*)     AST 39      ALT 53 (*)     Protein Total 7.3      Albumin 4.4      Bilirubin Total 0.5     BASIC METABOLIC PANEL - Abnormal    Sodium 142      Potassium 4.0      Chloride 105      Carbon Dioxide (CO2) 29      Anion Gap 8      Urea Nitrogen 25.5 (*)     Creatinine 0.82      GFR Estimate 77      Calcium 9.8      Glucose 102 (*)    NT PROBNP INPATIENT - Normal    N terminal Pro BNP Inpatient 68     PARTIAL THROMBOPLASTIN TIME - Normal    aPTT 27     INR - Normal    INR 0.95     TROPONIN T, HIGH SENSITIVITY - Normal    Troponin T, High Sensitivity <6     TROPONIN T, HIGH SENSITIVITY - Normal    Troponin T, High Sensitivity 8     HEPARIN UNFRACTIONATED ANTI XA LEVEL - Normal    Anti Xa Unfractionated Heparin 0.84     CBC WITH PLATELETS - Normal    WBC Count 8.1      RBC Count 4.91      Hemoglobin 14.2      Hematocrit 41.8      MCV 85      MCH 28.9      MCHC 34.0      RDW 13.2      Platelet Count 213     CBC WITH PLATELETS AND DIFFERENTIAL    WBC Count 10.3      RBC Count 5.10      Hemoglobin 14.7      Hematocrit 43.6      MCV 86      MCH 28.8      MCHC 33.7      RDW 13.1       Platelet Count 249      % Neutrophils 65      % Lymphocytes 21      % Monocytes 10      % Eosinophils 3      % Basophils 1      % Immature Granulocytes 0      NRBCs per 100 WBC 0      Absolute Neutrophils 6.7      Absolute Lymphocytes 2.2      Absolute Monocytes 1.0      Absolute Eosinophils 0.3      Absolute Basophils 0.1      Absolute Immature Granulocytes 0.0      Absolute NRBCs 0.0           RADIOLOGY     NA      EKG:    EKG obtained at 1825 and shows sinus rhythm rate 87, Qtc 454, compared to previous EKG on 2/21/25, LBBB present on previous EKGs dating back to 2020. I have independently reviewed and interpreted today's EKG, pending Cardiologist read.       PROCEDURES:  N/A      MEDICATIONS GIVEN IN THE EMERGENCY:  Medications   heparin loading dose for LOW INTENSITY TREATMENT * Give BEFORE starting heparin infusion (4,700 Units Intravenous $Given 2/25/25 0006)   iopamidol (ISOVUE-370) solution 100 mL (100 mLs Intravenous $Given 2/25/25 1110)       NEW PRESCRIPTIONS STARTED AT TODAY'S ER VISIT  Discharge Medication List as of 2/25/2025  4:35 PM        START taking these medications    Details   aspirin 81 MG EC tablet Take 1 tablet (81 mg) by mouth daily., Disp-30 tablet, R-0, E-Prescribe                  Marlin Sanon MD  Emergency Medicine  [facility] EMERGENCY ROOM  [address] [address] [address] [phone]  Dept: [phone]             Marlin Sanon MD  02/27/25 [id number]

## 2025-02-25 NOTE — DISCHARGE SUMMARY
"[facility]  Hospitalist Discharge Summary      Date of Admission:  2/24/2025  Date of Discharge:  2/25/2025  Discharging Provider: Sena Wang PA-C  Discharge Service: Hospitalist Service    Discharge Diagnoses   1.  Dyspnea on exertion  2.  Prediabetes  3.  Ulcerative colitis  4.  Left bundle branch block  5.  Family history of heart disease  6.  Dyslipidemia maintained on statin therapy  7.  Mild nonobstructive coronary disease on CT coronary angiogram in 2021    Clinically Significant Risk Factors     # Obesity: Estimated body mass index is 32.5 kg/m  as calculated from the following:    Height as of this encounter: 1.549 m (5' 1\").    Weight as of this encounter: 78 kg (172 lb).       Follow-ups Needed After Discharge   Follow-up Appointments       Follow-up and recommended labs and tests       Follow up with primary care provider, Jorge Blanton, within 7 days for hospital follow- up.  The following labs/tests are recommended:   - could consider future referral for outpatient cardiac catheterization in the future if patient continues to have similar episodes of chest pain/dyspnea.    - dedicated chest CT recommended for further evaluation of incidental pulmonary nodules seen on CTA Coronaries imaging.                Unresulted Labs Ordered in the Past 30 Days of this Admission       No orders found for last 31 day(s).        These results will be followed up by PCP    Discharge Disposition   Discharged to home  Condition at discharge: Stable    Hospital Course      Soco Ferguson is a 69 year old female with PMHx significant for hyperlipidemia, osteoporosis, GERD, ulcerative colitis, prediabetes, anxiety who was admitted on 2/24/2025 for Cardiology evaluation for dyspnea on exertion and chest pain, with concern for unstable angina.    ED Course: Hemodynamically stable, afebrile. Lab workup including CBC, BMP, LFT unremarkable. proBNP normal at 68. Troponin T negative, repeat " "pending. INR 0.95, PTT 27. ECG shows sinus rhythm, known LBBB, no obvious acute ischemic changes per my read.  ED provider discussed with cardiology, who recommended admission here with initial tentative plan for transfer to [facility] for cardiac catheterization.      Dyspnea on exertion  Chest discomfort  Presented with intermittent episodes of dyspnea on exertion and chest pain both with exertion and at rest since summer 2024, with frequency of up to several episodes per day. Chest pain localized to left chest and does have area that is reproducible to palpation. Also reported increased fatigue. Recently had abnormal nuclear medicine stress test on 2/21/2025 revealing \"a small area of nontransmural infarction in the distal anterior segment(s) of the left ventricle associated with a mild degree of jac-infarct ischemia.\"; LVEF at stress 68%. Most recent CTA coronaries 1/2021 showed mild nonobstructive CAD. Troponin T x 2 negative. ECG showed sinus rhythm with known LBBB, no obvious acute ischemic changes. Patient denied prior history of MI or other cardiac disorders.    Patient was initiated on heparin drip out of concern for unstable angina, with initial plan for Cardiology evaluation and transfer to [facility] for cardiac catheterization. However, Cardiology recommended further evaluation with CT coronary angiogram and echocardiogram. CT coronary angiogram showed grossly stable mild nonobstructive CAD compared to prior. Echocardiogram showed LVEF 55-60% with no wall motion abnormalities or other areas of concern. Cardiology recommended discontinuing heparin drip and felt there is no urgent need for catheterization procedure given stable findings on the above imaging studies, so patient can discharge home. Patient will continue on statin therapy and was initiated on aspirin as tolerated (could consider discontinuing aspirin if GI specialist/PCP feels it is absolutely contraindicated with patient's " "ulcerative colitis). Discussed the above results with patient and answered questions as appropriate. Patient feels well and would like to discharge home. She already has a PCP appointment scheduled for next week.    Incidental CT findings  On CTA angiogram 2/25/2025, the following was noted: \"Left lower lobe pulmonary nodules measure up to 6 mm. Recommend dedicated chest CT for further evaluation of the pulmonary nodules and to appropriately direct follow-up/management.\"   - Outpatient chest CT recommended      Consultations This Hospital Stay   CARDIOLOGY IP CONSULT  PHARMACY IP CONSULT    Code Status   Full Code    Time Spent on this Encounter   I, Sena Wang PA-C, personally saw the patient today and spent less than or equal to 30 minutes discharging this patient.        Sena Wang PA-C  [facility] EMERGENCY ROOM  [address] [place] [id number]  Phone: [phone]  Fax: [phone]  ______________________________________________________________________    Physical Exam   Vital Signs: Temp: 98.5  F (36.9  C) Temp src: Oral BP: 117/59 Pulse: 58   Resp: 18 SpO2: 96 % O2 Device: None (Room air)    Weight: 172 lbs 0 oz  General Appearance: Awake, alert, in no acute distress.  Respiratory: Normal respiratory effort on room air.  Musculoskeletal: Able to move all extremities freely.   Neurologic: Alert and oriented x 3.  Psychiatric: Calm, pleasant, cooperative with exam.         Primary Care Physician   Jorge Blanton    Discharge Orders      Reason for your hospital stay    You were in the hospital for further evaluation of chest pain and shortness of breath with activity.     Follow-up and recommended labs and tests     Follow up with primary care provider, Jorge Blanton, within 7 days for hospital follow- up.  The following labs/tests are recommended:   - could consider future referral for outpatient cardiac catheterization in the future if patient continues to " have similar episodes of chest pain/dyspnea.    - dedicated chest CT recommended for further evaluation of incidental pulmonary nodules seen on CTA Coronaries imaging.     Activity    Your activity upon discharge: activity as tolerated     Diet    Follow this diet upon discharge: Current Diet:Orders Placed This Encounter      Regular Diet Adult       Significant Results and Procedures   Results for orders placed or performed during the hospital encounter of 25   Radiologist Consult For Cardiology     Value    Radiologist flags Lung nodule    Narrative    OVERREAD: DETAILED [place] [facility] EXTRACARDIAC OVERREAD OF CARDIAC CT   LOCATION: [facility]  DATE: 2025    INDICATION: Dyspnea on exertion  TECHNIQUE: Dose reduction techniques were used.  COMPARISON: None.    FINDINGS:    LIMITED CHEST: There are 2 pulmonary nodules in the left lower lobe with the largest measuring 6 mm on series 3 image 27. Benign calcified granuloma in the left lower lobe.  LIMITED MEDIASTINUM: Negative.  LIMITED UPPER ABDOMEN: Negative.      Impression    IMPRESSION:    1.  Left lower lobe pulmonary nodules measure up to 6 mm. Recommend dedicated chest CT for further evaluation of the pulmonary nodules and to appropriately direct follow-up/management.  2.  Please refer to cardiologist's dictation for the cardiac CT report.      [Recommend Follow Up: Lung nodule]    This report will be copied to the [facility] to ensure a provider acknowledges the finding.   Echocardiogram Complete     Value    LVEF  55-60%    Narrative    [id number]  [id number]  [id number]  [id number]^[place] [address]     Name: WILLIAM WINTER  MRN: [id number]  : 1955  Study Date: 2025 12:39 PM  Age: 69 yrs  Gender: Female  Patient Location: [facility]  Reason For Study: Chest Pain  Ordering Physician: JONATHAN COTO  Performed By: ACE     BSA: 1.8  m2  Height: 61 in  Weight: 172 lb  HR: 55  BP: 117/59 mmHg  ______________________________________________________________________________  Procedure  Echocardiogram with two-dimensional, color and spectral Doppler. Adequate  quality two-dimensional was performed and interpreted. No hemodynamically  significant valvular abnormalities on 2D or color flow imaging. There is no  comparison study available.  ______________________________________________________________________________  Interpretation Summary     Left ventricular size, wall motion and function are normal. The ejection  fraction is 55-60%.  Normal right ventricle size and systolic function.  Normal left atrial size.  No hemodynamically significant valvular abnormalities on 2D or color flow  imaging.  There is no comparison study available.  ______________________________________________________________________________  Left Ventricle  Left ventricular size, wall motion and function are normal. The ejection  fraction is 55-60%. There is normal left ventricular wall thickness. Left  ventricular diastolic function is indeterminate.     Right Ventricle  Normal right ventricle size and systolic function.     Atria  Normal left atrial size. Right atrial size is normal. There is no color  Doppler evidence of an atrial shunt.     Mitral Valve  Mitral valve leaflets appear normal. There is no evidence of mitral stenosis  or clinically significant mitral regurgitation.     Tricuspid Valve  Tricuspid valve leaflets appear normal. There is no evidence of tricuspid  stenosis or clinically significant tricuspid regurgitation. Right ventricle  systolic pressure estimate normal. The right ventricular systolic pressure is  approximated at 21.0 mmHg plus the right atrial pressure.     Aortic Valve  The aortic valve is trileaflet. There is trace aortic regurgitation. No aortic  stenosis is present.     Pulmonic Valve  The pulmonic valve is not well seen, but is grossly  normal. This degree of  valvular regurgitation is within normal limits. There is trace pulmonic  valvular regurgitation.     Vessels  The aorta root is normal. Normal size ascending aorta. IVC diameter <2.1 cm  collapsing >50% with sniff suggests a normal RA pressure of 3 mmHg.     Pericardium  There is no pericardial effusion.     Rhythm  Sinus rhythm was noted.  ______________________________________________________________________________  MMode/2D Measurements & Calculations     IVSd: 0.93 cm  LVIDd: 4.1 cm  LVIDs: 2.9 cm  LVPWd: 0.82 cm  FS: 30.5 %  LV mass(C)d: 109.5 grams  LV mass(C)dI: 61.8 grams/m2  Ao root diam: 2.6 cm  LA dimension: 3.5 cm  asc Aorta Diam: 3.2 cm  LA/Ao: 1.3  LVOT diam: 2.0 cm  LVOT area: 3.1 cm2  Ao root diam index Ht(cm/m): 1.7  Ao root diam index BSA (cm/m2): 1.5  Asc Ao diam index BSA (cm/m2): 1.8  Asc Ao diam index Ht(cm/m): 2.1  EF Biplane: 59.5 %  LA Volume (BP): 35.8 ml     LA Volume Index (BP): 20.2 ml/m2  LA Volume Indexed (AL/bp): 22.0 ml/m2  RV Base: 2.8 cm  RWT: 0.40  TAPSE: 2.0 cm     Time Measurements  MM HR: 60.0 BPM     Doppler Measurements & Calculations  MV E max jorge: 57.0 cm/sec  MV A max jorge: 72.8 cm/sec  MV E/A: 0.78  MV dec slope: 171.0 cm/sec2  MV dec time: 0.33 sec  Ao V2 max: 127.0 cm/sec  Ao max P.0 mmHg  Ao V2 mean: 84.0 cm/sec  Ao mean PG: 3.0 mmHg  Ao V2 VTI: 24.7 cm  ZAHEER(I,D): 2.8 cm2  ZAHEER(V,D): 2.5 cm2  LV V1 max P.1 mmHg  LV V1 max: 101.0 cm/sec  LV V1 VTI: 21.7 cm  SV(LVOT): 68.2 ml  SI(LVOT): 38.5 ml/m2  PA acc time: 0.07 sec  TR max jorge: 229.0 cm/sec  TR max P.0 mmHg  AV Jorge Ratio (DI): 0.80  ZAHEER Index (cm2/m2): 1.6  E/E': 10.1  E/E' av.3  Lateral E/e': 9.7     Medial E/e': 16.9  Peak E' Jorge: 5.7 cm/sec  RV S Jorge: 10.2 cm/sec     ______________________________________________________________________________  Report approved by: Jacques Wallis MD on 2025 01:20 PM         CTA Angiogram coronary artery     Value    BSA 1.77    CCTA  SINUS 3.1    CCTA ASCENDING AORTA 3.2    Narrative      Nonobstructive coronary artery disease comprised of calcific plaque   with an overall mild burden of atherosclerosis.    Radiology review for incidental non cardiac findings will be under   separate report by the radiologist.         Discharge Medications   Current Discharge Medication List        START taking these medications    Details   aspirin 81 MG EC tablet Take 1 tablet (81 mg) by mouth daily.  Qty: 30 tablet, Refills: 0    Associated Diagnoses: Unstable angina pectoris (H)           CONTINUE these medications which have NOT CHANGED    Details   acetaminophen (TYLENOL) 325 MG tablet Take 325-650 mg by mouth every 6 hours as needed for mild pain      atorvastatin (LIPITOR) 40 MG tablet TAKE 1 TABLET BY MOUTH ONE TIME DAILY  Qty: 90 tablet, Refills: 0    Associated Diagnoses: Mixed hyperlipidemia      calcium citrate-vitamin D (CITRACAL) 200-6.25 MG-MCG TABS per tablet Take 2 tablets by mouth every evening. With dinner      cholecalciferol 50 MCG (2000 UT) tablet Take 2 tablets by mouth every evening. With dinner      denosumab (PROLIA) 60 MG/ML SOSY injection Inject 60 mg Subcutaneous every 6 months      fluocinonide (LIDEX) 0.05 % external solution Apply and massage 1 application topically onto itchy spots on scalp at bedtime for 14 days, then as needed for itching*      hypromellose (GENTEAL SEVERE) ophthalmic gel 0.3% Place 1 drop into both eyes nightly as needed for dry eyes.      omeprazole (PRILOSEC) 20 MG DR capsule Take 20 mg by mouth daily. Before a meal      Propylene Glycol (SYSTANE BALANCE OP) Apply 1 drop to eye as needed.           Allergies   Allergies   Allergen Reactions    Comtrex Dizziness     Acet/dextromethorphan/phenylephrine    Other (Do Not Use)

## 2025-02-25 NOTE — MEDICATION SCRIBE - ADMISSION MEDICATION HISTORY
Medication Scribe Admission Medication History    Admission medication history is complete. The information provided in this note is only as accurate as the sources available at the time of the update.    Information Source(s): Patient, Clinic records, [facility]    Pertinent Information: Patient recently had cephalexin 500 mg dispensed on 2/5/2025 and sulfamethoxazole-trimethoprim 800-160 mg on 2/10/2025. Patient stated this was for a UTI, but had never picked up either medication. She was seen 2/5/2025 for an IM visit in which she had her  concerns addressed.     On the 2/5 visit there was discussion for her to discontinue denosumab and transition to zoledronic acid in the future.     Additionally escitalopram was prescribed on 2/5 as well, but the patient has not started and would not like to start this medication in the future.     Patient has her ophthalmic solution and gel with and will not need these dispensed for inpatient use.     Changes made to PTA medication list:  Added:   Citracal  Ophthalmic gel HS  Deleted:   Escitalopram 5 mg  Cephalexin 500 mg  Denosumab 60 mg/mL  Changed:   Vitamin D 1 tab every day --> 2 tab every day (2,000 international unit(s))  Omeprazole 20 mg every day --> every day AC  Propylene glycol PRN --> QID PRN    Allergies reviewed with patient and updates made in EHR: yes    Medication History Completed By: Olivier Ellis 2/24/2025 10:24 PM    Current Facility-Administered Medications for the 2/24/25 encounter (Hospital Encounter)   Medication    denosumab (PROLIA) injection 60 mg     PTA Med List   Medication Sig Last Dose/Taking    acetaminophen (TYLENOL) 325 MG tablet Take 325-650 mg by mouth every 6 hours as needed for mild pain 2/24/2025 at  3:00 PM    atorvastatin (LIPITOR) 40 MG tablet TAKE 1 TABLET BY MOUTH ONE TIME DAILY 2/23/2025 Bedtime    calcium citrate-vitamin D (CITRACAL) 200-6.25 MG-MCG TABS per tablet Take 2 tablets by mouth  every evening. With dinner 2/24/2025 Evening    cholecalciferol 50 MCG (2000 UT) tablet Take 2 tablets by mouth every evening. With dinner 2/24/2025 Evening    denosumab (PROLIA) 60 MG/ML SOSY injection Inject 60 mg Subcutaneous every 6 months Past Month    fluocinonide (LIDEX) 0.05 % external solution Apply and massage 1 application topically onto itchy spots on scalp at bedtime for 14 days, then as needed for itching* Past Week    hypromellose (GENTEAL SEVERE) ophthalmic gel 0.3% Place 1 drop into both eyes nightly as needed for dry eyes. 2/23/2025 Bedtime    omeprazole (PRILOSEC) 20 MG DR capsule Take 20 mg by mouth daily. Before a meal 2/23/2025 Morning    Propylene Glycol (SYSTANE BALANCE OP) Apply 1 drop to eye as needed. 2/24/2025 Morning

## 2025-02-25 NOTE — PROGRESS NOTES
OUTPATIENT/OBSERVATION GOALS TO BE MET BEFORE DISCHARGE:  1. Pain Status: Improved-controlled with oral pain medications.    2. Return to near baseline physical activity: Yes    3. Cleared for discharge by consultants (if involved): Yes    Discharge Planner Nurse   Safe discharge environment identified: Yes  Barriers to discharge: No       Entered by: Demetrice Howard RN 02/25/2025 4:33 PM

## 2025-02-25 NOTE — PROGRESS NOTES
"[employer]    Medicine Progress Note - Hospitalist Service    Date of Admission:  2/24/2025  69-year-old woman with history of left bundle branch block nonobstructive coronary artery disease on CT angio 2021 was admitted with dyspnea and somewhat atypical chest pains  She was started on heparin infusion with plans for cardiology review  Cardiology is already seen the patient is morning and recommends to recheck CT angio and stop heparin infusion    Plan hold off on transfer to [facility] for cor angio  CT angiogram  Cardiology help greatly appreciated  Discontinue heparin infusion  Cards help appreciated-transfer to [facility] postponed pending studies      Assessment & Plan     Assessment  1.  Dyspnea on exertion: 2.  Prediabetes  3.  Ulcerative colitis  4.  Left bundle branch block  5.  Family history of heart disease  6.  Dyslipidemia maintained on statin therapy  7.  Mild nonobstructive coronary disease on CT coronary angiogram in 2021     Observation Goals: -diagnostic tests and consults completed and resulted, -vital signs normal or at patient baseline, Nurse to notify provider when observation goals have been met and patient is ready for discharge.  Diet: NPO per Anesthesia Guidelines for Procedure/Surgery Except for: Meds    DVT Prophylaxis: Pneumatic Compression Devices  Harden Catheter: Not present  Lines: None     Cardiac Monitoring: ACTIVE order. Indication: Chest pain/ ACS rule out (24 hours)  Code Status: Full Code      Clinically Significant Risk Factors Present on Admission                             # Obesity: Estimated body mass index is 32.5 kg/m  as calculated from the following:    Height as of this encounter: 1.549 m (5' 1\").    Weight as of this encounter: 78 kg (172 lb).              Social Drivers of Health    Food Insecurity: High Risk (2/2/2025)    Food Insecurity     Within the past 12 months, did you worry that your food would run out before you got money to buy " more?: No     Within the past 12 months, did the food you bought just not last and you didn t have money to get more?: Yes   Physical Activity: Insufficiently Active (2/2/2025)    Exercise Vital Sign     Days of Exercise per Week: 2 days     Minutes of Exercise per Session: 40 min   Stress: Stress Concern Present (2/2/2025)    [employer] - Occupational Stress Questionnaire     Feeling of Stress : To some extent   Social Connections: Unknown (2/2/2025)    Social Connection and Isolation Panel [NHANES]     Frequency of Social Gatherings with Friends and Family: Once a week          Disposition Plan     Medically Ready for Discharge: Anticipated Tomorrow             Valente Cardoza MD  Hospitalist Service  [facility]  Securely message with [employer] (more info)  Text page via [employer]/Directory   ______________________________________________________________________    Interval History   {Pertinent overnight events see notes from earlier    Physical Exam   Vital Signs: Temp: 98.5  F (36.9  C) Temp src: Oral BP: 117/59 Pulse: 58   Resp: 18 SpO2: 96 % O2 Device: None (Room air)    Weight: 172 lbs 0 oz    ----------------------------------------------------------------------------------------    Medical Decision Making       25 MINUTES SPENT BY ME on the date of service doing chart review, history, exam, documentation & further activities per the note.          Data   Recent Results (from the past 120 hours)   NM MPI with Lexiscan    Collection Time: 02/21/25 11:50 AM   Result Value Ref Range    Target      Baseline Systolic      Baseline Diastolic BP 86     Last Stress Systolic      Last Stress Diastolic BP 79     Baseline HR 76 bpm    Max HR  110     Max Predicted HR  72 %    Rate Pressure Product 13,420.0     Left Ventricular EF 68 %   ECG 12-LEAD WITH MUSE (E)    Collection Time: 02/24/25  6:25 PM   Result Value Ref Range    Systolic Blood  Pressure  mmHg    Diastolic Blood Pressure  mmHg    Ventricular Rate 87 BPM    Atrial Rate 87 BPM    NM Interval 164 ms    QRS Duration 132 ms     ms    QTc 454 ms    P Axis 30 degrees    R AXIS -29 degrees    T Axis 72 degrees    Interpretation ECG       Sinus rhythm  Left bundle branch block  Abnormal ECG  When compared with ECG of 21-Feb-2025 11:18,  QRS axis Shifted left  Confirmed by SEE ED PROVIDER NOTE FOR, ECG INTERPRETATION ([id number]),  Sara Ness ([id number]) on 2/24/2025 7:03:40 PM     Comprehensive metabolic panel    Collection Time: 02/24/25  8:24 PM   Result Value Ref Range    Sodium 143 135 - 145 mmol/L    Potassium 4.3 3.4 - 5.3 mmol/L    Carbon Dioxide (CO2) 28 22 - 29 mmol/L    Anion Gap 12 7 - 15 mmol/L    Urea Nitrogen 26.9 (H) 8.0 - 23.0 mg/dL    Creatinine 0.90 0.51 - 0.95 mg/dL    GFR Estimate 69 >60 mL/min/1.73m2    Calcium 10.1 8.8 - 10.4 mg/dL    Chloride 103 98 - 107 mmol/L    Glucose 101 (H) 70 - 99 mg/dL    Alkaline Phosphatase 151 (H) 40 - 150 U/L    AST 39 0 - 45 U/L    ALT 53 (H) 0 - 50 U/L    Protein Total 7.3 6.4 - 8.3 g/dL    Albumin 4.4 3.5 - 5.2 g/dL    Bilirubin Total 0.5 <=1.2 mg/dL   Nt probnp inpatient    Collection Time: 02/24/25  8:24 PM   Result Value Ref Range    N terminal Pro BNP Inpatient 68 0 - 900 pg/mL   Partial thromboplastin time    Collection Time: 02/24/25  8:24 PM   Result Value Ref Range    aPTT 27 22 - 38 Seconds   INR    Collection Time: 02/24/25  8:24 PM   Result Value Ref Range    INR 0.95 0.85 - 1.15   Troponin T, High Sensitivity    Collection Time: 02/24/25  8:24 PM   Result Value Ref Range    Troponin T, High Sensitivity <6 <=14 ng/L   CBC with platelets and differential    Collection Time: 02/24/25  8:24 PM   Result Value Ref Range    WBC Count 10.3 4.0 - 11.0 10e3/uL    RBC Count 5.10 3.80 - 5.20 10e6/uL    Hemoglobin 14.7 11.7 - 15.7 g/dL    Hematocrit 43.6 35.0 - 47.0 %    MCV 86 78 - 100 fL    MCH 28.8 26.5 - 33.0 pg    MCHC 33.7 31.5 - 36.5  g/dL    RDW 13.1 10.0 - 15.0 %    Platelet Count 249 150 - 450 10e3/uL    % Neutrophils 65 %    % Lymphocytes 21 %    % Monocytes 10 %    % Eosinophils 3 %    % Basophils 1 %    % Immature Granulocytes 0 %    NRBCs per 100 WBC 0 <1 /100    Absolute Neutrophils 6.7 1.6 - 8.3 10e3/uL    Absolute Lymphocytes 2.2 0.8 - 5.3 10e3/uL    Absolute Monocytes 1.0 0.0 - 1.3 10e3/uL    Absolute Eosinophils 0.3 0.0 - 0.7 10e3/uL    Absolute Basophils 0.1 0.0 - 0.2 10e3/uL    Absolute Immature Granulocytes 0.0 <=0.4 10e3/uL    Absolute NRBCs 0.0 10e3/uL   Troponin T, High Sensitivity    Collection Time: 02/24/25 10:15 PM   Result Value Ref Range    Troponin T, High Sensitivity 8 <=14 ng/L   Heparin Unfractionated Anti Xa Level    Collection Time: 02/25/25  6:13 AM   Result Value Ref Range    Anti Xa Unfractionated Heparin 0.84 For Reference Range, See Comment IU/mL   Basic metabolic panel    Collection Time: 02/25/25  6:13 AM   Result Value Ref Range    Sodium 142 135 - 145 mmol/L    Potassium 4.0 3.4 - 5.3 mmol/L    Chloride 105 98 - 107 mmol/L    Carbon Dioxide (CO2) 29 22 - 29 mmol/L    Anion Gap 8 7 - 15 mmol/L    Urea Nitrogen 25.5 (H) 8.0 - 23.0 mg/dL    Creatinine 0.82 0.51 - 0.95 mg/dL    GFR Estimate 77 >60 mL/min/1.73m2    Calcium 9.8 8.8 - 10.4 mg/dL    Glucose 102 (H) 70 - 99 mg/dL   CBC with platelets    Collection Time: 02/25/25  6:13 AM   Result Value Ref Range    WBC Count 8.1 4.0 - 11.0 10e3/uL    RBC Count 4.91 3.80 - 5.20 10e6/uL    Hemoglobin 14.2 11.7 - 15.7 g/dL    Hematocrit 41.8 35.0 - 47.0 %    MCV 85 78 - 100 fL    MCH 28.9 26.5 - 33.0 pg    MCHC 34.0 31.5 - 36.5 g/dL    RDW 13.2 10.0 - 15.0 %    Platelet Count 213 150 - 450 10e3/uL   CTA Angiogram coronary artery    Collection Time: 02/25/25 11:28 AM   Result Value Ref Range    BSA 0.00 m2

## 2025-02-25 NOTE — ED TRIAGE NOTES
Patient states she had a stress test last week, was told it was abnormal and was to go have an ECG and sent here, she had stress test secondary to mid sternal chest pain, fatigue and SOB with activity.      Triage Assessment (Adult)       Row Name 02/24/25 1819          Triage Assessment    Airway WDL WDL        Respiratory WDL    Respiratory WDL WDL        Skin Circulation/Temperature WDL    Skin Circulation/Temperature WDL WDL        Cardiac WDL    Cardiac WDL chest pain        Chest Pain Assessment    Chest Pain Location midsternal     Precipitating Factors nothing     Alleviating Factors nothing     Chest Pain Intervention 12-lead ECG obtained;activity minimized        Peripheral/Neurovascular WDL    Peripheral Neurovascular WDL WDL        Cognitive/Neuro/Behavioral WDL    Cognitive/Neuro/Behavioral WDL WDL

## 2025-02-25 NOTE — PLAN OF CARE
Problem: Chest Pain  Goal: Resolution of Chest Pain Symptoms  Outcome: Progressing    A&Ox4. VSS, on RA. Denied any CP or any other pain. No c/o SOB or CASTRO. Tele: SR with BBB. NPO; probable transfer to [facility] for cath lab. Heparin infusing, dnb93L=3.84 this AM. Pausing infusion for an hour then re-starting it (see order). Up independently. Discharge pending.

## 2025-02-25 NOTE — ED NOTES
Discharge paperwork discussed with pt. Questions were answered to patient satisfaction. Tyra Upton RN 2/25/2025 4:46 PM

## 2025-02-25 NOTE — PROGRESS NOTES
Patient discharged with friend/family to transport to home. Vitally stable and cleared for discharge. PIV access X2 removed prior to discharge. Belongings remain with pt at discharge. AVS reviewed with pt, questions answered, education complete.

## 2025-02-25 NOTE — CONSULTS
"  HEART CARE CONSULTATON NOTE        Assessment/Recommendations   Assessment  1.  Dyspnea on exertion: Dyspnea on exertion and heart pounding.  Also notes occasional sharp chest pains and a pinpoint spot in the left side of her chest that is not necessarily with exertion.  Troponins are negative.  Recent stress test showed a small area of infarction with ischemia in the distal anterior wall however this could be related to her left bundle branch block.  CT done in 2021 was unremarkable.  Recommend a repeat CT coronary angiogram to reevaluate her coronaries.  Will also proceed with an echocardiogram to reevaluate for structural heart disease.  2.  Prediabetes  3.  Ulcerative colitis  4.  Left bundle branch block  5.  Family history of heart disease  6.  Dyslipidemia maintained on statin therapy  7.  Mild nonobstructive coronary disease on CT coronary angiogram in 2021      Plan:  1.  CT coronary angiogram  2.  May stop heparin drip  3.  Continue on statin therapy and consider aspirin unless contraindicated with her ulcerative colitis  4.  Echocardiogram  5.  If above is unremarkable would suggest looking for noncardiac causes of her symptoms  Clinically Significant Risk Factors Present on Admission                             # Obesity: Estimated body mass index is 32.5 kg/m  as calculated from the following:    Height as of this encounter: 1.549 m (5' 1\").    Weight as of this encounter: 78 kg (172 lb).                History of Present Illness/Subjective    HPI: Soco Ferguson is a 69 year old female with history of left bundle branch block, mild nonobstructive coronary artery disease on CT coronary angiogram in 2021, dyslipidemia, ulcerative colitis, prediabetes, anxiety, family history of heart disease (brother had CABG in his 60s) who presented to the ED with complaints of chest pain and breathing difficulty.  She started noticing worsening shortness of breath with exertion with associated heart pounding in " "July and August when she started taking more walks.  She then started noticing some shortness of breath and occasional wheezing with speaking.  Also complains of generalized fatigue. Also notes occasional sharp chest pains and a pinpoint spot in the left side of her chest that is not necessarily with exertion.  She underwent stress testing recently that indicated a small area of infarction in the distal anterior segments with associated mild jac-infarct ischemia.  Due to worsening dyspnea and occasional heart pounding she decided to come in for further evaluation.  EKG unchanged and shows sinus rhythm with left bundle branch block.  Troponin x 2 are both unremarkable.    Lexiscan nuclear stress test 2/21/2025    The nuclear stress test is abnormal.    There is a small area of nontransmural infarction in the distal anterior segment(s) of the left ventricle associated with a mild degree of jac-infarct ischemia.    Left ventricular function is normal.    The left ventricular ejection fraction at stress is 68%.    There is no prior study for comparison.     Echocardiogram 1/7/21    Normal left ventricular size and systolic function.The calculated left ventricular ejection fraction is 63%. This represents a normal ejection fraction. The left ventricular wall thickness is normal. Normal left ventricle diastolic function. Normal   left atrial pressure.    Normal right ventricular size and systolic function.    Normal central venous pressure    No previous study for comparison.    CT coronary angiogram 1/7/2021  Result Text     Mild coronary atherosclerosis with no obstructive plaque identified.           Physical Examination  Review of Systems   VITALS: BP 98/52 (BP Location: Right arm)   Pulse 65   Temp 98  F (36.7  C) (Oral)   Resp 18   Ht 1.549 m (5' 1\")   Wt 78 kg (172 lb)   LMP  (LMP Unknown)   SpO2 96%   BMI 32.50 kg/m    BMI: Body mass index is 32.5 kg/m .  Wt Readings from Last 3 Encounters:   02/24/25 78 " kg (172 lb)   02/24/25 79.8 kg (176 lb)   02/05/25 79.8 kg (176 lb)     No intake or output data in the 24 hours ending 02/25/25 1012  General Appearance:   no distress, normal body habitus   ENT/Mouth: membranes moist, no oral lesions or bleeding gums.      EYES:  no scleral icterus, normal conjunctivae   Neck: no carotid bruits or thyromegaly   Chest/Lungs:   lungs are clear to auscultation   Cardiovascular:   Regular. Normal first and second heart sounds with no murmur no edema bilaterally    Abdomen:  no organomegaly, masses, bruits, or tenderness; bowel sounds are present   Extremities: no cyanosis or clubbing   Skin: no xanthelasma, warm.    Neurologic: normal  bilateral, no tremors     Psychiatric: alert and oriented x3, calm     Review Of Systems  Skin: negative  Eyes: negative  Ears/Nose/Throat: negative  Respiratory: No shortness of breath, dyspnea on exertion, cough, or hemoptysis  Cardiovascular: negative  Gastrointestinal: negative  Genitourinary: negative  Musculoskeletal: negative  Neurologic: negative  Psychiatric: negative  Hematologic/Lymphatic/Immunologic: negative  Endocrine: negative          Lab Results    Chemistry/lipid CBC Cardiac Enzymes/BNP/TSH/INR   Recent Labs   Lab Test 02/05/25  1101   CHOL 162   HDL 44*   LDL 85   TRIG 165*     Recent Labs   Lab Test 02/05/25  1101 02/01/24  1054 01/31/23  1428   LDL 85 93 84     Recent Labs   Lab Test 02/25/25  0613      POTASSIUM 4.0   CHLORIDE 105   CO2 29   *   BUN 25.5*   CR 0.82   GFRESTIMATED 77   JEREMY 9.8     Recent Labs   Lab Test 02/25/25  0613 02/24/25 2024 02/05/25  1101   CR 0.82 0.90 0.81     Recent Labs   Lab Test 02/05/25  1101 02/01/24  1054 09/12/23  1628   A1C 5.8* 5.3 5.5          Recent Labs   Lab Test 02/25/25  0613   WBC 8.1   HGB 14.2   HCT 41.8   MCV 85        Recent Labs   Lab Test 02/25/25  0613 02/24/25 2024 02/05/25  1101   HGB 14.2 14.7 14.9    Recent Labs   Lab Test 12/09/20  1515 12/09/20  1238    TROPONINI <0.01 <0.01     Recent Labs   Lab Test 02/24/25 2024   NTBNPI 68     Recent Labs   Lab Test 02/05/25  1101   TSH 1.96     Recent Labs   Lab Test 02/24/25 2024 12/09/20  1238   INR 0.95 0.96        Medical History  Surgical History Family History Social History   Past Medical History:   Diagnosis Date    Abdominal pain, unspecified site     Created by Conversion     Arthritis 2/7/2024    Asymptomatic varicose veins     Created by Conversion     Blastomycosis     Created by Conversion     Calculus of kidney     Created by Conversion     Endometrial hyperplasia, unspecified     Created by Conversion     Hyperparathyroidism, unspecified     Created by Conversion     Lattice degeneration     Leiomyoma of uterus, unspecified     Created by Conversion     Myalgia and myositis, unspecified     Created by Conversion     Nausea alone     Created by Conversion     Osteoporosis, unspecified     Created by Conversion     Other nonspecific finding on examination of urine     Created by Conversion     Pain in joint, site unspecified     Created by Conversion     Restless legs syndrome (RLS)     Created by Conversion     Senile osteoporosis     Created by Conversion     Ulcerative colitis, unspecified     Created by Conversion     Unspecified constipation     Created by Conversion     Unspecified sinusitis (chronic)     Created by Conversion     Unspecified symptom associated with female genital organs     Created by Conversion     Unspecified vitamin D deficiency     Created by Conversion      Past Surgical History:   Procedure Laterality Date    CATARACT IOL, RT/LT      KIDNEY STONE SURGERY  01/01/1999    removal    OTHER SURGICAL HISTORY Bilateral     venous closure    PHACOEMULSIFICATION WITH STANDARD INTRAOCULAR LENS IMPLANT Right 07/25/2024    Procedure: RIGHT EYE PHACOEMULSIFICATION, CATARACT, WITH STANDARD INTRAOCULAR LENS IMPLANT INSERTION;  Surgeon: Anaid Sloan MD;  [place] [facility] [place]     PHACOEMULSIFICATION WITH STANDARD INTRAOCULAR LENS IMPLANT Left 8/8/2024    Procedure: LEFT EYE PHACOEMULSIFICATION, CATARACT, WITH STANDARD INTRAOCULAR LENS IMPLANT INSERTION;  Surgeon: Anaid Sloan MD;  Location: [facility] OR    TUBAL LIGATION  01/01/1987    WISDOM TOOTH EXTRACTION  01/01/1980     Family History   Problem Relation Age of Onset    Breast Cancer Other     Emphysema Mother     Varicose Veins Mother     Edema Mother     Lung Cancer Father     Breast Cancer Niece         Early 20's    Asthma Grandchild     Diabetes Brother     Glaucoma No family hx of     Macular Degeneration No family hx of         Social History     Socioeconomic History    Marital status:      Spouse name: Not on file    Number of children: Not on file    Years of education: Not on file    Highest education level: Not on file   Occupational History    Not on file   Tobacco Use    Smoking status: Never     Passive exposure: Never    Smokeless tobacco: Never    Tobacco comments:     in HS and 20's socail only, never pack a day smoker   Vaping Use    Vaping status: Never Used   Substance and Sexual Activity    Alcohol use: Yes     Alcohol/week: 1.7 standard drinks of alcohol    Drug use: No    Sexual activity: Not Currently     Comment:     Other Topics Concern    Not on file   Social History Narrative    .  2 kids.  [employer] .     Social Drivers of Health     Financial Resource Strain: Low Risk  (2/2/2025)    Financial Resource Strain     Within the past 12 months, have you or your family members you live with been unable to get utilities (heat, electricity) when it was really needed?: No   Food Insecurity: High Risk (2/2/2025)    Food Insecurity     Within the past 12 months, did you worry that your food would run out before you got money to buy more?: No     Within the past 12 months, did the food you bought just not last and you didn t have money to get more?: Yes   Transportation  Needs: Low Risk  (2/2/2025)    Transportation Needs     Within the past 12 months, has lack of transportation kept you from medical appointments, getting your medicines, non-medical meetings or appointments, work, or from getting things that you need?: No   Physical Activity: Insufficiently Active (2/2/2025)    Exercise Vital Sign     Days of Exercise per Week: 2 days     Minutes of Exercise per Session: 40 min   Stress: Stress Concern Present (2/2/2025)    [employer] [place] [employer] - Occupational Stress Questionnaire     Feeling of Stress : To some extent   Social Connections: Unknown (2/2/2025)    Social Connection and Isolation Panel [NHANES]     Frequency of Communication with Friends and Family: Not on file     Frequency of Social Gatherings with Friends and Family: Once a week     Attends [employer] Services: Not on file     Active Member of Clubs or Organizations: Not on file     Attends Club or Organization Meetings: Not on file     Marital Status: Not on file   Interpersonal Safety: Low Risk  (2/5/2025)    Interpersonal Safety     Do you feel physically and emotionally safe where you currently live?: Yes     Within the past 12 months, have you been hit, slapped, kicked or otherwise physically hurt by someone?: No     Within the past 12 months, have you been humiliated or emotionally abused in other ways by your partner or ex-partner?: No   Housing Stability: Low Risk  (2/2/2025)    Housing Stability     Do you have housing? : Yes     Are you worried about losing your housing?: No         Medications  Allergies   Current Outpatient Medications   Medication Sig Dispense Refill    acetaminophen (TYLENOL) 325 MG tablet Take 325-650 mg by mouth every 6 hours as needed for mild pain      atorvastatin (LIPITOR) 40 MG tablet TAKE 1 TABLET BY MOUTH ONE TIME DAILY 90 tablet 0    calcium citrate-vitamin D (CITRACAL) 200-6.25 MG-MCG TABS per tablet Take 2 tablets by mouth every evening. With dinner       cholecalciferol 50 MCG (2000 UT) tablet Take 2 tablets by mouth every evening. With dinner      denosumab (PROLIA) 60 MG/ML SOSY injection Inject 60 mg Subcutaneous every 6 months      fluocinonide (LIDEX) 0.05 % external solution Apply and massage 1 application topically onto itchy spots on scalp at bedtime for 14 days, then as needed for itching*      hypromellose (GENTEAL SEVERE) ophthalmic gel 0.3% Place 1 drop into both eyes nightly as needed for dry eyes.      omeprazole (PRILOSEC) 20 MG DR capsule Take 20 mg by mouth daily. Before a meal      Propylene Glycol (SYSTANE BALANCE OP) Apply 1 drop to eye as needed.          Allergies   Allergen Reactions    Comtrex Dizziness     Acet/dextromethorphan/phenylephrine    Other (Do Not Use)          Mone Ta MD

## 2025-02-27 ENCOUNTER — INFUSION THERAPY VISIT (OUTPATIENT)
Dept: INFUSION THERAPY | Facility: CLINIC | Age: 70
End: 2025-02-27
Attending: INTERNAL MEDICINE
Payer: COMMERCIAL

## 2025-02-27 VITALS
WEIGHT: 175.7 LBS | SYSTOLIC BLOOD PRESSURE: 122 MMHG | TEMPERATURE: 98.2 F | HEIGHT: 61 IN | DIASTOLIC BLOOD PRESSURE: 78 MMHG | BODY MASS INDEX: 33.17 KG/M2 | RESPIRATION RATE: 20 BRPM | OXYGEN SATURATION: 96 % | HEART RATE: 77 BPM

## 2025-02-27 DIAGNOSIS — M81.0 SENILE OSTEOPOROSIS: Primary | ICD-10-CM

## 2025-02-27 PROCEDURE — 250N000011 HC RX IP 250 OP 636: Performed by: INTERNAL MEDICINE

## 2025-02-27 PROCEDURE — 258N000003 HC RX IP 258 OP 636: Performed by: INTERNAL MEDICINE

## 2025-02-27 RX ORDER — HEPARIN SODIUM,PORCINE 10 UNIT/ML
5-20 VIAL (ML) INTRAVENOUS DAILY PRN
OUTPATIENT
Start: 2026-02-27

## 2025-02-27 RX ORDER — ZOLEDRONIC ACID 0.05 MG/ML
5 INJECTION, SOLUTION INTRAVENOUS ONCE
Start: 2026-02-27

## 2025-02-27 RX ORDER — ACETAMINOPHEN 325 MG/1
650 TABLET ORAL
OUTPATIENT
Start: 2026-02-27

## 2025-02-27 RX ORDER — ZOLEDRONIC ACID 0.05 MG/ML
5 INJECTION, SOLUTION INTRAVENOUS ONCE
Status: COMPLETED | OUTPATIENT
Start: 2025-02-27 | End: 2025-02-27

## 2025-02-27 RX ORDER — DIPHENHYDRAMINE HYDROCHLORIDE 50 MG/ML
50 INJECTION INTRAMUSCULAR; INTRAVENOUS
Start: 2026-02-27

## 2025-02-27 RX ORDER — DIPHENHYDRAMINE HYDROCHLORIDE 50 MG/ML
25 INJECTION INTRAMUSCULAR; INTRAVENOUS
Start: 2026-02-27

## 2025-02-27 RX ORDER — METHYLPREDNISOLONE SODIUM SUCCINATE 40 MG/ML
40 INJECTION INTRAMUSCULAR; INTRAVENOUS
Start: 2026-02-27

## 2025-02-27 RX ORDER — MEPERIDINE HYDROCHLORIDE 25 MG/ML
25 INJECTION INTRAMUSCULAR; INTRAVENOUS; SUBCUTANEOUS
OUTPATIENT
Start: 2026-02-27

## 2025-02-27 RX ORDER — EPINEPHRINE 1 MG/ML
0.3 INJECTION, SOLUTION INTRAMUSCULAR; SUBCUTANEOUS EVERY 5 MIN PRN
OUTPATIENT
Start: 2026-02-27

## 2025-02-27 RX ORDER — ALBUTEROL SULFATE 0.83 MG/ML
2.5 SOLUTION RESPIRATORY (INHALATION)
OUTPATIENT
Start: 2026-02-27

## 2025-02-27 RX ORDER — ALBUTEROL SULFATE 90 UG/1
1-2 INHALANT RESPIRATORY (INHALATION)
Start: 2026-02-27

## 2025-02-27 RX ORDER — HEPARIN SODIUM (PORCINE) LOCK FLUSH IV SOLN 100 UNIT/ML 100 UNIT/ML
5 SOLUTION INTRAVENOUS
OUTPATIENT
Start: 2026-02-27

## 2025-02-27 RX ADMIN — SODIUM CHLORIDE 250 ML: 9 INJECTION, SOLUTION INTRAVENOUS at 12:40

## 2025-02-27 RX ADMIN — ZOLEDRONIC ACID 5 MG: 0.05 INJECTION, SOLUTION INTRAVENOUS at 12:47

## 2025-02-27 ASSESSMENT — ANXIETY QUESTIONNAIRES
4. TROUBLE RELAXING: NOT AT ALL
GAD7 TOTAL SCORE: 5
1. FEELING NERVOUS, ANXIOUS, OR ON EDGE: SEVERAL DAYS
7. FEELING AFRAID AS IF SOMETHING AWFUL MIGHT HAPPEN: SEVERAL DAYS
5. BEING SO RESTLESS THAT IT IS HARD TO SIT STILL: NOT AT ALL
7. FEELING AFRAID AS IF SOMETHING AWFUL MIGHT HAPPEN: SEVERAL DAYS
2. NOT BEING ABLE TO STOP OR CONTROL WORRYING: MORE THAN HALF THE DAYS
GAD7 TOTAL SCORE: 5
8. IF YOU CHECKED OFF ANY PROBLEMS, HOW DIFFICULT HAVE THESE MADE IT FOR YOU TO DO YOUR WORK, TAKE CARE OF THINGS AT HOME, OR GET ALONG WITH OTHER PEOPLE?: SOMEWHAT DIFFICULT
IF YOU CHECKED OFF ANY PROBLEMS ON THIS QUESTIONNAIRE, HOW DIFFICULT HAVE THESE PROBLEMS MADE IT FOR YOU TO DO YOUR WORK, TAKE CARE OF THINGS AT HOME, OR GET ALONG WITH OTHER PEOPLE: SOMEWHAT DIFFICULT
GAD7 TOTAL SCORE: 5
6. BECOMING EASILY ANNOYED OR IRRITABLE: NOT AT ALL
3. WORRYING TOO MUCH ABOUT DIFFERENT THINGS: SEVERAL DAYS

## 2025-02-27 NOTE — PROGRESS NOTES
Infusion Nursing Note:  Soco Ferguson presents today for Reclast.    Patient seen by provider today: No   present during visit today: Not Applicable.    Note: Oriented patient to infusion center.  Education handout reviewed and given to patient.  Patient taking vitamin D and Calcium. She does not have any invasive dental procedures coming up.  Per pt, she will let dentist know that she is getting yearly Reclast.        Intravenous Access:  Peripheral IV placed.    Treatment Conditions:  Lab Results   Component Value Date    HGB 14.2 02/25/2025    WBC 8.1 02/25/2025    ANEUTAUTO 6.7 02/24/2025     02/25/2025        Lab Results   Component Value Date     02/25/2025    POTASSIUM 4.0 02/25/2025    MAG 2.1 09/12/2023    CR 0.82 02/25/2025    JEREMY 9.8 02/25/2025    BILITOTAL 0.5 02/24/2025    ALBUMIN 4.4 02/24/2025    ALT 53 (H) 02/24/2025    AST 39 02/24/2025       Results reviewed, labs MET treatment parameters, ok to proceed with treatment.      Post Infusion Assessment:  Patient tolerated infusion without incident.  Blood return noted pre and post infusion.  Site patent and intact, free from redness, edema or discomfort.  No evidence of extravasations.  Access discontinued per protocol.       Discharge Plan:   Discharge instructions reviewed with: Patient.  Patient and/or family verbalized understanding of discharge instructions and all questions answered.  AVS to patient via [employer].  Patient will return in 1 year for next appointment.   Patient discharged in stable condition accompanied by: self.  Departure Mode: Ambulatory.      Marlin Richards RN

## 2025-02-28 ENCOUNTER — TRANSFERRED RECORDS (OUTPATIENT)
Dept: HEALTH INFORMATION MANAGEMENT | Facility: CLINIC | Age: 70
End: 2025-02-28

## 2025-03-04 ENCOUNTER — OFFICE VISIT (OUTPATIENT)
Dept: INTERNAL MEDICINE | Facility: CLINIC | Age: 70
End: 2025-03-04
Payer: COMMERCIAL

## 2025-03-04 ENCOUNTER — TELEPHONE (OUTPATIENT)
Dept: INTERNAL MEDICINE | Facility: CLINIC | Age: 70
End: 2025-03-04

## 2025-03-04 VITALS
DIASTOLIC BLOOD PRESSURE: 60 MMHG | RESPIRATION RATE: 16 BRPM | BODY MASS INDEX: 33.74 KG/M2 | WEIGHT: 178.7 LBS | TEMPERATURE: 97.9 F | HEART RATE: 82 BPM | HEIGHT: 61 IN | OXYGEN SATURATION: 96 % | SYSTOLIC BLOOD PRESSURE: 120 MMHG

## 2025-03-04 DIAGNOSIS — K51.20 CHRONIC ULCERATIVE PROCTITIS WITHOUT COMPLICATIONS (H): ICD-10-CM

## 2025-03-04 DIAGNOSIS — M81.0 SENILE OSTEOPOROSIS: ICD-10-CM

## 2025-03-04 DIAGNOSIS — K27.9 PEPTIC ULCER: ICD-10-CM

## 2025-03-04 DIAGNOSIS — R73.03 PREDIABETES: ICD-10-CM

## 2025-03-04 DIAGNOSIS — E78.2 MIXED HYPERLIPIDEMIA: ICD-10-CM

## 2025-03-04 DIAGNOSIS — R30.0 DYSURIA: ICD-10-CM

## 2025-03-04 DIAGNOSIS — I20.0 UNSTABLE ANGINA PECTORIS (H): ICD-10-CM

## 2025-03-04 DIAGNOSIS — R91.8 PULMONARY NODULES: ICD-10-CM

## 2025-03-04 DIAGNOSIS — Z09 HOSPITAL DISCHARGE FOLLOW-UP: Primary | ICD-10-CM

## 2025-03-04 PROBLEM — M79.89 LEG SWELLING: Status: RESOLVED | Noted: 2020-02-13 | Resolved: 2025-03-04

## 2025-03-04 PROBLEM — R68.84 JAW PAIN: Status: RESOLVED | Noted: 2025-02-24 | Resolved: 2025-03-04

## 2025-03-04 PROBLEM — M54.2 NECK PAIN: Status: RESOLVED | Noted: 2025-02-24 | Resolved: 2025-03-04

## 2025-03-04 PROBLEM — R10.33 PERIUMBILICAL PAIN: Status: RESOLVED | Noted: 2025-02-24 | Resolved: 2025-03-04

## 2025-03-04 PROBLEM — R06.00 DYSPNEA, UNSPECIFIED TYPE: Status: RESOLVED | Noted: 2025-02-24 | Resolved: 2025-03-04

## 2025-03-04 PROBLEM — K29.70 GASTRITIS: Status: RESOLVED | Noted: 2022-06-10 | Resolved: 2025-03-04

## 2025-03-04 PROBLEM — M54.9 BACKACHE: Status: RESOLVED | Noted: 2025-02-24 | Resolved: 2025-03-04

## 2025-03-04 LAB
ALBUMIN UR-MCNC: NEGATIVE MG/DL
APPEARANCE UR: CLEAR
BILIRUB UR QL STRIP: NEGATIVE
COLOR UR AUTO: YELLOW
GLUCOSE UR STRIP-MCNC: NEGATIVE MG/DL
HGB UR QL STRIP: ABNORMAL
KETONES UR STRIP-MCNC: NEGATIVE MG/DL
LEUKOCYTE ESTERASE UR QL STRIP: ABNORMAL
NITRATE UR QL: NEGATIVE
PH UR STRIP: 5.5 [PH] (ref 5–8)
RBC #/AREA URNS AUTO: NORMAL /HPF
SP GR UR STRIP: 1.02 (ref 1–1.03)
UROBILINOGEN UR STRIP-ACNC: 0.2 E.U./DL
WBC #/AREA URNS AUTO: NORMAL /HPF

## 2025-03-04 PROCEDURE — 81001 URINALYSIS AUTO W/SCOPE: CPT | Performed by: INTERNAL MEDICINE

## 2025-03-04 NOTE — PROGRESS NOTES
"  Assessment & Plan     Hospital discharge follow-up  Date of Admission:  2/24/2025  Date of Discharge:  2/25/2025    Presented with intermittent episodes of dyspnea on exertion and chest pain both with exertion and at rest since summer 2024, with frequency of up to several episodes per day. Chest pain localized to left chest and does have area that is reproducible to palpation. Also reported increased fatigue. Recently had abnormal nuclear medicine stress test on 2/21/2025 revealing \"a small area of nontransmural infarction in the distal anterior segment(s) of the left ventricle associated with a mild degree of jac-infarct ischemia.\"; LVEF at stress 68%. Most recent CTA coronaries 1/2021 showed mild nonobstructive CAD. Troponin T x 2 negative. ECG showed sinus rhythm with known LBBB, no obvious acute ischemic changes. Patient denied prior history of MI or other cardiac disorders.     Patient was initiated on heparin drip out of concern for unstable angina, with initial plan for Cardiology evaluation and transfer to [facility] for cardiac catheterization. However, Cardiology recommended further evaluation with CT coronary angiogram and echocardiogram. CT coronary angiogram showed grossly stable mild nonobstructive CAD compared to prior. Echocardiogram showed LVEF 55-60% with no wall motion abnormalities or other areas of concern. Cardiology recommended discontinuing heparin drip and felt there is no urgent need for catheterization procedure given stable findings on the above imaging studies, so patient can discharge home. Patient will continue on statin therapy and was initiated on aspirin as tolerated (could consider discontinuing aspirin if GI specialist/PCP feels it is absolutely contraindicated with patient's ulcerative colitis). Discussed the above results with patient and answered questions as appropriate. Patient feels well and would like to discharge home. She already has a PCP appointment " "scheduled for next week.     Incidental CT findings  On CTA angiogram 2/25/2025, the following was noted: \"Left lower lobe pulmonary nodules measure up to 6 mm. Recommend dedicated chest CT for further evaluation of the pulmonary nodules and to appropriately direct follow-up/management.\"   - Outpatient chest CT recommended    Unstable angina pectoris (H)  CT coronary angiogram showed grossly stable mild nonobstructive CAD compared to prior. Echocardiogram showed LVEF 55-60% with no wall motion abnormalities or other areas of concern. Cardiology recommended discontinuing heparin drip and felt there is no urgent need for catheterization procedure given stable findings on the above imaging studies, so patient can discharge home. Patient will continue on statin therapy     She had rectal bleeding with ulcerative proctitis and was not able to tolerate the aspirin. She also has h/o peptic ulcer.    Pulmonary nodules  She will schedule the CT.  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 She also has h/o peptic ulcer.  Stable, she had colonoscopy in 2022 which showed quiescent ulcerative proctitis. No active treatment. They recommended colonoscopy again in 10 years.   Last two EGDs in 2022 and 2023 did not show the ulcer. She is on omeprazole.     Senile osteoporosis  She was treated with Prolia [id number], Forteo [id number] and 9/2019-12/2020. She stopped Forteo 12/2020, since completed 2 years of treatment. On Prolia again since 2021.      DXA scan 9/2024 showed stable osteopenia. We switched to Reclast and she had first infusion 2/27/25 and tolerated it well.    Prediabetes  Component      Latest Ref Rng 2/5/2025  11:01 AM   Estimated Average Glucose      <117 mg/dL 120 (H)    Hemoglobin A1C      0.0 - 5.6 % 5.8 (H)       Low glucose " "diet recommended, as also weight loss and regular exercise.    Dysuria  She had positive UCX in 2/5/25, but her pharmacy never provided the prescribed medication.  She does not have significant symptoms now and we will recheck the urine today.  - UA with Microscopic reflex to Culture - Clinic Collect; Future  - UA with Microscopic reflex to Culture - Clinic Collect        MED REC REQUIRED  Post Medication Reconciliation Status: discharge medications reconciled, continue medications without change  BMI  Estimated body mass index is 33.79 kg/m  as calculated from the following:    Height as of this encounter: 1.549 m (5' 0.98\").    Weight as of this encounter: 81.1 kg (178 lb 11.2 oz).             Etelvina Crook is a 69 year old, presenting for the following health issues:  Follow Up (F/U)      3/4/2025    12:27 PM   Additional Questions   Roomed by Desiree     History of Present Illness       Mental Health Follow-up:  Patient presents to follow-up on Depression & Anxiety.Patient's depression since last visit has been:  Good  The patient is not having other symptoms associated with depression.  Patient's anxiety since last visit has been:  Good  The patient is not having other symptoms associated with anxiety.  Any significant life events: No  Patient is not feeling anxious or having panic attacks.  Patient has no concerns about alcohol or drug use.    Vascular Disease:  She presents for follow up of vascular disease.     She never takes nitroglycerin. She is not taking daily aspirin.    She eats 2-3 servings of fruits and vegetables daily.She consumes 0 sweetened beverage(s) daily.She exercises with enough effort to increase her heart rate 9 or less minutes per day.  She exercises with enough effort to increase her heart rate 3 or less days per week. She is missing 1 dose(s) of medications per week.  She is not taking prescribed medications regularly due to remembering to take.            Hospital Follow-up " "Visit:    Hospital/Nursing Home/IP Rehab Facility: [facility]  Date of Admission: 2/24/25  Date of Discharge: 2/25/25  Reason(s) for Admission: CASTRO  Was the patient in the ICU or did the patient experience delirium during hospitalization?  No  Do you have any other stressors you would like to discuss with your provider? No    Problems taking medications regularly:  None  Medication changes since discharge: None  Problems adhering to non-medication therapy:  None    Summary of hospitalization:  [facility] discharge summary reviewed  Diagnostic Tests/Treatments reviewed.  Follow up needed: none  Other Healthcare Providers Involved in Patient s Care:         None  Update since discharge: improved.         Plan of care communicated with patient                     Objective    /60   Pulse 82   Temp 97.9  F (36.6  C) (Temporal)   Resp 16   Ht 1.549 m (5' 0.98\")   Wt 81.1 kg (178 lb 11.2 oz)   LMP  (LMP Unknown)   SpO2 96%   BMI 33.79 kg/m    Body mass index is 33.79 kg/m .  Physical Exam   Constitutional:  oriented to person, place, and time, appears well-nourished. No distress.   HENT:   Head: Normocephalic.   Mouth/Throat: Oropharynx is clear and moist.   Eyes: Conjunctivae are normal. Pupils are equal, round, and reactive to light.   Neck: Normal range of motion. Neck supple.   Cardiovascular: Normal rate, regular rhythm and normal heart sounds.    Pulmonary/Chest: Effort normal and breath sounds normal.   Abdominal: Soft. Bowel sounds are normal.   Musculoskeletal: Normal range of motion.   Neurological: alert and oriented to person, place, and time. Skin: Skin is warm.   Psychiatric: normal mood and affect.             Signed Electronically by: Jorge Blanton MD    "

## 2025-03-04 NOTE — TELEPHONE ENCOUNTER
"*Patient is wondering if the nodule could be the cause of her shortness of breath, I told her it could be but I would inquire further. She also asked if this was a new finding as opposed to the scarring on her lungs they found in 2020. I told her that these are two new nodules that we need further imaging  on which was ordered by Dr. BUCIO today.    2020:    1. H/o blastomycosis pneumonia more than 10 years ago. No shortness of breath, cough or chronic respiratory issues. Last CT chest and lung biopsy was done in 2010. Lung biopsy showed caseating granuloma in the left lower lobe. Patient was told that will stay as a scar on her lungs.     2025  Incidental CT findings  On CTA angiogram 2/25/2025, the following was noted: \"Left lower lobe pulmonary nodules measure up to 6 mm. Recommend dedicated chest CT for further evaluation of the pulmonary nodules and to appropriately direct follow-up/management.\"   - Outpatient chest CT recommended  "

## 2025-03-04 NOTE — TELEPHONE ENCOUNTER
Test Results    Contacts       Contact Date/Time Type Contact Phone/Fax    03/04/2025 01:53 PM CST Phone (Incoming) Ambreen Ferguson (Self) [phone] ()            Who ordered the test:  Mone Ta    Type of test: DETAILED [facility] EXTRACARDIAC OVERREAD OF CARDIAC CT     Date of test:  02/25/2025    Where was the test performed:  [facility]     What are your questions/concerns?:  There are 2 pulmonary nodules in the left lower lobe with the largest measuring 6 mm on series 3 image 27. Benign calcified granuloma in the left lower lobe     Could we send this information to you in [employer] or would you prefer to receive a phone call?:   Patient would prefer a phone call     Okay to leave a detailed message?: Yes at Cell number on file:    Telephone Information:   Mobile [phone]     Kassandra Hall

## 2025-03-05 LAB — BACTERIA UR CULT: NORMAL

## 2025-03-08 ENCOUNTER — OFFICE VISIT (OUTPATIENT)
Dept: URGENT CARE | Facility: URGENT CARE | Age: 70
End: 2025-03-08
Payer: COMMERCIAL

## 2025-03-08 VITALS
DIASTOLIC BLOOD PRESSURE: 76 MMHG | HEART RATE: 89 BPM | SYSTOLIC BLOOD PRESSURE: 134 MMHG | WEIGHT: 170 LBS | RESPIRATION RATE: 16 BRPM | BODY MASS INDEX: 32.14 KG/M2 | OXYGEN SATURATION: 97 % | TEMPERATURE: 98 F

## 2025-03-08 DIAGNOSIS — R07.0 THROAT PAIN: ICD-10-CM

## 2025-03-08 DIAGNOSIS — J02.0 ACUTE STREPTOCOCCAL PHARYNGITIS: Primary | ICD-10-CM

## 2025-03-08 LAB — DEPRECATED S PYO AG THROAT QL EIA: POSITIVE

## 2025-03-08 PROCEDURE — 3075F SYST BP GE 130 - 139MM HG: CPT | Performed by: FAMILY MEDICINE

## 2025-03-08 PROCEDURE — 99213 OFFICE O/P EST LOW 20 MIN: CPT | Performed by: FAMILY MEDICINE

## 2025-03-08 PROCEDURE — 87880 STREP A ASSAY W/OPTIC: CPT | Performed by: FAMILY MEDICINE

## 2025-03-08 PROCEDURE — 3078F DIAST BP <80 MM HG: CPT | Performed by: FAMILY MEDICINE

## 2025-03-08 RX ORDER — PENICILLIN V POTASSIUM 500 MG/1
500 TABLET, FILM COATED ORAL 3 TIMES DAILY
Qty: 30 TABLET | Refills: 0 | Status: SHIPPED | OUTPATIENT
Start: 2025-03-08 | End: 2025-03-18

## 2025-03-08 RX ORDER — LIDOCAINE HYDROCHLORIDE 20 MG/ML
15 SOLUTION OROPHARYNGEAL
Qty: 100 ML | Refills: 2 | Status: SHIPPED | OUTPATIENT
Start: 2025-03-08

## 2025-03-08 NOTE — PATIENT INSTRUCTIONS
Drink ice-cold liquids.      Take Tylenol for the sore throat pain.      Follow up if not better in 7-10 days.

## 2025-03-08 NOTE — PROGRESS NOTES
SUBJECTIVE:  Soco Ferguson is a 69 year old female with a chief complaint of four days of worsening sore throat and white stuff at the back of the throat over the past four days  Onset of symptoms was October 2024.    Course of illness: worsening.  Severity severe.   Current and Associated symptoms: as listed above.  No high fevers.    Pain worse with swallowing  Treatment measures tried include Gatorade, Tylenol, Delsym cough syrup.    .  Predisposing factors include no sick contacts with sore throat.    She has noticed postnasal drainage for the past few weeks.          Past Medical History:   Diagnosis Date    Abdominal pain, unspecified site     Created by Conversion     Arthritis 2/7/2024    Asymptomatic varicose veins     Created by Conversion     Blastomycosis     Created by Conversion     Calculus of kidney     Created by Conversion     Endometrial hyperplasia, unspecified     Created by Conversion     Hyperparathyroidism, unspecified     Created by Conversion     Lattice degeneration     Leiomyoma of uterus, unspecified     Created by Conversion     Myalgia and myositis, unspecified     Created by Conversion     Nausea alone     Created by Conversion     Osteoporosis, unspecified     Created by Conversion     Other nonspecific finding on examination of urine     Created by Conversion     Pain in joint, site unspecified     Created by Conversion     Restless legs syndrome (RLS)     Created by Conversion     Senile osteoporosis     Created by Conversion     Ulcerative colitis, unspecified     Created by Conversion     Unspecified constipation     Created by Conversion     Unspecified sinusitis (chronic)     Created by Conversion     Unspecified symptom associated with female genital organs     Created by Conversion     Unspecified vitamin D deficiency     Created by Conversion      Current Outpatient Medications   Medication Sig Dispense Refill    acetaminophen (TYLENOL) 325 MG tablet Take 325-650 mg by  mouth every 6 hours as needed for mild pain      atorvastatin (LIPITOR) 40 MG tablet TAKE 1 TABLET BY MOUTH ONE TIME DAILY 90 tablet 0    calcium citrate-vitamin D (CITRACAL) 200-6.25 MG-MCG TABS per tablet Take 2 tablets by mouth every evening. With dinner      cholecalciferol 50 MCG (2000 UT) tablet Take 2 tablets by mouth every evening. With dinner      fluocinonide (LIDEX) 0.05 % external solution Apply and massage 1 application topically onto itchy spots on scalp at bedtime for 14 days, then as needed for itching*      hypromellose (GENTEAL SEVERE) ophthalmic gel 0.3% Place 1 drop into both eyes nightly as needed for dry eyes.      omeprazole (PRILOSEC) 20 MG DR capsule Take 20 mg by mouth daily. Before a meal      Propylene Glycol (SYSTANE BALANCE OP) Apply 1 drop to eye as needed.       Social History     Tobacco Use    Smoking status: Never     Passive exposure: Never    Smokeless tobacco: Never    Tobacco comments:     in HS and 20's socail only, never pack a day smoker   Substance Use Topics    Alcohol use: Yes     Alcohol/week: 1.7 standard drinks of alcohol       ROS:  CONSTITUTIONAL:negative for high fevers.   ENT/MOUTH:  positive for sore throat.   RESP: negative for cough.     OBJECTIVE:   /76 (BP Location: Right arm, Patient Position: Sitting, Cuff Size: Adult Regular)   Pulse 89   Temp 98  F (36.7  C) (Tympanic)   Resp 16   Wt 77.1 kg (170 lb)   LMP  (LMP Unknown)   SpO2 97%   BMI 32.14 kg/m    GENERAL APPEARANCE: healthy, alert and is in pain.   HENT:   Nose:  turbinates are moderate edematous.  Mouth:  oropharynx is erythematous with right-sided tonsillar exudates present. Uvula is midline.   NECK: Neck is supple.  There are tender enlarged submandibular lymph nodes present.    Rapid Strep test is positive    ASSESSMENT:   Throat pain   Acute Streptococcal Pharyngitis    PLAN:   Rx:  Penicillin VK  Rx:  Viscous Lidocaine.      Tylenol for the pain.     Ice-cold liquids    Follow up if  not better in 7-10 days.     Macario Joseph MD

## 2025-03-13 ENCOUNTER — HOSPITAL ENCOUNTER (OUTPATIENT)
Dept: CT IMAGING | Facility: CLINIC | Age: 70
Discharge: HOME | End: 2025-03-13
Attending: INTERNAL MEDICINE
Payer: COMMERCIAL

## 2025-03-13 DIAGNOSIS — R91.8 PULMONARY NODULES: ICD-10-CM

## 2025-03-13 PROCEDURE — 71250 CT THORAX DX C-: CPT

## 2025-03-25 DIAGNOSIS — E78.2 MIXED HYPERLIPIDEMIA: ICD-10-CM

## 2025-03-26 RX ORDER — ATORVASTATIN CALCIUM 40 MG/1
40 TABLET, FILM COATED ORAL
Qty: 90 TABLET | Refills: 2 | Status: SHIPPED | OUTPATIENT
Start: 2025-03-26

## 2025-04-16 ENCOUNTER — OFFICE VISIT (OUTPATIENT)
Dept: NEUROLOGY | Facility: CLINIC | Age: 70
End: 2025-04-16
Payer: COMMERCIAL

## 2025-04-16 VITALS
DIASTOLIC BLOOD PRESSURE: 72 MMHG | WEIGHT: 170 LBS | HEIGHT: 60 IN | BODY MASS INDEX: 33.38 KG/M2 | SYSTOLIC BLOOD PRESSURE: 122 MMHG | HEART RATE: 71 BPM

## 2025-04-16 DIAGNOSIS — M54.16 LUMBAR RADICULOPATHY: ICD-10-CM

## 2025-04-16 DIAGNOSIS — R20.2 RIGHT ARM AND RIGHT FACE TINGLING: ICD-10-CM

## 2025-04-16 DIAGNOSIS — M54.12 CERVICAL RADICULOPATHY: Primary | ICD-10-CM

## 2025-04-16 DIAGNOSIS — M54.2 NECK PAIN: ICD-10-CM

## 2025-04-16 PROCEDURE — 3074F SYST BP LT 130 MM HG: CPT | Performed by: PSYCHIATRY & NEUROLOGY

## 2025-04-16 PROCEDURE — 3078F DIAST BP <80 MM HG: CPT | Performed by: PSYCHIATRY & NEUROLOGY

## 2025-04-16 PROCEDURE — G2211 COMPLEX E/M VISIT ADD ON: HCPCS | Performed by: PSYCHIATRY & NEUROLOGY

## 2025-04-16 PROCEDURE — 99214 OFFICE O/P EST MOD 30 MIN: CPT | Performed by: PSYCHIATRY & NEUROLOGY

## 2025-04-16 RX ORDER — PREDNISONE 20 MG/1
60 TABLET ORAL DAILY
Qty: 18 TABLET | Refills: 0 | Status: SHIPPED | OUTPATIENT
Start: 2025-04-16 | End: 2025-04-22

## 2025-04-16 NOTE — NURSING NOTE
Chief Complaint   Patient presents with    Lumbar radiculopathy     Patient states when she bends down she has pain down her left side. Follow up     Kendy Henry on 4/16/2025 at 8:21 AM

## 2025-04-16 NOTE — LETTER
4/16/2025      Soco Ferguson  [address] [facility] [address]      Dear Colleague,    Thank you for referring your patient, Soco Ferguson, to the [facility] [place]. Please see a copy of my visit note below.    NEUROLOGY OUTPATIENT PROGRESS NOTE   Apr 16, 2025     CHIEF COMPLAINT/REASON FOR VISIT/REASON FOR CONSULT  Patient presents with:  Lumbar radiculopathy: Patient states when she bends down she has pain down her left side. Follow up    REASON FOR CONSULTATION-numbness/tingling    REFERRAL SOURCE  Dr. Jorge Blanton  CC Dr. Jorge Blanton    HISTORY OF PRESENT ILLNESS  Soco Ferguson is a 69 year old female seen today for evaluation of numbness/tingling.  She reports that the symptoms came on about a year ago.  She had gotten up from sleep and was in an awkward position.  Initially there was sharp shooting pain down the right leg.  There was no associated numbness.  This lasted for some time with spreading of the symptoms to the left leg.  She then had some intermittent numbness but no pain of the right arm and occasionally of the left arm.  The right leg symptoms and the left leg symptoms have resolved though she continues to have issues in the right arm and occasionally in the left arm.  The numbness is more towards the lateral portion of the hand.  Denies any weakness.  No significant neck pain or back pain.  She does have some history of arthritis in her low back has never had a scan officially done for the arthritis.  This was based on the scan done for osteoporosis.  Denies any difficulty with balance.  No dropping things with her hands.  Denies any cognitive issues.  She has had 1 episode of right cheek numbness.    2/20/24  Patient returns today.  She is stopped doing the exercises that she was doing at the gym where she was twisting her back lifting weights.  This has significantly helped with the symptoms.  Barely gets any numbness in her arms.  She is no  longer having pain in the leg.    8/21/24  Patient returns today.  Has recently had cataract surgery.  Has not been too active.  Since June she has been having some neck pain and that is leading to headaches.  This is not very severe but the symptoms are present every day.  Did do physical therapy which was helpful but has not been doing the exercises as she was concerned that it might make things worse.  Steroids did help initially.  Currently does not have any numbness or tingling except for rare right hand numbness.  No weakness in the hands.    4/16/25  Patient was urgently added today  1.  She reports that she was bending over to  something with her left hand when all of a sudden she heard a pop and a crack in her right shoulder and started as noticing some shooting pain down the right arm.  This is more of a sharp pain followed by dull pain.  No numbness or weakness in the hand.  Has also tried ice packs/heat.  Symptoms are slightly better.  Has been taking Tylenol.  Neck pain is still there.  2.  Was having intermittent cervicogenic headaches.  Thought that things will get better with cataract surgery but they have not  3.  Right leg is doing better no major back pain.  Does have back arthritis  No new concerns    Previous history is reviewed and this is unchanged.    PAST MEDICAL/SURGICAL HISTORY  Past Medical History:   Diagnosis Date     Abdominal pain, unspecified site     Created by Conversion      Arthritis 2/7/2024     Asymptomatic varicose veins     Created by Conversion      Blastomycosis     Created by Conversion      Calculus of kidney     Created by Conversion      Endometrial hyperplasia, unspecified     Created by Conversion      Hyperparathyroidism, unspecified     Created by Conversion      Lattice degeneration      Leiomyoma of uterus, unspecified     Created by Conversion      Myalgia and myositis, unspecified     Created by Conversion      Nausea alone     Created by Conversion       Osteoporosis, unspecified     Created by Conversion      Other nonspecific finding on examination of urine     Created by Conversion      Pain in joint, site unspecified     Created by Conversion      Restless legs syndrome (RLS)     Created by Conversion      Senile osteoporosis     Created by Conversion      Ulcerative colitis, unspecified     Created by Conversion      Unspecified constipation     Created by Conversion      Unspecified sinusitis (chronic)     Created by Conversion      Unspecified symptom associated with female genital organs     Created by Conversion      Unspecified vitamin D deficiency     Created by Conversion      Patient Active Problem List   Diagnosis     Blastomycosis     Restless legs syndrome     Senile osteoporosis     Ulcerative colitis (H)     Venous insufficiency of both lower extremities     Articular disc disorder of temporomandibular joint     Peptic ulcer     Mixed hyperlipidemia     Combined form of age-related cataract, both eyes     Unstable angina pectoris (H)     Gastro-esophageal reflux disease with esophagitis     Diaphragmatic hernia     Chronic ulcerative proctitis (H)     Nephrolithiasis     Esophageal pain     Arthralgia of temporomandibular joint     Pulmonary nodules     Prediabetes   Significant for migraines, depression, cataracts, osteoporosis, GERD, peripheral arterial disease    FAMILY HISTORY  Family History   Problem Relation Age of Onset     Breast Cancer Other      Emphysema Mother      Varicose Veins Mother      Edema Mother      Lung Cancer Father      Breast Cancer Niece         Early 20's     Asthma Grandchild      Diabetes Brother      Glaucoma No family hx of      Macular Degeneration No family hx of    Negative for neurological problems.    SOCIAL HISTORY  Social History     Tobacco Use     Smoking status: Never     Passive exposure: Never     Smokeless tobacco: Never     Tobacco comments:     in HS and 20's socail only, never pack a day smoker    Vaping Use     Vaping status: Never Used   Substance Use Topics     Alcohol use: Yes     Alcohol/week: 1.7 standard drinks of alcohol     Drug use: No       SYSTEMS REVIEW  Twelve-system ROS was done and other than the HPI this was negative/positive for arm and leg pain, joint pain, numbness/tingling, weakness paralysis, ringing in the ears, depression, stomach pain/distress.  No new symptoms/issues.    MEDICATIONS  Current Outpatient Medications   Medication Sig Dispense Refill     acetaminophen (TYLENOL) 325 MG tablet Take 325-650 mg by mouth every 6 hours as needed for mild pain       atorvastatin (LIPITOR) 40 MG tablet TAKE 1 TABLET BY MOUTH ONE TIME DAILY 90 tablet 2     calcium citrate-vitamin D (CITRACAL) 200-6.25 MG-MCG TABS per tablet Take 2 tablets by mouth every evening. With dinner       cholecalciferol 50 MCG (2000 UT) tablet Take 2 tablets by mouth every evening. With dinner       fluocinonide (LIDEX) 0.05 % external solution Apply and massage 1 application topically onto itchy spots on scalp at bedtime for 14 days, then as needed for itching*       hypromellose (GENTEAL SEVERE) ophthalmic gel 0.3% Place 1 drop into both eyes nightly as needed for dry eyes.       lidocaine, viscous, (XYLOCAINE) 2 % solution Swish and spit 15 mLs in mouth every 3 hours as needed for pain. ; Max 8 doses/24 hour period. 100 mL 2     omeprazole (PRILOSEC) 20 MG DR capsule Take 20 mg by mouth daily. Before a meal       predniSONE (DELTASONE) 20 MG tablet Take 3 tablets (60 mg) by mouth daily for 6 days. Take in AM with food. 18 tablet 0     Propylene Glycol (SYSTANE BALANCE OP) Apply 1 drop to eye as needed.       Current Facility-Administered Medications   Medication Dose Route Frequency Provider Last Rate Last Admin     denosumab (PROLIA) injection 60 mg  60 mg Subcutaneous Q6 Months    60 mg at 08/01/24 1251        PHYSICAL EXAMINATION  VITALS: /72   Pulse 71   Ht 1.524 m (5')   Wt 77.1 kg (170 lb)   LMP   (LMP Unknown)   BMI 33.20 kg/m    GENERAL: Healthy appearing, alert, no acute distress, normal habitus.  CARDIOVASCULAR: Extremities warm and well perfused. Pulses present.   NEUROLOGICAL:  Patient is awake and oriented to self, place and time.  Attention span is normal.  Memory is grossly intact.  Language is fluent and follows commands appropriately.  Appropriate fund of knowledge. Cranial nerves 2-12 are intact. There is no pronator drift.  Motor exam shows 5/5 strength in all extremities.  Tone is symmetric bilaterally in upper and lower extremities.  Reflexes are symmetric and 2+ in upper extremities and lower extremities. Sensory exam is grossly intact to light touch, pin prick and vibration.  Finger to nose and heel to shin is without dysmetria.  Romberg is negative.  Gait is normal and the patient is able to do tandem walk and walk on toes and heels.  Exam stable.    DIAGNOSTICS  MRI Brain 2017  CONCLUSION:  1.  No evidence for acute or subacute infarct, hemorrhage, mass or obstructive type hydrocephalus.  2.  No definite abnormality along either trigeminal nerve.  3.  No pathologic contrast enhancement.  4.  Mild mucosal thickening in the maxillary, ethmoid, and sphenoid sinuses. No air-fluid level in the sinuses. Mastoids clear.    EMG  Interpretation:  This is a normal EMG.  There is no evidence of any large fiber nerve injury to explain the patient's current tingling symptoms.  Specifically no findings of cervical radiculopathy or large fiber peripheral neuropathy.  EMG does not rule out small fiber neuropathy and clinical correlation is recommended.     RELEVANT LABS  Component      Latest Ref Rng 5/1/2023  9:30 AM 9/12/2023  4:28 PM   Sodium      136 - 145 mmol/L  140    Potassium      3.4 - 5.3 mmol/L  4.3    Chloride      98 - 107 mmol/L  102    Carbon Dioxide (CO2)      22 - 29 mmol/L  27    Anion Gap      7 - 15 mmol/L  11    Urea Nitrogen      8.0 - 23.0 mg/dL  24.6 (H)    Creatinine      0.51 -  0.95 mg/dL  0.86    Calcium      8.8 - 10.2 mg/dL  10.0    Glucose      70 - 99 mg/dL  100 (H)    Alkaline Phosphatase      35 - 104 U/L  92    AST      0 - 45 U/L  22    ALT      0 - 50 U/L  21    Protein Total      6.4 - 8.3 g/dL  7.8    Albumin      3.5 - 5.2 g/dL  4.7    Bilirubin Total      <=1.2 mg/dL  0.6    GFR Estimate      >60 mL/min/1.73m2  74    Hemoglobin A1C      0.0 - 5.6 % 5.4  5.5    Magnesium      1.7 - 2.3 mg/dL  2.1    TSH      0.30 - 4.20 uIU/mL  1.22       Legend:  (H) High    OUTSIDE RECORDS  Outside referral notes and chart notes were reviewed and pertinent information has been summarized (in addition to the HPI):-      EMG  CLINICAL INTERPRETATION:  This is an slightly abnormal nerve conduction and EMG study.  The study does not show any evidence of neuropathy.  The needle study shows mild spontaneous activity in the right L4-L5 muscle groups.  Further clinical correlation is needed.     MRI C spine  IMPRESSION:  1.  Multilevel spondylosis described above.     2.  C5-C6 moderate to severe right foraminal stenosis.     3.  C6-C7 moderate to severe left foraminal stenosis.     4.  No significant thecal sac stenosis.     5.  No abnormal cord signal.    MRI L spine  IMPRESSION:  1.  Transitional lumbosacral anatomy. S1-S2 well-developed disc space with bilateral S1 partial lumbarization.     2.  Mild multilevel spondylosis described above.     3.  No significant thecal sac stenosis.     4.  No significant neural foraminal stenosis.      IMPRESSION/REPORT/PLAN  Numbness in both upper extremities  Right leg pain  Suspected lumbar radiculopathy  Cervical neuroforaminal stenosis-rule out cervical radiculopathy-recently worsened  Neck pain/headaches    This is a 69 year old female with initial episode of right leg pain with possible twisting/turning while sleeping.  Symptoms could be suggestive of a lumbar radiculopathy.  EMG did show some evidence of lumbar radiculopathy though MRI was  negative.    She further complains of intermittent bilateral upper extremity numbness.  Exam today is noncontributory.  EMG did not show any entrapment neuropathy.  MRI C-spine does show neuroforaminal stenosis which could be related to the symptoms.      She now has some neck pain and some associated headaches which could also be related to the cervical neuroforaminal stenosis.    I have encouraged her to go back to exercise and do the exercises given to her by the /physical therapist so she does the exercises correctly.  If this does not resolve the headaches could consider medications.  Previously steroids were tried which were helpful.  Stable.    Discussed treatment of cervical/lumbar radiculopathy including steroids, epidural injections, physical therapy, surgery.      Right arm symptoms have recently worsened suggesting active cervical radiculopathy.  Will start with oral steroids and physical therapy.  If symptoms are not better we will consider an MRI of the C-spine in 3 months.  Would further recommend epidural injections/surgery if symptoms remain persistent.  Would also need to consider an EMG at that point.    Will monitor for right now.  Symptoms are not related to the cataract.  Follow-up with ophthalmologist for the cataract surgery.    Return back in 3 months.    -     Continue exercising.  -     Physical Therapy  Referral; Future  -     MR Cervical Spine w/o Contrast; Future  -     predniSONE (DELTASONE) 20 MG tablet; Take 3 tablets (60 mg) by mouth daily for 6 days. Take in AM with food.    Return in about 3 months (around 7/16/2025) for In-Clinic Visit (must), After testing.    Over 35 minutes were spent coordinating the care for the patient on the day of the encounter.  This includes previsit, during visit and post visit activities as documented above.  Counseling patient.  Worsening symptoms.  Prescription management/testing ordered.  (Activities include but not  inclusive of reviewing chart, reviewing outside records, reviewing labs and imaging study results as well as the images, patient visit time including getting history and exam,  use if applicable, review of test results with the patient and coming up with a plan in a shared model, counseling patient and family, education and answering patient questions, EMR , EMR diagnosis entry and problem list management, medication reconciliation and prescription management if applicable, paperwork if applicable, printing documents and documentation of the visit activities.)        Marshall Weeks MD  Neurologist  [facility]  Tel:- [phone]    This note was dictated using voice recognition software.  Any grammatical or context distortions are unintentional and inherent to the software.    The longitudinal plan of care for the diagnosis(es)/condition(s) as documented were addressed during this visit. Due to the added complexity in care, I will continue to support Ambreen in the subsequent management and with ongoing continuity of care.      Again, thank you for allowing me to participate in the care of your patient.        Sincerely,        Marshall Weeks MD    Electronically signed

## 2025-04-16 NOTE — PROGRESS NOTES
NEUROLOGY OUTPATIENT PROGRESS NOTE   Apr 16, 2025     CHIEF COMPLAINT/REASON FOR VISIT/REASON FOR CONSULT  Patient presents with:  Lumbar radiculopathy: Patient states when she bends down she has pain down her left side. Follow up    REASON FOR CONSULTATION-numbness/tingling    REFERRAL SOURCE  Dr. Jorge Blanton   Dr. Jorge Blanton    HISTORY OF PRESENT ILLNESS  Soco Ferguson is a 69 year old female seen today for evaluation of numbness/tingling.  She reports that the symptoms came on about a year ago.  She had gotten up from sleep and was in an awkward position.  Initially there was sharp shooting pain down the right leg.  There was no associated numbness.  This lasted for some time with spreading of the symptoms to the left leg.  She then had some intermittent numbness but no pain of the right arm and occasionally of the left arm.  The right leg symptoms and the left leg symptoms have resolved though she continues to have issues in the right arm and occasionally in the left arm.  The numbness is more towards the lateral portion of the hand.  Denies any weakness.  No significant neck pain or back pain.  She does have some history of arthritis in her low back has never had a scan officially done for the arthritis.  This was based on the scan done for osteoporosis.  Denies any difficulty with balance.  No dropping things with her hands.  Denies any cognitive issues.  She has had 1 episode of right cheek numbness.    2/20/24  Patient returns today.  She is stopped doing the exercises that she was doing at the gym where she was twisting her back lifting weights.  This has significantly helped with the symptoms.  Barely gets any numbness in her arms.  She is no longer having pain in the leg.    8/21/24  Patient returns today.  Has recently had cataract surgery.  Has not been too active.  Since June she has been having some neck pain and that is leading to headaches.  This is not very severe but the symptoms are  present every day.  Did do physical therapy which was helpful but has not been doing the exercises as she was concerned that it might make things worse.  Steroids did help initially.  Currently does not have any numbness or tingling except for rare right hand numbness.  No weakness in the hands.    4/16/25  Patient was urgently added today  1.  She reports that she was bending over to  something with her left hand when all of a sudden she heard a pop and a crack in her right shoulder and started as noticing some shooting pain down the right arm.  This is more of a sharp pain followed by dull pain.  No numbness or weakness in the hand.  Has also tried ice packs/heat.  Symptoms are slightly better.  Has been taking Tylenol.  Neck pain is still there.  2.  Was having intermittent cervicogenic headaches.  Thought that things will get better with cataract surgery but they have not  3.  Right leg is doing better no major back pain.  Does have back arthritis  No new concerns    Previous history is reviewed and this is unchanged.    PAST MEDICAL/SURGICAL HISTORY  Past Medical History:   Diagnosis Date    Abdominal pain, unspecified site     Created by Conversion     Arthritis 2/7/2024    Asymptomatic varicose veins     Created by Conversion     Blastomycosis     Created by Conversion     Calculus of kidney     Created by Conversion     Endometrial hyperplasia, unspecified     Created by Conversion     Hyperparathyroidism, unspecified     Created by Conversion     Lattice degeneration     Leiomyoma of uterus, unspecified     Created by Conversion     Myalgia and myositis, unspecified     Created by Conversion     Nausea alone     Created by Conversion     Osteoporosis, unspecified     Created by Conversion     Other nonspecific finding on examination of urine     Created by Conversion     Pain in joint, site unspecified     Created by Conversion     Restless legs syndrome (RLS)     Created by Conversion     Senile  osteoporosis     Created by Conversion     Ulcerative colitis, unspecified     Created by Conversion     Unspecified constipation     Created by Conversion     Unspecified sinusitis (chronic)     Created by Conversion     Unspecified symptom associated with female genital organs     Created by Conversion     Unspecified vitamin D deficiency     Created by Conversion      Patient Active Problem List   Diagnosis    Blastomycosis    Restless legs syndrome    Senile osteoporosis    Ulcerative colitis (H)    Venous insufficiency of both lower extremities    Articular disc disorder of temporomandibular joint    Peptic ulcer    Mixed hyperlipidemia    Combined form of age-related cataract, both eyes    Unstable angina pectoris (H)    Gastro-esophageal reflux disease with esophagitis    Diaphragmatic hernia    Chronic ulcerative proctitis (H)    Nephrolithiasis    Esophageal pain    Arthralgia of temporomandibular joint    Pulmonary nodules    Prediabetes   Significant for migraines, depression, cataracts, osteoporosis, GERD, peripheral arterial disease    FAMILY HISTORY  Family History   Problem Relation Age of Onset    Breast Cancer Other     Emphysema Mother     Varicose Veins Mother     Edema Mother     Lung Cancer Father     Breast Cancer Niece         Early 20's    Asthma Grandchild     Diabetes Brother     Glaucoma No family hx of     Macular Degeneration No family hx of    Negative for neurological problems.    SOCIAL HISTORY  Social History     Tobacco Use    Smoking status: Never     Passive exposure: Never    Smokeless tobacco: Never    Tobacco comments:     in HS and 20's socail only, never pack a day smoker   Vaping Use    Vaping status: Never Used   Substance Use Topics    Alcohol use: Yes     Alcohol/week: 1.7 standard drinks of alcohol    Drug use: No       SYSTEMS REVIEW  Twelve-system ROS was done and other than the HPI this was negative/positive for arm and leg pain, joint pain, numbness/tingling,  weakness paralysis, ringing in the ears, depression, stomach pain/distress.  No new symptoms/issues.    MEDICATIONS  Current Outpatient Medications   Medication Sig Dispense Refill    acetaminophen (TYLENOL) 325 MG tablet Take 325-650 mg by mouth every 6 hours as needed for mild pain      atorvastatin (LIPITOR) 40 MG tablet TAKE 1 TABLET BY MOUTH ONE TIME DAILY 90 tablet 2    calcium citrate-vitamin D (CITRACAL) 200-6.25 MG-MCG TABS per tablet Take 2 tablets by mouth every evening. With dinner      cholecalciferol 50 MCG (2000 UT) tablet Take 2 tablets by mouth every evening. With dinner      fluocinonide (LIDEX) 0.05 % external solution Apply and massage 1 application topically onto itchy spots on scalp at bedtime for 14 days, then as needed for itching*      hypromellose (GENTEAL SEVERE) ophthalmic gel 0.3% Place 1 drop into both eyes nightly as needed for dry eyes.      lidocaine, viscous, (XYLOCAINE) 2 % solution Swish and spit 15 mLs in mouth every 3 hours as needed for pain. ; Max 8 doses/24 hour period. 100 mL 2    omeprazole (PRILOSEC) 20 MG DR capsule Take 20 mg by mouth daily. Before a meal      predniSONE (DELTASONE) 20 MG tablet Take 3 tablets (60 mg) by mouth daily for 6 days. Take in AM with food. 18 tablet 0    Propylene Glycol (SYSTANE BALANCE OP) Apply 1 drop to eye as needed.       Current Facility-Administered Medications   Medication Dose Route Frequency Provider Last Rate Last Admin    denosumab (PROLIA) injection 60 mg  60 mg Subcutaneous Q6 Months    60 mg at 08/01/24 1251        PHYSICAL EXAMINATION  VITALS: /72   Pulse 71   Ht 1.524 m (5')   Wt 77.1 kg (170 lb)   LMP  (LMP Unknown)   BMI 33.20 kg/m    GENERAL: Healthy appearing, alert, no acute distress, normal habitus.  CARDIOVASCULAR: Extremities warm and well perfused. Pulses present.   NEUROLOGICAL:  Patient is awake and oriented to self, place and time.  Attention span is normal.  Memory is grossly intact.  Language is fluent  and follows commands appropriately.  Appropriate fund of knowledge. Cranial nerves 2-12 are intact. There is no pronator drift.  Motor exam shows 5/5 strength in all extremities.  Tone is symmetric bilaterally in upper and lower extremities.  Reflexes are symmetric and 2+ in upper extremities and lower extremities. Sensory exam is grossly intact to light touch, pin prick and vibration.  Finger to nose and heel to shin is without dysmetria.  Romberg is negative.  Gait is normal and the patient is able to do tandem walk and walk on toes and heels.  Exam stable.    DIAGNOSTICS  MRI Brain 2017  CONCLUSION:  1.  No evidence for acute or subacute infarct, hemorrhage, mass or obstructive type hydrocephalus.  2.  No definite abnormality along either trigeminal nerve.  3.  No pathologic contrast enhancement.  4.  Mild mucosal thickening in the maxillary, ethmoid, and sphenoid sinuses. No air-fluid level in the sinuses. Mastoids clear.    EMG  Interpretation:  This is a normal EMG.  There is no evidence of any large fiber nerve injury to explain the patient's current tingling symptoms.  Specifically no findings of cervical radiculopathy or large fiber peripheral neuropathy.  EMG does not rule out small fiber neuropathy and clinical correlation is recommended.     RELEVANT LABS  Component      Latest Ref Rng 5/1/2023  9:30 AM 9/12/2023  4:28 PM   Sodium      136 - 145 mmol/L  140    Potassium      3.4 - 5.3 mmol/L  4.3    Chloride      98 - 107 mmol/L  102    Carbon Dioxide (CO2)      22 - 29 mmol/L  27    Anion Gap      7 - 15 mmol/L  11    Urea Nitrogen      8.0 - 23.0 mg/dL  24.6 (H)    Creatinine      0.51 - 0.95 mg/dL  0.86    Calcium      8.8 - 10.2 mg/dL  10.0    Glucose      70 - 99 mg/dL  100 (H)    Alkaline Phosphatase      35 - 104 U/L  92    AST      0 - 45 U/L  22    ALT      0 - 50 U/L  21    Protein Total      6.4 - 8.3 g/dL  7.8    Albumin      3.5 - 5.2 g/dL  4.7    Bilirubin Total      <=1.2 mg/dL  0.6    GFR  Estimate      >60 mL/min/1.73m2  74    Hemoglobin A1C      0.0 - 5.6 % 5.4  5.5    Magnesium      1.7 - 2.3 mg/dL  2.1    TSH      0.30 - 4.20 uIU/mL  1.22       Legend:  (H) High    OUTSIDE RECORDS  Outside referral notes and chart notes were reviewed and pertinent information has been summarized (in addition to the HPI):-      EMG  CLINICAL INTERPRETATION:  This is an slightly abnormal nerve conduction and EMG study.  The study does not show any evidence of neuropathy.  The needle study shows mild spontaneous activity in the right L4-L5 muscle groups.  Further clinical correlation is needed.     MRI C spine  IMPRESSION:  1.  Multilevel spondylosis described above.     2.  C5-C6 moderate to severe right foraminal stenosis.     3.  C6-C7 moderate to severe left foraminal stenosis.     4.  No significant thecal sac stenosis.     5.  No abnormal cord signal.    MRI L spine  IMPRESSION:  1.  Transitional lumbosacral anatomy. S1-S2 well-developed disc space with bilateral S1 partial lumbarization.     2.  Mild multilevel spondylosis described above.     3.  No significant thecal sac stenosis.     4.  No significant neural foraminal stenosis.      IMPRESSION/REPORT/PLAN  Numbness in both upper extremities  Right leg pain  Suspected lumbar radiculopathy  Cervical neuroforaminal stenosis-rule out cervical radiculopathy-recently worsened  Neck pain/headaches    This is a 69 year old female with initial episode of right leg pain with possible twisting/turning while sleeping.  Symptoms could be suggestive of a lumbar radiculopathy.  EMG did show some evidence of lumbar radiculopathy though MRI was negative.    She further complains of intermittent bilateral upper extremity numbness.  Exam today is noncontributory.  EMG did not show any entrapment neuropathy.  MRI C-spine does show neuroforaminal stenosis which could be related to the symptoms.      She now has some neck pain and some associated headaches which could also be  related to the cervical neuroforaminal stenosis.    I have encouraged her to go back to exercise and do the exercises given to her by the /physical therapist so she does the exercises correctly.  If this does not resolve the headaches could consider medications.  Previously steroids were tried which were helpful.  Stable.    Discussed treatment of cervical/lumbar radiculopathy including steroids, epidural injections, physical therapy, surgery.      Right arm symptoms have recently worsened suggesting active cervical radiculopathy.  Will start with oral steroids and physical therapy.  If symptoms are not better we will consider an MRI of the C-spine in 3 months.  Would further recommend epidural injections/surgery if symptoms remain persistent.  Would also need to consider an EMG at that point.    Will monitor for right now.  Symptoms are not related to the cataract.  Follow-up with ophthalmologist for the cataract surgery.    Return back in 3 months.    -     Continue exercising.  -     Physical Therapy  Referral; Future  -     MR Cervical Spine w/o Contrast; Future  -     predniSONE (DELTASONE) 20 MG tablet; Take 3 tablets (60 mg) by mouth daily for 6 days. Take in AM with food.    Return in about 3 months (around 7/16/2025) for In-Clinic Visit (must), After testing.    Over 35 minutes were spent coordinating the care for the patient on the day of the encounter.  This includes previsit, during visit and post visit activities as documented above.  Counseling patient.  Worsening symptoms.  Prescription management/testing ordered.  (Activities include but not inclusive of reviewing chart, reviewing outside records, reviewing labs and imaging study results as well as the images, patient visit time including getting history and exam,  use if applicable, review of test results with the patient and coming up with a plan in a shared model, counseling patient and family, education and  answering patient questions, EMR , EMR diagnosis entry and problem list management, medication reconciliation and prescription management if applicable, paperwork if applicable, printing documents and documentation of the visit activities.)        Marshall Weeks MD  Neurologist  [facility]  Tel:- [phone]    This note was dictated using voice recognition software.  Any grammatical or context distortions are unintentional and inherent to the software.    The longitudinal plan of care for the diagnosis(es)/condition(s) as documented were addressed during this visit. Due to the added complexity in care, I will continue to support Ambreen in the subsequent management and with ongoing continuity of care.

## 2025-05-01 ENCOUNTER — THERAPY VISIT (OUTPATIENT)
Dept: PHYSICAL THERAPY | Facility: CLINIC | Age: 70
End: 2025-05-01
Attending: PSYCHIATRY & NEUROLOGY
Payer: COMMERCIAL

## 2025-05-01 DIAGNOSIS — M54.12 CERVICAL RADICULOPATHY: ICD-10-CM

## 2025-05-01 NOTE — PROGRESS NOTES
PHYSICAL THERAPY EVALUATION  Type of Visit: Evaluation       Fall Risk Screen:  Have you fallen 2 or more times in the past year?: No  Have you fallen and had an injury in the past year?: No    Subjective   A few months ago was reaching down for something and heard crunch and pain in R shoulder and had pain that went down into her hand. This has improved and is no longer constant but comes back with certain movements. These movements include reaching arm behind back, lifting arm overhead or across body. Also pain with lifting dishes.   Pt is R handed. Sometimes gets numbness and tingling in radial side of hand.   Pain from lateral shoulder to the top of the hand.  Has had recent illness so reduced activity level, starting to feel better now. Retired and not currently exercising.         Presenting condition or subjective complaint:    Date of onset: 04/16/25 (MD order)    Relevant medical history: Chest pain   Dates & types of surgery:      Prior diagnostic imaging/testing results: Other [facility] neurologist     Cervical MRI 2023:   1.  Multilevel spondylosis described above.  2.  C5-C6 moderate to severe right foraminal stenosis.  3.  C6-C7 moderate to severe left foraminal stenosis.  4.  No significant thecal sac stenosis.  5.  No abnormal cord signal.    Prior therapy history for the same diagnosis, illness or injury: No        Living Environment  Social support: Alone   Type of home: [facility]   Stairs to enter the home: No       Ramp: No   Stairs inside the home: No       Help at home: None  Equipment owned:       Employment: No    Hobbies/Interests:      Patient goals for therapy: move my arm without pain    Pain assessment: Pain present     Objective   CERVICAL SPINE EVALUATION  INTEGUMENTARY (edema, incisions): WNL  POSTURE: Standing Posture: Rounded shoulders, Forward head  ROM: AROM WNL  Slight pain with cervical extension, and B sidebend  MYOTOMES: WNL  DERMATOMES: WNL  NEURAL TENSION:  Positive neural  tension in right radial, ulnar, median nerves      SHOULDER EVALUATION  PAIN: Pain Level at Rest: 0/10  Pain Level with Use: 6/10  ROM:   (Degrees) Left AROM Right AROM    Shoulder Flexion WNL WNL, * pain at end range   Shoulder Extension WNL WNL   Shoulder Abduction WNL 90 *    Shoulder Internal Rotation WNL WNL   Shoulder External Rotation WNL 45 *    Shoulder Horizontal Adduction WNL 45 *   Shoulder Extension  IR WNL Cannot get arm behind back, to R hip     STRENGTH:  pain with resisted abduction, extension, ER  PALPATION:  TTP throughout rotator cuff, periscapular muscles, biceps tendon, deltoid         Assessment & Plan   CLINICAL IMPRESSIONS  Medical Diagnosis: cervical radiculopathy    Treatment Diagnosis: R shoulder/UE pain   Impression/Assessment: Patient is a 69 year old female with R shoulder/arm complaints.  The following significant findings have been identified: Pain, Decreased ROM/flexibility, Decreased joint mobility, Decreased strength, Impaired muscle performance, Decreased activity tolerance, and Impaired posture. These impairments interfere with their ability to perform self care tasks, recreational activities, and community mobility as compared to previous level of function.     Clinical Decision Making (Complexity):  Clinical Presentation: Stable/Uncomplicated  Clinical Presentation Rationale: based on medical and personal factors listed in PT evaluation  Clinical Decision Making (Complexity): Low complexity    PLAN OF CARE  Treatment Interventions:  Interventions: Manual Therapy, Neuromuscular Re-education, Therapeutic Activity, Therapeutic Exercise    Long Term Goals     PT Goal 1  Goal Identifier: Reaching  Goal Description: pt will be able to reach across body without increased pain  Rationale: to maximize safety and independence with performance of ADLs and functional tasks  Target Date: 07/24/25  PT Goal 2  Goal Identifier: Behind back  Goal Description: pt will be able to reach behind  back for washing and dressing without increased pain  Rationale: to maximize safety and independence with performance of ADLs and functional tasks  Target Date: 07/24/25      Frequency of Treatment: once per week  Duration of Treatment: 12 weeks    Recommended Referrals to Other Professionals:  NA  Education Assessment:   Learner/Method: Patient  Education Comments: Pt educated on PT role and plan of care    Risks and benefits of evaluation/treatment have been explained.   Patient/Family/caregiver agrees with Plan of Care.     Evaluation Time:     PT Eval, Low Complexity Minutes (53541): 20     Signing Clinician: [facility]                                                                                   OUTPATIENT PHYSICAL THERAPY      PLAN OF TREATMENT FOR OUTPATIENT REHABILITATION   Patient's Last Name, First Name, Soco Dutton YOB: 1955   Provider's Name   [facility]   Medical Record No.  [id number]     Onset Date: 04/16/25 (MD order)  Start of Care Date: 05/01/25     Medical Diagnosis:  cervical radiculopathy      PT Treatment Diagnosis:  R shoulder/UE pain Plan of Treatment  Frequency/Duration: once per week/ 12 weeks    Certification date from 05/01/25 to 07/24/25         See note for plan of treatment details and functional goals     Pati Garcia, PT                         I CERTIFY THE NEED FOR THESE SERVICES FURNISHED UNDER        THIS PLAN OF TREATMENT AND WHILE UNDER MY CARE     (Physician attestation of this document indicates review and certification of the therapy plan).              Referring Provider:  Marshall Weeks    Initial Assessment  See Epic Evaluation- Start of Care Date: 05/01/25

## 2025-06-05 ENCOUNTER — TELEPHONE (OUTPATIENT)
Dept: OPHTHALMOLOGY | Facility: CLINIC | Age: 70
End: 2025-06-05
Payer: COMMERCIAL

## 2025-06-05 PROBLEM — M54.12 CERVICAL RADICULOPATHY: Status: RESOLVED | Noted: 2025-05-01 | Resolved: 2025-06-05

## 2025-06-16 NOTE — TELEPHONE ENCOUNTER
FUTURE VISIT INFORMATION:  Appointment Date: 09/22/2025  Appointment Time: 12:45 PM      REFERRAL INFORMATION:  Referring Provider:  Anaid Sloan MD   Referring Clinic:  Ophthalmology   Reason for Visit/Diagnosis: lid consult.      NOTES STATUS DETAILS   OFFICE NOTE from Referring Provider Internal [facility]    06/02/2025 Telephone encounter referral note w/ Anaid Sloan MD    OFFICE NOTE from [facility] Clin[facility]  * Include Visual Field Tests Internal     04/16/2025 OV with Anaid Sloan MD    OFFICE NOTE from Other Specialists  * Neurology, Dermatology, ENT Internal [facility] [place]    04/16/2025 OV with Marshall Weeks MD, Neurology.     [facility] [place]  09/23/2024 Jose Maria Pickett, OD     *Other OVs with Eye in EPIC   [facility]Brain[facility]     MRI FUTURE MR ORDERED Not yet scheduled

## 2025-07-15 ENCOUNTER — HOSPITAL ENCOUNTER (OUTPATIENT)
Dept: MRI IMAGING | Facility: CLINIC | Age: 70
Discharge: HOME | End: 2025-07-15
Attending: PSYCHIATRY & NEUROLOGY
Payer: COMMERCIAL

## 2025-07-15 DIAGNOSIS — M54.12 CERVICAL RADICULOPATHY: ICD-10-CM

## 2025-07-15 PROCEDURE — 72141 MRI NECK SPINE W/O DYE: CPT

## 2025-07-22 ENCOUNTER — TELEPHONE (OUTPATIENT)
Dept: NEUROLOGY | Facility: CLINIC | Age: 70
End: 2025-07-22
Payer: COMMERCIAL

## 2025-07-22 NOTE — TELEPHONE ENCOUNTER
Dr. Weeks, are you able to comment on patient's MRI results from 7/15?    Kirsten KAMARA RN  [facility]

## 2025-07-22 NOTE — TELEPHONE ENCOUNTER
[facility] Call Center    Phone Message    May a detailed message be left on voicemail: yes     Reason for Call:  Patient is calling for MRI results, records in Epic- ok to call anytime     Action Taken: Other: [facility]    Travel Screening: Not Applicable     Date of Service:

## 2025-07-23 NOTE — TELEPHONE ENCOUNTER
Nu3 message sent to pt with result note.     Jay KAMARA RN, BSN  [facility] Neurology

## 2025-07-23 NOTE — TELEPHONE ENCOUNTER
1.  MRI C-spine shows some possible pinched nerves on the right side which could be causing her symptoms  2.  There is evidence of mild spinal cord damage though this is stable compared to the previous MRI (not new).  We will have to further investigate this.

## 2025-07-23 NOTE — TELEPHONE ENCOUNTER
Before I call pt with your result note below, do you have any further recommendations for work up/treatment prior to next follow up 8/25? Pt did course of steroids in April and has been participating in PT. Just wanting your input prior to call to her.     Thank you  Jay KAMARA RN, BSN  [facility]rology

## 2025-08-20 ENCOUNTER — TELEPHONE (OUTPATIENT)
Dept: INTERNAL MEDICINE | Facility: CLINIC | Age: 70
End: 2025-08-20
Payer: COMMERCIAL

## 2025-08-20 DIAGNOSIS — R07.0 THROAT PAIN: Primary | ICD-10-CM

## 2025-08-25 ENCOUNTER — OFFICE VISIT (OUTPATIENT)
Dept: NEUROLOGY | Facility: CLINIC | Age: 70
End: 2025-08-25
Payer: COMMERCIAL

## 2025-08-25 VITALS
SYSTOLIC BLOOD PRESSURE: 124 MMHG | BODY MASS INDEX: 33.79 KG/M2 | DIASTOLIC BLOOD PRESSURE: 77 MMHG | HEART RATE: 74 BPM | WEIGHT: 173 LBS

## 2025-08-25 DIAGNOSIS — M54.12 CERVICAL RADICULOPATHY: Primary | ICD-10-CM

## 2025-08-25 DIAGNOSIS — M54.16 LUMBAR RADICULOPATHY: ICD-10-CM

## 2025-08-25 DIAGNOSIS — R20.2 RIGHT ARM AND RIGHT FACE TINGLING: ICD-10-CM

## 2025-08-25 PROCEDURE — 99214 OFFICE O/P EST MOD 30 MIN: CPT | Performed by: PSYCHIATRY & NEUROLOGY

## 2025-08-25 PROCEDURE — 3074F SYST BP LT 130 MM HG: CPT | Performed by: PSYCHIATRY & NEUROLOGY

## 2025-08-25 PROCEDURE — G2211 COMPLEX E/M VISIT ADD ON: HCPCS | Performed by: PSYCHIATRY & NEUROLOGY

## 2025-08-25 PROCEDURE — 3078F DIAST BP <80 MM HG: CPT | Performed by: PSYCHIATRY & NEUROLOGY

## 2025-08-25 RX ORDER — PREDNISONE 20 MG/1
60 TABLET ORAL DAILY
Qty: 18 TABLET | Refills: 0 | Status: SHIPPED | OUTPATIENT
Start: 2025-08-25 | End: 2025-08-31

## 2025-08-25 RX ORDER — GABAPENTIN 300 MG/1
300 CAPSULE ORAL 3 TIMES DAILY
Qty: 270 CAPSULE | Refills: 1 | Status: SHIPPED | OUTPATIENT
Start: 2025-08-25

## 2025-09-02 ENCOUNTER — TELEPHONE (OUTPATIENT)
Dept: NEUROLOGY | Facility: CLINIC | Age: 70
End: 2025-09-02
Payer: COMMERCIAL

## 2025-09-22 ENCOUNTER — PRE VISIT (OUTPATIENT)
Dept: OPHTHALMOLOGY | Facility: CLINIC | Age: 70
End: 2025-09-22

## (undated) DEVICE — TAPE MICROPORE 2"X1.5YD 1530S-2

## (undated) DEVICE — PACK CATARACT CUSTOM ASC SEY15CPUMC

## (undated) DEVICE — EYE CANN IRR 25GA HYDRODISSECTING 585037

## (undated) DEVICE — EYE SHIELD PLASTIC

## (undated) DEVICE — LINEN TOWEL PACK X5 5464

## (undated) DEVICE — SOL WATER IRRIG 500ML BOTTLE 2F7113

## (undated) DEVICE — CATARACT BLADE PACK 2.4MM 58001897

## (undated) DEVICE — DRAPE EYE SHEET 8441

## (undated) DEVICE — EYE TIP IRRIGATION & ASPIRATION POLYMER 35D BENT 8065751511

## (undated) DEVICE — EYE PACK CUSTOM ANTERIOR 30DEG TIP CENTURION PPK6682-04

## (undated) DEVICE — GLOVE BIOGEL PI ULTRATOUCH G SZ 7.5 42175

## (undated) DEVICE — DRSG TEGADERM 4X4 3/4" 1626W

## (undated) RX ORDER — ACETAMINOPHEN 325 MG/1
TABLET ORAL
Status: DISPENSED
Start: 2024-07-25

## (undated) RX ORDER — ACETAMINOPHEN 325 MG/1
TABLET ORAL
Status: DISPENSED
Start: 2024-08-08

## (undated) RX ORDER — PROPOFOL 10 MG/ML
INJECTION, EMULSION INTRAVENOUS
Status: DISPENSED
Start: 2024-07-25

## (undated) RX ORDER — FENTANYL CITRATE 50 UG/ML
INJECTION, SOLUTION INTRAMUSCULAR; INTRAVENOUS
Status: DISPENSED
Start: 2024-08-08